# Patient Record
Sex: FEMALE | Race: WHITE | Employment: OTHER | URBAN - METROPOLITAN AREA
[De-identification: names, ages, dates, MRNs, and addresses within clinical notes are randomized per-mention and may not be internally consistent; named-entity substitution may affect disease eponyms.]

---

## 2017-03-30 ENCOUNTER — OFFICE VISIT (OUTPATIENT)
Dept: FAMILY MEDICINE CLINIC | Age: 57
End: 2017-03-30

## 2017-03-30 VITALS
HEART RATE: 65 BPM | WEIGHT: 244.8 LBS | SYSTOLIC BLOOD PRESSURE: 128 MMHG | TEMPERATURE: 98.7 F | HEIGHT: 65 IN | OXYGEN SATURATION: 98 % | DIASTOLIC BLOOD PRESSURE: 80 MMHG | BODY MASS INDEX: 40.79 KG/M2

## 2017-03-30 DIAGNOSIS — L03.311 CELLULITIS OF ABDOMINAL WALL: Primary | ICD-10-CM

## 2017-03-30 DIAGNOSIS — J30.2 SEASONAL ALLERGIC RHINITIS, UNSPECIFIED ALLERGIC RHINITIS TRIGGER: ICD-10-CM

## 2017-03-30 PROCEDURE — 99213 OFFICE O/P EST LOW 20 MIN: CPT | Performed by: FAMILY MEDICINE

## 2017-03-30 PROCEDURE — G8417 CALC BMI ABV UP PARAM F/U: HCPCS | Performed by: FAMILY MEDICINE

## 2017-03-30 PROCEDURE — G8484 FLU IMMUNIZE NO ADMIN: HCPCS | Performed by: FAMILY MEDICINE

## 2017-03-30 PROCEDURE — G8427 DOCREV CUR MEDS BY ELIG CLIN: HCPCS | Performed by: FAMILY MEDICINE

## 2017-03-30 PROCEDURE — 3014F SCREEN MAMMO DOC REV: CPT | Performed by: FAMILY MEDICINE

## 2017-03-30 PROCEDURE — 3017F COLORECTAL CA SCREEN DOC REV: CPT | Performed by: FAMILY MEDICINE

## 2017-03-30 PROCEDURE — 1036F TOBACCO NON-USER: CPT | Performed by: FAMILY MEDICINE

## 2017-03-30 RX ORDER — ATORVASTATIN CALCIUM 40 MG/1
40 TABLET, FILM COATED ORAL DAILY
COMMUNITY
Start: 2017-03-14 | End: 2020-11-05

## 2017-03-30 RX ORDER — SPIRONOLACTONE 25 MG/1
25 TABLET ORAL DAILY
Status: ON HOLD | COMMUNITY
Start: 2017-03-14 | End: 2018-03-09 | Stop reason: ALTCHOICE

## 2017-03-30 RX ORDER — LOSARTAN POTASSIUM 100 MG/1
50 TABLET ORAL DAILY
COMMUNITY
Start: 2017-03-14 | End: 2017-07-31

## 2017-03-30 RX ORDER — CLINDAMYCIN HYDROCHLORIDE 300 MG/1
300 CAPSULE ORAL 3 TIMES DAILY
Qty: 21 CAPSULE | Refills: 0 | Status: SHIPPED | OUTPATIENT
Start: 2017-03-30 | End: 2017-04-06

## 2017-03-30 ASSESSMENT — ENCOUNTER SYMPTOMS
COUGH: 0
WHEEZING: 0
DIARRHEA: 0
RHINORRHEA: 0
CONSTIPATION: 0
SHORTNESS OF BREATH: 0
SORE THROAT: 0
ABDOMINAL PAIN: 0

## 2017-04-03 ENCOUNTER — HOSPITAL ENCOUNTER (OUTPATIENT)
Age: 57
Setting detail: SPECIMEN
Discharge: HOME | End: 2017-04-03
Payer: COMMERCIAL

## 2017-04-03 ENCOUNTER — NURSE ONLY (OUTPATIENT)
Dept: FAMILY MEDICINE CLINIC | Age: 57
End: 2017-04-03

## 2017-04-03 DIAGNOSIS — E55.9 HYPOVITAMINOSIS D: ICD-10-CM

## 2017-04-03 DIAGNOSIS — I48.0 PAROXYSMAL ATRIAL FIBRILLATION (HCC): ICD-10-CM

## 2017-04-03 DIAGNOSIS — E78.5 DYSLIPIDEMIA: ICD-10-CM

## 2017-04-03 DIAGNOSIS — I10 ESSENTIAL HYPERTENSION: Primary | Chronic | ICD-10-CM

## 2017-04-03 DIAGNOSIS — I10 ESSENTIAL HYPERTENSION: Chronic | ICD-10-CM

## 2017-04-03 LAB
ANION GAP SERPL CALCULATED.3IONS-SCNC: 12 MEQ/L (ref 7–13)
BASOPHILS ABSOLUTE: 0 K/UL (ref 0–0.2)
BASOPHILS RELATIVE PERCENT: 0.5 %
BUN BLDV-MCNC: 18 MG/DL (ref 6–20)
CALCIUM SERPL-MCNC: 10.1 MG/DL (ref 8.6–10.2)
CHLORIDE BLD-SCNC: 99 MEQ/L (ref 98–107)
CHOLESTEROL, TOTAL: 143 MG/DL (ref 0–199)
CO2: 26 MEQ/L (ref 22–29)
CREAT SERPL-MCNC: 0.81 MG/DL (ref 0.5–0.9)
EOSINOPHILS ABSOLUTE: 0.2 K/UL (ref 0–0.7)
EOSINOPHILS RELATIVE PERCENT: 1.8 %
GFR AFRICAN AMERICAN: >60
GFR NON-AFRICAN AMERICAN: >60
GLUCOSE BLD-MCNC: 102 MG/DL (ref 74–109)
HCT VFR BLD CALC: 44 % (ref 37–47)
HDLC SERPL-MCNC: 49 MG/DL (ref 40–59)
HEMOGLOBIN: 14.5 G/DL (ref 12–16)
LDL CHOLESTEROL CALCULATED: 75 MG/DL (ref 0–129)
LYMPHOCYTES ABSOLUTE: 2.3 K/UL (ref 1–4.8)
LYMPHOCYTES RELATIVE PERCENT: 25.1 %
MCH RBC QN AUTO: 29.3 PG (ref 27–31.3)
MCHC RBC AUTO-ENTMCNC: 32.9 % (ref 33–37)
MCV RBC AUTO: 88.9 FL (ref 82–100)
MONOCYTES ABSOLUTE: 0.7 K/UL (ref 0.2–0.8)
MONOCYTES RELATIVE PERCENT: 7.5 %
NEUTROPHILS ABSOLUTE: 6 K/UL (ref 1.4–6.5)
NEUTROPHILS RELATIVE PERCENT: 65.1 %
PDW BLD-RTO: 14.2 % (ref 11.5–14.5)
PLATELET # BLD: 227 K/UL (ref 130–400)
POTASSIUM SERPL-SCNC: 4 MEQ/L (ref 3.5–5.1)
RBC # BLD: 4.96 M/UL (ref 4.2–5.4)
SODIUM BLD-SCNC: 137 MEQ/L (ref 132–144)
TRIGL SERPL-MCNC: 97 MG/DL (ref 0–200)
VITAMIN D 25-HYDROXY: 23.1 NG/ML (ref 30–100)
WBC # BLD: 9.2 K/UL (ref 4.8–10.8)

## 2017-04-03 PROCEDURE — 82306 VITAMIN D 25 HYDROXY: CPT

## 2017-04-03 PROCEDURE — 80061 LIPID PANEL: CPT

## 2017-04-03 PROCEDURE — 80048 BASIC METABOLIC PNL TOTAL CA: CPT

## 2017-04-03 PROCEDURE — 36415 COLL VENOUS BLD VENIPUNCTURE: CPT | Performed by: FAMILY MEDICINE

## 2017-04-03 PROCEDURE — 85025 COMPLETE CBC W/AUTO DIFF WBC: CPT

## 2017-04-05 DIAGNOSIS — R79.89 LOW VITAMIN D LEVEL: Primary | ICD-10-CM

## 2017-04-10 ENCOUNTER — OFFICE VISIT (OUTPATIENT)
Dept: FAMILY MEDICINE CLINIC | Age: 57
End: 2017-04-10

## 2017-04-10 VITALS
BODY MASS INDEX: 40.8 KG/M2 | OXYGEN SATURATION: 93 % | DIASTOLIC BLOOD PRESSURE: 84 MMHG | SYSTOLIC BLOOD PRESSURE: 132 MMHG | TEMPERATURE: 98.2 F | HEART RATE: 66 BPM | HEIGHT: 64 IN | WEIGHT: 239 LBS

## 2017-04-10 DIAGNOSIS — F41.9 ANXIETY: ICD-10-CM

## 2017-04-10 DIAGNOSIS — Z00.00 ANNUAL PHYSICAL EXAM: ICD-10-CM

## 2017-04-10 DIAGNOSIS — J06.9 URI WITH COUGH AND CONGESTION: Primary | ICD-10-CM

## 2017-04-10 DIAGNOSIS — L73.9 FOLLICULITIS: ICD-10-CM

## 2017-04-10 DIAGNOSIS — R09.81 NASAL CONGESTION: ICD-10-CM

## 2017-04-10 PROCEDURE — 3014F SCREEN MAMMO DOC REV: CPT | Performed by: FAMILY MEDICINE

## 2017-04-10 PROCEDURE — G8417 CALC BMI ABV UP PARAM F/U: HCPCS | Performed by: FAMILY MEDICINE

## 2017-04-10 PROCEDURE — 99214 OFFICE O/P EST MOD 30 MIN: CPT | Performed by: FAMILY MEDICINE

## 2017-04-10 PROCEDURE — 1036F TOBACCO NON-USER: CPT | Performed by: FAMILY MEDICINE

## 2017-04-10 PROCEDURE — 3017F COLORECTAL CA SCREEN DOC REV: CPT | Performed by: FAMILY MEDICINE

## 2017-04-10 PROCEDURE — G8427 DOCREV CUR MEDS BY ELIG CLIN: HCPCS | Performed by: FAMILY MEDICINE

## 2017-04-10 RX ORDER — POTASSIUM CHLORIDE 20 MEQ/1
20 TABLET, EXTENDED RELEASE ORAL DAILY
Status: ON HOLD | COMMUNITY
Start: 2017-03-16 | End: 2017-11-12

## 2017-04-10 RX ORDER — OXYMETAZOLINE HYDROCHLORIDE 0.05 G/100ML
2 SPRAY NASAL 2 TIMES DAILY
Qty: 14.7 ML | Refills: 0 | Status: SHIPPED | OUTPATIENT
Start: 2017-04-10 | End: 2017-04-13

## 2017-04-10 RX ORDER — PSEUDOEPHEDRINE HCL 120 MG/1
120 TABLET, FILM COATED, EXTENDED RELEASE ORAL EVERY 12 HOURS
Qty: 14 TABLET | Refills: 0 | Status: SHIPPED | OUTPATIENT
Start: 2017-04-10 | End: 2017-04-17

## 2017-04-10 RX ORDER — PAROXETINE HYDROCHLORIDE 20 MG/1
20 TABLET, FILM COATED ORAL EVERY MORNING
Qty: 90 TABLET | Refills: 3 | Status: SHIPPED | OUTPATIENT
Start: 2017-04-10 | End: 2018-04-16

## 2017-04-10 ASSESSMENT — ENCOUNTER SYMPTOMS
RHINORRHEA: 0
CONSTIPATION: 0
SHORTNESS OF BREATH: 0
SORE THROAT: 0
DIARRHEA: 0
ABDOMINAL PAIN: 0
WHEEZING: 0
COUGH: 0

## 2017-04-10 ASSESSMENT — PATIENT HEALTH QUESTIONNAIRE - PHQ9
2. FEELING DOWN, DEPRESSED OR HOPELESS: 0
SUM OF ALL RESPONSES TO PHQ9 QUESTIONS 1 & 2: 0
1. LITTLE INTEREST OR PLEASURE IN DOING THINGS: 0
SUM OF ALL RESPONSES TO PHQ QUESTIONS 1-9: 0

## 2017-04-19 ENCOUNTER — TELEPHONE (OUTPATIENT)
Dept: FAMILY MEDICINE CLINIC | Age: 57
End: 2017-04-19

## 2017-04-19 DIAGNOSIS — B37.31 VAGINAL CANDIDA: Primary | ICD-10-CM

## 2017-04-19 RX ORDER — FLUCONAZOLE 150 MG/1
150 TABLET ORAL ONCE
Qty: 1 TABLET | Refills: 0 | Status: SHIPPED | OUTPATIENT
Start: 2017-04-19 | End: 2017-04-19

## 2017-05-24 ENCOUNTER — HOSPITAL ENCOUNTER (EMERGENCY)
Age: 57
Discharge: HOME | End: 2017-05-24
Attending: EMERGENCY MEDICINE
Payer: COMMERCIAL

## 2017-05-24 VITALS
HEART RATE: 56 BPM | TEMPERATURE: 98 F | SYSTOLIC BLOOD PRESSURE: 111 MMHG | OXYGEN SATURATION: 96 % | DIASTOLIC BLOOD PRESSURE: 59 MMHG | RESPIRATION RATE: 10 BRPM | WEIGHT: 220 LBS | BODY MASS INDEX: 37.56 KG/M2 | HEIGHT: 64 IN

## 2017-05-24 DIAGNOSIS — M79.89 LEG SWELLING: Primary | ICD-10-CM

## 2017-05-24 LAB
ALBUMIN SERPL-MCNC: 4.3 G/DL (ref 3.9–4.9)
ALP BLD-CCNC: 105 U/L (ref 40–130)
ALT SERPL-CCNC: 16 U/L (ref 0–33)
ANION GAP SERPL CALCULATED.3IONS-SCNC: 14 MEQ/L (ref 7–13)
AST SERPL-CCNC: 17 U/L (ref 0–35)
BASOPHILS ABSOLUTE: 0 K/UL (ref 0–0.2)
BASOPHILS RELATIVE PERCENT: 0.2 %
BILIRUB SERPL-MCNC: 0.5 MG/DL (ref 0–1.2)
BUN BLDV-MCNC: 27 MG/DL (ref 6–20)
CALCIUM SERPL-MCNC: 10 MG/DL (ref 8.6–10.2)
CHLORIDE BLD-SCNC: 98 MEQ/L (ref 98–107)
CO2: 23 MEQ/L (ref 22–29)
CREAT SERPL-MCNC: 1.17 MG/DL (ref 0.5–0.9)
EOSINOPHILS ABSOLUTE: 0.2 K/UL (ref 0–0.7)
EOSINOPHILS RELATIVE PERCENT: 2.2 %
GFR AFRICAN AMERICAN: 57.2
GFR NON-AFRICAN AMERICAN: 47.3
GLOBULIN: 3.6 G/DL (ref 2.3–3.5)
GLUCOSE BLD-MCNC: 99 MG/DL (ref 74–109)
HCT VFR BLD CALC: 40.6 % (ref 37–47)
HEMOGLOBIN: 13.8 G/DL (ref 12–16)
INR BLD: 0.9
LYMPHOCYTES ABSOLUTE: 1.6 K/UL (ref 1–4.8)
LYMPHOCYTES RELATIVE PERCENT: 14.9 %
MCH RBC QN AUTO: 30.1 PG (ref 27–31.3)
MCHC RBC AUTO-ENTMCNC: 33.9 % (ref 33–37)
MCV RBC AUTO: 88.9 FL (ref 82–100)
MONOCYTES ABSOLUTE: 0.9 K/UL (ref 0.2–0.8)
MONOCYTES RELATIVE PERCENT: 8.9 %
NEUTROPHILS ABSOLUTE: 7.9 K/UL (ref 1.4–6.5)
NEUTROPHILS RELATIVE PERCENT: 73.8 %
PDW BLD-RTO: 13.6 % (ref 11.5–14.5)
PLATELET # BLD: 300 K/UL (ref 130–400)
POTASSIUM SERPL-SCNC: 4.7 MEQ/L (ref 3.5–5.1)
PRO-BNP: 164 PG/ML
PROTHROMBIN TIME: 9.8 SEC (ref 9.6–12.3)
RBC # BLD: 4.57 M/UL (ref 4.2–5.4)
SODIUM BLD-SCNC: 135 MEQ/L (ref 132–144)
TOTAL PROTEIN: 7.9 G/DL (ref 6.4–8.1)
TSH SERPL DL<=0.05 MIU/L-ACNC: 2.95 UIU/ML (ref 0.27–4.2)
WBC # BLD: 10.7 K/UL (ref 4.8–10.8)

## 2017-05-24 PROCEDURE — 96374 THER/PROPH/DIAG INJ IV PUSH: CPT

## 2017-05-24 PROCEDURE — 85025 COMPLETE CBC W/AUTO DIFF WBC: CPT

## 2017-05-24 PROCEDURE — 83880 ASSAY OF NATRIURETIC PEPTIDE: CPT

## 2017-05-24 PROCEDURE — 84443 ASSAY THYROID STIM HORMONE: CPT

## 2017-05-24 PROCEDURE — 99284 EMERGENCY DEPT VISIT MOD MDM: CPT

## 2017-05-24 PROCEDURE — 93005 ELECTROCARDIOGRAM TRACING: CPT

## 2017-05-24 PROCEDURE — 80053 COMPREHEN METABOLIC PANEL: CPT

## 2017-05-24 PROCEDURE — 85610 PROTHROMBIN TIME: CPT

## 2017-05-24 PROCEDURE — 6360000002 HC RX W HCPCS: Performed by: EMERGENCY MEDICINE

## 2017-05-24 RX ORDER — AMLODIPINE BESYLATE 10 MG/1
10 TABLET ORAL DAILY
COMMUNITY
End: 2021-05-24

## 2017-05-24 RX ORDER — FUROSEMIDE 10 MG/ML
20 INJECTION INTRAMUSCULAR; INTRAVENOUS ONCE
Status: COMPLETED | OUTPATIENT
Start: 2017-05-24 | End: 2017-05-24

## 2017-05-24 RX ORDER — FUROSEMIDE 20 MG/1
20 TABLET ORAL DAILY
Qty: 10 TABLET | Refills: 0 | Status: SHIPPED | OUTPATIENT
Start: 2017-05-24 | End: 2017-07-31

## 2017-05-24 RX ADMIN — FUROSEMIDE 20 MG: 10 INJECTION, SOLUTION INTRAMUSCULAR; INTRAVENOUS at 19:50

## 2017-05-24 ASSESSMENT — ENCOUNTER SYMPTOMS
VOMITING: 0
SORE THROAT: 0
EYE REDNESS: 0
CHOKING: 0
WHEEZING: 0
TROUBLE SWALLOWING: 0
CHEST TIGHTNESS: 0
COUGH: 0
EYE PAIN: 0
SINUS PRESSURE: 0
BACK PAIN: 0
STRIDOR: 0
SHORTNESS OF BREATH: 0
ABDOMINAL PAIN: 0
BLOOD IN STOOL: 0
EYE DISCHARGE: 0
CONSTIPATION: 0
FACIAL SWELLING: 0
VOICE CHANGE: 0
DIARRHEA: 0

## 2017-05-26 ENCOUNTER — TELEPHONE (OUTPATIENT)
Dept: FAMILY MEDICINE CLINIC | Age: 57
End: 2017-05-26

## 2017-05-26 LAB
EKG ATRIAL RATE: 56 BPM
EKG P AXIS: 51 DEGREES
EKG P-R INTERVAL: 128 MS
EKG Q-T INTERVAL: 420 MS
EKG QRS DURATION: 90 MS
EKG QTC CALCULATION (BAZETT): 405 MS
EKG R AXIS: -9 DEGREES
EKG T AXIS: 36 DEGREES
EKG VENTRICULAR RATE: 56 BPM

## 2017-06-13 ENCOUNTER — HOSPITAL ENCOUNTER (OUTPATIENT)
Dept: LAB | Age: 57
Discharge: HOME | End: 2017-06-13
Payer: COMMERCIAL

## 2017-06-13 LAB
ANION GAP SERPL CALCULATED.3IONS-SCNC: 15 MEQ/L (ref 7–13)
BUN BLDV-MCNC: 26 MG/DL (ref 6–20)
CALCIUM SERPL-MCNC: 9.6 MG/DL (ref 8.6–10.2)
CHLORIDE BLD-SCNC: 98 MEQ/L (ref 98–107)
CO2: 21 MEQ/L (ref 22–29)
CREAT SERPL-MCNC: 0.99 MG/DL (ref 0.5–0.9)
GFR AFRICAN AMERICAN: >60
GFR NON-AFRICAN AMERICAN: 57.4
GLUCOSE BLD-MCNC: 100 MG/DL (ref 74–109)
POTASSIUM SERPL-SCNC: 4.4 MEQ/L (ref 3.5–5.1)
SODIUM BLD-SCNC: 134 MEQ/L (ref 132–144)

## 2017-06-13 PROCEDURE — 80048 BASIC METABOLIC PNL TOTAL CA: CPT

## 2017-06-13 PROCEDURE — 36415 COLL VENOUS BLD VENIPUNCTURE: CPT

## 2017-07-10 ENCOUNTER — HOSPITAL ENCOUNTER (OUTPATIENT)
Dept: NUCLEAR MEDICINE | Age: 57
Discharge: HOME | End: 2017-07-10
Payer: COMMERCIAL

## 2017-07-10 ENCOUNTER — HOSPITAL ENCOUNTER (OUTPATIENT)
Dept: ULTRASOUND IMAGING | Age: 57
Discharge: HOME | End: 2017-07-10
Payer: COMMERCIAL

## 2017-07-10 DIAGNOSIS — M54.89 INTERSCAPULAR PAIN: ICD-10-CM

## 2017-07-10 PROCEDURE — 76705 ECHO EXAM OF ABDOMEN: CPT

## 2017-07-10 PROCEDURE — 3430000000 HC RX DIAGNOSTIC RADIOPHARMACEUTICAL: Performed by: INTERNAL MEDICINE

## 2017-07-10 PROCEDURE — 78227 HEPATOBIL SYST IMAGE W/DRUG: CPT

## 2017-07-10 PROCEDURE — A9537 TC99M MEBROFENIN: HCPCS | Performed by: INTERNAL MEDICINE

## 2017-07-10 RX ADMIN — Medication 6.9 MILLICURIE: at 10:00

## 2017-07-24 ENCOUNTER — OFFICE VISIT (OUTPATIENT)
Dept: SURGERY | Age: 57
End: 2017-07-24

## 2017-07-24 VITALS
DIASTOLIC BLOOD PRESSURE: 74 MMHG | SYSTOLIC BLOOD PRESSURE: 152 MMHG | HEIGHT: 65 IN | RESPIRATION RATE: 14 BRPM | HEART RATE: 56 BPM | BODY MASS INDEX: 40.65 KG/M2 | WEIGHT: 244 LBS | TEMPERATURE: 98.2 F

## 2017-07-24 DIAGNOSIS — K80.50 BILIARY COLIC: ICD-10-CM

## 2017-07-24 DIAGNOSIS — K80.10 CHRONIC CHOLECYSTITIS WITH CALCULUS: Primary | ICD-10-CM

## 2017-07-24 PROCEDURE — 3017F COLORECTAL CA SCREEN DOC REV: CPT | Performed by: SURGERY

## 2017-07-24 PROCEDURE — 3014F SCREEN MAMMO DOC REV: CPT | Performed by: SURGERY

## 2017-07-24 PROCEDURE — G8427 DOCREV CUR MEDS BY ELIG CLIN: HCPCS | Performed by: SURGERY

## 2017-07-24 PROCEDURE — 1036F TOBACCO NON-USER: CPT | Performed by: SURGERY

## 2017-07-24 PROCEDURE — 99202 OFFICE O/P NEW SF 15 MIN: CPT | Performed by: SURGERY

## 2017-07-24 PROCEDURE — G8417 CALC BMI ABV UP PARAM F/U: HCPCS | Performed by: SURGERY

## 2017-07-31 ENCOUNTER — HOSPITAL ENCOUNTER (OUTPATIENT)
Dept: PREADMISSION TESTING | Age: 57
Discharge: HOME | End: 2017-07-31
Payer: COMMERCIAL

## 2017-07-31 VITALS
DIASTOLIC BLOOD PRESSURE: 51 MMHG | HEART RATE: 53 BPM | RESPIRATION RATE: 16 BRPM | OXYGEN SATURATION: 96 % | TEMPERATURE: 96.9 F | SYSTOLIC BLOOD PRESSURE: 112 MMHG | HEIGHT: 65 IN | BODY MASS INDEX: 40.55 KG/M2 | WEIGHT: 243.39 LBS

## 2017-07-31 PROCEDURE — 93005 ELECTROCARDIOGRAM TRACING: CPT

## 2017-07-31 PROCEDURE — 85027 COMPLETE CBC AUTOMATED: CPT

## 2017-07-31 PROCEDURE — 80048 BASIC METABOLIC PNL TOTAL CA: CPT

## 2017-07-31 RX ORDER — SODIUM CHLORIDE 0.9 % (FLUSH) 0.9 %
10 SYRINGE (ML) INJECTION EVERY 12 HOURS SCHEDULED
Status: CANCELLED | OUTPATIENT
Start: 2017-07-31

## 2017-07-31 RX ORDER — SODIUM CHLORIDE, SODIUM LACTATE, POTASSIUM CHLORIDE, CALCIUM CHLORIDE 600; 310; 30; 20 MG/100ML; MG/100ML; MG/100ML; MG/100ML
INJECTION, SOLUTION INTRAVENOUS CONTINUOUS
Status: CANCELLED | OUTPATIENT
Start: 2017-07-31

## 2017-07-31 RX ORDER — SODIUM CHLORIDE 0.9 % (FLUSH) 0.9 %
10 SYRINGE (ML) INJECTION PRN
Status: CANCELLED | OUTPATIENT
Start: 2017-07-31

## 2017-07-31 RX ORDER — SODIUM CHLORIDE 9 MG/ML
INJECTION, SOLUTION INTRAVENOUS CONTINUOUS
Status: CANCELLED | OUTPATIENT
Start: 2017-08-01

## 2017-08-01 ENCOUNTER — HOSPITAL ENCOUNTER (OUTPATIENT)
Dept: GENERAL RADIOLOGY | Age: 57
Setting detail: OUTPATIENT SURGERY
Discharge: HOME | End: 2017-08-01
Attending: SURGERY
Payer: COMMERCIAL

## 2017-08-01 ENCOUNTER — ANESTHESIA EVENT (OUTPATIENT)
Dept: OPERATING ROOM | Age: 57
End: 2017-08-01
Payer: COMMERCIAL

## 2017-08-01 ENCOUNTER — ANESTHESIA (OUTPATIENT)
Dept: OPERATING ROOM | Age: 57
End: 2017-08-01
Payer: COMMERCIAL

## 2017-08-01 ENCOUNTER — HOSPITAL ENCOUNTER (OUTPATIENT)
Age: 57
Setting detail: OUTPATIENT SURGERY
Discharge: HOME | End: 2017-08-01
Attending: SURGERY | Admitting: SURGERY
Payer: COMMERCIAL

## 2017-08-01 VITALS — DIASTOLIC BLOOD PRESSURE: 55 MMHG | TEMPERATURE: 95.9 F | SYSTOLIC BLOOD PRESSURE: 108 MMHG | OXYGEN SATURATION: 88 %

## 2017-08-01 VITALS
TEMPERATURE: 97.3 F | BODY MASS INDEX: 40.48 KG/M2 | OXYGEN SATURATION: 93 % | WEIGHT: 243 LBS | HEART RATE: 60 BPM | DIASTOLIC BLOOD PRESSURE: 83 MMHG | RESPIRATION RATE: 18 BRPM | HEIGHT: 65 IN | SYSTOLIC BLOOD PRESSURE: 150 MMHG

## 2017-08-01 DIAGNOSIS — K80.50 STONES COMMON DUCT: ICD-10-CM

## 2017-08-01 PROBLEM — K80.10 CHRONIC CHOLECYSTITIS WITH CALCULUS: Status: ACTIVE | Noted: 2017-08-01

## 2017-08-01 LAB
ANION GAP SERPL CALCULATED.3IONS-SCNC: 19 MEQ/L (ref 7–13)
BUN BLDV-MCNC: 19 MG/DL (ref 6–20)
CALCIUM SERPL-MCNC: 9.6 MG/DL (ref 8.6–10.2)
CHLORIDE BLD-SCNC: 99 MEQ/L (ref 98–107)
CO2: 23 MEQ/L (ref 22–29)
CREAT SERPL-MCNC: 0.81 MG/DL (ref 0.5–0.9)
EKG ATRIAL RATE: 53 BPM
EKG P AXIS: 45 DEGREES
EKG P-R INTERVAL: 122 MS
EKG Q-T INTERVAL: 454 MS
EKG QRS DURATION: 94 MS
EKG QTC CALCULATION (BAZETT): 426 MS
EKG R AXIS: -9 DEGREES
EKG T AXIS: 21 DEGREES
EKG VENTRICULAR RATE: 53 BPM
GFR AFRICAN AMERICAN: >60
GFR NON-AFRICAN AMERICAN: >60
GLUCOSE BLD-MCNC: 92 MG/DL (ref 74–109)
HCT VFR BLD CALC: 42.4 % (ref 37–47)
HEMOGLOBIN: 14.1 G/DL (ref 12–16)
MCH RBC QN AUTO: 29.6 PG (ref 27–31.3)
MCHC RBC AUTO-ENTMCNC: 33.2 % (ref 33–37)
MCV RBC AUTO: 89.2 FL (ref 82–100)
PDW BLD-RTO: 13.3 % (ref 11.5–14.5)
PLATELET # BLD: 264 K/UL (ref 130–400)
POTASSIUM SERPL-SCNC: 4.4 MEQ/L (ref 3.5–5.1)
RBC # BLD: 4.75 M/UL (ref 4.2–5.4)
SODIUM BLD-SCNC: 141 MEQ/L (ref 132–144)
WBC # BLD: 9 K/UL (ref 4.8–10.8)

## 2017-08-01 PROCEDURE — 7100000000 HC PACU RECOVERY - FIRST 15 MIN: Performed by: SURGERY

## 2017-08-01 PROCEDURE — 6360000002 HC RX W HCPCS: Performed by: NURSE ANESTHETIST, CERTIFIED REGISTERED

## 2017-08-01 PROCEDURE — 6360000002 HC RX W HCPCS: Performed by: SURGERY

## 2017-08-01 PROCEDURE — 3700000001 HC ADD 15 MINUTES (ANESTHESIA): Performed by: SURGERY

## 2017-08-01 PROCEDURE — 7100000010 HC PHASE II RECOVERY - FIRST 15 MIN: Performed by: SURGERY

## 2017-08-01 PROCEDURE — 3600000014 HC SURGERY LEVEL 4 ADDTL 15MIN: Performed by: SURGERY

## 2017-08-01 PROCEDURE — 2580000003 HC RX 258: Performed by: SURGERY

## 2017-08-01 PROCEDURE — 6360000002 HC RX W HCPCS: Performed by: ANESTHESIOLOGY

## 2017-08-01 PROCEDURE — 7100000011 HC PHASE II RECOVERY - ADDTL 15 MIN: Performed by: SURGERY

## 2017-08-01 PROCEDURE — 2500000003 HC RX 250 WO HCPCS: Performed by: NURSE ANESTHETIST, CERTIFIED REGISTERED

## 2017-08-01 PROCEDURE — 2720000010 HC SURG SUPPLY STERILE: Performed by: SURGERY

## 2017-08-01 PROCEDURE — 2500000003 HC RX 250 WO HCPCS: Performed by: STUDENT IN AN ORGANIZED HEALTH CARE EDUCATION/TRAINING PROGRAM

## 2017-08-01 PROCEDURE — 6370000000 HC RX 637 (ALT 250 FOR IP): Performed by: SURGERY

## 2017-08-01 PROCEDURE — 3700000000 HC ANESTHESIA ATTENDED CARE: Performed by: SURGERY

## 2017-08-01 PROCEDURE — 6360000004 HC RX CONTRAST MEDICATION: Performed by: SURGERY

## 2017-08-01 PROCEDURE — 7100000001 HC PACU RECOVERY - ADDTL 15 MIN: Performed by: SURGERY

## 2017-08-01 PROCEDURE — 88304 TISSUE EXAM BY PATHOLOGIST: CPT

## 2017-08-01 PROCEDURE — 47562 LAPAROSCOPIC CHOLECYSTECTOMY: CPT | Performed by: SURGERY

## 2017-08-01 PROCEDURE — 3600000004 HC SURGERY LEVEL 4 BASE: Performed by: SURGERY

## 2017-08-01 RX ORDER — GLYCOPYRROLATE 0.2 MG/ML
INJECTION INTRAMUSCULAR; INTRAVENOUS PRN
Status: DISCONTINUED | OUTPATIENT
Start: 2017-08-01 | End: 2017-08-01 | Stop reason: SDUPTHER

## 2017-08-01 RX ORDER — HYDROCODONE BITARTRATE AND ACETAMINOPHEN 5; 325 MG/1; MG/1
2 TABLET ORAL EVERY 6 HOURS PRN
Qty: 40 TABLET | Refills: 0 | Status: SHIPPED | OUTPATIENT
Start: 2017-08-01 | End: 2017-08-08

## 2017-08-01 RX ORDER — FENTANYL CITRATE 50 UG/ML
50 INJECTION, SOLUTION INTRAMUSCULAR; INTRAVENOUS EVERY 10 MIN PRN
Status: DISCONTINUED | OUTPATIENT
Start: 2017-08-01 | End: 2017-08-01 | Stop reason: HOSPADM

## 2017-08-01 RX ORDER — SODIUM CHLORIDE 9 MG/ML
INJECTION, SOLUTION INTRAVENOUS CONTINUOUS
Status: DISCONTINUED | OUTPATIENT
Start: 2017-08-01 | End: 2017-08-01 | Stop reason: HOSPADM

## 2017-08-01 RX ORDER — ROCURONIUM BROMIDE 10 MG/ML
INJECTION, SOLUTION INTRAVENOUS PRN
Status: DISCONTINUED | OUTPATIENT
Start: 2017-08-01 | End: 2017-08-01 | Stop reason: SDUPTHER

## 2017-08-01 RX ORDER — MAGNESIUM HYDROXIDE 1200 MG/15ML
LIQUID ORAL CONTINUOUS PRN
Status: DISCONTINUED | OUTPATIENT
Start: 2017-08-01 | End: 2017-08-01 | Stop reason: HOSPADM

## 2017-08-01 RX ORDER — MEPERIDINE HYDROCHLORIDE 25 MG/ML
12.5 INJECTION INTRAMUSCULAR; INTRAVENOUS; SUBCUTANEOUS EVERY 5 MIN PRN
Status: DISCONTINUED | OUTPATIENT
Start: 2017-08-01 | End: 2017-08-01 | Stop reason: HOSPADM

## 2017-08-01 RX ORDER — MORPHINE SULFATE 2 MG/ML
2 INJECTION, SOLUTION INTRAMUSCULAR; INTRAVENOUS
Status: DISCONTINUED | OUTPATIENT
Start: 2017-08-01 | End: 2017-08-01 | Stop reason: HOSPADM

## 2017-08-01 RX ORDER — DIPHENHYDRAMINE HYDROCHLORIDE 50 MG/ML
INJECTION INTRAMUSCULAR; INTRAVENOUS PRN
Status: DISCONTINUED | OUTPATIENT
Start: 2017-08-01 | End: 2017-08-01 | Stop reason: SDUPTHER

## 2017-08-01 RX ORDER — SUCCINYLCHOLINE CHLORIDE 20 MG/ML
INJECTION INTRAMUSCULAR; INTRAVENOUS PRN
Status: DISCONTINUED | OUTPATIENT
Start: 2017-08-01 | End: 2017-08-01 | Stop reason: SDUPTHER

## 2017-08-01 RX ORDER — PROPOFOL 10 MG/ML
INJECTION, EMULSION INTRAVENOUS PRN
Status: DISCONTINUED | OUTPATIENT
Start: 2017-08-01 | End: 2017-08-01 | Stop reason: SDUPTHER

## 2017-08-01 RX ORDER — KETOROLAC TROMETHAMINE 30 MG/ML
30 INJECTION, SOLUTION INTRAMUSCULAR; INTRAVENOUS EVERY 6 HOURS
Status: DISCONTINUED | OUTPATIENT
Start: 2017-08-01 | End: 2017-08-01 | Stop reason: HOSPADM

## 2017-08-01 RX ORDER — DEXAMETHASONE SODIUM PHOSPHATE 10 MG/ML
INJECTION INTRAMUSCULAR; INTRAVENOUS PRN
Status: DISCONTINUED | OUTPATIENT
Start: 2017-08-01 | End: 2017-08-01 | Stop reason: SDUPTHER

## 2017-08-01 RX ORDER — LIDOCAINE HYDROCHLORIDE 20 MG/ML
INJECTION, SOLUTION EPIDURAL; INFILTRATION; INTRACAUDAL; PERINEURAL PRN
Status: DISCONTINUED | OUTPATIENT
Start: 2017-08-01 | End: 2017-08-01 | Stop reason: SDUPTHER

## 2017-08-01 RX ORDER — ONDANSETRON 2 MG/ML
4 INJECTION INTRAMUSCULAR; INTRAVENOUS EVERY 6 HOURS PRN
Status: DISCONTINUED | OUTPATIENT
Start: 2017-08-01 | End: 2017-08-01 | Stop reason: HOSPADM

## 2017-08-01 RX ORDER — HYDROMORPHONE HCL 110MG/55ML
PATIENT CONTROLLED ANALGESIA SYRINGE INTRAVENOUS PRN
Status: DISCONTINUED | OUTPATIENT
Start: 2017-08-01 | End: 2017-08-01 | Stop reason: SDUPTHER

## 2017-08-01 RX ORDER — HYDROCODONE BITARTRATE AND ACETAMINOPHEN 5; 325 MG/1; MG/1
1 TABLET ORAL EVERY 4 HOURS PRN
Status: DISCONTINUED | OUTPATIENT
Start: 2017-08-01 | End: 2017-08-01 | Stop reason: HOSPADM

## 2017-08-01 RX ORDER — WOUND DRESSING ADHESIVE - LIQUID
LIQUID MISCELLANEOUS PRN
Status: DISCONTINUED | OUTPATIENT
Start: 2017-08-01 | End: 2017-08-01 | Stop reason: HOSPADM

## 2017-08-01 RX ORDER — DOCUSATE SODIUM 100 MG/1
100 CAPSULE, LIQUID FILLED ORAL 2 TIMES DAILY
Status: DISCONTINUED | OUTPATIENT
Start: 2017-08-01 | End: 2017-08-01 | Stop reason: HOSPADM

## 2017-08-01 RX ORDER — MORPHINE SULFATE 4 MG/ML
4 INJECTION, SOLUTION INTRAMUSCULAR; INTRAVENOUS
Status: DISCONTINUED | OUTPATIENT
Start: 2017-08-01 | End: 2017-08-01 | Stop reason: HOSPADM

## 2017-08-01 RX ORDER — ACETAMINOPHEN 650 MG/1
650 SUPPOSITORY RECTAL EVERY 4 HOURS PRN
Status: DISCONTINUED | OUTPATIENT
Start: 2017-08-01 | End: 2017-08-01 | Stop reason: HOSPADM

## 2017-08-01 RX ORDER — PROMETHAZINE HYDROCHLORIDE 25 MG/ML
6.25 INJECTION, SOLUTION INTRAMUSCULAR; INTRAVENOUS
Status: COMPLETED | OUTPATIENT
Start: 2017-08-01 | End: 2017-08-01

## 2017-08-01 RX ORDER — HYDROCODONE BITARTRATE AND ACETAMINOPHEN 5; 325 MG/1; MG/1
2 TABLET ORAL PRN
Status: DISCONTINUED | OUTPATIENT
Start: 2017-08-01 | End: 2017-08-01 | Stop reason: HOSPADM

## 2017-08-01 RX ORDER — HYDROCODONE BITARTRATE AND ACETAMINOPHEN 5; 325 MG/1; MG/1
1 TABLET ORAL PRN
Status: DISCONTINUED | OUTPATIENT
Start: 2017-08-01 | End: 2017-08-01 | Stop reason: HOSPADM

## 2017-08-01 RX ORDER — LIDOCAINE HYDROCHLORIDE 10 MG/ML
1 INJECTION, SOLUTION EPIDURAL; INFILTRATION; INTRACAUDAL; PERINEURAL
Status: COMPLETED | OUTPATIENT
Start: 2017-08-01 | End: 2017-08-01

## 2017-08-01 RX ORDER — ACETAMINOPHEN 325 MG/1
650 TABLET ORAL EVERY 4 HOURS PRN
Status: DISCONTINUED | OUTPATIENT
Start: 2017-08-01 | End: 2017-08-01 | Stop reason: HOSPADM

## 2017-08-01 RX ADMIN — MORPHINE SULFATE 2 MG: 2 INJECTION, SOLUTION INTRAMUSCULAR; INTRAVENOUS at 14:25

## 2017-08-01 RX ADMIN — SODIUM CHLORIDE: 900 INJECTION, SOLUTION INTRAVENOUS at 08:58

## 2017-08-01 RX ADMIN — GLYCOPYRROLATE 0.2 MG: 0.2 INJECTION INTRAMUSCULAR; INTRAVENOUS at 11:35

## 2017-08-01 RX ADMIN — DIPHENHYDRAMINE HYDROCHLORIDE 12.5 MG: 50 INJECTION, SOLUTION INTRAMUSCULAR; INTRAVENOUS at 11:27

## 2017-08-01 RX ADMIN — LIDOCAINE HYDROCHLORIDE 0.1 ML: 10 INJECTION, SOLUTION EPIDURAL; INFILTRATION; INTRACAUDAL; PERINEURAL at 08:57

## 2017-08-01 RX ADMIN — CEFTRIAXONE SODIUM 1 G: 1 INJECTION, POWDER, FOR SOLUTION INTRAMUSCULAR; INTRAVENOUS at 11:20

## 2017-08-01 RX ADMIN — HYDROMORPHONE HYDROCHLORIDE 0.4 MG: 2 INJECTION, SOLUTION INTRAMUSCULAR; INTRAVENOUS; SUBCUTANEOUS at 12:00

## 2017-08-01 RX ADMIN — ONDANSETRON 4 MG: 2 INJECTION INTRAMUSCULAR; INTRAVENOUS at 15:04

## 2017-08-01 RX ADMIN — LIDOCAINE HYDROCHLORIDE 60 MG: 20 INJECTION, SOLUTION EPIDURAL; INFILTRATION; INTRACAUDAL; PERINEURAL at 11:16

## 2017-08-01 RX ADMIN — FENTANYL CITRATE 50 MCG: 50 INJECTION, SOLUTION INTRAMUSCULAR; INTRAVENOUS at 13:13

## 2017-08-01 RX ADMIN — HYDROMORPHONE HYDROCHLORIDE 0.4 MG: 2 INJECTION, SOLUTION INTRAMUSCULAR; INTRAVENOUS; SUBCUTANEOUS at 12:05

## 2017-08-01 RX ADMIN — FENTANYL CITRATE 50 MCG: 50 INJECTION, SOLUTION INTRAMUSCULAR; INTRAVENOUS at 13:28

## 2017-08-01 RX ADMIN — PROMETHAZINE HYDROCHLORIDE 6.25 MG: 25 INJECTION INTRAMUSCULAR; INTRAVENOUS at 15:47

## 2017-08-01 RX ADMIN — FENTANYL CITRATE 100 MCG: 50 INJECTION, SOLUTION INTRAMUSCULAR; INTRAVENOUS at 11:16

## 2017-08-01 RX ADMIN — PROPOFOL 200 MG: 10 INJECTION, EMULSION INTRAVENOUS at 11:16

## 2017-08-01 RX ADMIN — SODIUM CHLORIDE: 900 INJECTION, SOLUTION INTRAVENOUS at 12:15

## 2017-08-01 RX ADMIN — SUGAMMADEX 400 MG: 100 INJECTION, SOLUTION INTRAVENOUS at 12:15

## 2017-08-01 RX ADMIN — SUCCINYLCHOLINE CHLORIDE 160 MG: 20 INJECTION, SOLUTION INTRAMUSCULAR; INTRAVENOUS at 11:16

## 2017-08-01 RX ADMIN — DEXAMETHASONE SODIUM PHOSPHATE 8 MG: 10 INJECTION INTRAMUSCULAR; INTRAVENOUS at 11:27

## 2017-08-01 RX ADMIN — ROCURONIUM BROMIDE 50 MG: 50 INJECTION, SOLUTION INTRAVENOUS at 11:23

## 2017-08-01 ASSESSMENT — PAIN DESCRIPTION - LOCATION: LOCATION: ABDOMEN

## 2017-08-01 ASSESSMENT — PAIN SCALES - GENERAL
PAINLEVEL_OUTOF10: 0
PAINLEVEL_OUTOF10: 8
PAINLEVEL_OUTOF10: 8
PAINLEVEL_OUTOF10: 7
PAINLEVEL_OUTOF10: 8

## 2017-08-01 ASSESSMENT — PAIN DESCRIPTION - ONSET: ONSET: GRADUAL

## 2017-08-01 ASSESSMENT — PAIN DESCRIPTION - FREQUENCY: FREQUENCY: CONTINUOUS

## 2017-08-01 ASSESSMENT — PAIN DESCRIPTION - DESCRIPTORS: DESCRIPTORS: SHARP

## 2017-08-01 ASSESSMENT — PAIN DESCRIPTION - PAIN TYPE: TYPE: ACUTE PAIN;SURGICAL PAIN

## 2017-08-01 ASSESSMENT — PAIN - FUNCTIONAL ASSESSMENT: PAIN_FUNCTIONAL_ASSESSMENT: 0-10

## 2017-08-14 ENCOUNTER — OFFICE VISIT (OUTPATIENT)
Dept: SURGERY | Age: 57
End: 2017-08-14

## 2017-08-14 VITALS
HEART RATE: 62 BPM | WEIGHT: 240 LBS | SYSTOLIC BLOOD PRESSURE: 153 MMHG | RESPIRATION RATE: 12 BRPM | TEMPERATURE: 98.1 F | BODY MASS INDEX: 39.99 KG/M2 | HEIGHT: 65 IN | DIASTOLIC BLOOD PRESSURE: 91 MMHG

## 2017-08-14 DIAGNOSIS — Z09 SURGERY FOLLOW-UP: Primary | ICD-10-CM

## 2017-08-14 PROCEDURE — 99024 POSTOP FOLLOW-UP VISIT: CPT | Performed by: SURGERY

## 2017-09-27 ENCOUNTER — HOSPITAL ENCOUNTER (OUTPATIENT)
Age: 57
Setting detail: OBSERVATION
Discharge: HOME | End: 2017-09-28
Attending: INTERNAL MEDICINE | Admitting: INTERNAL MEDICINE
Payer: COMMERCIAL

## 2017-09-27 ENCOUNTER — APPOINTMENT (OUTPATIENT)
Dept: GENERAL RADIOLOGY | Age: 57
End: 2017-09-27
Payer: COMMERCIAL

## 2017-09-27 DIAGNOSIS — I48.0 PAROXYSMAL ATRIAL FIBRILLATION (HCC): Primary | ICD-10-CM

## 2017-09-27 DIAGNOSIS — R07.9 CHEST PAIN, UNSPECIFIED TYPE: ICD-10-CM

## 2017-09-27 DIAGNOSIS — Z79.899 HIGH RISK MEDICATION USE: ICD-10-CM

## 2017-09-27 LAB
ALBUMIN SERPL-MCNC: 4.5 G/DL (ref 3.9–4.9)
ALP BLD-CCNC: 142 U/L (ref 40–130)
ALT SERPL-CCNC: 23 U/L (ref 0–33)
ANION GAP SERPL CALCULATED.3IONS-SCNC: 17 MEQ/L (ref 7–13)
APTT: 22.9 SEC (ref 21.6–35.4)
AST SERPL-CCNC: 28 U/L (ref 0–35)
BILIRUB SERPL-MCNC: 0.2 MG/DL (ref 0–1.2)
BILIRUBIN URINE: NEGATIVE
BLOOD, URINE: NEGATIVE
BUN BLDV-MCNC: 18 MG/DL (ref 6–20)
CALCIUM SERPL-MCNC: 9.7 MG/DL (ref 8.6–10.2)
CHLORIDE BLD-SCNC: 97 MEQ/L (ref 98–107)
CK MB: 3.5 NG/ML (ref 0–3.8)
CLARITY: CLEAR
CO2: 26 MEQ/L (ref 22–29)
COLOR: YELLOW
CREAT SERPL-MCNC: 0.74 MG/DL (ref 0.5–0.9)
CREATINE KINASE-MB INDEX: 2 % (ref 0–3.5)
GFR AFRICAN AMERICAN: >60
GFR NON-AFRICAN AMERICAN: >60
GLOBULIN: 3.7 G/DL (ref 2.3–3.5)
GLUCOSE BLD-MCNC: 86 MG/DL (ref 74–109)
GLUCOSE URINE: NEGATIVE MG/DL
HCT VFR BLD CALC: 45.7 % (ref 37–47)
HEMOGLOBIN: 15.1 G/DL (ref 12–16)
INR BLD: 1
KETONES, URINE: NEGATIVE MG/DL
LEUKOCYTE ESTERASE, URINE: NEGATIVE
MAGNESIUM: 2.2 MG/DL (ref 1.7–2.3)
MCH RBC QN AUTO: 28.7 PG (ref 27–31.3)
MCHC RBC AUTO-ENTMCNC: 33 % (ref 33–37)
MCV RBC AUTO: 86.8 FL (ref 82–100)
NITRITE, URINE: NEGATIVE
PDW BLD-RTO: 12.9 % (ref 11.5–14.5)
PH UA: 7 (ref 5–9)
PLATELET # BLD: 290 K/UL (ref 130–400)
POTASSIUM SERPL-SCNC: 3.7 MEQ/L (ref 3.5–5.1)
PRO-BNP: 250 PG/ML
PROTEIN UA: NEGATIVE MG/DL
PROTHROMBIN TIME: 10.2 SEC (ref 8.1–13.7)
RBC # BLD: 5.27 M/UL (ref 4.2–5.4)
SODIUM BLD-SCNC: 140 MEQ/L (ref 132–144)
SPECIFIC GRAVITY UA: 1.01 (ref 1–1.03)
TOTAL CK: 175 U/L (ref 0–170)
TOTAL PROTEIN: 8.2 G/DL (ref 6.4–8.1)
TROPONIN: <0.01 NG/ML (ref 0–0.01)
URINE REFLEX TO CULTURE: NORMAL
UROBILINOGEN, URINE: 0.2 E.U./DL
WBC # BLD: 10.5 K/UL (ref 4.8–10.8)

## 2017-09-27 PROCEDURE — 85610 PROTHROMBIN TIME: CPT

## 2017-09-27 PROCEDURE — 6360000002 HC RX W HCPCS: Performed by: PHYSICIAN ASSISTANT

## 2017-09-27 PROCEDURE — 93005 ELECTROCARDIOGRAM TRACING: CPT

## 2017-09-27 PROCEDURE — G0378 HOSPITAL OBSERVATION PER HR: HCPCS

## 2017-09-27 PROCEDURE — 85730 THROMBOPLASTIN TIME PARTIAL: CPT

## 2017-09-27 PROCEDURE — 80053 COMPREHEN METABOLIC PANEL: CPT

## 2017-09-27 PROCEDURE — 84484 ASSAY OF TROPONIN QUANT: CPT

## 2017-09-27 PROCEDURE — 83735 ASSAY OF MAGNESIUM: CPT

## 2017-09-27 PROCEDURE — 36415 COLL VENOUS BLD VENIPUNCTURE: CPT

## 2017-09-27 PROCEDURE — 71010 XR CHEST PORTABLE: CPT

## 2017-09-27 PROCEDURE — 99285 EMERGENCY DEPT VISIT HI MDM: CPT

## 2017-09-27 PROCEDURE — 6370000000 HC RX 637 (ALT 250 FOR IP): Performed by: NURSE PRACTITIONER

## 2017-09-27 PROCEDURE — 81003 URINALYSIS AUTO W/O SCOPE: CPT

## 2017-09-27 PROCEDURE — 83880 ASSAY OF NATRIURETIC PEPTIDE: CPT

## 2017-09-27 PROCEDURE — 6370000000 HC RX 637 (ALT 250 FOR IP): Performed by: INTERNAL MEDICINE

## 2017-09-27 PROCEDURE — 82553 CREATINE MB FRACTION: CPT

## 2017-09-27 PROCEDURE — 85027 COMPLETE CBC AUTOMATED: CPT

## 2017-09-27 PROCEDURE — 82550 ASSAY OF CK (CPK): CPT

## 2017-09-27 PROCEDURE — 6370000000 HC RX 637 (ALT 250 FOR IP): Performed by: PHYSICIAN ASSISTANT

## 2017-09-27 PROCEDURE — 96372 THER/PROPH/DIAG INJ SC/IM: CPT

## 2017-09-27 RX ORDER — LORATADINE 10 MG/1
10 TABLET ORAL DAILY
Status: ON HOLD | COMMUNITY
End: 2017-09-28 | Stop reason: HOSPADM

## 2017-09-27 RX ORDER — SOTALOL HYDROCHLORIDE 80 MG/1
80 TABLET ORAL 2 TIMES DAILY
Status: DISCONTINUED | OUTPATIENT
Start: 2017-09-27 | End: 2017-09-29 | Stop reason: HOSPADM

## 2017-09-27 RX ORDER — ASPIRIN 81 MG/1
81 TABLET, CHEWABLE ORAL DAILY
Status: DISCONTINUED | OUTPATIENT
Start: 2017-09-28 | End: 2017-09-29 | Stop reason: HOSPADM

## 2017-09-27 RX ORDER — ONDANSETRON 2 MG/ML
4 INJECTION INTRAMUSCULAR; INTRAVENOUS EVERY 6 HOURS PRN
Status: DISCONTINUED | OUTPATIENT
Start: 2017-09-27 | End: 2017-09-29 | Stop reason: HOSPADM

## 2017-09-27 RX ORDER — PAROXETINE HYDROCHLORIDE 20 MG/1
20 TABLET, FILM COATED ORAL EVERY MORNING
Status: DISCONTINUED | OUTPATIENT
Start: 2017-09-28 | End: 2017-09-29 | Stop reason: HOSPADM

## 2017-09-27 RX ORDER — SODIUM CHLORIDE 0.9 % (FLUSH) 0.9 %
10 SYRINGE (ML) INJECTION EVERY 12 HOURS SCHEDULED
Status: DISCONTINUED | OUTPATIENT
Start: 2017-09-27 | End: 2017-09-29 | Stop reason: HOSPADM

## 2017-09-27 RX ORDER — SODIUM CHLORIDE 0.9 % (FLUSH) 0.9 %
10 SYRINGE (ML) INJECTION PRN
Status: DISCONTINUED | OUTPATIENT
Start: 2017-09-27 | End: 2017-09-29 | Stop reason: HOSPADM

## 2017-09-27 RX ORDER — MORPHINE SULFATE 2 MG/ML
2 INJECTION, SOLUTION INTRAMUSCULAR; INTRAVENOUS
Status: DISCONTINUED | OUTPATIENT
Start: 2017-09-27 | End: 2017-09-29 | Stop reason: HOSPADM

## 2017-09-27 RX ORDER — AMLODIPINE BESYLATE 10 MG/1
10 TABLET ORAL DAILY
Status: DISCONTINUED | OUTPATIENT
Start: 2017-09-27 | End: 2017-09-29 | Stop reason: HOSPADM

## 2017-09-27 RX ORDER — ATORVASTATIN CALCIUM 20 MG/1
20 TABLET, FILM COATED ORAL NIGHTLY
Status: DISCONTINUED | OUTPATIENT
Start: 2017-09-27 | End: 2017-09-27

## 2017-09-27 RX ORDER — ACETAMINOPHEN 80 MG
TABLET,CHEWABLE ORAL ONCE
Status: DISCONTINUED | OUTPATIENT
Start: 2017-09-27 | End: 2017-09-29 | Stop reason: HOSPADM

## 2017-09-27 RX ORDER — ACETAMINOPHEN 325 MG/1
650 TABLET ORAL EVERY 4 HOURS PRN
Status: DISCONTINUED | OUTPATIENT
Start: 2017-09-27 | End: 2017-09-29 | Stop reason: HOSPADM

## 2017-09-27 RX ORDER — TRIAMTERENE AND HYDROCHLOROTHIAZIDE 37.5; 25 MG/1; MG/1
0.5 TABLET ORAL DAILY
Status: DISCONTINUED | OUTPATIENT
Start: 2017-09-27 | End: 2017-09-29 | Stop reason: HOSPADM

## 2017-09-27 RX ORDER — ATORVASTATIN CALCIUM 40 MG/1
40 TABLET, FILM COATED ORAL DAILY
Status: DISCONTINUED | OUTPATIENT
Start: 2017-09-27 | End: 2017-09-29 | Stop reason: HOSPADM

## 2017-09-27 RX ORDER — CETIRIZINE HYDROCHLORIDE 10 MG/1
5 TABLET ORAL DAILY
Status: DISCONTINUED | OUTPATIENT
Start: 2017-09-27 | End: 2017-09-29 | Stop reason: HOSPADM

## 2017-09-27 RX ORDER — NITROGLYCERIN 0.4 MG/1
0.4 TABLET SUBLINGUAL EVERY 5 MIN PRN
Status: DISCONTINUED | OUTPATIENT
Start: 2017-09-27 | End: 2017-09-29 | Stop reason: HOSPADM

## 2017-09-27 RX ORDER — MORPHINE SULFATE 4 MG/ML
4 INJECTION, SOLUTION INTRAMUSCULAR; INTRAVENOUS
Status: DISCONTINUED | OUTPATIENT
Start: 2017-09-27 | End: 2017-09-29 | Stop reason: HOSPADM

## 2017-09-27 RX ADMIN — TRIAMTERENE AND HYDROCHLOROTHIAZIDE 0.5 TABLET: 37.5; 25 TABLET ORAL at 20:24

## 2017-09-27 RX ADMIN — AMLODIPINE BESYLATE 10 MG: 10 TABLET ORAL at 20:22

## 2017-09-27 RX ADMIN — ACETAMINOPHEN 650 MG: 325 TABLET ORAL at 14:25

## 2017-09-27 RX ADMIN — ATORVASTATIN CALCIUM 40 MG: 40 TABLET, FILM COATED ORAL at 20:25

## 2017-09-27 RX ADMIN — ENOXAPARIN SODIUM 100 MG: 100 INJECTION SUBCUTANEOUS at 09:53

## 2017-09-27 RX ADMIN — SOTALOL HYDROCHLORIDE 80 MG: 80 TABLET ORAL at 20:25

## 2017-09-27 RX ADMIN — NITROGLYCERIN 1 INCH: 20 OINTMENT TOPICAL at 09:53

## 2017-09-27 RX ADMIN — CETIRIZINE HYDROCHLORIDE 5 MG: 10 TABLET, FILM COATED ORAL at 20:23

## 2017-09-27 RX ADMIN — Medication 400 MG: at 20:22

## 2017-09-27 RX ADMIN — APIXABAN 5 MG: 5 TABLET, FILM COATED ORAL at 20:22

## 2017-09-27 ASSESSMENT — ENCOUNTER SYMPTOMS
ABDOMINAL PAIN: 0
ALLERGIC/IMMUNOLOGIC NEGATIVE: 1
GASTROINTESTINAL NEGATIVE: 1
DIARRHEA: 0
ABDOMINAL DISTENTION: 0
RESPIRATORY NEGATIVE: 1
WHEEZING: 0
SHORTNESS OF BREATH: 0
EYES NEGATIVE: 1
BACK PAIN: 0
COUGH: 0
NAUSEA: 0
CONSTIPATION: 0
RHINORRHEA: 0
CHOKING: 0
COLOR CHANGE: 0
SORE THROAT: 0
EYE DISCHARGE: 0
STRIDOR: 0
VOMITING: 0

## 2017-09-27 ASSESSMENT — PAIN SCALES - GENERAL
PAINLEVEL_OUTOF10: 0
PAINLEVEL_OUTOF10: 7
PAINLEVEL_OUTOF10: 0
PAINLEVEL_OUTOF10: 2
PAINLEVEL_OUTOF10: 6
PAINLEVEL_OUTOF10: 0
PAINLEVEL_OUTOF10: 0

## 2017-09-27 ASSESSMENT — PAIN DESCRIPTION - PAIN TYPE
TYPE: ACUTE PAIN

## 2017-09-27 ASSESSMENT — PAIN DESCRIPTION - ORIENTATION: ORIENTATION: LEFT

## 2017-09-27 ASSESSMENT — PAIN DESCRIPTION - LOCATION
LOCATION: JAW
LOCATION: HEAD
LOCATION: HEAD

## 2017-09-27 ASSESSMENT — PAIN DESCRIPTION - DESCRIPTORS: DESCRIPTORS: BURNING

## 2017-09-28 VITALS
WEIGHT: 293 LBS | HEART RATE: 64 BPM | RESPIRATION RATE: 18 BRPM | BODY MASS INDEX: 48.82 KG/M2 | HEIGHT: 65 IN | OXYGEN SATURATION: 96 % | TEMPERATURE: 98 F | DIASTOLIC BLOOD PRESSURE: 46 MMHG | SYSTOLIC BLOOD PRESSURE: 103 MMHG

## 2017-09-28 LAB
ANION GAP SERPL CALCULATED.3IONS-SCNC: 15 MEQ/L (ref 7–13)
BUN BLDV-MCNC: 16 MG/DL (ref 6–20)
CALCIUM SERPL-MCNC: 9.3 MG/DL (ref 8.6–10.2)
CHLORIDE BLD-SCNC: 99 MEQ/L (ref 98–107)
CHOLESTEROL, TOTAL: 206 MG/DL (ref 0–199)
CO2: 27 MEQ/L (ref 22–29)
CREAT SERPL-MCNC: 0.6 MG/DL (ref 0.5–0.9)
GFR AFRICAN AMERICAN: >60
GFR NON-AFRICAN AMERICAN: >60
GLUCOSE BLD-MCNC: 106 MG/DL (ref 74–109)
HCT VFR BLD CALC: 44 % (ref 37–47)
HDLC SERPL-MCNC: 45 MG/DL (ref 40–59)
HEMOGLOBIN: 14.4 G/DL (ref 12–16)
INR BLD: 1
LDL CHOLESTEROL CALCULATED: 141 MG/DL (ref 0–129)
MAGNESIUM: 2.4 MG/DL (ref 1.7–2.3)
MCH RBC QN AUTO: 28.5 PG (ref 27–31.3)
MCHC RBC AUTO-ENTMCNC: 32.7 % (ref 33–37)
MCV RBC AUTO: 87.2 FL (ref 82–100)
PDW BLD-RTO: 13 % (ref 11.5–14.5)
PLATELET # BLD: 292 K/UL (ref 130–400)
PLATELET SLIDE REVIEW: ADEQUATE
POTASSIUM SERPL-SCNC: 4.2 MEQ/L (ref 3.5–5.1)
PROTHROMBIN TIME: 10.4 SEC (ref 8.1–13.7)
RBC # BLD: 5.05 M/UL (ref 4.2–5.4)
SODIUM BLD-SCNC: 141 MEQ/L (ref 132–144)
TRIGL SERPL-MCNC: 102 MG/DL (ref 0–200)
TSH REFLEX: 3.59 UIU/ML (ref 0.27–4.2)
WBC # BLD: 12.2 K/UL (ref 4.8–10.8)

## 2017-09-28 PROCEDURE — 6370000000 HC RX 637 (ALT 250 FOR IP): Performed by: INTERNAL MEDICINE

## 2017-09-28 PROCEDURE — 94762 N-INVAS EAR/PLS OXIMTRY CONT: CPT

## 2017-09-28 PROCEDURE — G0378 HOSPITAL OBSERVATION PER HR: HCPCS

## 2017-09-28 PROCEDURE — 84443 ASSAY THYROID STIM HORMONE: CPT

## 2017-09-28 PROCEDURE — 85610 PROTHROMBIN TIME: CPT

## 2017-09-28 PROCEDURE — 6370000000 HC RX 637 (ALT 250 FOR IP): Performed by: NURSE PRACTITIONER

## 2017-09-28 PROCEDURE — 85027 COMPLETE CBC AUTOMATED: CPT

## 2017-09-28 PROCEDURE — 80048 BASIC METABOLIC PNL TOTAL CA: CPT

## 2017-09-28 PROCEDURE — 2580000003 HC RX 258: Performed by: INTERNAL MEDICINE

## 2017-09-28 PROCEDURE — 83735 ASSAY OF MAGNESIUM: CPT

## 2017-09-28 PROCEDURE — 36415 COLL VENOUS BLD VENIPUNCTURE: CPT

## 2017-09-28 PROCEDURE — 80061 LIPID PANEL: CPT

## 2017-09-28 PROCEDURE — 93005 ELECTROCARDIOGRAM TRACING: CPT

## 2017-09-28 RX ADMIN — SODIUM CHLORIDE, PRESERVATIVE FREE 10 ML: 5 INJECTION INTRAVENOUS at 08:21

## 2017-09-28 RX ADMIN — ACETAMINOPHEN 650 MG: 325 TABLET ORAL at 16:32

## 2017-09-28 RX ADMIN — AMLODIPINE BESYLATE 10 MG: 10 TABLET ORAL at 08:20

## 2017-09-28 RX ADMIN — Medication 400 MG: at 08:20

## 2017-09-28 RX ADMIN — SOTALOL HYDROCHLORIDE 80 MG: 80 TABLET ORAL at 08:20

## 2017-09-28 RX ADMIN — CETIRIZINE HYDROCHLORIDE 5 MG: 10 TABLET, FILM COATED ORAL at 08:20

## 2017-09-28 RX ADMIN — ATORVASTATIN CALCIUM 40 MG: 40 TABLET, FILM COATED ORAL at 08:21

## 2017-09-28 RX ADMIN — ASPIRIN 81 MG 81 MG: 81 TABLET ORAL at 08:20

## 2017-09-28 RX ADMIN — PAROXETINE HYDROCHLORIDE HEMIHYDRATE 20 MG: 20 TABLET, FILM COATED ORAL at 08:21

## 2017-09-28 RX ADMIN — APIXABAN 5 MG: 5 TABLET, FILM COATED ORAL at 08:21

## 2017-09-28 RX ADMIN — TRIAMTERENE AND HYDROCHLOROTHIAZIDE 0.5 TABLET: 37.5; 25 TABLET ORAL at 08:20

## 2017-09-28 ASSESSMENT — PAIN SCALES - GENERAL
PAINLEVEL_OUTOF10: 0
PAINLEVEL_OUTOF10: 7
PAINLEVEL_OUTOF10: 0

## 2017-09-28 ASSESSMENT — PAIN DESCRIPTION - LOCATION: LOCATION: HEAD

## 2017-09-28 ASSESSMENT — PAIN DESCRIPTION - PAIN TYPE: TYPE: ACUTE PAIN

## 2017-09-29 ENCOUNTER — TELEPHONE (OUTPATIENT)
Dept: FAMILY MEDICINE CLINIC | Age: 57
End: 2017-09-29

## 2017-09-29 LAB
EKG ATRIAL RATE: 394 BPM
EKG ATRIAL RATE: 54 BPM
EKG ATRIAL RATE: 60 BPM
EKG P AXIS: 43 DEGREES
EKG P AXIS: 48 DEGREES
EKG P-R INTERVAL: 124 MS
EKG P-R INTERVAL: 126 MS
EKG Q-T INTERVAL: 402 MS
EKG Q-T INTERVAL: 424 MS
EKG Q-T INTERVAL: 466 MS
EKG QRS DURATION: 88 MS
EKG QRS DURATION: 92 MS
EKG QRS DURATION: 94 MS
EKG QTC CALCULATION (BAZETT): 424 MS
EKG QTC CALCULATION (BAZETT): 441 MS
EKG QTC CALCULATION (BAZETT): 446 MS
EKG R AXIS: -19 DEGREES
EKG R AXIS: -5 DEGREES
EKG R AXIS: -6 DEGREES
EKG T AXIS: 10 DEGREES
EKG T AXIS: 18 DEGREES
EKG T AXIS: 34 DEGREES
EKG VENTRICULAR RATE: 54 BPM
EKG VENTRICULAR RATE: 60 BPM
EKG VENTRICULAR RATE: 74 BPM

## 2017-10-09 ENCOUNTER — HOSPITAL ENCOUNTER (OUTPATIENT)
Dept: LAB | Age: 57
Discharge: HOME | End: 2017-10-09
Payer: COMMERCIAL

## 2017-10-09 DIAGNOSIS — Z79.899 HIGH RISK MEDICATION USE: ICD-10-CM

## 2017-10-09 LAB
ANION GAP SERPL CALCULATED.3IONS-SCNC: 15 MEQ/L (ref 7–13)
BUN BLDV-MCNC: 17 MG/DL (ref 6–20)
CALCIUM SERPL-MCNC: 9.4 MG/DL (ref 8.6–10.2)
CHLORIDE BLD-SCNC: 97 MEQ/L (ref 98–107)
CO2: 26 MEQ/L (ref 22–29)
CREAT SERPL-MCNC: 0.76 MG/DL (ref 0.5–0.9)
GFR AFRICAN AMERICAN: >60
GFR NON-AFRICAN AMERICAN: >60
GLUCOSE BLD-MCNC: 109 MG/DL (ref 74–109)
POTASSIUM SERPL-SCNC: 3.9 MEQ/L (ref 3.5–5.1)
SODIUM BLD-SCNC: 138 MEQ/L (ref 132–144)

## 2017-10-09 PROCEDURE — 80048 BASIC METABOLIC PNL TOTAL CA: CPT

## 2017-10-09 PROCEDURE — 36415 COLL VENOUS BLD VENIPUNCTURE: CPT

## 2017-10-26 ENCOUNTER — TELEPHONE (OUTPATIENT)
Dept: FAMILY MEDICINE CLINIC | Age: 57
End: 2017-10-26

## 2017-10-26 DIAGNOSIS — Z12.31 VISIT FOR SCREENING MAMMOGRAM: Primary | ICD-10-CM

## 2017-10-26 NOTE — TELEPHONE ENCOUNTER
Pt last seen by Dr Marcos Colón on 4/10/2017, Pt called today requesting an order for a mammogram. Order pended in epic for approval.

## 2017-11-10 ENCOUNTER — HOSPITAL ENCOUNTER (INPATIENT)
Age: 57
LOS: 2 days | Discharge: HOME | DRG: 309 | End: 2017-11-12
Attending: INTERNAL MEDICINE | Admitting: INTERNAL MEDICINE
Payer: COMMERCIAL

## 2017-11-10 DIAGNOSIS — I10 ESSENTIAL HYPERTENSION: Primary | Chronic | ICD-10-CM

## 2017-11-10 LAB
ALBUMIN SERPL-MCNC: 4.2 G/DL (ref 3.9–4.9)
ALP BLD-CCNC: 115 U/L (ref 40–130)
ALT SERPL-CCNC: 17 U/L (ref 0–33)
ANION GAP SERPL CALCULATED.3IONS-SCNC: 15 MEQ/L (ref 7–13)
AST SERPL-CCNC: 18 U/L (ref 0–35)
BILIRUB SERPL-MCNC: 0.3 MG/DL (ref 0–1.2)
BUN BLDV-MCNC: 20 MG/DL (ref 6–20)
CALCIUM SERPL-MCNC: 9.9 MG/DL (ref 8.6–10.2)
CHLORIDE BLD-SCNC: 99 MEQ/L (ref 98–107)
CO2: 26 MEQ/L (ref 22–29)
CREAT SERPL-MCNC: 0.71 MG/DL (ref 0.5–0.9)
EKG ATRIAL RATE: 56 BPM
EKG ATRIAL RATE: 61 BPM
EKG ATRIAL RATE: 64 BPM
EKG P AXIS: 34 DEGREES
EKG P AXIS: 42 DEGREES
EKG P AXIS: 47 DEGREES
EKG P-R INTERVAL: 120 MS
EKG P-R INTERVAL: 126 MS
EKG P-R INTERVAL: 128 MS
EKG Q-T INTERVAL: 392 MS
EKG Q-T INTERVAL: 452 MS
EKG Q-T INTERVAL: 452 MS
EKG QRS DURATION: 88 MS
EKG QRS DURATION: 92 MS
EKG QRS DURATION: 92 MS
EKG QTC CALCULATION (BAZETT): 404 MS
EKG QTC CALCULATION (BAZETT): 436 MS
EKG QTC CALCULATION (BAZETT): 455 MS
EKG R AXIS: -14 DEGREES
EKG R AXIS: -15 DEGREES
EKG R AXIS: -16 DEGREES
EKG T AXIS: -8 DEGREES
EKG T AXIS: 103 DEGREES
EKG T AXIS: 4 DEGREES
EKG VENTRICULAR RATE: 56 BPM
EKG VENTRICULAR RATE: 61 BPM
EKG VENTRICULAR RATE: 64 BPM
GFR AFRICAN AMERICAN: >60
GFR NON-AFRICAN AMERICAN: >60
GLOBULIN: 3 G/DL (ref 2.3–3.5)
GLUCOSE BLD-MCNC: 110 MG/DL (ref 74–109)
HCT VFR BLD CALC: 43.2 % (ref 37–47)
HEMOGLOBIN: 14.3 G/DL (ref 12–16)
MCH RBC QN AUTO: 28.8 PG (ref 27–31.3)
MCHC RBC AUTO-ENTMCNC: 33 % (ref 33–37)
MCV RBC AUTO: 87.4 FL (ref 82–100)
PDW BLD-RTO: 14.4 % (ref 11.5–14.5)
PLATELET # BLD: 267 K/UL (ref 130–400)
POTASSIUM SERPL-SCNC: 4.3 MEQ/L (ref 3.5–5.1)
RBC # BLD: 4.95 M/UL (ref 4.2–5.4)
SODIUM BLD-SCNC: 140 MEQ/L (ref 132–144)
TOTAL PROTEIN: 7.2 G/DL (ref 6.4–8.1)
WBC # BLD: 8.3 K/UL (ref 4.8–10.8)

## 2017-11-10 PROCEDURE — 85027 COMPLETE CBC AUTOMATED: CPT

## 2017-11-10 PROCEDURE — 80053 COMPREHEN METABOLIC PANEL: CPT

## 2017-11-10 PROCEDURE — 36415 COLL VENOUS BLD VENIPUNCTURE: CPT

## 2017-11-10 PROCEDURE — 6370000000 HC RX 637 (ALT 250 FOR IP): Performed by: INTERNAL MEDICINE

## 2017-11-10 PROCEDURE — 6370000000 HC RX 637 (ALT 250 FOR IP): Performed by: NURSE PRACTITIONER

## 2017-11-10 PROCEDURE — 2060000000 HC ICU INTERMEDIATE R&B

## 2017-11-10 PROCEDURE — 93005 ELECTROCARDIOGRAM TRACING: CPT

## 2017-11-10 RX ORDER — ATORVASTATIN CALCIUM 40 MG/1
40 TABLET, FILM COATED ORAL DAILY
Status: DISCONTINUED | OUTPATIENT
Start: 2017-11-10 | End: 2017-11-12 | Stop reason: HOSPADM

## 2017-11-10 RX ORDER — PAROXETINE HYDROCHLORIDE 20 MG/1
20 TABLET, FILM COATED ORAL EVERY MORNING
Status: DISCONTINUED | OUTPATIENT
Start: 2017-11-10 | End: 2017-11-12 | Stop reason: HOSPADM

## 2017-11-10 RX ORDER — LORATADINE 10 MG/1
10 TABLET ORAL DAILY
COMMUNITY

## 2017-11-10 RX ORDER — SPIRONOLACTONE 25 MG/1
25 TABLET ORAL DAILY
Status: DISCONTINUED | OUTPATIENT
Start: 2017-11-10 | End: 2017-11-12 | Stop reason: HOSPADM

## 2017-11-10 RX ORDER — TRIAMCINOLONE ACETONIDE 55 UG/1
1 SPRAY, METERED NASAL 3 TIMES DAILY PRN
Status: DISCONTINUED | OUTPATIENT
Start: 2017-11-10 | End: 2017-11-12 | Stop reason: HOSPADM

## 2017-11-10 RX ORDER — MAGNESIUM OXIDE 400 MG/1
250 TABLET ORAL DAILY
Status: DISCONTINUED | OUTPATIENT
Start: 2017-11-10 | End: 2017-11-10

## 2017-11-10 RX ORDER — DOFETILIDE 0.25 MG/1
250 CAPSULE ORAL EVERY 12 HOURS SCHEDULED
Status: DISCONTINUED | OUTPATIENT
Start: 2017-11-10 | End: 2017-11-12 | Stop reason: HOSPADM

## 2017-11-10 RX ORDER — TRIAMTERENE AND HYDROCHLOROTHIAZIDE 37.5; 25 MG/1; MG/1
1 TABLET ORAL DAILY
Status: DISCONTINUED | OUTPATIENT
Start: 2017-11-10 | End: 2017-11-12 | Stop reason: HOSPADM

## 2017-11-10 RX ORDER — CETIRIZINE HYDROCHLORIDE 10 MG/1
10 TABLET ORAL DAILY
Status: DISCONTINUED | OUTPATIENT
Start: 2017-11-10 | End: 2017-11-12 | Stop reason: HOSPADM

## 2017-11-10 RX ORDER — AMLODIPINE BESYLATE 5 MG/1
5 TABLET ORAL DAILY
Status: DISCONTINUED | OUTPATIENT
Start: 2017-11-10 | End: 2017-11-12 | Stop reason: HOSPADM

## 2017-11-10 RX ORDER — ACETAMINOPHEN 325 MG/1
650 TABLET ORAL EVERY 6 HOURS PRN
Status: DISCONTINUED | OUTPATIENT
Start: 2017-11-10 | End: 2017-11-12 | Stop reason: HOSPADM

## 2017-11-10 RX ORDER — POTASSIUM CHLORIDE 20 MEQ/1
20 TABLET, EXTENDED RELEASE ORAL DAILY
Status: DISCONTINUED | OUTPATIENT
Start: 2017-11-10 | End: 2017-11-12 | Stop reason: HOSPADM

## 2017-11-10 RX ORDER — MAGNESIUM OXIDE 400 MG/1
400 TABLET ORAL DAILY
COMMUNITY
End: 2019-05-06

## 2017-11-10 RX ADMIN — CETIRIZINE HYDROCHLORIDE 10 MG: 10 TABLET, FILM COATED ORAL at 17:23

## 2017-11-10 RX ADMIN — AMLODIPINE BESYLATE 5 MG: 5 TABLET ORAL at 17:23

## 2017-11-10 RX ADMIN — TRIAMTERENE AND HYDROCHLOROTHIAZIDE 1 TABLET: 37.5; 25 TABLET ORAL at 17:23

## 2017-11-10 RX ADMIN — SPIRONOLACTONE 25 MG: 25 TABLET, FILM COATED ORAL at 17:23

## 2017-11-10 RX ADMIN — DOFETILIDE 250 MCG: 0.25 CAPSULE ORAL at 21:56

## 2017-11-10 RX ADMIN — DOFETILIDE 250 MCG: 0.25 CAPSULE ORAL at 11:23

## 2017-11-10 RX ADMIN — Medication 400 MG: at 21:56

## 2017-11-10 RX ADMIN — POTASSIUM CHLORIDE 20 MEQ: 20 TABLET, EXTENDED RELEASE ORAL at 17:23

## 2017-11-10 RX ADMIN — ATORVASTATIN CALCIUM 40 MG: 40 TABLET, FILM COATED ORAL at 21:56

## 2017-11-10 RX ADMIN — APIXABAN 5 MG: 5 TABLET, FILM COATED ORAL at 21:56

## 2017-11-10 RX ADMIN — APIXABAN 5 MG: 5 TABLET, FILM COATED ORAL at 11:23

## 2017-11-10 ASSESSMENT — PAIN SCALES - GENERAL
PAINLEVEL_OUTOF10: 0

## 2017-11-10 ASSESSMENT — ENCOUNTER SYMPTOMS
RESPIRATORY NEGATIVE: 1
GASTROINTESTINAL NEGATIVE: 1
ALLERGIC/IMMUNOLOGIC NEGATIVE: 1
EYES NEGATIVE: 1

## 2017-11-10 NOTE — FLOWSHEET NOTE
1st dose Tikosyn given. Reviewed home medications with pt. Others timed for dinner 1700 per pt request / usual medication regimen.  Electronically signed by Evita Greenwood RN on 11/10/2017 at 1:53 PM

## 2017-11-10 NOTE — PLAN OF CARE
Problem: Pain Control  Goal: Maintain pain level at or below patient's acceptable level (or 5 if patient is unable to determine acceptable level)  Outcome: Ongoing    Goal: Improvement in pain related behaviors BP/HR WNL  Outcome: Ongoing      Problem: Cardiovascular  Goal: No DVT, peripheral vascular complications  Outcome: Ongoing    Goal: Hemodynamic stability  Outcome: Ongoing    Goal: Anticoagulate/Hct stable  Outcome: Ongoing    Goal: Agreement to quit smoking  Outcome: Ongoing    Goal: Weight maintained or lost  Outcome: Ongoing      Problem: Respiratory  Goal: No pulmonary complications  Outcome: Ongoing    Goal: O2 Sat > 90%  Outcome: Ongoing      Problem: Skin Integrity/Risk  Goal: No skin breakdown during hospitalization  Outcome: Ongoing      Problem: Musculor/Skeletal Functional Status  Goal: Highest potential functional level  Outcome: Ongoing    Goal: Absence of falls  Outcome: Ongoing      Problem: Intellectual/Education/Knowledge Deficit  Goal: Teaching initiated upon admission  Outcome: Ongoing    Goal: Written Disposition Instruction form completed  Outcome: Ongoing

## 2017-11-10 NOTE — LETTER
[address]  [facility] [address]  Phone: [phone]             November 12, 2017    Patient: Soto Campos   YOB: 1960   Date of Visit: 11/10/2017       To Whom It May Concern:    Soto Campos was seen and treated in our facility  beginning 11/10/2017 until {DISCHARGE BWVB:[id number]}. She {Return to school/sport/work:10879}.       Sincerely,       Marshall Montelongo RN         Signature:__________________________________

## 2017-11-10 NOTE — H&P
[facility] HEART CARDIOLOGY HISTORY AND PHYSICAL NOTE    PATIENT NAME: Mami Robles  PATIENT MRN: [id number]  SERVICE DATE:  11/10/2017  SERVICE TIME: 10:08 AM    SHARED VISIT CNP: Tati Choudhary NP  PRIMARY CARE PHYSICIAN: Deni Hickman MD  CARDIOLOGIST : Dr. Maria Fernanda Butterfield  PRIMARY CARDIOLOGIST: Dr. Maria Fernanda Butterfield    ================================================================    CHIEF COMPLAINT:    Antiarrhythmic drug loading    IMPRESSIONS:  1. Persistent atrial fibrillation, presently in NSR, anticoagulated with eliquis. Failure of sotalol to maintain NSR  2. Normal LV function     RECOMMENDATIONS:  1. Will initiate antiarrhythmic drug loading with tikosyn 250 µg twice daily. 2. Routine EKG for QTc monitoring  3. Check baseline CMP, CBC  4. Continue anticoagulation with Eliquis      CARDIAC HISTORY:  1. Persistent atrial fibrillation s/p [facility] 11/2016  2. Normal LV systolic function with LVEF 55% per [facility] 11/2016  3. Moderate LAE 4.4cm  4. Perfusion scan 2/2016 showing mild apical ischemia with normal TID ratio  5. HTN  6. HLD    NON CARDIAC HISTORY:  1. Morbid Obesity  2. Mild ALE, recent nocturnal studies showing no sustained desaturations  3. Stage 3 CKD  4. Recent cholecystectomy    HPI:   Mami Robles is a 59 y.o. female history of persistent atrial fibrillation, status post prior cardioversion in November 2016, with failure of sotalol to maintain sinus rhythm. She is being admitted for anti-arrhythmic drug loading. She is presently in sinus rhythm, no reports of chest pain, no palpitations, no lightheadedness. She discontinued her sotalol 48 hours ago. She has been continued on anticoagulation with eliquis.     PROBLEM LIST:  Patient Active Problem List   Diagnosis    Essential hypertension    Adiposity    Atopic rhinitis    PVC's (premature ventricular contractions)    Dyslipidemia    Hypovitaminosis D    Benign essential hypertension    New onset a-fib (HCC)    Fatigue    High risk medication use    Patient overweight    Anxiety    Chronic cholecystitis with calculus    Biliary colic       PAST MEDICAL HISTORY:    Past Medical History:   Diagnosis Date    Allergic rhinitis     Anxiety     Cholecystitis     Hyperlipidemia     Hypertension     Osteoarthritis     back worst    Osteoarthritis     knees, spine    PONV (postoperative nausea and vomiting)     from using inhaled anethesia     Seasonal allergies     Vitamin D deficiency        PAST SURGICAL HISTORY:  Past Surgical History:   Procedure Laterality Date     SECTION      x2     SECTION   &     CHOLECYSTECTOMY, LAPAROSCOPIC N/A 2017    For biliary colic and stones    COLONOSCOPY  14    jarmoszuk    HYSTERECTOMY      fibroids total    HYSTERECTOMY      TONSILLECTOMY      TONSILLECTOMY  age 20       MEDICATIONS:  No current facility-administered medications for this encounter. ALLERGIES AND DRUG INTOLERANCES:   Allergies   Allergen Reactions    Sulfa Antibiotics Swelling    Lisinopril     Lisinopril Other (See Comments)     cough    Sulfa Antibiotics Swelling    Codeine Nausea And Vomiting    Percocet [Oxycodone-Acetaminophen] Nausea And Vomiting       FAMILY HISTORY:    Family History   Problem Relation Age of Onset    Heart Disease Father      heart attack    Heart Attack Father     Heart Disease Sister      heart attack age 40    Cancer Brother 62     lung/age 62    Coronary Art Dis Sister     Cancer Maternal Grandfather      lung cancer    Cancer Paternal Grandfather      colon cancer    No Known Problems Daughter        SOCIAL HISTORY:    Social History     Social History    Marital status:      Spouse name: N/A    Number of children: 2    Years of education: N/A     Occupational History    Not on file.      Social History Main Topics    Smoking status: Never Smoker    Smokeless tobacco: Never Used    Alcohol use No    Drug use: No    Sexual activity: Yes     Partners: Male     Other Topics Concern    Not on file     Social History Narrative    ** Merged History Encounter **         ** Data from: 5/4/16 Enc Dept: [facility]         ** Data from: 7/19/14 Enc Dept: [address]    Lives with . 2 daughters. ADs. Living Will. [facility], , secondary is daughter Chen Kim. ROS:   Review of Systems   Constitutional: Negative. HENT: Negative. Eyes: Negative. Respiratory: Negative. Cardiovascular: Positive for palpitations. Negative for chest pain and leg swelling. Gastrointestinal: Negative. Endocrine: Negative. Genitourinary: Negative. Musculoskeletal: Negative. Skin: Negative. Allergic/Immunologic: Negative. Neurological: Negative. Hematological: Negative. Psychiatric/Behavioral: Negative. PHYSICAL EXAM:  BP (!) 161/76   Pulse 72   Temp 98 °F (36.7 °C) (Oral)   Resp 16   Ht 5' 5\" (1.651 m)   Wt 253 lb 8.5 oz (115 kg)   LMP 11/14/1998   SpO2 98%   BMI 42.19 kg/m²   Physical Exam   Constitutional: She appears healthy. No distress. HENT:   Mouth/Throat: Oropharynx is clear. Eyes: Conjunctivae are normal. Pupils are equal, round, and reactive to light. Neck: Normal range of motion. No JVD present. Cardiovascular: Normal rate, regular rhythm, normal heart sounds and intact distal pulses. PMI is not displaced. Pulmonary/Chest: Effort normal and breath sounds normal.   Abdominal: Soft. Bowel sounds are normal. She exhibits no distension. Musculoskeletal: Normal range of motion. She exhibits no edema. Neurological: She is alert and oriented to person, place, and time. Skin: Skin is warm and dry. EKG:  EKG: NSR, no ischemic abnormalities, QTc 404ms    LABS:  No results for input(s): WBC, HB, HCT, PLT, INR, NA, K, CO2, BUN, MG in the last 72 hours.     Invalid input(s): CHLOR, CREAT, GLUC    No intake or output data in the 24 hours

## 2017-11-10 NOTE — FLOWSHEET NOTE
1st dose Tikosyn given. Reviewed home medications with pt. Others timed for dinner 1700 per pt request / usual medication regimen.  Electronically signed by Holli Bennett RN on 11/10/2017 at 1:50 PM

## 2017-11-11 PROCEDURE — 6370000000 HC RX 637 (ALT 250 FOR IP): Performed by: NURSE PRACTITIONER

## 2017-11-11 PROCEDURE — 6370000000 HC RX 637 (ALT 250 FOR IP): Performed by: INTERNAL MEDICINE

## 2017-11-11 PROCEDURE — 2060000000 HC ICU INTERMEDIATE R&B

## 2017-11-11 PROCEDURE — 93005 ELECTROCARDIOGRAM TRACING: CPT

## 2017-11-11 RX ADMIN — POTASSIUM CHLORIDE 20 MEQ: 20 TABLET, EXTENDED RELEASE ORAL at 08:04

## 2017-11-11 RX ADMIN — Medication 400 MG: at 08:04

## 2017-11-11 RX ADMIN — ATORVASTATIN CALCIUM 40 MG: 40 TABLET, FILM COATED ORAL at 20:27

## 2017-11-11 RX ADMIN — SPIRONOLACTONE 25 MG: 25 TABLET, FILM COATED ORAL at 08:04

## 2017-11-11 RX ADMIN — CETIRIZINE HYDROCHLORIDE 10 MG: 10 TABLET, FILM COATED ORAL at 08:04

## 2017-11-11 RX ADMIN — APIXABAN 5 MG: 5 TABLET, FILM COATED ORAL at 08:04

## 2017-11-11 RX ADMIN — APIXABAN 5 MG: 5 TABLET, FILM COATED ORAL at 20:27

## 2017-11-11 RX ADMIN — DOFETILIDE 250 MCG: 0.25 CAPSULE ORAL at 20:27

## 2017-11-11 RX ADMIN — AMLODIPINE BESYLATE 5 MG: 5 TABLET ORAL at 08:04

## 2017-11-11 RX ADMIN — DOFETILIDE 250 MCG: 0.25 CAPSULE ORAL at 08:04

## 2017-11-11 RX ADMIN — PAROXETINE HYDROCHLORIDE 20 MG: 20 TABLET, FILM COATED ORAL at 08:04

## 2017-11-11 RX ADMIN — TRIAMTERENE AND HYDROCHLOROTHIAZIDE 1 TABLET: 37.5; 25 TABLET ORAL at 08:04

## 2017-11-11 ASSESSMENT — PAIN SCALES - GENERAL: PAINLEVEL_OUTOF10: 0

## 2017-11-11 NOTE — CARE COORDINATION
PT IS FROM HOME WITH SPOUSE, INDEPENDENT. PT DENIES NEEDS. USES [address] I CALLED THE PHARMACY AND THEY DO HAVE SOME GENERIC IN STOCK. PLAN FOR DISCHARGE TOMORROW AFTER 5TH DOSE. ? CARE TRANSITION PROGRAM.

## 2017-11-11 NOTE — PROGRESS NOTES
[facility] OF THE [facility] Heart Progress Note      Patient: Von Setting  Unit/Bed: Q797/I010-07  YOB: 1960  MRN: [id number]  Admit Date:  11/10/2017      Subjective Complaint:   Denies any chest pain with exertion or at rest, palpitations, syncope, or edema. .     Physical Examination:     BP (!) 150/58   Pulse 59   Temp 98.1 °F (36.7 °C) (Oral)   Resp 17   Ht 5' 5\" (1.651 m)   Wt 261 lb 11 oz (118.7 kg)   LMP 11/14/1998   SpO2 95%   BMI 43.55 kg/m²     No intake or output data in the 24 hours ending 11/11/17 0856               Von Setting examined at bedside in in no apparent distress. Focused exam reveals:     Cardiac: Heart regular rate and rhythm     Lungs:  clear to auscultation bilaterally- no wheezes, rales or rhonchi, normal air movement, no respiratory distress    Extremities:   Negative  BP was elevated this am with recheck showing much lower numbers. Telemetry:      normal sinus          LABS:   CBC:   Recent Labs      11/10/17   1048   WBC  8.3   HGB  14.3   PLT  267      BMP:    Recent Labs      11/10/17   1048   NA  140   K  4.3   CL  99   CO2  26   BUN  20   CREATININE  0.71   GLUCOSE  110*              Troponin: No results for input(s): TROPONINT in the last 72 hours. BNP: No results for input(s): PROBNP in the last 72 hours. INR: No results for input(s): INR in the last 72 hours. Mg: No results for input(s): MG in the last 72 hours. Cardiac Testing:    none EKG reviewed. QTc 459 msec. Assessment:   Persistent atrial fibrillation s/p [facility] 11/2016  2. Normal LV systolic function with LVEF 55% per [facility] 11/2016  3. Moderate LAE 4.4cm  4. Perfusion scan 2/2016 showing mild apical ischemia with normal TID ratio  5. HTN  6. HLD   7. High risk medications  8. Obstructive sleep apnoea. Plan:  1.  Continue Dofetilide  2. DC planning in am.  Electronically signed by Dejah Zimmerman MD on 11/11/2017 at 8:56 AM

## 2017-11-12 VITALS
HEART RATE: 65 BPM | DIASTOLIC BLOOD PRESSURE: 84 MMHG | TEMPERATURE: 98 F | OXYGEN SATURATION: 98 % | SYSTOLIC BLOOD PRESSURE: 140 MMHG | HEIGHT: 65 IN | RESPIRATION RATE: 16 BRPM | BODY MASS INDEX: 43.6 KG/M2 | WEIGHT: 261.69 LBS

## 2017-11-12 PROCEDURE — 6370000000 HC RX 637 (ALT 250 FOR IP): Performed by: INTERNAL MEDICINE

## 2017-11-12 PROCEDURE — 6370000000 HC RX 637 (ALT 250 FOR IP): Performed by: NURSE PRACTITIONER

## 2017-11-12 PROCEDURE — 93005 ELECTROCARDIOGRAM TRACING: CPT

## 2017-11-12 RX ORDER — DOFETILIDE 0.25 MG/1
250 CAPSULE ORAL EVERY 12 HOURS SCHEDULED
Qty: 60 CAPSULE | Refills: 3 | Status: SHIPPED | OUTPATIENT
Start: 2017-11-12 | End: 2017-12-07 | Stop reason: SDUPTHER

## 2017-11-12 RX ORDER — POTASSIUM CHLORIDE 20 MEQ/1
20 TABLET, EXTENDED RELEASE ORAL 2 TIMES DAILY
Qty: 60 TABLET | Refills: 3 | Status: SHIPPED | OUTPATIENT
Start: 2017-11-12

## 2017-11-12 RX ADMIN — SPIRONOLACTONE 25 MG: 25 TABLET, FILM COATED ORAL at 08:07

## 2017-11-12 RX ADMIN — POTASSIUM CHLORIDE 20 MEQ: 20 TABLET, EXTENDED RELEASE ORAL at 08:07

## 2017-11-12 RX ADMIN — APIXABAN 5 MG: 5 TABLET, FILM COATED ORAL at 08:07

## 2017-11-12 RX ADMIN — TRIAMTERENE AND HYDROCHLOROTHIAZIDE 1 TABLET: 37.5; 25 TABLET ORAL at 08:07

## 2017-11-12 RX ADMIN — PAROXETINE HYDROCHLORIDE 20 MG: 20 TABLET, FILM COATED ORAL at 08:07

## 2017-11-12 RX ADMIN — AMLODIPINE BESYLATE 5 MG: 5 TABLET ORAL at 08:07

## 2017-11-12 RX ADMIN — Medication 400 MG: at 08:07

## 2017-11-12 RX ADMIN — DOFETILIDE 250 MCG: 0.25 CAPSULE ORAL at 08:07

## 2017-11-12 RX ADMIN — CETIRIZINE HYDROCHLORIDE 10 MG: 10 TABLET, FILM COATED ORAL at 08:07

## 2017-11-12 ASSESSMENT — PAIN SCALES - GENERAL: PAINLEVEL_OUTOF10: 0

## 2017-11-12 NOTE — FLOWSHEET NOTE
Sl dc'd, tele dc'd discharge instructions reviewed with pt and . Transport called for [employer] home.

## 2017-11-13 PROCEDURE — 93010 ELECTROCARDIOGRAM REPORT: CPT | Performed by: INTERNAL MEDICINE

## 2017-11-13 NOTE — DISCHARGE SUMMARY
[address]                       [address], [address] [facility]                                 DISCHARGE SUMMARY    PATIENT NAME: Meche Chan                       :        1960  MED REC NO:   [id number]                            ROOM:       I941  ACCOUNT NO:   [de-identified]                           ADMIT DATE: 11/10/2017  PROVIDER:     Jimi Steiner MD                    [address] [facility] DATE: 2017    REASON FOR ADMISSION:  Symptomatic atrial fibrillation, patient is admitted  for dofetilide loading. HOSPITAL COURSE:  The patient tolerated dofetilide 250 mcg twice a day and  QT intervals were around 450 msec, baseline heart rate was in the 60s. She  has history of hypertension, tendency for hypokalemia despite potassium  supplementation, she is overweight, has obstructive sleep apnea and remains  on high-risk medication. Her GFR is greater than 60. EKG was reviewed. Her exam shows clear lung  fields, regular heart sounds and no peripheral edema. DISCHARGE MEDICATIONS:  Dofetilide 250 mcg twice a day, potassium chloride  is being increased to 20 mEq twice a day, amlodipine 10 mg daily, Eliquis 5  mg twice a day, loratadine 10 mg at bedtime, atorvastatin 40 mg daily,  magnesium oxide 400 mg daily, Paxil 20 mg daily, Aldactone 25 mg daily,  Nasacort spray, Maxzide 37.5/25 daily. She is to get basic metabolic profile drawn on 2017. She is advised  to follow up with Dr. Len Torres in about 6-8 weeks. Primary medical care is  Dr. Matthew Price. Discharge time 30 minutes. Return to work will be 2017 without  restrictions.         Gia Barton MD    D: 2017 11:15:45       T: 2017 0:51:18     [id number]  Job#: [id number]     Doc#: [id number]

## 2017-11-20 ENCOUNTER — HOSPITAL ENCOUNTER (OUTPATIENT)
Dept: LAB | Age: 57
Discharge: HOME | End: 2017-11-20
Payer: COMMERCIAL

## 2017-11-20 DIAGNOSIS — I10 ESSENTIAL HYPERTENSION: Chronic | ICD-10-CM

## 2017-11-20 LAB
ANION GAP SERPL CALCULATED.3IONS-SCNC: 12 MEQ/L (ref 7–13)
BUN BLDV-MCNC: 18 MG/DL (ref 6–20)
CALCIUM SERPL-MCNC: 9.5 MG/DL (ref 8.6–10.2)
CHLORIDE BLD-SCNC: 100 MEQ/L (ref 98–107)
CO2: 26 MEQ/L (ref 22–29)
CREAT SERPL-MCNC: 0.78 MG/DL (ref 0.5–0.9)
GFR AFRICAN AMERICAN: >60
GFR NON-AFRICAN AMERICAN: >60
GLUCOSE BLD-MCNC: 105 MG/DL (ref 74–109)
POTASSIUM SERPL-SCNC: 4.5 MEQ/L (ref 3.5–5.1)
SODIUM BLD-SCNC: 138 MEQ/L (ref 132–144)

## 2017-11-20 PROCEDURE — 80048 BASIC METABOLIC PNL TOTAL CA: CPT

## 2017-11-20 PROCEDURE — 36415 COLL VENOUS BLD VENIPUNCTURE: CPT

## 2017-11-27 ENCOUNTER — HOSPITAL ENCOUNTER (OUTPATIENT)
Dept: WOMENS IMAGING | Age: 57
Discharge: HOME | End: 2017-11-27
Payer: COMMERCIAL

## 2017-11-27 DIAGNOSIS — Z12.31 VISIT FOR SCREENING MAMMOGRAM: ICD-10-CM

## 2017-11-27 PROCEDURE — G0202 SCR MAMMO BI INCL CAD: HCPCS

## 2017-12-07 ENCOUNTER — OFFICE VISIT (OUTPATIENT)
Dept: FAMILY MEDICINE CLINIC | Age: 57
End: 2017-12-07

## 2017-12-07 VITALS
WEIGHT: 243.6 LBS | HEART RATE: 72 BPM | BODY MASS INDEX: 40.54 KG/M2 | TEMPERATURE: 97.5 F | OXYGEN SATURATION: 96 % | SYSTOLIC BLOOD PRESSURE: 130 MMHG | DIASTOLIC BLOOD PRESSURE: 84 MMHG

## 2017-12-07 DIAGNOSIS — I87.2 VENOUS STASIS DERMATITIS OF BOTH LOWER EXTREMITIES: Primary | ICD-10-CM

## 2017-12-07 DIAGNOSIS — L25.9 CONTACT DERMATITIS, UNSPECIFIED CONTACT DERMATITIS TYPE, UNSPECIFIED TRIGGER: ICD-10-CM

## 2017-12-07 DIAGNOSIS — I48.91 NEW ONSET A-FIB (HCC): ICD-10-CM

## 2017-12-07 PROCEDURE — G8484 FLU IMMUNIZE NO ADMIN: HCPCS | Performed by: FAMILY MEDICINE

## 2017-12-07 PROCEDURE — 99214 OFFICE O/P EST MOD 30 MIN: CPT | Performed by: FAMILY MEDICINE

## 2017-12-07 PROCEDURE — G8417 CALC BMI ABV UP PARAM F/U: HCPCS | Performed by: FAMILY MEDICINE

## 2017-12-07 PROCEDURE — 1111F DSCHRG MED/CURRENT MED MERGE: CPT | Performed by: FAMILY MEDICINE

## 2017-12-07 PROCEDURE — 1036F TOBACCO NON-USER: CPT | Performed by: FAMILY MEDICINE

## 2017-12-07 PROCEDURE — 3017F COLORECTAL CA SCREEN DOC REV: CPT | Performed by: FAMILY MEDICINE

## 2017-12-07 PROCEDURE — 3014F SCREEN MAMMO DOC REV: CPT | Performed by: FAMILY MEDICINE

## 2017-12-07 PROCEDURE — G8427 DOCREV CUR MEDS BY ELIG CLIN: HCPCS | Performed by: FAMILY MEDICINE

## 2017-12-07 RX ORDER — DOFETILIDE 0.12 MG/1
125 CAPSULE ORAL EVERY 12 HOURS SCHEDULED
Qty: 60 CAPSULE | Refills: 0 | Status: ON HOLD | OUTPATIENT
Start: 2017-12-07 | End: 2018-04-25 | Stop reason: HOSPADM

## 2017-12-07 ASSESSMENT — ENCOUNTER SYMPTOMS
RHINORRHEA: 0
WHEEZING: 0
COUGH: 0
DIARRHEA: 0
ABDOMINAL PAIN: 0
SHORTNESS OF BREATH: 0
SORE THROAT: 0
CONSTIPATION: 0

## 2017-12-07 NOTE — PROGRESS NOTES
[address] [facility] PRIMARY CARE [place]  [address] [address]  Dept: [phone]  Dept Fax: [phone]  Loc: [phone]   Chief Complaint  Chief Complaint   Patient presents with    Rash     x 4 days, B/L, warm to the touch, wolfe       HPI:   59 y.o. female who presents for rash b/l LEs:    Rash: x4 days; warm to touch with burning sensation and some swelling, seems to be spreading, denies itchiness  Denies ever having anything like this before     No changes to anything at home, uses dye free detergent, using fragrant and dye free lotions and soaps    recently had a rash also, and was given Rx cream, which she tried and it did not help her     Also has dermatitis like rash on thumb, and attributes this to using  at work, as it usually resolves over the weekend; she uses a spray bottle while wearing latex gloves and notices the skin gets irritated while working.     Also was notified by insurance/pharmacist that there is a chance for a possible bad reaction between Tikosyn and Maxzide and is expressing concern with this   Also reporting sinus issues, congestion and drainage  Taking Nasacort and Claritin, which have been helping     She is on her feet for 8 hours daily.      Denies f/c, CP, SOB, n/v/d    Past Medical History:   Diagnosis Date    Allergic rhinitis     Anxiety     Cholecystitis     Hyperlipidemia     Hypertension     Osteoarthritis     back worst    Osteoarthritis     knees, spine    PONV (postoperative nausea and vomiting)     from using inhaled anethesia     Seasonal allergies     Vitamin D deficiency      Past Surgical History:   Procedure Laterality Date     SECTION      x2    [facility], LAPAROSCOPIC N/A 2017    For biliary colic and stones    COLONOSCOPY  14    [facility]    fibroids total   [address] [facility] thumb rash: contact dermatitis. Needs to avoid the triggers which sound like latex. - Afib: refilled her Tikosyn which she is almost out of. Possible reaction is QT prolongation with maxzide. She has been unable to get a response from her cardiology office but will be seeing them 12/13. Will have her confirm with them whether she should stay on this. 1. Venous stasis dermatitis of both lower extremities  Misc. Devices KIT   2. New onset a-fib (HCC)  dofetilide (TIKOSYN) 125 MCG capsule   3. Contact dermatitis, unspecified contact dermatitis type, unspecified trigger          Return if symptoms worsen or fail to improve.     Betsy Hardy MD

## 2018-01-25 ENCOUNTER — HOSPITAL ENCOUNTER (OUTPATIENT)
Dept: SLEEP CENTER | Age: 58
Discharge: HOME | End: 2018-01-25
Payer: COMMERCIAL

## 2018-01-25 PROCEDURE — 95810 POLYSOM 6/> YRS 4/> PARAM: CPT

## 2018-01-31 ENCOUNTER — HOSPITAL ENCOUNTER (OUTPATIENT)
Dept: LAB | Age: 58
Discharge: HOME | End: 2018-01-31
Payer: COMMERCIAL

## 2018-01-31 LAB
ALBUMIN SERPL-MCNC: 4.3 G/DL (ref 3.9–4.9)
ALP BLD-CCNC: 124 U/L (ref 40–130)
ALT SERPL-CCNC: 21 U/L (ref 0–33)
ANION GAP SERPL CALCULATED.3IONS-SCNC: 16 MEQ/L (ref 7–13)
AST SERPL-CCNC: 22 U/L (ref 0–35)
BILIRUB SERPL-MCNC: 0.3 MG/DL (ref 0–1.2)
BUN BLDV-MCNC: 18 MG/DL (ref 6–20)
CALCIUM SERPL-MCNC: 9.8 MG/DL (ref 8.6–10.2)
CHLORIDE BLD-SCNC: 96 MEQ/L (ref 98–107)
CHOLESTEROL, TOTAL: 187 MG/DL (ref 0–199)
CO2: 25 MEQ/L (ref 22–29)
CREAT SERPL-MCNC: 0.8 MG/DL (ref 0.5–0.9)
GFR AFRICAN AMERICAN: >60
GFR NON-AFRICAN AMERICAN: >60
GLOBULIN: 3.1 G/DL (ref 2.3–3.5)
GLUCOSE BLD-MCNC: 108 MG/DL (ref 74–109)
HCT VFR BLD CALC: 41 % (ref 37–47)
HDLC SERPL-MCNC: 54 MG/DL (ref 40–59)
HEMOGLOBIN: 13.5 G/DL (ref 12–16)
LDL CHOLESTEROL CALCULATED: 119 MG/DL (ref 0–129)
MCH RBC QN AUTO: 28.7 PG (ref 27–31.3)
MCHC RBC AUTO-ENTMCNC: 32.8 % (ref 33–37)
MCV RBC AUTO: 87.3 FL (ref 82–100)
PDW BLD-RTO: 14 % (ref 11.5–14.5)
PLATELET # BLD: 281 K/UL (ref 130–400)
POTASSIUM SERPL-SCNC: 4 MEQ/L (ref 3.5–5.1)
RBC # BLD: 4.69 M/UL (ref 4.2–5.4)
SODIUM BLD-SCNC: 137 MEQ/L (ref 132–144)
TOTAL PROTEIN: 7.4 G/DL (ref 6.4–8.1)
TRIGL SERPL-MCNC: 71 MG/DL (ref 0–200)
TSH SERPL DL<=0.05 MIU/L-ACNC: 4.26 UIU/ML (ref 0.27–4.2)
WBC # BLD: 9.5 K/UL (ref 4.8–10.8)

## 2018-01-31 PROCEDURE — 84443 ASSAY THYROID STIM HORMONE: CPT

## 2018-01-31 PROCEDURE — 36415 COLL VENOUS BLD VENIPUNCTURE: CPT

## 2018-01-31 PROCEDURE — 85027 COMPLETE CBC AUTOMATED: CPT

## 2018-01-31 PROCEDURE — 80053 COMPREHEN METABOLIC PANEL: CPT

## 2018-01-31 PROCEDURE — 80061 LIPID PANEL: CPT

## 2018-02-07 ENCOUNTER — TELEPHONE (OUTPATIENT)
Dept: FAMILY MEDICINE CLINIC | Age: 58
End: 2018-02-07

## 2018-02-07 DIAGNOSIS — L30.9 DERMATITIS: Primary | ICD-10-CM

## 2018-02-22 ENCOUNTER — HOSPITAL ENCOUNTER (OUTPATIENT)
Age: 58
Setting detail: SPECIMEN
Discharge: HOME | End: 2018-02-22
Payer: COMMERCIAL

## 2018-02-22 ENCOUNTER — OFFICE VISIT (OUTPATIENT)
Dept: INTERNAL MEDICINE | Age: 58
End: 2018-02-22
Payer: COMMERCIAL

## 2018-02-22 VITALS
SYSTOLIC BLOOD PRESSURE: 152 MMHG | BODY MASS INDEX: 41.66 KG/M2 | DIASTOLIC BLOOD PRESSURE: 78 MMHG | HEART RATE: 58 BPM | WEIGHT: 244 LBS | HEIGHT: 64 IN

## 2018-02-22 DIAGNOSIS — L30.1 DYSHIDROTIC ECZEMA: ICD-10-CM

## 2018-02-22 DIAGNOSIS — R79.89 ELEVATED TSH: Primary | ICD-10-CM

## 2018-02-22 DIAGNOSIS — R09.81 SINUS CONGESTION: ICD-10-CM

## 2018-02-22 DIAGNOSIS — R79.89 ELEVATED TSH: ICD-10-CM

## 2018-02-22 LAB
T3 FREE: 3.4 PG/ML (ref 2–4.4)
T4 FREE: 1.28 NG/DL (ref 0.93–1.7)
TSH REFLEX: 3.42 UIU/ML (ref 0.27–4.2)

## 2018-02-22 PROCEDURE — 84443 ASSAY THYROID STIM HORMONE: CPT

## 2018-02-22 PROCEDURE — G8482 FLU IMMUNIZE ORDER/ADMIN: HCPCS | Performed by: FAMILY MEDICINE

## 2018-02-22 PROCEDURE — G8417 CALC BMI ABV UP PARAM F/U: HCPCS | Performed by: FAMILY MEDICINE

## 2018-02-22 PROCEDURE — 84481 FREE ASSAY (FT-3): CPT

## 2018-02-22 PROCEDURE — 3014F SCREEN MAMMO DOC REV: CPT | Performed by: FAMILY MEDICINE

## 2018-02-22 PROCEDURE — 86376 MICROSOMAL ANTIBODY EACH: CPT

## 2018-02-22 PROCEDURE — 1036F TOBACCO NON-USER: CPT | Performed by: FAMILY MEDICINE

## 2018-02-22 PROCEDURE — 84439 ASSAY OF FREE THYROXINE: CPT

## 2018-02-22 PROCEDURE — 3017F COLORECTAL CA SCREEN DOC REV: CPT | Performed by: FAMILY MEDICINE

## 2018-02-22 PROCEDURE — G8427 DOCREV CUR MEDS BY ELIG CLIN: HCPCS | Performed by: FAMILY MEDICINE

## 2018-02-22 PROCEDURE — 36415 COLL VENOUS BLD VENIPUNCTURE: CPT | Performed by: FAMILY MEDICINE

## 2018-02-22 PROCEDURE — 99213 OFFICE O/P EST LOW 20 MIN: CPT | Performed by: FAMILY MEDICINE

## 2018-02-22 RX ORDER — CLOBETASOL PROPIONATE 0.5 MG/G
CREAM TOPICAL
Qty: 60 G | Refills: 2 | Status: SHIPPED | OUTPATIENT
Start: 2018-02-22 | End: 2020-01-14 | Stop reason: SDUPTHER

## 2018-02-22 NOTE — PROGRESS NOTES
note and vitals reviewed. pruritic, red, scaly, and crusted lesions on hands. no vesicles,  serous exudates and crusting      I personally reviewed all recent labs and imaging pertaining to conditions mentioned above in assessment/plan in [facility] and care everywhere, discussed results with patient in office      Assessment:  Agustín Best was seen today for other. Diagnoses and all orders for this visit:    Elevated TSH  -     TSH with Reflex; Future  -     T4, Free; Future  -     T3, Free; Future  -     Thyroid Peroxidase Antibody; Future  Check labs    Sinus congestion  Explained viral etiology to patient and advised supportive care, over the counter analgesics for symptomatic therapy, tylenol/motrin for fevers, Fluids, rest  Signs of worsening infection explained, signs of dehydration explained, to seek medical attention if they occur  rtc if not better  If not better at 10-14 day nanci advised he/she can call in for antibiotics script  Hand out provided    Dyshidrotic eczema  -     clobetasol (TEMOVATE) 0.05 % cream; Apply topically 2 times daily. hand out provided, had a lengthy discussion with patient about treatment, it's side effects, the diagnosis & etiology of symptoms, along with management and expectations of outcome with the recommended treatment. Patient verbalized understanding.           Orders Placed This Encounter   Procedures    TSH with Reflex     Standing Status:   Future     Number of Occurrences:   1     Standing Expiration Date:   2/22/2019    T4, Free     Standing Status:   Future     Number of Occurrences:   1     Standing Expiration Date:   2/22/2019    T3, Free     Standing Status:   Future     Number of Occurrences:   1     Standing Expiration Date:   2/22/2019    Thyroid Peroxidase Antibody     Standing Status:   Future     Number of Occurrences:   1     Standing Expiration Date:   4/22/2018       Piyush Kent MD

## 2018-02-24 LAB — THYROID PEROXIDASE (TPO) ABS: 0.4 IU/ML (ref 0–9)

## 2018-02-26 ENCOUNTER — TELEPHONE (OUTPATIENT)
Dept: INTERNAL MEDICINE | Age: 58
End: 2018-02-26

## 2018-02-26 ENCOUNTER — HOSPITAL ENCOUNTER (OUTPATIENT)
Dept: LAB | Age: 58
Discharge: HOME | End: 2018-02-26
Payer: COMMERCIAL

## 2018-02-26 LAB
ALBUMIN SERPL-MCNC: 4.3 G/DL (ref 3.9–4.9)
ALP BLD-CCNC: 119 U/L (ref 40–130)
ALT SERPL-CCNC: 19 U/L (ref 0–33)
ANION GAP SERPL CALCULATED.3IONS-SCNC: 11 MEQ/L (ref 7–13)
AST SERPL-CCNC: 21 U/L (ref 0–35)
BILIRUB SERPL-MCNC: 0.4 MG/DL (ref 0–1.2)
BUN BLDV-MCNC: 17 MG/DL (ref 8–23)
CALCIUM SERPL-MCNC: 9.4 MG/DL (ref 8.6–10.2)
CHLORIDE BLD-SCNC: 101 MEQ/L (ref 98–107)
CO2: 28 MEQ/L (ref 22–29)
CREAT SERPL-MCNC: 0.79 MG/DL (ref 0.5–0.9)
GFR AFRICAN AMERICAN: >60
GFR NON-AFRICAN AMERICAN: >60
GLOBULIN: 2.8 G/DL (ref 2.3–3.5)
GLUCOSE BLD-MCNC: 106 MG/DL (ref 74–109)
HCT VFR BLD CALC: 42.8 % (ref 37–47)
HEMOGLOBIN: 14 G/DL (ref 12–16)
MCH RBC QN AUTO: 28.4 PG (ref 27–31.3)
MCHC RBC AUTO-ENTMCNC: 32.6 % (ref 33–37)
MCV RBC AUTO: 87.1 FL (ref 82–100)
PDW BLD-RTO: 14.1 % (ref 11.5–14.5)
PLATELET # BLD: 266 K/UL (ref 130–400)
POTASSIUM SERPL-SCNC: 4.2 MEQ/L (ref 3.5–5.1)
RBC # BLD: 4.91 M/UL (ref 4.2–5.4)
SODIUM BLD-SCNC: 140 MEQ/L (ref 132–144)
TOTAL PROTEIN: 7.1 G/DL (ref 6.4–8.1)
WBC # BLD: 7.6 K/UL (ref 4.8–10.8)

## 2018-02-26 PROCEDURE — 80053 COMPREHEN METABOLIC PANEL: CPT

## 2018-02-26 PROCEDURE — 85027 COMPLETE CBC AUTOMATED: CPT

## 2018-02-26 PROCEDURE — 36415 COLL VENOUS BLD VENIPUNCTURE: CPT

## 2018-03-09 ENCOUNTER — HOSPITAL ENCOUNTER (OUTPATIENT)
Dept: CARDIAC CATH/INVASIVE PROCEDURES | Age: 58
Discharge: HOME | End: 2018-03-10
Attending: INTERNAL MEDICINE | Admitting: INTERNAL MEDICINE
Payer: COMMERCIAL

## 2018-03-09 ENCOUNTER — APPOINTMENT (OUTPATIENT)
Dept: GENERAL RADIOLOGY | Age: 58
End: 2018-03-09
Attending: INTERNAL MEDICINE
Payer: COMMERCIAL

## 2018-03-09 PROBLEM — R00.1 SYMPTOMATIC BRADYCARDIA: Status: ACTIVE | Noted: 2018-03-09

## 2018-03-09 PROBLEM — Z95.0 PACEMAKER: Status: ACTIVE | Noted: 2018-03-09

## 2018-03-09 LAB
EKG ATRIAL RATE: 63 BPM
EKG P AXIS: 57 DEGREES
EKG P-R INTERVAL: 120 MS
EKG Q-T INTERVAL: 428 MS
EKG QRS DURATION: 92 MS
EKG QTC CALCULATION (BAZETT): 437 MS
EKG R AXIS: -8 DEGREES
EKG T AXIS: 17 DEGREES
EKG VENTRICULAR RATE: 63 BPM

## 2018-03-09 PROCEDURE — C1785 PMKR, DUAL, RATE-RESP: HCPCS

## 2018-03-09 PROCEDURE — 6370000000 HC RX 637 (ALT 250 FOR IP): Performed by: INTERNAL MEDICINE

## 2018-03-09 PROCEDURE — 71045 X-RAY EXAM CHEST 1 VIEW: CPT

## 2018-03-09 PROCEDURE — 2580000003 HC RX 258: Performed by: INTERNAL MEDICINE

## 2018-03-09 PROCEDURE — C1898 LEAD, PMKR, OTHER THAN TRANS: HCPCS

## 2018-03-09 PROCEDURE — 93005 ELECTROCARDIOGRAM TRACING: CPT

## 2018-03-09 PROCEDURE — 2500000003 HC RX 250 WO HCPCS

## 2018-03-09 PROCEDURE — C1894 INTRO/SHEATH, NON-LASER: HCPCS

## 2018-03-09 PROCEDURE — G0378 HOSPITAL OBSERVATION PER HR: HCPCS

## 2018-03-09 PROCEDURE — C1892 INTRO/SHEATH,FIXED,PEEL-AWAY: HCPCS

## 2018-03-09 PROCEDURE — 33208 INSRT HEART PM ATRIAL & VENT: CPT | Performed by: INTERNAL MEDICINE

## 2018-03-09 PROCEDURE — 6360000002 HC RX W HCPCS: Performed by: INTERNAL MEDICINE

## 2018-03-09 PROCEDURE — 6360000002 HC RX W HCPCS

## 2018-03-09 PROCEDURE — 6370000000 HC RX 637 (ALT 250 FOR IP)

## 2018-03-09 PROCEDURE — 2580000003 HC RX 258

## 2018-03-09 RX ORDER — ACETAMINOPHEN 80 MG
TABLET,CHEWABLE ORAL ONCE
Status: DISCONTINUED | OUTPATIENT
Start: 2018-03-09 | End: 2018-03-10 | Stop reason: HOSPADM

## 2018-03-09 RX ORDER — AMLODIPINE BESYLATE 10 MG/1
10 TABLET ORAL DAILY
Status: DISCONTINUED | OUTPATIENT
Start: 2018-03-09 | End: 2018-03-10 | Stop reason: HOSPADM

## 2018-03-09 RX ORDER — ACETAMINOPHEN 325 MG/1
650 TABLET ORAL EVERY 4 HOURS PRN
Status: DISCONTINUED | OUTPATIENT
Start: 2018-03-09 | End: 2018-03-10 | Stop reason: HOSPADM

## 2018-03-09 RX ORDER — PAROXETINE HYDROCHLORIDE 20 MG/1
20 TABLET, FILM COATED ORAL EVERY MORNING
Status: DISCONTINUED | OUTPATIENT
Start: 2018-03-09 | End: 2018-03-10 | Stop reason: HOSPADM

## 2018-03-09 RX ORDER — FUROSEMIDE 20 MG/1
20 TABLET ORAL DAILY
Status: DISCONTINUED | OUTPATIENT
Start: 2018-03-09 | End: 2018-03-10 | Stop reason: HOSPADM

## 2018-03-09 RX ORDER — VANCOMYCIN HYDROCHLORIDE 1 G/200ML
1000 INJECTION, SOLUTION INTRAVENOUS ONCE
Status: COMPLETED | OUTPATIENT
Start: 2018-03-10 | End: 2018-03-10

## 2018-03-09 RX ORDER — KETOROLAC TROMETHAMINE 30 MG/ML
30 INJECTION, SOLUTION INTRAMUSCULAR; INTRAVENOUS EVERY 6 HOURS
Status: DISCONTINUED | OUTPATIENT
Start: 2018-03-09 | End: 2018-03-10 | Stop reason: HOSPADM

## 2018-03-09 RX ORDER — SODIUM CHLORIDE 0.9 % (FLUSH) 0.9 %
10 SYRINGE (ML) INJECTION PRN
Status: DISCONTINUED | OUTPATIENT
Start: 2018-03-09 | End: 2018-03-10 | Stop reason: HOSPADM

## 2018-03-09 RX ORDER — ONDANSETRON 2 MG/ML
4 INJECTION INTRAMUSCULAR; INTRAVENOUS EVERY 6 HOURS PRN
Status: DISCONTINUED | OUTPATIENT
Start: 2018-03-09 | End: 2018-03-10 | Stop reason: HOSPADM

## 2018-03-09 RX ORDER — SODIUM CHLORIDE 0.9 % (FLUSH) 0.9 %
10 SYRINGE (ML) INJECTION EVERY 12 HOURS SCHEDULED
Status: DISCONTINUED | OUTPATIENT
Start: 2018-03-09 | End: 2018-03-10 | Stop reason: HOSPADM

## 2018-03-09 RX ORDER — ALPRAZOLAM 0.5 MG/1
0.5 TABLET ORAL ONCE
Status: COMPLETED | OUTPATIENT
Start: 2018-03-09 | End: 2018-03-09

## 2018-03-09 RX ORDER — ATORVASTATIN CALCIUM 40 MG/1
40 TABLET, FILM COATED ORAL DAILY
Status: DISCONTINUED | OUTPATIENT
Start: 2018-03-09 | End: 2018-03-10 | Stop reason: HOSPADM

## 2018-03-09 RX ORDER — TRIAMCINOLONE ACETONIDE 55 UG/1
1 SPRAY, METERED NASAL 2 TIMES DAILY
Status: DISCONTINUED | OUTPATIENT
Start: 2018-03-09 | End: 2018-03-09 | Stop reason: CLARIF

## 2018-03-09 RX ORDER — FLUTICASONE PROPIONATE 50 MCG
1 SPRAY, SUSPENSION (ML) NASAL DAILY
Status: DISCONTINUED | OUTPATIENT
Start: 2018-03-09 | End: 2018-03-10 | Stop reason: HOSPADM

## 2018-03-09 RX ORDER — VANCOMYCIN HYDROCHLORIDE 1 G/200ML
1000 INJECTION, SOLUTION INTRAVENOUS ONCE
Status: DISCONTINUED | OUTPATIENT
Start: 2018-03-09 | End: 2018-03-09 | Stop reason: SDUPTHER

## 2018-03-09 RX ORDER — SODIUM CHLORIDE 450 MG/100ML
INJECTION, SOLUTION INTRAVENOUS CONTINUOUS
Status: DISCONTINUED | OUTPATIENT
Start: 2018-03-09 | End: 2018-03-10 | Stop reason: HOSPADM

## 2018-03-09 RX ORDER — DOFETILIDE 0.12 MG/1
125 CAPSULE ORAL EVERY 12 HOURS SCHEDULED
Status: DISCONTINUED | OUTPATIENT
Start: 2018-03-09 | End: 2018-03-10 | Stop reason: HOSPADM

## 2018-03-09 RX ORDER — SPIRONOLACTONE 25 MG/1
25 TABLET ORAL DAILY
COMMUNITY

## 2018-03-09 RX ORDER — CETIRIZINE HYDROCHLORIDE 10 MG/1
5 TABLET ORAL DAILY
Status: DISCONTINUED | OUTPATIENT
Start: 2018-03-09 | End: 2018-03-10 | Stop reason: HOSPADM

## 2018-03-09 RX ORDER — DOXAZOSIN MESYLATE 1 MG/1
2 TABLET ORAL DAILY
Status: DISCONTINUED | OUTPATIENT
Start: 2018-03-09 | End: 2018-03-10 | Stop reason: HOSPADM

## 2018-03-09 RX ORDER — DOXAZOSIN 2 MG/1
2 TABLET ORAL NIGHTLY
COMMUNITY

## 2018-03-09 RX ORDER — POTASSIUM CHLORIDE 20 MEQ/1
20 TABLET, EXTENDED RELEASE ORAL 2 TIMES DAILY
Status: DISCONTINUED | OUTPATIENT
Start: 2018-03-09 | End: 2018-03-10 | Stop reason: HOSPADM

## 2018-03-09 RX ORDER — SPIRONOLACTONE 25 MG/1
25 TABLET ORAL DAILY
Status: DISCONTINUED | OUTPATIENT
Start: 2018-03-09 | End: 2018-03-10 | Stop reason: HOSPADM

## 2018-03-09 RX ORDER — FUROSEMIDE 20 MG/1
20 TABLET ORAL DAILY
COMMUNITY

## 2018-03-09 RX ADMIN — SODIUM CHLORIDE 1000 ML: 4.5 INJECTION, SOLUTION INTRAVENOUS at 07:44

## 2018-03-09 RX ADMIN — VANCOMYCIN HYDROCHLORIDE 1000 MG: 1 INJECTION, SOLUTION INTRAVENOUS at 12:24

## 2018-03-09 RX ADMIN — KETOROLAC TROMETHAMINE 30 MG: 30 INJECTION, SOLUTION INTRAMUSCULAR; INTRAVENOUS at 16:45

## 2018-03-09 RX ADMIN — Medication 10 ML: at 21:45

## 2018-03-09 RX ADMIN — ALPRAZOLAM 0.5 MG: 0.5 TABLET ORAL at 07:43

## 2018-03-09 RX ADMIN — POTASSIUM CHLORIDE 20 MEQ: 20 TABLET, EXTENDED RELEASE ORAL at 21:43

## 2018-03-09 RX ADMIN — KETOROLAC TROMETHAMINE 30 MG: 30 INJECTION, SOLUTION INTRAMUSCULAR; INTRAVENOUS at 21:44

## 2018-03-09 RX ADMIN — VANCOMYCIN HYDROCHLORIDE: 1 INJECTION, POWDER, LYOPHILIZED, FOR SOLUTION INTRAVENOUS at 12:23

## 2018-03-09 RX ADMIN — DOFETILIDE 125 MCG: 0.12 CAPSULE ORAL at 21:43

## 2018-03-09 RX ADMIN — Medication 10 ML: at 12:22

## 2018-03-09 RX ADMIN — ATORVASTATIN CALCIUM 40 MG: 40 TABLET, FILM COATED ORAL at 21:44

## 2018-03-09 RX ADMIN — KETOROLAC TROMETHAMINE 30 MG: 30 INJECTION, SOLUTION INTRAMUSCULAR; INTRAVENOUS at 10:58

## 2018-03-09 RX ADMIN — FUROSEMIDE 20 MG: 20 TABLET ORAL at 21:43

## 2018-03-09 RX ADMIN — CETIRIZINE HYDROCHLORIDE 5 MG: 10 TABLET, FILM COATED ORAL at 22:04

## 2018-03-09 RX ADMIN — MAGNESIUM OXIDE TAB 400 MG (241.3 MG ELEMENTAL MG) 400 MG: 400 (241.3 MG) TAB at 21:44

## 2018-03-09 ASSESSMENT — PAIN SCALES - GENERAL
PAINLEVEL_OUTOF10: 7

## 2018-03-09 NOTE — PROGRESS NOTES
PHARMACY NOTE  Felicia Vigil was ordered Nasacort. Per the [address], this medication is non-formulary and not stocked by pharmacy. Flonase was substituted. The medication can be reordered at discharge.

## 2018-03-09 NOTE — PROGRESS NOTES
Patient is returning from the cath lab and family is at the bedside.   Juju Queen will review results with patient and family

## 2018-03-09 NOTE — OP NOTE
with subcuticular suture and Steri-Strips. A pressure  dressing was applied. The patient left the EP laboratory in stable  condition. COMPLICATIONS:  The patient tolerated the procedure without any  complications or incidents. No complications occurred. LEAD PARAMETERS:  LEAD #1:  Chamber:  Right atrium. Model Information:  Medtronic [id number], serial [id number]. Implanted on  03/09/2018. Location:  Right atrial appendage. Capture:  1 V at 0.5 ms. Impedance:  735 ohms. EGM amplitude:  2.9 mV. Diaphragmatic Stimulation:  None. Status:  Active. LEAD #2:  Chamber:  Right ventricle. Model Information:  Medtronic [id number], serial [id number]. Implanted on  03/09/2018. Location:  Right ventricular low septum. Capture:  1.1 V at 0.5 ms. Impedance:  1211 ohms. EGM amplitude:  5.4 mV. Diaphragmatic Stimulation:  None. Status:  Active. SUMMARY:  1. Successful implantation of a dual-chamber pacemaker. 2.  Patency of the left subclavian system. FOLLOWUP:  1. The patient will remain in the hospital overnight for telemetry  monitoring and observation with anticipated discharge the next day. 2.  Follow up with electrophysiology service in 7 days for wound  assessment. 3.  The patient was instructed for bleeding, swelling, or signs of  infection. 4.  Resume Eliquis as indicated in the discharge orders. 5.  Follow up with the device clinic next day for device next day for  device interrogation and also as scheduled. 6.  The patient should receive an extra dose of vancomycin 12 hours post  procedure and also get a chest x-ray, PA and lateral for lead position.         Samreen Antunez MD    D: 03/09/2018 10:06:12       T: 03/09/2018 12:10:31     [email or web address]  Job#: [id number]     Doc#: [id number]    CC:

## 2018-03-10 ENCOUNTER — APPOINTMENT (OUTPATIENT)
Dept: GENERAL RADIOLOGY | Age: 58
End: 2018-03-10
Attending: INTERNAL MEDICINE
Payer: COMMERCIAL

## 2018-03-10 VITALS
SYSTOLIC BLOOD PRESSURE: 157 MMHG | HEIGHT: 65 IN | TEMPERATURE: 97.9 F | BODY MASS INDEX: 40.4 KG/M2 | WEIGHT: 242.51 LBS | DIASTOLIC BLOOD PRESSURE: 57 MMHG | RESPIRATION RATE: 17 BRPM | OXYGEN SATURATION: 95 % | HEART RATE: 67 BPM

## 2018-03-10 PROCEDURE — 2580000003 HC RX 258: Performed by: INTERNAL MEDICINE

## 2018-03-10 PROCEDURE — 6370000000 HC RX 637 (ALT 250 FOR IP): Performed by: INTERNAL MEDICINE

## 2018-03-10 PROCEDURE — 71046 X-RAY EXAM CHEST 2 VIEWS: CPT

## 2018-03-10 PROCEDURE — 6360000002 HC RX W HCPCS: Performed by: INTERNAL MEDICINE

## 2018-03-10 RX ADMIN — FLUTICASONE PROPIONATE 1 SPRAY: 50 SPRAY, METERED NASAL at 09:50

## 2018-03-10 RX ADMIN — POTASSIUM CHLORIDE 20 MEQ: 20 TABLET, EXTENDED RELEASE ORAL at 09:36

## 2018-03-10 RX ADMIN — FUROSEMIDE 20 MG: 20 TABLET ORAL at 09:37

## 2018-03-10 RX ADMIN — VANCOMYCIN HYDROCHLORIDE 1000 MG: 1 INJECTION, SOLUTION INTRAVENOUS at 09:38

## 2018-03-10 RX ADMIN — AMLODIPINE BESYLATE 10 MG: 10 TABLET ORAL at 09:36

## 2018-03-10 RX ADMIN — Medication 10 ML: at 09:38

## 2018-03-10 RX ADMIN — CETIRIZINE HYDROCHLORIDE 5 MG: 10 TABLET, FILM COATED ORAL at 09:36

## 2018-03-10 RX ADMIN — PAROXETINE HYDROCHLORIDE 20 MG: 20 TABLET, FILM COATED ORAL at 09:36

## 2018-03-10 RX ADMIN — SPIRONOLACTONE 25 MG: 25 TABLET, FILM COATED ORAL at 09:37

## 2018-03-10 RX ADMIN — KETOROLAC TROMETHAMINE 30 MG: 30 INJECTION, SOLUTION INTRAMUSCULAR; INTRAVENOUS at 05:04

## 2018-03-10 RX ADMIN — KETOROLAC TROMETHAMINE 30 MG: 30 INJECTION, SOLUTION INTRAMUSCULAR; INTRAVENOUS at 09:37

## 2018-03-10 RX ADMIN — MAGNESIUM OXIDE TAB 400 MG (241.3 MG ELEMENTAL MG) 400 MG: 400 (241.3 MG) TAB at 09:37

## 2018-03-10 RX ADMIN — DOXAZOSIN 2 MG: 1 TABLET ORAL at 09:37

## 2018-03-10 RX ADMIN — DOFETILIDE 125 MCG: 0.12 CAPSULE ORAL at 09:36

## 2018-03-10 RX ADMIN — ATORVASTATIN CALCIUM 40 MG: 40 TABLET, FILM COATED ORAL at 09:37

## 2018-03-10 ASSESSMENT — PAIN SCALES - GENERAL
PAINLEVEL_OUTOF10: 6
PAINLEVEL_OUTOF10: 8

## 2018-03-12 PROCEDURE — 93010 ELECTROCARDIOGRAM REPORT: CPT | Performed by: INTERNAL MEDICINE

## 2018-03-21 ENCOUNTER — OFFICE VISIT (OUTPATIENT)
Dept: PULMONOLOGY | Age: 58
End: 2018-03-21
Payer: COMMERCIAL

## 2018-03-21 VITALS
TEMPERATURE: 97.4 F | WEIGHT: 249 LBS | SYSTOLIC BLOOD PRESSURE: 120 MMHG | DIASTOLIC BLOOD PRESSURE: 72 MMHG | BODY MASS INDEX: 41.48 KG/M2 | HEART RATE: 65 BPM | HEIGHT: 65 IN | OXYGEN SATURATION: 95 %

## 2018-03-21 DIAGNOSIS — I10 ESSENTIAL HYPERTENSION: ICD-10-CM

## 2018-03-21 DIAGNOSIS — R09.81 NASAL CONGESTION: ICD-10-CM

## 2018-03-21 DIAGNOSIS — G47.33 OSA (OBSTRUCTIVE SLEEP APNEA): Primary | ICD-10-CM

## 2018-03-21 PROCEDURE — 99215 OFFICE O/P EST HI 40 MIN: CPT | Performed by: INTERNAL MEDICINE

## 2018-03-21 PROCEDURE — 3014F SCREEN MAMMO DOC REV: CPT | Performed by: INTERNAL MEDICINE

## 2018-03-21 PROCEDURE — G8427 DOCREV CUR MEDS BY ELIG CLIN: HCPCS | Performed by: INTERNAL MEDICINE

## 2018-03-21 PROCEDURE — 1036F TOBACCO NON-USER: CPT | Performed by: INTERNAL MEDICINE

## 2018-03-21 PROCEDURE — G8417 CALC BMI ABV UP PARAM F/U: HCPCS | Performed by: INTERNAL MEDICINE

## 2018-03-21 PROCEDURE — G8482 FLU IMMUNIZE ORDER/ADMIN: HCPCS | Performed by: INTERNAL MEDICINE

## 2018-03-21 PROCEDURE — 3017F COLORECTAL CA SCREEN DOC REV: CPT | Performed by: INTERNAL MEDICINE

## 2018-03-21 RX ORDER — FLUTICASONE PROPIONATE 50 MCG
1 SPRAY, SUSPENSION (ML) NASAL DAILY
Qty: 1 BOTTLE | COMMUNITY
Start: 2018-03-21 | End: 2020-02-21 | Stop reason: SDUPTHER

## 2018-03-21 NOTE — PROGRESS NOTES
Subjective:     Jeison Larson is a 59 y.o. female who complains today of:     Chief Complaint   Patient presents with    New Patient     pt referred by dr. Megan De La Garza for teto       HPI  Patient presents for management of TETO, had PSG done and showed AHI 15.2 , reports low day time energy, sleepy during the day , + snoring, no SOB, no chest pain, no fever or chills, + nasal congestion and post nasal drip. Works at night 10pm to 630 am , sleeps 5-6 hours during the day with no refreshing sleep     Never smoked    Declined repeat in lab titration study     Allergies:  Sulfa antibiotics; Lisinopril; Lisinopril; Sulfa antibiotics; Codeine; and Percocet [oxycodone-acetaminophen]  Past Medical History:   Diagnosis Date    Allergic rhinitis     Anxiety     Cholecystitis     Hyperlipidemia     Hypertension     Osteoarthritis     back worst    Osteoarthritis     knees, spine    PONV (postoperative nausea and vomiting)     from using inhaled anethesia     Seasonal allergies     Vitamin D deficiency      Past Surgical History:   Procedure Laterality Date     SECTION      x2     SECTION   &     CHOLECYSTECTOMY, LAPAROSCOPIC N/A 2017    For biliary colic and stones    COLONOSCOPY  14    jarmoszuk    HYSTERECTOMY      fibroids total    HYSTERECTOMY      TONSILLECTOMY      TONSILLECTOMY  age 20     Family History   Problem Relation Age of Onset    Heart Disease Father 36     heart attack    Heart Attack Father     Heart Disease Sister 40     heart attack age 40    Cancer Brother 62     lung/age 62    Coronary Art Dis Sister     Cancer Maternal Grandfather      lung cancer    Cancer Paternal Grandfather      colon cancer    No Known Problems Daughter      Social History     Social History    Marital status:      Spouse name: N/A    Number of children: 2    Years of education: N/A     Occupational History    Not on file.      Social History Main Topics    studies reviewed by me Pacemaker, no infiltrate. Lab results reviewed in chart  ECHO: 2017  1- Overall preserved left ventricular systolic function, visually estimated  left ventricular ejection fraction is about 55% in the setting of atrial  fibrillation with controlled ventricular response. 2.  No regional wall motion abnormality is apparent within the limits of the  transesophageal echocardiogram.  3.  Left ventricular cavity size is normal.  4.  Left atrium is dilated at 4.4 cm. Assessment and Plan      1. ALE (obstructive sleep apnea)  DME Order for CPAP as OP   2. Nasal congestion  fluticasone (FLONASE) 50 MCG/ACT nasal spray   3. Class 3 obesity due to excess calories without serious comorbidity with body mass index (BMI) of 40.0 to 44.9 in adult ([facility] [address]     4. Essential hypertension       · In light of patient clinic history recommended in lab titration however patient declined she would like to do AutoPap. We will start Flonase for nasal congestion, and weight loss strongly recommended. · Blood pressure control or sensory improve with CPAP compliance  · Compliance with CPAP strongly suggested, avoid sedative. Orders Placed This Encounter   Procedures    DME Order for CPAP as OP     CPAP Auto Adjust 5 - 20 cm H2O    Heated Humidifier    Full Face Mask 1 per 3 months and Cushions/Seals 2 per month    Headgear 1 per 6 months, Chin Strap 1 per 6 months, Disposable Filters 2 per month, Non-disposable Filters 1 per 6 months, Tubing 1 per 3 months, Integrated Heating Element 1 per 3 months, Chambers 1 per 6 months and Other Related Supplies. Replace supplies and accessories as needed. Patient may choose another interface for compliance and comfort. Comments: mask fitting   Diagnosis: ALE AHI 15.2   Length of need: Lifetime     No orders of the defined types were placed in this encounter. Return in about 2 months (around 5/21/2018).       Zheng Munguia MD

## 2018-04-09 ENCOUNTER — HOSPITAL ENCOUNTER (OUTPATIENT)
Age: 58
Setting detail: SPECIMEN
Discharge: HOME | End: 2018-04-09
Payer: COMMERCIAL

## 2018-04-09 ENCOUNTER — NURSE ONLY (OUTPATIENT)
Dept: FAMILY MEDICINE CLINIC | Age: 58
End: 2018-04-09
Payer: COMMERCIAL

## 2018-04-09 DIAGNOSIS — Z11.59 NEED FOR HEPATITIS C SCREENING TEST: ICD-10-CM

## 2018-04-09 DIAGNOSIS — E78.5 DYSLIPIDEMIA: Primary | ICD-10-CM

## 2018-04-09 DIAGNOSIS — E78.5 DYSLIPIDEMIA: ICD-10-CM

## 2018-04-09 DIAGNOSIS — Z11.4 ENCOUNTER FOR SCREENING FOR HIV: ICD-10-CM

## 2018-04-09 DIAGNOSIS — Z13.1 DIABETES MELLITUS SCREENING: ICD-10-CM

## 2018-04-09 DIAGNOSIS — I10 BENIGN ESSENTIAL HYPERTENSION: ICD-10-CM

## 2018-04-09 LAB
ALBUMIN SERPL-MCNC: 3.9 G/DL (ref 3.9–4.9)
ALP BLD-CCNC: 115 U/L (ref 40–130)
ALT SERPL-CCNC: 22 U/L (ref 0–33)
ANION GAP SERPL CALCULATED.3IONS-SCNC: 15 MEQ/L (ref 7–13)
AST SERPL-CCNC: 18 U/L (ref 0–35)
BILIRUB SERPL-MCNC: 0.4 MG/DL (ref 0–1.2)
BUN BLDV-MCNC: 18 MG/DL (ref 8–23)
CALCIUM SERPL-MCNC: 9 MG/DL (ref 8.6–10.2)
CHLORIDE BLD-SCNC: 103 MEQ/L (ref 98–107)
CHOLESTEROL, TOTAL: 223 MG/DL (ref 0–199)
CO2: 24 MEQ/L (ref 22–29)
CREAT SERPL-MCNC: 0.68 MG/DL (ref 0.5–0.9)
GFR AFRICAN AMERICAN: >60
GFR NON-AFRICAN AMERICAN: >60
GLOBULIN: 2.7 G/DL (ref 2.3–3.5)
GLUCOSE BLD-MCNC: 91 MG/DL (ref 74–109)
HBA1C MFR BLD: 6.2 % (ref 4.8–5.9)
HCT VFR BLD CALC: 38.3 % (ref 37–47)
HDLC SERPL-MCNC: 58 MG/DL (ref 40–59)
HEMOGLOBIN: 12.8 G/DL (ref 12–16)
HEPATITIS C ANTIBODY INTERPRETATION: NORMAL
LDL CHOLESTEROL CALCULATED: 150 MG/DL (ref 0–129)
MCH RBC QN AUTO: 28.9 PG (ref 27–31.3)
MCHC RBC AUTO-ENTMCNC: 33.3 % (ref 33–37)
MCV RBC AUTO: 86.7 FL (ref 82–100)
PDW BLD-RTO: 14.9 % (ref 11.5–14.5)
PLATELET # BLD: 201 K/UL (ref 130–400)
POTASSIUM SERPL-SCNC: 4.3 MEQ/L (ref 3.5–5.1)
RBC # BLD: 4.42 M/UL (ref 4.2–5.4)
SODIUM BLD-SCNC: 142 MEQ/L (ref 132–144)
TOTAL PROTEIN: 6.6 G/DL (ref 6.4–8.1)
TRIGL SERPL-MCNC: 76 MG/DL (ref 0–200)
WBC # BLD: 6.9 K/UL (ref 4.8–10.8)

## 2018-04-09 PROCEDURE — 86703 HIV-1/HIV-2 1 RESULT ANTBDY: CPT

## 2018-04-09 PROCEDURE — 85027 COMPLETE CBC AUTOMATED: CPT

## 2018-04-09 PROCEDURE — 80061 LIPID PANEL: CPT

## 2018-04-09 PROCEDURE — 80053 COMPREHEN METABOLIC PANEL: CPT

## 2018-04-09 PROCEDURE — 36415 COLL VENOUS BLD VENIPUNCTURE: CPT | Performed by: FAMILY MEDICINE

## 2018-04-09 PROCEDURE — 86803 HEPATITIS C AB TEST: CPT

## 2018-04-09 PROCEDURE — 83036 HEMOGLOBIN GLYCOSYLATED A1C: CPT

## 2018-04-11 LAB — HIV-1 AND HIV-2 ANTIBODIES: NEGATIVE

## 2018-04-16 ENCOUNTER — OFFICE VISIT (OUTPATIENT)
Dept: FAMILY MEDICINE CLINIC | Age: 58
End: 2018-04-16
Payer: COMMERCIAL

## 2018-04-16 VITALS
SYSTOLIC BLOOD PRESSURE: 126 MMHG | BODY MASS INDEX: 41.69 KG/M2 | DIASTOLIC BLOOD PRESSURE: 72 MMHG | HEIGHT: 65 IN | TEMPERATURE: 97.4 F | OXYGEN SATURATION: 99 % | WEIGHT: 250.2 LBS | HEART RATE: 63 BPM

## 2018-04-16 DIAGNOSIS — Z00.00 ANNUAL PHYSICAL EXAM: Primary | ICD-10-CM

## 2018-04-16 DIAGNOSIS — R73.03 PREDIABETES: ICD-10-CM

## 2018-04-16 PROCEDURE — 99396 PREV VISIT EST AGE 40-64: CPT | Performed by: FAMILY MEDICINE

## 2018-04-16 ASSESSMENT — ENCOUNTER SYMPTOMS
RHINORRHEA: 0
COUGH: 0
SHORTNESS OF BREATH: 0
SORE THROAT: 0
DIARRHEA: 0
CONSTIPATION: 0
WHEEZING: 0
ABDOMINAL PAIN: 0

## 2018-04-16 ASSESSMENT — PATIENT HEALTH QUESTIONNAIRE - PHQ9
2. FEELING DOWN, DEPRESSED OR HOPELESS: 0
1. LITTLE INTEREST OR PLEASURE IN DOING THINGS: 0
SUM OF ALL RESPONSES TO PHQ9 QUESTIONS 1 & 2: 0
SUM OF ALL RESPONSES TO PHQ QUESTIONS 1-9: 0

## 2018-04-19 RX ORDER — PAROXETINE HYDROCHLORIDE 20 MG/1
20 TABLET, FILM COATED ORAL EVERY MORNING
Qty: 90 TABLET | Refills: 3 | Status: SHIPPED | OUTPATIENT
Start: 2018-04-19 | End: 2018-07-16 | Stop reason: SDUPTHER

## 2018-04-23 ENCOUNTER — HOSPITAL ENCOUNTER (INPATIENT)
Age: 58
LOS: 2 days | Discharge: HOME | DRG: 309 | End: 2018-04-25
Attending: INTERNAL MEDICINE | Admitting: INTERNAL MEDICINE
Payer: COMMERCIAL

## 2018-04-23 PROBLEM — I48.91 ATRIAL FIBRILLATION (HCC): Status: ACTIVE | Noted: 2018-04-23

## 2018-04-23 LAB
ANION GAP SERPL CALCULATED.3IONS-SCNC: 14 MEQ/L (ref 7–13)
BUN BLDV-MCNC: 21 MG/DL (ref 8–23)
CALCIUM SERPL-MCNC: 9.8 MG/DL (ref 8.6–10.2)
CHLORIDE BLD-SCNC: 102 MEQ/L (ref 98–107)
CO2: 25 MEQ/L (ref 22–29)
CREAT SERPL-MCNC: 0.9 MG/DL (ref 0.5–0.9)
GFR AFRICAN AMERICAN: >60
GFR NON-AFRICAN AMERICAN: >60
GLUCOSE BLD-MCNC: 111 MG/DL (ref 74–109)
HCT VFR BLD CALC: 42.2 % (ref 37–47)
HEMOGLOBIN: 14.2 G/DL (ref 12–16)
MAGNESIUM: 1.9 MG/DL (ref 1.7–2.3)
MCH RBC QN AUTO: 29.5 PG (ref 27–31.3)
MCHC RBC AUTO-ENTMCNC: 33.6 % (ref 33–37)
MCV RBC AUTO: 87.7 FL (ref 82–100)
PDW BLD-RTO: 15.2 % (ref 11.5–14.5)
PLATELET # BLD: 223 K/UL (ref 130–400)
PLATELET SLIDE REVIEW: ADEQUATE
POTASSIUM REFLEX MAGNESIUM: 4.4 MEQ/L (ref 3.5–5.1)
RBC # BLD: 4.82 M/UL (ref 4.2–5.4)
SLIDE REVIEW: ABNORMAL
SODIUM BLD-SCNC: 141 MEQ/L (ref 132–144)
T4 FREE: 1.31 NG/DL (ref 0.93–1.7)
TSH SERPL DL<=0.05 MIU/L-ACNC: 3.05 UIU/ML (ref 0.27–4.2)
WBC # BLD: 9.9 K/UL (ref 4.8–10.8)

## 2018-04-23 PROCEDURE — 93005 ELECTROCARDIOGRAM TRACING: CPT

## 2018-04-23 PROCEDURE — 85027 COMPLETE CBC AUTOMATED: CPT

## 2018-04-23 PROCEDURE — 84443 ASSAY THYROID STIM HORMONE: CPT

## 2018-04-23 PROCEDURE — 84439 ASSAY OF FREE THYROXINE: CPT

## 2018-04-23 PROCEDURE — 83735 ASSAY OF MAGNESIUM: CPT

## 2018-04-23 PROCEDURE — 2060000000 HC ICU INTERMEDIATE R&B

## 2018-04-23 PROCEDURE — 6360000002 HC RX W HCPCS: Performed by: NURSE PRACTITIONER

## 2018-04-23 PROCEDURE — 6370000000 HC RX 637 (ALT 250 FOR IP): Performed by: NURSE PRACTITIONER

## 2018-04-23 PROCEDURE — 80048 BASIC METABOLIC PNL TOTAL CA: CPT

## 2018-04-23 PROCEDURE — 2580000003 HC RX 258: Performed by: NURSE PRACTITIONER

## 2018-04-23 PROCEDURE — 36415 COLL VENOUS BLD VENIPUNCTURE: CPT

## 2018-04-23 RX ORDER — FLUTICASONE PROPIONATE 50 MCG
1 SPRAY, SUSPENSION (ML) NASAL DAILY
Status: DISCONTINUED | OUTPATIENT
Start: 2018-04-23 | End: 2018-04-25 | Stop reason: HOSPADM

## 2018-04-23 RX ORDER — PAROXETINE HYDROCHLORIDE 20 MG/1
20 TABLET, FILM COATED ORAL EVERY MORNING
Status: DISCONTINUED | OUTPATIENT
Start: 2018-04-23 | End: 2018-04-25 | Stop reason: HOSPADM

## 2018-04-23 RX ORDER — SPIRONOLACTONE 25 MG/1
25 TABLET ORAL DAILY
Status: DISCONTINUED | OUTPATIENT
Start: 2018-04-23 | End: 2018-04-25 | Stop reason: HOSPADM

## 2018-04-23 RX ORDER — CLOBETASOL PROPIONATE 0.5 MG/G
CREAM TOPICAL 2 TIMES DAILY
Status: DISCONTINUED | OUTPATIENT
Start: 2018-04-23 | End: 2018-04-25 | Stop reason: HOSPADM

## 2018-04-23 RX ORDER — ATORVASTATIN CALCIUM 40 MG/1
40 TABLET, FILM COATED ORAL DAILY
Status: DISCONTINUED | OUTPATIENT
Start: 2018-04-23 | End: 2018-04-25 | Stop reason: HOSPADM

## 2018-04-23 RX ORDER — POTASSIUM CHLORIDE 20 MEQ/1
20 TABLET, EXTENDED RELEASE ORAL 2 TIMES DAILY
Status: DISCONTINUED | OUTPATIENT
Start: 2018-04-23 | End: 2018-04-25 | Stop reason: HOSPADM

## 2018-04-23 RX ORDER — ACETAMINOPHEN 325 MG/1
650 TABLET ORAL EVERY 4 HOURS PRN
Status: DISCONTINUED | OUTPATIENT
Start: 2018-04-23 | End: 2018-04-25 | Stop reason: HOSPADM

## 2018-04-23 RX ORDER — FUROSEMIDE 20 MG/1
20 TABLET ORAL DAILY
Status: DISCONTINUED | OUTPATIENT
Start: 2018-04-23 | End: 2018-04-25 | Stop reason: HOSPADM

## 2018-04-23 RX ORDER — SODIUM CHLORIDE 0.9 % (FLUSH) 0.9 %
10 SYRINGE (ML) INJECTION PRN
Status: DISCONTINUED | OUTPATIENT
Start: 2018-04-23 | End: 2018-04-25 | Stop reason: HOSPADM

## 2018-04-23 RX ORDER — FUROSEMIDE 10 MG/ML
20 INJECTION INTRAMUSCULAR; INTRAVENOUS ONCE
Status: COMPLETED | OUTPATIENT
Start: 2018-04-23 | End: 2018-04-23

## 2018-04-23 RX ORDER — SODIUM CHLORIDE 0.9 % (FLUSH) 0.9 %
10 SYRINGE (ML) INJECTION EVERY 12 HOURS SCHEDULED
Status: DISCONTINUED | OUTPATIENT
Start: 2018-04-23 | End: 2018-04-25 | Stop reason: HOSPADM

## 2018-04-23 RX ORDER — SOTALOL HYDROCHLORIDE 120 MG/1
120 TABLET ORAL 2 TIMES DAILY
Status: DISCONTINUED | OUTPATIENT
Start: 2018-04-23 | End: 2018-04-25 | Stop reason: HOSPADM

## 2018-04-23 RX ORDER — AMLODIPINE BESYLATE 10 MG/1
10 TABLET ORAL DAILY
Status: DISCONTINUED | OUTPATIENT
Start: 2018-04-23 | End: 2018-04-25 | Stop reason: HOSPADM

## 2018-04-23 RX ORDER — CETIRIZINE HYDROCHLORIDE 10 MG/1
10 TABLET ORAL DAILY
Status: DISCONTINUED | OUTPATIENT
Start: 2018-04-23 | End: 2018-04-25 | Stop reason: HOSPADM

## 2018-04-23 RX ORDER — DOXAZOSIN MESYLATE 1 MG/1
2 TABLET ORAL DAILY
Status: DISCONTINUED | OUTPATIENT
Start: 2018-04-23 | End: 2018-04-25 | Stop reason: HOSPADM

## 2018-04-23 RX ADMIN — Medication 10 ML: at 14:36

## 2018-04-23 RX ADMIN — SPIRONOLACTONE 25 MG: 25 TABLET ORAL at 11:15

## 2018-04-23 RX ADMIN — SOTALOL HYDROCHLORIDE 120 MG: 120 TABLET ORAL at 21:40

## 2018-04-23 RX ADMIN — AMLODIPINE BESYLATE 10 MG: 10 TABLET ORAL at 11:16

## 2018-04-23 RX ADMIN — POTASSIUM CHLORIDE 20 MEQ: 20 TABLET, EXTENDED RELEASE ORAL at 21:40

## 2018-04-23 RX ADMIN — APIXABAN 5 MG: 5 TABLET, FILM COATED ORAL at 11:16

## 2018-04-23 RX ADMIN — PAROXETINE HYDROCHLORIDE 20 MG: 20 TABLET, FILM COATED ORAL at 11:15

## 2018-04-23 RX ADMIN — SOTALOL HYDROCHLORIDE 120 MG: 120 TABLET ORAL at 11:15

## 2018-04-23 RX ADMIN — Medication 400 MG: at 11:15

## 2018-04-23 RX ADMIN — ATORVASTATIN CALCIUM 40 MG: 40 TABLET, FILM COATED ORAL at 11:16

## 2018-04-23 RX ADMIN — APIXABAN 5 MG: 5 TABLET, FILM COATED ORAL at 21:40

## 2018-04-23 RX ADMIN — DOXAZOSIN 2 MG: 1 TABLET ORAL at 11:15

## 2018-04-23 RX ADMIN — POTASSIUM CHLORIDE 20 MEQ: 20 TABLET, EXTENDED RELEASE ORAL at 11:15

## 2018-04-23 RX ADMIN — CETIRIZINE HYDROCHLORIDE 10 MG: 10 TABLET, FILM COATED ORAL at 11:15

## 2018-04-23 RX ADMIN — FUROSEMIDE 20 MG: 10 INJECTION, SOLUTION INTRAVENOUS at 11:15

## 2018-04-23 ASSESSMENT — ENCOUNTER SYMPTOMS
ALLERGIC/IMMUNOLOGIC NEGATIVE: 1
GASTROINTESTINAL NEGATIVE: 1
EYES NEGATIVE: 1
RESPIRATORY NEGATIVE: 1

## 2018-04-23 ASSESSMENT — PAIN SCALES - GENERAL
PAINLEVEL_OUTOF10: 0
PAINLEVEL_OUTOF10: 0

## 2018-04-24 PROCEDURE — 93005 ELECTROCARDIOGRAM TRACING: CPT

## 2018-04-24 PROCEDURE — 6370000000 HC RX 637 (ALT 250 FOR IP): Performed by: NURSE PRACTITIONER

## 2018-04-24 PROCEDURE — 2060000000 HC ICU INTERMEDIATE R&B

## 2018-04-24 PROCEDURE — 93010 ELECTROCARDIOGRAM REPORT: CPT | Performed by: INTERNAL MEDICINE

## 2018-04-24 RX ADMIN — PAROXETINE HYDROCHLORIDE 20 MG: 20 TABLET, FILM COATED ORAL at 09:25

## 2018-04-24 RX ADMIN — ATORVASTATIN CALCIUM 40 MG: 40 TABLET, FILM COATED ORAL at 09:25

## 2018-04-24 RX ADMIN — DOXAZOSIN 2 MG: 1 TABLET ORAL at 09:25

## 2018-04-24 RX ADMIN — POTASSIUM CHLORIDE 20 MEQ: 20 TABLET, EXTENDED RELEASE ORAL at 20:46

## 2018-04-24 RX ADMIN — SOTALOL HYDROCHLORIDE 120 MG: 120 TABLET ORAL at 09:25

## 2018-04-24 RX ADMIN — CETIRIZINE HYDROCHLORIDE 10 MG: 10 TABLET, FILM COATED ORAL at 09:25

## 2018-04-24 RX ADMIN — APIXABAN 5 MG: 5 TABLET, FILM COATED ORAL at 20:46

## 2018-04-24 RX ADMIN — CLOBETASOL PROPIONATE: 0.5 CREAM TOPICAL at 09:29

## 2018-04-24 RX ADMIN — SPIRONOLACTONE 25 MG: 25 TABLET ORAL at 09:25

## 2018-04-24 RX ADMIN — Medication 400 MG: at 09:25

## 2018-04-24 RX ADMIN — AMLODIPINE BESYLATE 10 MG: 10 TABLET ORAL at 09:25

## 2018-04-24 RX ADMIN — FLUTICASONE PROPIONATE 1 SPRAY: 50 SPRAY, METERED NASAL at 09:46

## 2018-04-24 RX ADMIN — APIXABAN 5 MG: 5 TABLET, FILM COATED ORAL at 09:25

## 2018-04-24 RX ADMIN — POTASSIUM CHLORIDE 20 MEQ: 20 TABLET, EXTENDED RELEASE ORAL at 09:24

## 2018-04-24 RX ADMIN — FUROSEMIDE 20 MG: 20 TABLET ORAL at 09:25

## 2018-04-24 RX ADMIN — SOTALOL HYDROCHLORIDE 120 MG: 120 TABLET ORAL at 20:46

## 2018-04-24 ASSESSMENT — PAIN SCALES - GENERAL
PAINLEVEL_OUTOF10: 0
PAINLEVEL_OUTOF10: 0

## 2018-04-25 VITALS
BODY MASS INDEX: 41.51 KG/M2 | DIASTOLIC BLOOD PRESSURE: 59 MMHG | WEIGHT: 249.12 LBS | SYSTOLIC BLOOD PRESSURE: 116 MMHG | HEIGHT: 65 IN | HEART RATE: 64 BPM | RESPIRATION RATE: 18 BRPM | TEMPERATURE: 98.1 F | OXYGEN SATURATION: 97 %

## 2018-04-25 LAB
EKG ATRIAL RATE: 60 BPM
EKG ATRIAL RATE: 60 BPM
EKG ATRIAL RATE: 61 BPM
EKG ATRIAL RATE: 62 BPM
EKG ATRIAL RATE: 62 BPM
EKG ATRIAL RATE: 63 BPM
EKG ATRIAL RATE: 65 BPM
EKG ATRIAL RATE: 65 BPM
EKG P AXIS: 38 DEGREES
EKG P AXIS: 48 DEGREES
EKG P AXIS: 49 DEGREES
EKG P AXIS: 52 DEGREES
EKG P AXIS: 54 DEGREES
EKG P AXIS: 56 DEGREES
EKG P-R INTERVAL: 118 MS
EKG P-R INTERVAL: 122 MS
EKG P-R INTERVAL: 126 MS
EKG P-R INTERVAL: 126 MS
EKG P-R INTERVAL: 146 MS
EKG P-R INTERVAL: 186 MS
EKG P-R INTERVAL: 188 MS
EKG P-R INTERVAL: 188 MS
EKG Q-T INTERVAL: 390 MS
EKG Q-T INTERVAL: 412 MS
EKG Q-T INTERVAL: 422 MS
EKG Q-T INTERVAL: 422 MS
EKG Q-T INTERVAL: 426 MS
EKG Q-T INTERVAL: 432 MS
EKG Q-T INTERVAL: 434 MS
EKG Q-T INTERVAL: 448 MS
EKG QRS DURATION: 84 MS
EKG QRS DURATION: 88 MS
EKG QRS DURATION: 90 MS
EKG QRS DURATION: 90 MS
EKG QRS DURATION: 92 MS
EKG QRS DURATION: 94 MS
EKG QTC CALCULATION (BAZETT): 405 MS
EKG QTC CALCULATION (BAZETT): 418 MS
EKG QTC CALCULATION (BAZETT): 422 MS
EKG QTC CALCULATION (BAZETT): 434 MS
EKG QTC CALCULATION (BAZETT): 435 MS
EKG QTC CALCULATION (BAZETT): 438 MS
EKG QTC CALCULATION (BAZETT): 440 MS
EKG QTC CALCULATION (BAZETT): 448 MS
EKG R AXIS: -11 DEGREES
EKG R AXIS: -21 DEGREES
EKG R AXIS: -21 DEGREES
EKG R AXIS: -22 DEGREES
EKG R AXIS: -3 DEGREES
EKG R AXIS: -4 DEGREES
EKG R AXIS: -4 DEGREES
EKG R AXIS: -9 DEGREES
EKG T AXIS: 16 DEGREES
EKG T AXIS: 24 DEGREES
EKG T AXIS: 26 DEGREES
EKG T AXIS: 28 DEGREES
EKG T AXIS: 31 DEGREES
EKG T AXIS: 34 DEGREES
EKG T AXIS: 38 DEGREES
EKG T AXIS: 44 DEGREES
EKG VENTRICULAR RATE: 60 BPM
EKG VENTRICULAR RATE: 60 BPM
EKG VENTRICULAR RATE: 61 BPM
EKG VENTRICULAR RATE: 62 BPM
EKG VENTRICULAR RATE: 62 BPM
EKG VENTRICULAR RATE: 63 BPM
EKG VENTRICULAR RATE: 65 BPM
EKG VENTRICULAR RATE: 65 BPM

## 2018-04-25 PROCEDURE — 2580000003 HC RX 258: Performed by: NURSE PRACTITIONER

## 2018-04-25 PROCEDURE — 93005 ELECTROCARDIOGRAM TRACING: CPT

## 2018-04-25 PROCEDURE — 6370000000 HC RX 637 (ALT 250 FOR IP): Performed by: NURSE PRACTITIONER

## 2018-04-25 RX ORDER — SOTALOL HYDROCHLORIDE 120 MG/1
120 TABLET ORAL 2 TIMES DAILY
Qty: 60 TABLET | Refills: 3 | Status: ON HOLD | OUTPATIENT
Start: 2018-04-25 | End: 2018-05-12 | Stop reason: HOSPADM

## 2018-04-25 RX ADMIN — FLUTICASONE PROPIONATE 1 SPRAY: 50 SPRAY, METERED NASAL at 08:47

## 2018-04-25 RX ADMIN — AMLODIPINE BESYLATE 10 MG: 10 TABLET ORAL at 08:46

## 2018-04-25 RX ADMIN — DOXAZOSIN 2 MG: 1 TABLET ORAL at 08:45

## 2018-04-25 RX ADMIN — CLOBETASOL PROPIONATE: 0.5 CREAM TOPICAL at 08:47

## 2018-04-25 RX ADMIN — CETIRIZINE HYDROCHLORIDE 10 MG: 10 TABLET, FILM COATED ORAL at 08:46

## 2018-04-25 RX ADMIN — POTASSIUM CHLORIDE 20 MEQ: 20 TABLET, EXTENDED RELEASE ORAL at 08:46

## 2018-04-25 RX ADMIN — Medication 400 MG: at 08:46

## 2018-04-25 RX ADMIN — PAROXETINE HYDROCHLORIDE 20 MG: 20 TABLET, FILM COATED ORAL at 08:46

## 2018-04-25 RX ADMIN — FUROSEMIDE 20 MG: 20 TABLET ORAL at 08:45

## 2018-04-25 RX ADMIN — Medication 10 ML: at 08:46

## 2018-04-25 RX ADMIN — APIXABAN 5 MG: 5 TABLET, FILM COATED ORAL at 08:46

## 2018-04-25 RX ADMIN — ATORVASTATIN CALCIUM 40 MG: 40 TABLET, FILM COATED ORAL at 08:46

## 2018-04-25 RX ADMIN — SOTALOL HYDROCHLORIDE 120 MG: 120 TABLET ORAL at 08:46

## 2018-04-25 RX ADMIN — SPIRONOLACTONE 25 MG: 25 TABLET ORAL at 08:45

## 2018-04-25 ASSESSMENT — PAIN SCALES - GENERAL
PAINLEVEL_OUTOF10: 0

## 2018-04-26 PROCEDURE — 93010 ELECTROCARDIOGRAM REPORT: CPT | Performed by: INTERNAL MEDICINE

## 2018-05-10 ENCOUNTER — HOSPITAL ENCOUNTER (INPATIENT)
Age: 58
LOS: 2 days | Discharge: HOME | DRG: 308 | End: 2018-05-12
Attending: INTERNAL MEDICINE | Admitting: INTERNAL MEDICINE
Payer: COMMERCIAL

## 2018-05-10 PROBLEM — I48.19 ATRIAL FIBRILLATION, PERSISTENT (HCC): Status: ACTIVE | Noted: 2018-05-10

## 2018-05-10 LAB
ANION GAP SERPL CALCULATED.3IONS-SCNC: 13 MEQ/L (ref 7–13)
BASOPHILS ABSOLUTE: 0.1 K/UL (ref 0–0.2)
BASOPHILS RELATIVE PERCENT: 0.7 %
BUN BLDV-MCNC: 18 MG/DL (ref 8–23)
CALCIUM SERPL-MCNC: 8.7 MG/DL (ref 8.6–10.2)
CHLORIDE BLD-SCNC: 103 MEQ/L (ref 98–107)
CO2: 26 MEQ/L (ref 22–29)
CREAT SERPL-MCNC: 0.86 MG/DL (ref 0.5–0.9)
EOSINOPHILS ABSOLUTE: 0.2 K/UL (ref 0–0.7)
EOSINOPHILS RELATIVE PERCENT: 2.2 %
GFR AFRICAN AMERICAN: >60
GFR NON-AFRICAN AMERICAN: >60
GLUCOSE BLD-MCNC: 96 MG/DL (ref 74–109)
HCT VFR BLD CALC: 39 % (ref 37–47)
HEMOGLOBIN: 13.2 G/DL (ref 12–16)
INR BLD: 1
LV EF: 60 %
LVEF MODALITY: NORMAL
LYMPHOCYTES ABSOLUTE: 1.4 K/UL (ref 1–4.8)
LYMPHOCYTES RELATIVE PERCENT: 17.2 %
MCH RBC QN AUTO: 28.9 PG (ref 27–31.3)
MCHC RBC AUTO-ENTMCNC: 33.9 % (ref 33–37)
MCV RBC AUTO: 85.1 FL (ref 82–100)
MONOCYTES ABSOLUTE: 0.8 K/UL (ref 0.2–0.8)
MONOCYTES RELATIVE PERCENT: 9.5 %
NEUTROPHILS ABSOLUTE: 5.9 K/UL (ref 1.4–6.5)
NEUTROPHILS RELATIVE PERCENT: 70.4 %
PDW BLD-RTO: 14.8 % (ref 11.5–14.5)
PLATELET # BLD: 246 K/UL (ref 130–400)
POTASSIUM REFLEX MAGNESIUM: 4.1 MEQ/L (ref 3.5–5.1)
PROTHROMBIN TIME: 10.8 SEC (ref 9.6–12.3)
RBC # BLD: 4.58 M/UL (ref 4.2–5.4)
SODIUM BLD-SCNC: 142 MEQ/L (ref 132–144)
TSH SERPL DL<=0.05 MIU/L-ACNC: 3.25 UIU/ML (ref 0.27–4.2)
WBC # BLD: 8.4 K/UL (ref 4.8–10.8)

## 2018-05-10 PROCEDURE — 84443 ASSAY THYROID STIM HORMONE: CPT

## 2018-05-10 PROCEDURE — 1210000000 HC MED SURG R&B

## 2018-05-10 PROCEDURE — 85025 COMPLETE CBC W/AUTO DIFF WBC: CPT

## 2018-05-10 PROCEDURE — 93306 TTE W/DOPPLER COMPLETE: CPT

## 2018-05-10 PROCEDURE — 6370000000 HC RX 637 (ALT 250 FOR IP): Performed by: INTERNAL MEDICINE

## 2018-05-10 PROCEDURE — 36415 COLL VENOUS BLD VENIPUNCTURE: CPT

## 2018-05-10 PROCEDURE — 85610 PROTHROMBIN TIME: CPT

## 2018-05-10 PROCEDURE — 80048 BASIC METABOLIC PNL TOTAL CA: CPT

## 2018-05-10 RX ORDER — ACETAMINOPHEN 325 MG/1
650 TABLET ORAL EVERY 4 HOURS PRN
Status: DISCONTINUED | OUTPATIENT
Start: 2018-05-10 | End: 2018-05-12 | Stop reason: HOSPADM

## 2018-05-10 RX ORDER — PANTOPRAZOLE SODIUM 40 MG/1
40 TABLET, DELAYED RELEASE ORAL
Status: DISCONTINUED | OUTPATIENT
Start: 2018-05-10 | End: 2018-05-12 | Stop reason: HOSPADM

## 2018-05-10 RX ORDER — DOXAZOSIN MESYLATE 1 MG/1
2 TABLET ORAL DAILY
Status: DISCONTINUED | OUTPATIENT
Start: 2018-05-10 | End: 2018-05-12 | Stop reason: HOSPADM

## 2018-05-10 RX ORDER — ONDANSETRON 4 MG/1
2 TABLET, FILM COATED ORAL EVERY 8 HOURS PRN
Status: DISCONTINUED | OUTPATIENT
Start: 2018-05-10 | End: 2018-05-12 | Stop reason: HOSPADM

## 2018-05-10 RX ORDER — FUROSEMIDE 10 MG/ML
40 INJECTION INTRAMUSCULAR; INTRAVENOUS DAILY
Status: DISCONTINUED | OUTPATIENT
Start: 2018-05-10 | End: 2018-05-12 | Stop reason: HOSPADM

## 2018-05-10 RX ORDER — POTASSIUM CHLORIDE 20 MEQ/1
20 TABLET, EXTENDED RELEASE ORAL 2 TIMES DAILY
Status: DISCONTINUED | OUTPATIENT
Start: 2018-05-10 | End: 2018-05-12 | Stop reason: HOSPADM

## 2018-05-10 RX ORDER — SPIRONOLACTONE 25 MG/1
25 TABLET ORAL 2 TIMES DAILY
Status: DISCONTINUED | OUTPATIENT
Start: 2018-05-10 | End: 2018-05-12 | Stop reason: HOSPADM

## 2018-05-10 RX ORDER — PAROXETINE HYDROCHLORIDE 20 MG/1
20 TABLET, FILM COATED ORAL EVERY MORNING
Status: DISCONTINUED | OUTPATIENT
Start: 2018-05-11 | End: 2018-05-12 | Stop reason: HOSPADM

## 2018-05-10 RX ORDER — AMLODIPINE BESYLATE 10 MG/1
10 TABLET ORAL DAILY
Status: DISCONTINUED | OUTPATIENT
Start: 2018-05-10 | End: 2018-05-12 | Stop reason: HOSPADM

## 2018-05-10 RX ORDER — ATORVASTATIN CALCIUM 40 MG/1
40 TABLET, FILM COATED ORAL DAILY
Status: DISCONTINUED | OUTPATIENT
Start: 2018-05-10 | End: 2018-05-12 | Stop reason: HOSPADM

## 2018-05-10 RX ORDER — SPIRONOLACTONE 25 MG/1
25 TABLET ORAL DAILY
Status: DISCONTINUED | OUTPATIENT
Start: 2018-05-10 | End: 2018-05-10

## 2018-05-10 RX ORDER — SOTALOL HYDROCHLORIDE 80 MG/1
160 TABLET ORAL 2 TIMES DAILY
Status: DISCONTINUED | OUTPATIENT
Start: 2018-05-10 | End: 2018-05-11

## 2018-05-10 RX ADMIN — SOTALOL HYDROCHLORIDE 160 MG: 80 TABLET ORAL at 20:38

## 2018-05-10 RX ADMIN — ACETAMINOPHEN 650 MG: 325 TABLET ORAL at 20:38

## 2018-05-10 RX ADMIN — POTASSIUM CHLORIDE 20 MEQ: 20 TABLET, EXTENDED RELEASE ORAL at 20:39

## 2018-05-10 RX ADMIN — SPIRONOLACTONE 25 MG: 25 TABLET ORAL at 18:48

## 2018-05-10 RX ADMIN — APIXABAN 5 MG: 5 TABLET, FILM COATED ORAL at 20:39

## 2018-05-10 ASSESSMENT — PAIN DESCRIPTION - LOCATION: LOCATION: HEAD

## 2018-05-10 ASSESSMENT — PAIN DESCRIPTION - DESCRIPTORS: DESCRIPTORS: HEADACHE

## 2018-05-10 ASSESSMENT — PAIN SCALES - GENERAL: PAINLEVEL_OUTOF10: 5

## 2018-05-10 ASSESSMENT — PAIN DESCRIPTION - PAIN TYPE: TYPE: ACUTE PAIN

## 2018-05-11 ENCOUNTER — APPOINTMENT (OUTPATIENT)
Dept: GENERAL RADIOLOGY | Age: 58
DRG: 308 | End: 2018-05-11
Attending: INTERNAL MEDICINE
Payer: COMMERCIAL

## 2018-05-11 LAB
ALBUMIN SERPL-MCNC: 3.4 G/DL (ref 3.9–4.9)
ALP BLD-CCNC: 114 U/L (ref 40–130)
ALT SERPL-CCNC: 15 U/L (ref 0–33)
ANION GAP SERPL CALCULATED.3IONS-SCNC: 13 MEQ/L (ref 7–13)
AST SERPL-CCNC: 14 U/L (ref 0–35)
BILIRUB SERPL-MCNC: 0.4 MG/DL (ref 0–1.2)
BUN BLDV-MCNC: 16 MG/DL (ref 8–23)
C-REACTIVE PROTEIN, HIGH SENSITIVITY: 8.7 MG/L (ref 0–5)
CALCIUM SERPL-MCNC: 8.7 MG/DL (ref 8.6–10.2)
CHLORIDE BLD-SCNC: 105 MEQ/L (ref 98–107)
CO2: 24 MEQ/L (ref 22–29)
CREAT SERPL-MCNC: 0.74 MG/DL (ref 0.5–0.9)
D DIMER: 0.66 MG/L FEU (ref 0–0.5)
GFR AFRICAN AMERICAN: >60
GFR NON-AFRICAN AMERICAN: >60
GLOBULIN: 2.3 G/DL (ref 2.3–3.5)
GLUCOSE BLD-MCNC: 102 MG/DL (ref 74–109)
HCT VFR BLD CALC: 37.7 % (ref 37–47)
HEMOGLOBIN: 12.4 G/DL (ref 12–16)
MAGNESIUM: 2 MG/DL (ref 1.7–2.3)
MCH RBC QN AUTO: 28.4 PG (ref 27–31.3)
MCHC RBC AUTO-ENTMCNC: 33 % (ref 33–37)
MCV RBC AUTO: 86.1 FL (ref 82–100)
PDW BLD-RTO: 14.7 % (ref 11.5–14.5)
PLATELET # BLD: 198 K/UL (ref 130–400)
POTASSIUM SERPL-SCNC: 4.1 MEQ/L (ref 3.5–5.1)
RBC # BLD: 4.38 M/UL (ref 4.2–5.4)
SEDIMENTATION RATE, ERYTHROCYTE: 25 MM (ref 0–30)
SODIUM BLD-SCNC: 142 MEQ/L (ref 132–144)
TOTAL PROTEIN: 5.7 G/DL (ref 6.4–8.1)
TROPONIN: <0.01 NG/ML (ref 0–0.01)
WBC # BLD: 7 K/UL (ref 4.8–10.8)

## 2018-05-11 PROCEDURE — 71046 X-RAY EXAM CHEST 2 VIEWS: CPT

## 2018-05-11 PROCEDURE — 85379 FIBRIN DEGRADATION QUANT: CPT

## 2018-05-11 PROCEDURE — 93005 ELECTROCARDIOGRAM TRACING: CPT

## 2018-05-11 PROCEDURE — 83735 ASSAY OF MAGNESIUM: CPT

## 2018-05-11 PROCEDURE — 80053 COMPREHEN METABOLIC PANEL: CPT

## 2018-05-11 PROCEDURE — 86141 C-REACTIVE PROTEIN HS: CPT

## 2018-05-11 PROCEDURE — 85652 RBC SED RATE AUTOMATED: CPT

## 2018-05-11 PROCEDURE — 6370000000 HC RX 637 (ALT 250 FOR IP): Performed by: INTERNAL MEDICINE

## 2018-05-11 PROCEDURE — 6370000000 HC RX 637 (ALT 250 FOR IP): Performed by: NURSE PRACTITIONER

## 2018-05-11 PROCEDURE — 84484 ASSAY OF TROPONIN QUANT: CPT

## 2018-05-11 PROCEDURE — 85027 COMPLETE CBC AUTOMATED: CPT

## 2018-05-11 PROCEDURE — 2060000000 HC ICU INTERMEDIATE R&B

## 2018-05-11 PROCEDURE — 36415 COLL VENOUS BLD VENIPUNCTURE: CPT

## 2018-05-11 PROCEDURE — 6360000002 HC RX W HCPCS: Performed by: INTERNAL MEDICINE

## 2018-05-11 RX ORDER — SODIUM CHLORIDE 9 MG/ML
INJECTION, SOLUTION INTRAVENOUS CONTINUOUS
Status: DISCONTINUED | OUTPATIENT
Start: 2018-05-14 | End: 2018-05-12 | Stop reason: HOSPADM

## 2018-05-11 RX ORDER — SODIUM CHLORIDE 0.9 % (FLUSH) 0.9 %
10 SYRINGE (ML) INJECTION EVERY 12 HOURS SCHEDULED
Status: DISCONTINUED | OUTPATIENT
Start: 2018-05-11 | End: 2018-05-12 | Stop reason: HOSPADM

## 2018-05-11 RX ORDER — SODIUM CHLORIDE 0.9 % (FLUSH) 0.9 %
10 SYRINGE (ML) INJECTION PRN
Status: DISCONTINUED | OUTPATIENT
Start: 2018-05-11 | End: 2018-05-12 | Stop reason: HOSPADM

## 2018-05-11 RX ORDER — ACETAMINOPHEN 80 MG
TABLET,CHEWABLE ORAL ONCE
Status: DISCONTINUED | OUTPATIENT
Start: 2018-05-11 | End: 2018-05-12 | Stop reason: HOSPADM

## 2018-05-11 RX ORDER — SOTALOL HYDROCHLORIDE 120 MG/1
180 TABLET ORAL 2 TIMES DAILY
Status: DISCONTINUED | OUTPATIENT
Start: 2018-05-11 | End: 2018-05-12 | Stop reason: HOSPADM

## 2018-05-11 RX ORDER — SODIUM CHLORIDE 9 MG/ML
INJECTION, SOLUTION INTRAVENOUS CONTINUOUS
Status: DISCONTINUED | OUTPATIENT
Start: 2018-05-11 | End: 2018-05-11

## 2018-05-11 RX ADMIN — Medication 400 MG: at 10:33

## 2018-05-11 RX ADMIN — PANTOPRAZOLE SODIUM 40 MG: 40 TABLET, DELAYED RELEASE ORAL at 10:39

## 2018-05-11 RX ADMIN — ATORVASTATIN CALCIUM 40 MG: 40 TABLET, FILM COATED ORAL at 10:33

## 2018-05-11 RX ADMIN — SPIRONOLACTONE 25 MG: 25 TABLET ORAL at 10:34

## 2018-05-11 RX ADMIN — PAROXETINE HYDROCHLORIDE 20 MG: 20 TABLET, FILM COATED ORAL at 10:33

## 2018-05-11 RX ADMIN — SPIRONOLACTONE 25 MG: 25 TABLET ORAL at 16:19

## 2018-05-11 RX ADMIN — FUROSEMIDE 40 MG: 10 INJECTION, SOLUTION INTRAMUSCULAR; INTRAVENOUS at 10:34

## 2018-05-11 RX ADMIN — SOTALOL HYDROCHLORIDE 180 MG: 120 TABLET ORAL at 21:59

## 2018-05-11 RX ADMIN — APIXABAN 5 MG: 5 TABLET, FILM COATED ORAL at 21:42

## 2018-05-11 RX ADMIN — POTASSIUM CHLORIDE 20 MEQ: 20 TABLET, EXTENDED RELEASE ORAL at 10:33

## 2018-05-11 RX ADMIN — SOTALOL HYDROCHLORIDE 160 MG: 80 TABLET ORAL at 10:33

## 2018-05-11 RX ADMIN — AMLODIPINE BESYLATE 10 MG: 10 TABLET ORAL at 21:42

## 2018-05-11 RX ADMIN — APIXABAN 5 MG: 5 TABLET, FILM COATED ORAL at 10:33

## 2018-05-11 RX ADMIN — POTASSIUM CHLORIDE 20 MEQ: 20 TABLET, EXTENDED RELEASE ORAL at 21:42

## 2018-05-11 RX ADMIN — DOXAZOSIN 2 MG: 1 TABLET ORAL at 16:20

## 2018-05-11 ASSESSMENT — PAIN SCALES - GENERAL
PAINLEVEL_OUTOF10: 0
PAINLEVEL_OUTOF10: 0

## 2018-05-12 VITALS
BODY MASS INDEX: 42 KG/M2 | HEART RATE: 61 BPM | OXYGEN SATURATION: 95 % | DIASTOLIC BLOOD PRESSURE: 68 MMHG | TEMPERATURE: 98.1 F | HEIGHT: 65 IN | WEIGHT: 252.1 LBS | SYSTOLIC BLOOD PRESSURE: 113 MMHG | RESPIRATION RATE: 20 BRPM

## 2018-05-12 LAB
EKG ATRIAL RATE: 300 BPM
EKG ATRIAL RATE: 340 BPM
EKG ATRIAL RATE: 394 BPM
EKG ATRIAL RATE: 60 BPM
EKG ATRIAL RATE: 61 BPM
EKG ATRIAL RATE: 62 BPM
EKG ATRIAL RATE: 62 BPM
EKG P AXIS: 43 DEGREES
EKG P AXIS: 52 DEGREES
EKG P AXIS: 60 DEGREES
EKG P AXIS: 62 DEGREES
EKG P-R INTERVAL: 148 MS
EKG P-R INTERVAL: 180 MS
EKG P-R INTERVAL: 194 MS
EKG P-R INTERVAL: 198 MS
EKG Q-T INTERVAL: 308 MS
EKG Q-T INTERVAL: 386 MS
EKG Q-T INTERVAL: 396 MS
EKG Q-T INTERVAL: 404 MS
EKG Q-T INTERVAL: 446 MS
EKG Q-T INTERVAL: 450 MS
EKG Q-T INTERVAL: 492 MS
EKG QRS DURATION: 84 MS
EKG QRS DURATION: 84 MS
EKG QRS DURATION: 88 MS
EKG QRS DURATION: 90 MS
EKG QRS DURATION: 92 MS
EKG QRS DURATION: 96 MS
EKG QRS DURATION: 98 MS
EKG QTC CALCULATION (BAZETT): 327 MS
EKG QTC CALCULATION (BAZETT): 391 MS
EKG QTC CALCULATION (BAZETT): 423 MS
EKG QTC CALCULATION (BAZETT): 450 MS
EKG QTC CALCULATION (BAZETT): 452 MS
EKG QTC CALCULATION (BAZETT): 456 MS
EKG QTC CALCULATION (BAZETT): 495 MS
EKG R AXIS: -4 DEGREES
EKG R AXIS: -5 DEGREES
EKG R AXIS: 2 DEGREES
EKG T AXIS: -13 DEGREES
EKG T AXIS: -6 DEGREES
EKG T AXIS: 15 DEGREES
EKG T AXIS: 16 DEGREES
EKG T AXIS: 19 DEGREES
EKG T AXIS: 209 DEGREES
EKG T AXIS: 4 DEGREES
EKG VENTRICULAR RATE: 60 BPM
EKG VENTRICULAR RATE: 61 BPM
EKG VENTRICULAR RATE: 62 BPM
EKG VENTRICULAR RATE: 62 BPM
EKG VENTRICULAR RATE: 66 BPM
EKG VENTRICULAR RATE: 68 BPM
EKG VENTRICULAR RATE: 80 BPM

## 2018-05-12 PROCEDURE — 93005 ELECTROCARDIOGRAM TRACING: CPT

## 2018-05-12 PROCEDURE — 6370000000 HC RX 637 (ALT 250 FOR IP): Performed by: NURSE PRACTITIONER

## 2018-05-12 PROCEDURE — 6370000000 HC RX 637 (ALT 250 FOR IP): Performed by: INTERNAL MEDICINE

## 2018-05-12 RX ORDER — SOTALOL HYDROCHLORIDE 120 MG/1
180 TABLET ORAL 2 TIMES DAILY
Qty: 60 TABLET | Refills: 3 | Status: SHIPPED | OUTPATIENT
Start: 2018-05-12 | End: 2019-05-06 | Stop reason: ALTCHOICE

## 2018-05-12 RX ADMIN — AMLODIPINE BESYLATE 10 MG: 10 TABLET ORAL at 09:29

## 2018-05-12 RX ADMIN — ATORVASTATIN CALCIUM 40 MG: 40 TABLET, FILM COATED ORAL at 09:29

## 2018-05-12 RX ADMIN — DOXAZOSIN 2 MG: 1 TABLET ORAL at 09:29

## 2018-05-12 RX ADMIN — SPIRONOLACTONE 25 MG: 25 TABLET ORAL at 09:29

## 2018-05-12 RX ADMIN — PANTOPRAZOLE SODIUM 40 MG: 40 TABLET, DELAYED RELEASE ORAL at 09:29

## 2018-05-12 RX ADMIN — SOTALOL HYDROCHLORIDE 180 MG: 120 TABLET ORAL at 17:03

## 2018-05-12 RX ADMIN — POTASSIUM CHLORIDE 20 MEQ: 20 TABLET, EXTENDED RELEASE ORAL at 09:29

## 2018-05-12 RX ADMIN — SOTALOL HYDROCHLORIDE 180 MG: 120 TABLET ORAL at 09:29

## 2018-05-12 RX ADMIN — PAROXETINE HYDROCHLORIDE 20 MG: 20 TABLET, FILM COATED ORAL at 09:29

## 2018-05-12 RX ADMIN — APIXABAN 5 MG: 5 TABLET, FILM COATED ORAL at 09:29

## 2018-05-12 RX ADMIN — Medication 400 MG: at 09:29

## 2018-05-12 ASSESSMENT — PAIN SCALES - GENERAL
PAINLEVEL_OUTOF10: 0

## 2018-05-13 PROCEDURE — 93010 ELECTROCARDIOGRAM REPORT: CPT | Performed by: INTERNAL MEDICINE

## 2018-05-14 ENCOUNTER — HOSPITAL ENCOUNTER (OUTPATIENT)
Dept: CARDIAC CATH/INVASIVE PROCEDURES | Age: 58
Discharge: HOME | End: 2018-05-14
Attending: INTERNAL MEDICINE | Admitting: INTERNAL MEDICINE
Payer: COMMERCIAL

## 2018-05-14 VITALS
TEMPERATURE: 98.3 F | SYSTOLIC BLOOD PRESSURE: 137 MMHG | BODY MASS INDEX: 40.32 KG/M2 | DIASTOLIC BLOOD PRESSURE: 74 MMHG | WEIGHT: 242 LBS | RESPIRATION RATE: 19 BRPM | HEIGHT: 65 IN | HEART RATE: 59 BPM

## 2018-05-14 PROBLEM — I20.8 ANGINA AT REST (HCC): Status: ACTIVE | Noted: 2018-05-14

## 2018-05-14 PROBLEM — I20.89 ANGINA AT REST: Status: ACTIVE | Noted: 2018-05-14

## 2018-05-14 PROCEDURE — 2500000003 HC RX 250 WO HCPCS

## 2018-05-14 PROCEDURE — 93458 L HRT ARTERY/VENTRICLE ANGIO: CPT | Performed by: INTERNAL MEDICINE

## 2018-05-14 PROCEDURE — 6360000002 HC RX W HCPCS

## 2018-05-14 PROCEDURE — C1887 CATHETER, GUIDING: HCPCS

## 2018-05-14 PROCEDURE — 2709999900 HC NON-CHARGEABLE SUPPLY

## 2018-05-14 PROCEDURE — 85347 COAGULATION TIME ACTIVATED: CPT

## 2018-05-14 PROCEDURE — 6370000000 HC RX 637 (ALT 250 FOR IP)

## 2018-05-14 PROCEDURE — C1725 CATH, TRANSLUMIN NON-LASER: HCPCS

## 2018-05-14 PROCEDURE — 6370000000 HC RX 637 (ALT 250 FOR IP): Performed by: INTERNAL MEDICINE

## 2018-05-14 PROCEDURE — C1769 GUIDE WIRE: HCPCS

## 2018-05-14 PROCEDURE — C1894 INTRO/SHEATH, NON-LASER: HCPCS

## 2018-05-14 PROCEDURE — 2580000003 HC RX 258

## 2018-05-14 PROCEDURE — 93571 IV DOP VEL&/PRESS C FLO 1ST: CPT | Performed by: INTERNAL MEDICINE

## 2018-05-14 RX ORDER — HYDRALAZINE HYDROCHLORIDE 20 MG/ML
10 INJECTION INTRAMUSCULAR; INTRAVENOUS EVERY 10 MIN PRN
Status: CANCELLED | OUTPATIENT
Start: 2018-05-14

## 2018-05-14 RX ORDER — ACETAMINOPHEN 325 MG/1
650 TABLET ORAL EVERY 4 HOURS PRN
Status: DISCONTINUED | OUTPATIENT
Start: 2018-05-14 | End: 2018-05-14 | Stop reason: HOSPADM

## 2018-05-14 RX ORDER — SODIUM CHLORIDE 0.9 % (FLUSH) 0.9 %
10 SYRINGE (ML) INJECTION PRN
Status: CANCELLED | OUTPATIENT
Start: 2018-05-14

## 2018-05-14 RX ORDER — 0.9 % SODIUM CHLORIDE 0.9 %
500 INTRAVENOUS SOLUTION INTRAVENOUS ONCE
Status: CANCELLED | OUTPATIENT
Start: 2018-05-14 | End: 2018-05-14

## 2018-05-14 RX ORDER — SODIUM CHLORIDE 0.9 % (FLUSH) 0.9 %
10 SYRINGE (ML) INJECTION EVERY 12 HOURS SCHEDULED
Status: DISCONTINUED | OUTPATIENT
Start: 2018-05-14 | End: 2018-05-14 | Stop reason: HOSPADM

## 2018-05-14 RX ORDER — SODIUM CHLORIDE 9 MG/ML
INJECTION, SOLUTION INTRAVENOUS CONTINUOUS
Status: CANCELLED | OUTPATIENT
Start: 2018-05-14 | End: 2018-05-15

## 2018-05-14 RX ORDER — ACETAMINOPHEN 325 MG/1
650 TABLET ORAL EVERY 4 HOURS PRN
Status: CANCELLED | OUTPATIENT
Start: 2018-05-14

## 2018-05-14 RX ORDER — ATROPINE SULFATE 0.4 MG/ML
0.6 AMPUL (ML) INJECTION
Status: CANCELLED | OUTPATIENT
Start: 2018-05-14 | End: 2018-05-14

## 2018-05-14 RX ORDER — SODIUM CHLORIDE 9 MG/ML
INJECTION, SOLUTION INTRAVENOUS CONTINUOUS
Status: DISCONTINUED | OUTPATIENT
Start: 2018-05-14 | End: 2018-05-14 | Stop reason: HOSPADM

## 2018-05-14 RX ORDER — DOCUSATE SODIUM 100 MG/1
100 CAPSULE, LIQUID FILLED ORAL 2 TIMES DAILY
Status: CANCELLED | OUTPATIENT
Start: 2018-05-14

## 2018-05-14 RX ORDER — SODIUM CHLORIDE 0.9 % (FLUSH) 0.9 %
10 SYRINGE (ML) INJECTION EVERY 12 HOURS SCHEDULED
Status: CANCELLED | OUTPATIENT
Start: 2018-05-14

## 2018-05-14 RX ORDER — ONDANSETRON 2 MG/ML
4 INJECTION INTRAMUSCULAR; INTRAVENOUS EVERY 6 HOURS PRN
Status: CANCELLED | OUTPATIENT
Start: 2018-05-14

## 2018-05-14 RX ORDER — ASPIRIN 81 MG/1
81 TABLET ORAL DAILY
Status: CANCELLED | OUTPATIENT
Start: 2018-05-15

## 2018-05-14 RX ADMIN — ACETAMINOPHEN 650 MG: 325 TABLET ORAL at 14:05

## 2018-05-14 ASSESSMENT — PAIN SCALES - GENERAL: PAINLEVEL_OUTOF10: 6

## 2018-05-15 PROCEDURE — 93010 ELECTROCARDIOGRAM REPORT: CPT | Performed by: INTERNAL MEDICINE

## 2018-05-16 LAB
PERFORMED ON: ABNORMAL
POC ACTIVATED CLOTTING TIME KAOLIN: 175 SEC (ref 82–152)
POC SAMPLE TYPE: ABNORMAL

## 2018-05-17 ENCOUNTER — OFFICE VISIT (OUTPATIENT)
Dept: INTERNAL MEDICINE | Age: 58
End: 2018-05-17
Payer: COMMERCIAL

## 2018-05-17 VITALS
HEART RATE: 62 BPM | WEIGHT: 251.8 LBS | SYSTOLIC BLOOD PRESSURE: 102 MMHG | OXYGEN SATURATION: 96 % | DIASTOLIC BLOOD PRESSURE: 70 MMHG | BODY MASS INDEX: 41.9 KG/M2

## 2018-05-17 DIAGNOSIS — R60.0 MILD PERIPHERAL EDEMA: ICD-10-CM

## 2018-05-17 DIAGNOSIS — R39.9 URINARY TRACT INFECTION SYMPTOMS: Primary | ICD-10-CM

## 2018-05-17 PROBLEM — I48.91 ATRIAL FIBRILLATION (HCC): Status: RESOLVED | Noted: 2018-04-23 | Resolved: 2018-05-17

## 2018-05-17 LAB
BILIRUBIN, POC: NORMAL
BLOOD URINE, POC: NORMAL
CLARITY, POC: NORMAL
COLOR, POC: NORMAL
GLUCOSE URINE, POC: NORMAL
KETONES, POC: NORMAL
LEUKOCYTE EST, POC: NORMAL
NITRITE, POC: NORMAL
PH, POC: 6
PROTEIN, POC: NORMAL
SPECIFIC GRAVITY, POC: 1.03
UROBILINOGEN, POC: NORMAL

## 2018-05-17 PROCEDURE — 99214 OFFICE O/P EST MOD 30 MIN: CPT | Performed by: PHYSICIAN ASSISTANT

## 2018-05-17 PROCEDURE — 81003 URINALYSIS AUTO W/O SCOPE: CPT | Performed by: PHYSICIAN ASSISTANT

## 2018-05-17 ASSESSMENT — ENCOUNTER SYMPTOMS
COUGH: 0
SHORTNESS OF BREATH: 0
ABDOMINAL PAIN: 1
CHEST TIGHTNESS: 0
NAUSEA: 0

## 2018-05-21 ENCOUNTER — OFFICE VISIT (OUTPATIENT)
Dept: PULMONOLOGY | Age: 58
End: 2018-05-21
Payer: COMMERCIAL

## 2018-05-21 VITALS
SYSTOLIC BLOOD PRESSURE: 130 MMHG | TEMPERATURE: 96.8 F | OXYGEN SATURATION: 96 % | RESPIRATION RATE: 18 BRPM | WEIGHT: 249 LBS | HEART RATE: 72 BPM | BODY MASS INDEX: 41.48 KG/M2 | HEIGHT: 65 IN | DIASTOLIC BLOOD PRESSURE: 84 MMHG

## 2018-05-21 DIAGNOSIS — G47.33 OSA (OBSTRUCTIVE SLEEP APNEA): Primary | ICD-10-CM

## 2018-05-21 DIAGNOSIS — I27.20 PULMONARY HTN (HCC): ICD-10-CM

## 2018-05-21 DIAGNOSIS — R09.81 NASAL CONGESTION: ICD-10-CM

## 2018-05-21 PROCEDURE — 99213 OFFICE O/P EST LOW 20 MIN: CPT | Performed by: INTERNAL MEDICINE

## 2018-06-18 ENCOUNTER — APPOINTMENT (OUTPATIENT)
Dept: LAB | Age: 58
End: 2018-06-18
Payer: COMMERCIAL

## 2018-06-18 ENCOUNTER — HOSPITAL ENCOUNTER (OUTPATIENT)
Dept: LAB | Age: 58
Discharge: HOME | End: 2018-06-18
Payer: COMMERCIAL

## 2018-06-18 LAB
ANION GAP SERPL CALCULATED.3IONS-SCNC: 16 MEQ/L (ref 7–13)
BUN BLDV-MCNC: 17 MG/DL (ref 8–23)
CALCIUM SERPL-MCNC: 9 MG/DL (ref 8.6–10.2)
CHLORIDE BLD-SCNC: 104 MEQ/L (ref 98–107)
CO2: 22 MEQ/L (ref 22–29)
CREAT SERPL-MCNC: 0.74 MG/DL (ref 0.5–0.9)
GFR AFRICAN AMERICAN: >60
GFR NON-AFRICAN AMERICAN: >60
GLUCOSE BLD-MCNC: 98 MG/DL (ref 74–109)
POTASSIUM SERPL-SCNC: 3.8 MEQ/L (ref 3.5–5.1)
SODIUM BLD-SCNC: 142 MEQ/L (ref 132–144)

## 2018-06-18 PROCEDURE — 36415 COLL VENOUS BLD VENIPUNCTURE: CPT

## 2018-06-18 PROCEDURE — 80048 BASIC METABOLIC PNL TOTAL CA: CPT

## 2018-07-16 ENCOUNTER — OFFICE VISIT (OUTPATIENT)
Dept: FAMILY MEDICINE CLINIC | Age: 58
End: 2018-07-16
Payer: COMMERCIAL

## 2018-07-16 VITALS
OXYGEN SATURATION: 97 % | BODY MASS INDEX: 41.14 KG/M2 | DIASTOLIC BLOOD PRESSURE: 82 MMHG | SYSTOLIC BLOOD PRESSURE: 128 MMHG | HEART RATE: 62 BPM | WEIGHT: 247.2 LBS

## 2018-07-16 DIAGNOSIS — F41.9 ANXIETY: ICD-10-CM

## 2018-07-16 DIAGNOSIS — R73.03 PREDIABETES: Primary | ICD-10-CM
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
## 2018-07-16 DIAGNOSIS — L72.0 EPIDERMOID CYST: ICD-10-CM

## 2018-07-16 LAB — HBA1C MFR BLD: 6.1 %

## 2018-07-16 PROCEDURE — 83036 HEMOGLOBIN GLYCOSYLATED A1C: CPT | Performed by: FAMILY MEDICINE

## 2018-07-16 PROCEDURE — 99214 OFFICE O/P EST MOD 30 MIN: CPT | Performed by: FAMILY MEDICINE

## 2018-07-16 RX ORDER — DOXYCYCLINE HYCLATE 100 MG
200 TABLET ORAL ONCE
Qty: 2 TABLET | Refills: 0 | Status: SHIPPED | OUTPATIENT
Start: 2018-07-16 | End: 2018-07-16

## 2018-07-16 RX ORDER — PAROXETINE HYDROCHLORIDE 20 MG/1
20 TABLET, FILM COATED ORAL EVERY MORNING
Qty: 90 TABLET | Refills: 3 | Status: SHIPPED | OUTPATIENT
Start: 2018-07-16 | End: 2019-01-14 | Stop reason: SDUPTHER

## 2018-07-16 ASSESSMENT — ENCOUNTER SYMPTOMS
SORE THROAT: 0
COUGH: 0
SHORTNESS OF BREATH: 0
ABDOMINAL PAIN: 0
RHINORRHEA: 0
DIARRHEA: 0
WHEEZING: 0
CONSTIPATION: 0

## 2018-07-16 NOTE — PROGRESS NOTES
[address] PRIMARY CARE  [address] [facility] [address]  Dept: [phone]  Dept Fax: [phone]  Loc: [phone]   Chief Complaint  Chief Complaint   Patient presents with    3 Month Follow-Up     preDM    Knee Pain     x few days,right    Other     pt has a spot on her back that she would like for you to check, states it will fill up and then drain        HPI:  59 y.o. female who presents for preDM2:    DM2: a1c 6.1 from 6.2; diet control; No excessive thirst, urination, or blurry vision. R knee pain: x1 week; has been on her feet more often than usual lately; has hx OA in multiple other joints; Has been using biofreeze cream and tylenol arthritis; No recent trauma. Skin lesion: thinks she has a cyst on the R back x years and it got bigger recently; popped it with a needle and blood and water came out;    Mole: R neck: just noticed it over the past week; no changing size. Hx of LUÍS-BSO.     + tick identified as deer tick  + engorged or attached for at least 36 hours  + able to provide prophylaxis within 72 hours of tick removal  + local rates of borgdoferi infection >20%  + Doxy not contraindicated (ie, the patient is not <6years of age, pregnant, or lactating).      Past Medical History:   Diagnosis Date    Allergic rhinitis     Anxiety     Cholecystitis     Hyperlipidemia     Hypertension     Osteoarthritis     back worst    Osteoarthritis     knees, spine    PONV (postoperative nausea and vomiting)     from using inhaled anethesia     Seasonal allergies     Vitamin D deficiency      Past Surgical History:   Procedure Laterality Date     SECTION      x2     SECTION   &     CHOLECYSTECTOMY, LAPAROSCOPIC N/A 2017    For biliary colic and stones    COLONOSCOPY  14    [email or web address]    HYSTERECTOMY      fibroids total    HYSTERECTOMY      TONSILLECTOMY      M) 20 MEQ extended release tablet Take 1 tablet by mouth 2 times daily 60 tablet 3    magnesium oxide (MAG-OX) 400 MG tablet Take 400 mg by mouth daily       loratadine (CLARITIN) 10 MG tablet Take 10 mg by mouth daily      amLODIPine (NORVASC) 10 MG tablet Take 10 mg by mouth daily       atorvastatin (LIPITOR) 40 MG tablet Take 40 mg by mouth daily       Triamcinolone Acetonide (NASACORT ALLERGY 24HR NA) by Nasal route 3 times daily as needed. No current facility-administered medications for this visit. ROS:  Review of Systems   Constitutional: Negative for chills and fever. HENT: Negative for rhinorrhea and sore throat. Respiratory: Negative for cough, shortness of breath and wheezing. Gastrointestinal: Negative for abdominal pain, constipation and diarrhea. Endocrine: Negative for polydipsia and polyuria. Genitourinary: Negative for dysuria, frequency and urgency. Neurological: Negative for syncope, light-headedness, numbness and headaches. Psychiatric/Behavioral: Negative for sleep disturbance. The patient is not nervous/anxious. Vitals:    07/16/18 0835   BP: 128/82   Site: Right Arm   Position: Sitting   Cuff Size: Large Adult   Pulse: 62   SpO2: 97%   Weight: 247 lb 3.2 oz (112.1 kg)       Physical exam:  Physical Exam   Constitutional: She is oriented to person, place, and time. She appears well-developed and well-nourished. No distress. HENT:   Head: Normocephalic and atraumatic. Eyes: EOM are normal.   Neck: Normal range of motion. Cardiovascular: Normal rate. Pulmonary/Chest: Effort normal. No respiratory distress. Musculoskeletal: She exhibits no edema. Back:    Neurological: She is alert and oriented to person, place, and time. Skin: Skin is warm and dry. Psychiatric: She has a normal mood and affect. Her behavior is normal.   Vitals reviewed.       Assessment/Plan:  59 y.o. female here mainly for preDM2:  - preDM2: at goal; continue

## 2018-07-23 ENCOUNTER — TELEPHONE (OUTPATIENT)
Dept: FAMILY MEDICINE CLINIC | Age: 58
End: 2018-07-23

## 2018-07-23 NOTE — TELEPHONE ENCOUNTER
Pt called in today and says that her right knee is still bothering her. She said you told her to call to let you know.

## 2018-07-24 ENCOUNTER — PROCEDURE VISIT (OUTPATIENT)
Dept: FAMILY MEDICINE CLINIC | Age: 58
End: 2018-07-24
Payer: COMMERCIAL

## 2018-07-24 VITALS
RESPIRATION RATE: 16 BRPM | OXYGEN SATURATION: 98 % | SYSTOLIC BLOOD PRESSURE: 122 MMHG | TEMPERATURE: 97.4 F | DIASTOLIC BLOOD PRESSURE: 80 MMHG | HEIGHT: 65 IN | HEART RATE: 62 BPM | BODY MASS INDEX: 40.98 KG/M2 | WEIGHT: 246 LBS

## 2018-07-24 DIAGNOSIS — M17.11 PRIMARY OSTEOARTHRITIS OF RIGHT KNEE: Primary | ICD-10-CM

## 2018-07-24 PROCEDURE — 20610 DRAIN/INJ JOINT/BURSA W/O US: CPT | Performed by: FAMILY MEDICINE

## 2018-07-24 PROCEDURE — 99213 OFFICE O/P EST LOW 20 MIN: CPT | Performed by: FAMILY MEDICINE

## 2018-07-24 RX ORDER — DOXYCYCLINE HYCLATE 100 MG
TABLET ORAL
COMMUNITY
Start: 2018-07-18 | End: 2019-05-15 | Stop reason: ALTCHOICE

## 2018-07-24 RX ORDER — TRIAMCINOLONE ACETONIDE 40 MG/ML
40 INJECTION, SUSPENSION INTRA-ARTICULAR; INTRAMUSCULAR ONCE
Status: COMPLETED | OUTPATIENT
Start: 2018-07-24 | End: 2018-07-24

## 2018-07-24 RX ADMIN — TRIAMCINOLONE ACETONIDE 40 MG: 40 INJECTION, SUSPENSION INTRA-ARTICULAR; INTRAMUSCULAR at 10:23

## 2018-07-24 ASSESSMENT — ENCOUNTER SYMPTOMS
CONSTIPATION: 0
ABDOMINAL PAIN: 0
RHINORRHEA: 0
DIARRHEA: 0
WHEEZING: 0
COUGH: 0
SHORTNESS OF BREATH: 0
SORE THROAT: 0

## 2018-07-24 NOTE — PROGRESS NOTES
Allergies   Allergen Reactions    Sulfa Antibiotics Swelling    Lisinopril     Lisinopril Other (See Comments)     cough    Sulfa Antibiotics Swelling    Codeine Nausea And Vomiting    Percocet [Oxycodone-Acetaminophen] Nausea And Vomiting     Current Outpatient Prescriptions   Medication Sig Dispense Refill    doxycycline hyclate (VIBRA-TABS) 100 MG tablet       apixaban (ELIQUIS) 5 MG TABS tablet Take 1 tablet by mouth 2 times daily Sample medication labeled with directions for administration and patient instructed on use. Lot: [id number] Exp:  28 tablet 0    PARoxetine (PAXIL) 20 MG tablet Take 1 tablet by mouth every morning 90 tablet 3    apixaban (ELIQUIS) 5 MG TABS tablet Take 5 mg by mouth 2 times daily      sotalol (BETAPACE) 120 MG tablet Take 1.5 tablets by mouth 2 times daily (Patient taking differently: Take 180 mg by mouth 2 times daily ) 60 tablet 3    fluticasone (FLONASE) 50 MCG/ACT nasal spray 1 spray by Nasal route daily 1 Bottle     doxazosin (CARDURA) 2 MG tablet Take 2 mg by mouth nightly       furosemide (LASIX) 20 MG tablet Take 20 mg by mouth daily      spironolactone (ALDACTONE) 25 MG tablet Take 25 mg by mouth daily      clobetasol (TEMOVATE) 0.05 % cream Apply topically 2 times daily. 60 g 2    Misc. Devices KIT B/l; knee high; compression stockings; 20-30mmHg 2 kit 1    potassium chloride (KLOR-CON M) 20 MEQ extended release tablet Take 1 tablet by mouth 2 times daily 60 tablet 3    magnesium oxide (MAG-OX) 400 MG tablet Take 400 mg by mouth daily       loratadine (CLARITIN) 10 MG tablet Take 10 mg by mouth daily      amLODIPine (NORVASC) 10 MG tablet Take 10 mg by mouth daily       atorvastatin (LIPITOR) 40 MG tablet Take 40 mg by mouth daily       Triamcinolone Acetonide (NASACORT ALLERGY 24HR NA) by Nasal route 3 times daily as needed. No current facility-administered medications for this visit.         ROS:  Review of Systems   Constitutional: DRAIN/INJECT LARGE JOINT/BURSA        Return if symptoms worsen or fail to improve.     Shellie Renner MD

## 2018-08-09 ENCOUNTER — HOSPITAL ENCOUNTER (OUTPATIENT)
Dept: CARDIOLOGY | Age: 58
Discharge: HOME | End: 2018-08-09
Payer: COMMERCIAL

## 2018-08-09 PROCEDURE — 93296 REM INTERROG EVL PM/IDS: CPT

## 2018-10-10 DIAGNOSIS — Z12.39 BREAST CANCER SCREENING: Primary | ICD-10-CM

## 2018-11-21 ENCOUNTER — HOSPITAL ENCOUNTER (OUTPATIENT)
Dept: CARDIOLOGY | Age: 58
Discharge: HOME | End: 2018-11-21
Payer: COMMERCIAL

## 2018-11-21 PROCEDURE — 93280 PM DEVICE PROGR EVAL DUAL: CPT

## 2018-11-28 ENCOUNTER — HOSPITAL ENCOUNTER (OUTPATIENT)
Dept: WOMENS IMAGING | Age: 58
Discharge: HOME | End: 2018-11-30
Payer: COMMERCIAL

## 2018-11-28 DIAGNOSIS — Z12.39 BREAST CANCER SCREENING: ICD-10-CM

## 2018-11-28 PROCEDURE — 77067 SCR MAMMO BI INCL CAD: CPT

## 2019-01-14 ENCOUNTER — TELEPHONE (OUTPATIENT)
Dept: FAMILY MEDICINE CLINIC | Age: 59
End: 2019-01-14

## 2019-01-14 ENCOUNTER — OFFICE VISIT (OUTPATIENT)
Dept: FAMILY MEDICINE CLINIC | Age: 59
End: 2019-01-14
Payer: COMMERCIAL

## 2019-01-14 VITALS
TEMPERATURE: 96.8 F | OXYGEN SATURATION: 94 % | SYSTOLIC BLOOD PRESSURE: 130 MMHG | RESPIRATION RATE: 14 BRPM | HEIGHT: 65 IN | DIASTOLIC BLOOD PRESSURE: 58 MMHG | BODY MASS INDEX: 44.15 KG/M2 | WEIGHT: 265 LBS | HEART RATE: 66 BPM

## 2019-01-14 DIAGNOSIS — R73.03 PREDIABETES: Primary | ICD-10-CM

## 2019-01-14 DIAGNOSIS — F41.9 ANXIETY: ICD-10-CM

## 2019-01-14 LAB — HBA1C MFR BLD: 5.7 %

## 2019-01-14 PROCEDURE — 83036 HEMOGLOBIN GLYCOSYLATED A1C: CPT | Performed by: FAMILY MEDICINE

## 2019-01-14 PROCEDURE — 99214 OFFICE O/P EST MOD 30 MIN: CPT | Performed by: FAMILY MEDICINE

## 2019-01-14 RX ORDER — PAROXETINE HYDROCHLORIDE 20 MG/1
20 TABLET, FILM COATED ORAL EVERY MORNING
Qty: 90 TABLET | Refills: 3 | Status: SHIPPED | OUTPATIENT
Start: 2019-01-14 | End: 2020-01-20 | Stop reason: SDUPTHER

## 2019-01-14 ASSESSMENT — ENCOUNTER SYMPTOMS
ABDOMINAL PAIN: 0
SORE THROAT: 0
RHINORRHEA: 0
SHORTNESS OF BREATH: 0
COUGH: 0
WHEEZING: 0
DIARRHEA: 0
CONSTIPATION: 0

## 2019-02-01 ENCOUNTER — HOSPITAL ENCOUNTER (EMERGENCY)
Age: 59
Discharge: HOME | End: 2019-02-01
Attending: STUDENT IN AN ORGANIZED HEALTH CARE EDUCATION/TRAINING PROGRAM
Payer: COMMERCIAL

## 2019-02-01 ENCOUNTER — APPOINTMENT (OUTPATIENT)
Dept: GENERAL RADIOLOGY | Age: 59
End: 2019-02-01
Payer: COMMERCIAL

## 2019-02-01 VITALS
HEART RATE: 63 BPM | OXYGEN SATURATION: 98 % | WEIGHT: 258 LBS | SYSTOLIC BLOOD PRESSURE: 160 MMHG | HEIGHT: 65 IN | BODY MASS INDEX: 42.99 KG/M2 | DIASTOLIC BLOOD PRESSURE: 88 MMHG | TEMPERATURE: 97.7 F | RESPIRATION RATE: 20 BRPM

## 2019-02-01 DIAGNOSIS — T82.110A PACEMAKER LEAD MALFUNCTION, INITIAL ENCOUNTER: Primary | ICD-10-CM

## 2019-02-01 DIAGNOSIS — E87.6 HYPOKALEMIA: ICD-10-CM

## 2019-02-01 LAB
ALBUMIN SERPL-MCNC: 3.6 G/DL (ref 3.9–4.9)
ALP BLD-CCNC: 147 U/L (ref 40–130)
ALT SERPL-CCNC: 22 U/L (ref 0–33)
ANION GAP SERPL CALCULATED.3IONS-SCNC: 12 MEQ/L (ref 7–13)
AST SERPL-CCNC: 20 U/L (ref 0–35)
BASOPHILS ABSOLUTE: 0.1 K/UL (ref 0–0.2)
BASOPHILS RELATIVE PERCENT: 1 %
BILIRUB SERPL-MCNC: 0.4 MG/DL (ref 0–1.2)
BILIRUBIN URINE: NEGATIVE
BLOOD, URINE: NEGATIVE
BUN BLDV-MCNC: 13 MG/DL (ref 8–23)
C-REACTIVE PROTEIN, HIGH SENSITIVITY: 13.2 MG/L (ref 0–5)
CALCIUM SERPL-MCNC: 9.1 MG/DL (ref 8.6–10.2)
CHLORIDE BLD-SCNC: 104 MEQ/L (ref 98–107)
CLARITY: CLEAR
CO2: 25 MEQ/L (ref 22–29)
COLOR: YELLOW
CREAT SERPL-MCNC: 0.92 MG/DL (ref 0.5–0.9)
EKG ATRIAL RATE: 60 BPM
EKG P AXIS: 52 DEGREES
EKG P-R INTERVAL: 136 MS
EKG Q-T INTERVAL: 458 MS
EKG QRS DURATION: 86 MS
EKG QTC CALCULATION (BAZETT): 458 MS
EKG R AXIS: -10 DEGREES
EKG T AXIS: 14 DEGREES
EKG VENTRICULAR RATE: 60 BPM
EOSINOPHILS ABSOLUTE: 0.2 K/UL (ref 0–0.7)
EOSINOPHILS RELATIVE PERCENT: 2.6 %
GFR AFRICAN AMERICAN: >60
GFR NON-AFRICAN AMERICAN: >60
GLOBULIN: 3.5 G/DL (ref 2.3–3.5)
GLUCOSE BLD-MCNC: 116 MG/DL (ref 74–109)
GLUCOSE URINE: NEGATIVE MG/DL
HCT VFR BLD CALC: 41.1 % (ref 37–47)
HEMOGLOBIN: 14.2 G/DL (ref 12–16)
KETONES, URINE: ABNORMAL MG/DL
LEUKOCYTE ESTERASE, URINE: NEGATIVE
LYMPHOCYTES ABSOLUTE: 1.3 K/UL (ref 1–4.8)
LYMPHOCYTES RELATIVE PERCENT: 16.6 %
MAGNESIUM: 2 MG/DL (ref 1.7–2.3)
MCH RBC QN AUTO: 30.3 PG (ref 27–31.3)
MCHC RBC AUTO-ENTMCNC: 34.5 % (ref 33–37)
MCV RBC AUTO: 87.9 FL (ref 82–100)
MONOCYTES ABSOLUTE: 0.6 K/UL (ref 0.2–0.8)
MONOCYTES RELATIVE PERCENT: 7.6 %
NEUTROPHILS ABSOLUTE: 5.5 K/UL (ref 1.4–6.5)
NEUTROPHILS RELATIVE PERCENT: 72.2 %
NITRITE, URINE: NEGATIVE
PDW BLD-RTO: 13.4 % (ref 11.5–14.5)
PH UA: 7 (ref 5–9)
PLATELET # BLD: 195 K/UL (ref 130–400)
POTASSIUM SERPL-SCNC: 3.4 MEQ/L (ref 3.5–5.1)
PROTEIN UA: NEGATIVE MG/DL
RBC # BLD: 4.68 M/UL (ref 4.2–5.4)
SODIUM BLD-SCNC: 141 MEQ/L (ref 132–144)
SPECIFIC GRAVITY UA: 1.03 (ref 1–1.03)
TOTAL CK: 111 U/L (ref 0–170)
TOTAL PROTEIN: 7.1 G/DL (ref 6.4–8.1)
TROPONIN: <0.01 NG/ML (ref 0–0.01)
URINE REFLEX TO CULTURE: ABNORMAL
UROBILINOGEN, URINE: 1 E.U./DL
WBC # BLD: 7.7 K/UL (ref 4.8–10.8)

## 2019-02-01 PROCEDURE — 6370000000 HC RX 637 (ALT 250 FOR IP): Performed by: PHYSICIAN ASSISTANT

## 2019-02-01 PROCEDURE — 81003 URINALYSIS AUTO W/O SCOPE: CPT

## 2019-02-01 PROCEDURE — 83735 ASSAY OF MAGNESIUM: CPT

## 2019-02-01 PROCEDURE — 85025 COMPLETE CBC W/AUTO DIFF WBC: CPT

## 2019-02-01 PROCEDURE — 99285 EMERGENCY DEPT VISIT HI MDM: CPT

## 2019-02-01 PROCEDURE — 84484 ASSAY OF TROPONIN QUANT: CPT

## 2019-02-01 PROCEDURE — 80053 COMPREHEN METABOLIC PANEL: CPT

## 2019-02-01 PROCEDURE — 36415 COLL VENOUS BLD VENIPUNCTURE: CPT

## 2019-02-01 PROCEDURE — 93005 ELECTROCARDIOGRAM TRACING: CPT

## 2019-02-01 PROCEDURE — 86141 C-REACTIVE PROTEIN HS: CPT

## 2019-02-01 PROCEDURE — 71046 X-RAY EXAM CHEST 2 VIEWS: CPT

## 2019-02-01 PROCEDURE — 82550 ASSAY OF CK (CPK): CPT

## 2019-02-01 RX ORDER — POTASSIUM BICARBONATE 25 MEQ/1
50 TABLET, EFFERVESCENT ORAL ONCE
Status: COMPLETED | OUTPATIENT
Start: 2019-02-01 | End: 2019-02-01

## 2019-02-01 RX ORDER — SODIUM CHLORIDE 0.9 % (FLUSH) 0.9 %
3 SYRINGE (ML) INJECTION EVERY 8 HOURS
Status: DISCONTINUED | OUTPATIENT
Start: 2019-02-01 | End: 2019-02-01 | Stop reason: HOSPADM

## 2019-02-01 RX ADMIN — POTASSIUM BICARBONATE 50 MEQ: 25 TABLET, EFFERVESCENT ORAL at 16:57

## 2019-02-01 ASSESSMENT — ENCOUNTER SYMPTOMS
VOMITING: 0
ABDOMINAL DISTENTION: 0
NAUSEA: 0
SHORTNESS OF BREATH: 1
ANAL BLEEDING: 0
BACK PAIN: 0
BLOOD IN STOOL: 0
EYE DISCHARGE: 0
VOICE CHANGE: 0
APNEA: 0
PHOTOPHOBIA: 0

## 2019-02-04 PROCEDURE — 93010 ELECTROCARDIOGRAM REPORT: CPT | Performed by: INTERNAL MEDICINE

## 2019-03-08 ENCOUNTER — HOSPITAL ENCOUNTER (OUTPATIENT)
Dept: LAB | Age: 59
Discharge: HOME | End: 2019-03-08
Payer: COMMERCIAL

## 2019-03-08 LAB
ANION GAP SERPL CALCULATED.3IONS-SCNC: 13 MEQ/L (ref 9–15)
BUN BLDV-MCNC: 15 MG/DL (ref 8–23)
CALCIUM SERPL-MCNC: 9.4 MG/DL (ref 8.5–9.9)
CHLORIDE BLD-SCNC: 99 MEQ/L (ref 95–107)
CO2: 27 MEQ/L (ref 20–31)
CREAT SERPL-MCNC: 0.84 MG/DL (ref 0.5–0.9)
GFR AFRICAN AMERICAN: >60
GFR NON-AFRICAN AMERICAN: >60
GLUCOSE BLD-MCNC: 113 MG/DL (ref 70–99)
POTASSIUM SERPL-SCNC: 4.2 MEQ/L (ref 3.4–4.9)
SODIUM BLD-SCNC: 139 MEQ/L (ref 135–144)

## 2019-03-08 PROCEDURE — 36415 COLL VENOUS BLD VENIPUNCTURE: CPT

## 2019-03-08 PROCEDURE — 80048 BASIC METABOLIC PNL TOTAL CA: CPT

## 2019-04-22 ENCOUNTER — TELEPHONE (OUTPATIENT)
Dept: FAMILY MEDICINE CLINIC | Age: 59
End: 2019-04-22

## 2019-04-22 DIAGNOSIS — Z00.00 ANNUAL PHYSICAL EXAM: Primary | ICD-10-CM

## 2019-04-25 ENCOUNTER — HOSPITAL ENCOUNTER (OUTPATIENT)
Dept: LAB | Age: 59
Discharge: HOME | End: 2019-04-25
Payer: COMMERCIAL

## 2019-04-25 DIAGNOSIS — Z00.00 ANNUAL PHYSICAL EXAM: ICD-10-CM

## 2019-04-25 LAB
CHOLESTEROL, FASTING: 179 MG/DL (ref 0–199)
HBA1C MFR BLD: 6.2 % (ref 4.8–5.9)
HDLC SERPL-MCNC: 44 MG/DL (ref 40–59)
LDL CHOLESTEROL CALCULATED: 118 MG/DL (ref 0–129)
TRIGLYCERIDE, FASTING: 87 MG/DL (ref 0–150)

## 2019-04-25 PROCEDURE — 36415 COLL VENOUS BLD VENIPUNCTURE: CPT

## 2019-04-25 PROCEDURE — 83036 HEMOGLOBIN GLYCOSYLATED A1C: CPT

## 2019-04-25 PROCEDURE — 80061 LIPID PANEL: CPT

## 2019-05-06 ENCOUNTER — OFFICE VISIT (OUTPATIENT)
Dept: FAMILY MEDICINE CLINIC | Age: 59
End: 2019-05-06
Payer: COMMERCIAL

## 2019-05-06 VITALS
WEIGHT: 267.8 LBS | HEART RATE: 65 BPM | BODY MASS INDEX: 44.62 KG/M2 | SYSTOLIC BLOOD PRESSURE: 130 MMHG | RESPIRATION RATE: 16 BRPM | HEIGHT: 65 IN | DIASTOLIC BLOOD PRESSURE: 72 MMHG | TEMPERATURE: 96.9 F | OXYGEN SATURATION: 95 %

## 2019-05-06 DIAGNOSIS — I48.19 ATRIAL FIBRILLATION, PERSISTENT (HCC): ICD-10-CM

## 2019-05-06 DIAGNOSIS — R73.03 PREDIABETES: ICD-10-CM

## 2019-05-06 DIAGNOSIS — Z00.00 ANNUAL PHYSICAL EXAM: Primary | ICD-10-CM

## 2019-05-06 DIAGNOSIS — E66.01 CLASS 3 SEVERE OBESITY DUE TO EXCESS CALORIES WITH SERIOUS COMORBIDITY AND BODY MASS INDEX (BMI) OF 40.0 TO 44.9 IN ADULT (HCC): ICD-10-CM

## 2019-05-06 DIAGNOSIS — I10 ESSENTIAL HYPERTENSION: Chronic | ICD-10-CM

## 2019-05-06 PROCEDURE — 99396 PREV VISIT EST AGE 40-64: CPT | Performed by: FAMILY MEDICINE

## 2019-05-06 RX ORDER — OLOPATADINE HYDROCHLORIDE 1 MG/ML
1-2 SOLUTION/ DROPS OPHTHALMIC
COMMUNITY
Start: 2011-03-17 | End: 2022-09-19

## 2019-05-06 RX ORDER — BUPROPION HYDROCHLORIDE 300 MG/1
300 TABLET ORAL EVERY MORNING
Qty: 30 TABLET | Refills: 3 | Status: SHIPPED | OUTPATIENT
Start: 2019-05-06 | End: 2021-05-24

## 2019-05-06 RX ORDER — TRIAMTERENE AND HYDROCHLOROTHIAZIDE 37.5; 25 MG/1; MG/1
1 TABLET ORAL
COMMUNITY
Start: 2012-04-30 | End: 2021-05-24

## 2019-05-06 RX ORDER — ACETAMINOPHEN,DIPHENHYDRAMINE HCL 500; 25 MG/1; MG/1
TABLET, FILM COATED ORAL
COMMUNITY
Start: 2009-06-15 | End: 2021-05-24

## 2019-05-06 ASSESSMENT — ENCOUNTER SYMPTOMS
ABDOMINAL PAIN: 0
COUGH: 0
CONSTIPATION: 0
RHINORRHEA: 0
SORE THROAT: 0
SHORTNESS OF BREATH: 0
DIARRHEA: 0
WHEEZING: 0

## 2019-05-06 ASSESSMENT — PATIENT HEALTH QUESTIONNAIRE - PHQ9
2. FEELING DOWN, DEPRESSED OR HOPELESS: 1
SUM OF ALL RESPONSES TO PHQ QUESTIONS 1-9: 2
SUM OF ALL RESPONSES TO PHQ QUESTIONS 1-9: 2
SUM OF ALL RESPONSES TO PHQ9 QUESTIONS 1 & 2: 2
1. LITTLE INTEREST OR PLEASURE IN DOING THINGS: 1

## 2019-05-06 NOTE — PROGRESS NOTES
[address] [facility] [address] [facility] PRIMARY CARE  [address] [facility]  [place] [address]  Dept: [phone]  Dept Fax: : [phone]   Chief Complaint  Chief Complaint   Patient presents with    Annual Exam       HPI:  62 y. o.female who presents for annual exam:    Sees Dr. Jeff Stoner and Madan Pichardo for for the Afib. No CP, palpitations, or SOB. Already had colonoscopy and mamm. Worried about her weight gain since retiring. Decreasing the carbs and portion sizes. Adding walking. Interested in starting wellbutrin to assist in wt loss.     Past Medical History:   Diagnosis Date    Allergic rhinitis     Anxiety     Cholecystitis     Hyperlipidemia     Hypertension     Osteoarthritis     back worst    Osteoarthritis     knees, spine    PONV (postoperative nausea and vomiting)     from using inhaled anethesia     Seasonal allergies     Vitamin D deficiency      Past Surgical History:   Procedure Laterality Date     SECTION      x2     SECTION   &     CHOLECYSTECTOMY, LAPAROSCOPIC N/A 2017    For biliary colic and stones    COLONOSCOPY  14    [email or web address]    HYSTERECTOMY      fibroids total    HYSTERECTOMY      TONSILLECTOMY      TONSILLECTOMY  age 20     Social History     Socioeconomic History    Marital status:      Spouse name: Not on file    Number of children: 2    Years of education: Not on file    Highest education level: Not on file   Occupational History    Not on file   Social Needs    Financial resource strain: Not on file    Food insecurity:     Worry: Not on file     Inability: Not on file    Transportation needs:     Medical: Not on file     Non-medical: Not on file   Tobacco Use    Smoking status: Never Smoker    Smokeless tobacco: Never Used   Substance and Sexual Activity    Alcohol use: No    Drug use: No    Sexual activity: Yes     Partners: Male   Lifestyle    tablet Take 20 mg by mouth daily      spironolactone (ALDACTONE) 25 MG tablet Take 25 mg by mouth daily      clobetasol (TEMOVATE) 0.05 % cream Apply topically 2 times daily. 60 g 2    potassium chloride (KLOR-CON M) 20 MEQ extended release tablet Take 1 tablet by mouth 2 times daily 60 tablet 3    loratadine (CLARITIN) 10 MG tablet Take 10 mg by mouth daily       amLODIPine (NORVASC) 10 MG tablet Take 10 mg by mouth daily       atorvastatin (LIPITOR) 40 MG tablet Take 40 mg by mouth daily       Triamcinolone Acetonide (NASACORT ALLERGY 24HR NA) by Nasal route 3 times daily as needed. No current facility-administered medications for this visit. ROS:  Review of Systems   Constitutional: Negative for chills and fever. HENT: Negative for rhinorrhea and sore throat. Respiratory: Negative for cough, shortness of breath and wheezing. Gastrointestinal: Negative for abdominal pain, constipation and diarrhea. Endocrine: Negative for polydipsia and polyuria. Genitourinary: Negative for dysuria, frequency and urgency. Neurological: Negative for syncope, light-headedness, numbness and headaches. Psychiatric/Behavioral: Negative for sleep disturbance. The patient is not nervous/anxious. Vitals:    05/06/19 0823   BP: 130/72   Site: Right Lower Arm   Position: Sitting   Cuff Size: Large Adult   Pulse: 65   Resp: 16   Temp: 96.9 °F (36.1 °C)   TempSrc: Temporal   SpO2: 95%   Weight: 267 lb 12.8 oz (121.5 kg)   Height: 5' 5\" (1.651 m)       Physical exam:  Physical Exam   Constitutional: She is oriented to person, place, and time. She appears well-developed and well-nourished. No distress. HENT:   Head: Normocephalic and atraumatic. Mouth/Throat: No oropharyngeal exudate. Eyes: EOM are normal.   Neck: Normal range of motion. Cardiovascular: Normal rate, regular rhythm and normal heart sounds. No murmur heard.   Pulmonary/Chest: Effort normal and breath sounds normal. No respiratory distress. She has no wheezes. Abdominal: Soft. She exhibits no distension. There is no tenderness. There is no rebound and no guarding. Musculoskeletal: She exhibits no edema. Neurological: She is alert and oriented to person, place, and time. Skin: Skin is warm and dry. Psychiatric: She has a normal mood and affect. Her behavior is normal.   Vitals reviewed. Assessment/Plan:  62 y.o. female here mainly for annual exam:  - doing well; discussed changes in the diet including adding more veggies and less chips/snacking; starting wellbutrin     Diagnosis Orders   1. Annual physical exam     2. Class 3 severe obesity due to excess calories with serious comorbidity and body mass index (BMI) of 40.0 to 44.9 in adult (HCC)  buPROPion (WELLBUTRIN XL) 300 MG extended release tablet   3. Atrial fibrillation, persistent ([address]     4. Prediabetes  buPROPion (WELLBUTRIN XL) 300 MG extended release tablet   5.  Essential hypertension  triamterene-hydrochlorothiazide (MAXZIDE-25) 37.5-25 MG per tablet        Return in about 1 year (around 5/6/2020) for annual exam.    Landy Escobedo MD

## 2019-05-15 ENCOUNTER — OFFICE VISIT (OUTPATIENT)
Dept: PULMONOLOGY | Age: 59
End: 2019-05-15
Payer: COMMERCIAL

## 2019-05-15 VITALS
OXYGEN SATURATION: 98 % | TEMPERATURE: 97 F | HEART RATE: 62 BPM | BODY MASS INDEX: 45.05 KG/M2 | HEIGHT: 65 IN | DIASTOLIC BLOOD PRESSURE: 66 MMHG | SYSTOLIC BLOOD PRESSURE: 116 MMHG | WEIGHT: 270.4 LBS

## 2019-05-15 DIAGNOSIS — R51.9 INTRACTABLE HEADACHE, UNSPECIFIED CHRONICITY PATTERN, UNSPECIFIED HEADACHE TYPE: ICD-10-CM

## 2019-05-15 DIAGNOSIS — R09.81 NASAL CONGESTION: ICD-10-CM

## 2019-05-15 DIAGNOSIS — I27.20 PULMONARY HTN (HCC): ICD-10-CM

## 2019-05-15 DIAGNOSIS — G47.33 OSA (OBSTRUCTIVE SLEEP APNEA): Primary | ICD-10-CM

## 2019-05-15 PROCEDURE — 99214 OFFICE O/P EST MOD 30 MIN: CPT | Performed by: INTERNAL MEDICINE

## 2019-05-15 RX ORDER — AZELASTINE 1 MG/ML
1 SPRAY, METERED NASAL 2 TIMES DAILY
Qty: 2 BOTTLE | Refills: 1 | Status: SHIPPED | OUTPATIENT
Start: 2019-05-15 | End: 2021-05-24

## 2019-05-15 NOTE — PROGRESS NOTES
Subjective:     Nickie Locke is a 62 y.o. female whocomplains today of:     Chief Complaint   Patient presents with    Follow-up     ALE       HPI  Patient presents for ALE     Uses CPAP daily has no problems, it improves her energy and day time activity sleeps well. Has headache, BP is dropping occasionally, headache back of the neck and temples, no sinus pressure   No SOB  No Chest pain   And no cough   No lower ext swelling   No tingling or numbness         Allergies:  Sulfa antibiotics; Lisinopril; Lisinopril; Sulfa antibiotics;  Codeine; and Percocet [oxycodone-acetaminophen]     Past Medical History:   Diagnosis Date    Allergic rhinitis     Anxiety     Cholecystitis     Hyperlipidemia     Hypertension     Osteoarthritis     back worst    Osteoarthritis     knees, spine    PONV (postoperative nausea and vomiting)     from using inhaled anethesia     Seasonal allergies     Vitamin D deficiency      Past Surgical History:   Procedure Laterality Date     SECTION      x2     SECTION   &     CHOLECYSTECTOMY, LAPAROSCOPIC N/A 2017    For biliary colic and stones    COLONOSCOPY  14    [email or web address]    HYSTERECTOMY      fibroids total    HYSTERECTOMY      TONSILLECTOMY      TONSILLECTOMY  age 20     Family History   Problem Relation Age of Onset    Heart Disease Father 36        heart attack    Heart Attack Father     Heart Disease Sister 40        heart attack age 40    Cancer Brother 62        lung/age 62    Coronary Art Dis Sister     Cancer Maternal Grandfather         lung cancer    Cancer Paternal Grandfather         colon cancer    No Known Problems Daughter      Social History     Socioeconomic History    Marital status:      Spouse name: Not on file    Number of children: 2    Years of education: Not on file    Highest education level: Not on file   Occupational History    Not on file   Social Needs    Financial resource strain: Not on file    Food insecurity:     Worry: Not on file     Inability: Not on file    Transportation needs:     Medical: Not on file     Non-medical: Not on file   Tobacco Use    Smoking status: Never Smoker    Smokeless tobacco: Never Used   Substance and Sexual Activity    Alcohol use: No    Drug use: No    Sexual activity: Yes     Partners: Male   Lifestyle    Physical activity:     Days per week: Not on file     Minutes per session: Not on file    Stress: Not on file   Relationships    Social connections:     Talks on phone: Not on file     Gets together: Not on file     Attends Orthodoxy service: Not on file     Active member of club or organization: Not on file     Attends meetings of clubs or organizations: Not on file     Relationship status: Not on file    Intimate partner violence:     Fear of current or ex partner: Not on file     Emotionally abused: Not on file     Physically abused: Not on file     Forced sexual activity: Not on file   Other Topics Concern    Not on file   Social History Narrative    ** Merged History Encounter **         ** Data from: 5/4/16 Enc Dept: [facility]         ** Data from: 7/19/14 Enc Dept: [address]    Lives with . 2 daughters. ADs. Living Will. [address] [facility], , secondary is daughter Romy Sanchez. Review of Systems      ROS: 10 organs review of system is done including general, psychological, ENT, hematological, endocrine, respiratory, cardiovascular, gastrointestinal,musculoskeletal, neurological,  allergy and Immunology is done and is otherwise negative.     Current Outpatient Medications   Medication Sig Dispense Refill    diphenhydrAMINE-APAP, sleep, (TYLENOL PM EXTRA STRENGTH)  MG tablet Take by mouth      olopatadine (PATANOL) 0.1 % ophthalmic solution 1-2 drops      triamterene-hydrochlorothiazide (MAXZIDE-25) 37.5-25 MG per tablet Take 1 tablet by mouth      buPROPion (WELLBUTRIN XL) 300 MG extended release tablet Take 1 tablet by mouth every morning 30 tablet 3    PARoxetine (PAXIL) 20 MG tablet Take 1 tablet by mouth every morning 90 tablet 3    doxycycline hyclate (VIBRA-TABS) 100 MG tablet       fluticasone (FLONASE) 50 MCG/ACT nasal spray 1 spray by Nasal route daily 1 Bottle     doxazosin (CARDURA) 2 MG tablet Take 2 mg by mouth nightly       furosemide (LASIX) 20 MG tablet Take 20 mg by mouth daily      spironolactone (ALDACTONE) 25 MG tablet Take 25 mg by mouth daily      clobetasol (TEMOVATE) 0.05 % cream Apply topically 2 times daily. 60 g 2    potassium chloride (KLOR-CON M) 20 MEQ extended release tablet Take 1 tablet by mouth 2 times daily 60 tablet 3    loratadine (CLARITIN) 10 MG tablet Take 10 mg by mouth daily       amLODIPine (NORVASC) 10 MG tablet Take 10 mg by mouth daily       atorvastatin (LIPITOR) 40 MG tablet Take 40 mg by mouth daily       Triamcinolone Acetonide (NASACORT ALLERGY 24HR NA) by Nasal route 3 times daily as needed. No current facility-administered medications for this visit. Objective:     Vitals:    05/15/19 1023   BP: 116/66   Site: Left Lower Arm   Pulse: 62   Temp: 97 °F (36.1 °C)   TempSrc: Tympanic   SpO2: 98%   Weight: 270 lb 6.4 oz (122.7 kg)   Height: 5' 5\" (1.651 m)         Physical Exam   Constitutional: She is oriented to person, place, and time. She appears well-developed and well-nourished. No distress. HENT:   Head: Normocephalic and atraumatic. Nose: Right sinus exhibits frontal sinus tenderness. Left sinus exhibits frontal sinus tenderness. Eyes: Pupils are equal, round, and reactive to light. Conjunctivae are normal.   Neck: Normal range of motion. Neck supple. Cardiovascular: Normal rate and regular rhythm. Exam reveals no gallop and no friction rub. No murmur heard. Pulmonary/Chest: Effort normal and breath sounds normal. No respiratory distress. She has no wheezes. She has no rales.  She exhibits no

## 2019-05-28 ENCOUNTER — HOSPITAL ENCOUNTER (OUTPATIENT)
Dept: GENERAL RADIOLOGY | Age: 59
Discharge: HOME | End: 2019-05-30
Payer: COMMERCIAL

## 2019-05-28 DIAGNOSIS — Z95.0 PRESENCE OF PERMANENT CARDIAC PACEMAKER: ICD-10-CM

## 2019-05-28 PROCEDURE — 71046 X-RAY EXAM CHEST 2 VIEWS: CPT

## 2019-06-04 ENCOUNTER — HOSPITAL ENCOUNTER (OUTPATIENT)
Dept: CARDIOLOGY | Age: 59
Discharge: HOME | End: 2019-06-04
Payer: COMMERCIAL

## 2019-06-04 PROCEDURE — 93280 PM DEVICE PROGR EVAL DUAL: CPT

## 2019-09-06 ENCOUNTER — HOSPITAL ENCOUNTER (OUTPATIENT)
Dept: CARDIOLOGY | Age: 59
Discharge: HOME | End: 2019-09-06
Payer: COMMERCIAL

## 2019-09-06 PROCEDURE — 93296 REM INTERROG EVL PM/IDS: CPT

## 2019-10-10 ENCOUNTER — HOSPITAL ENCOUNTER (OUTPATIENT)
Dept: LAB | Age: 59
Discharge: HOME | End: 2019-10-10
Payer: COMMERCIAL

## 2019-10-10 LAB
ALBUMIN SERPL-MCNC: 4 G/DL (ref 3.5–4.6)
ALP BLD-CCNC: 110 U/L (ref 40–130)
ALT SERPL-CCNC: 19 U/L (ref 0–33)
ANION GAP SERPL CALCULATED.3IONS-SCNC: 12 MEQ/L (ref 9–15)
AST SERPL-CCNC: 19 U/L (ref 0–35)
BILIRUB SERPL-MCNC: 0.4 MG/DL (ref 0.2–0.7)
BILIRUBIN DIRECT: <0.2 MG/DL (ref 0–0.4)
BILIRUBIN, INDIRECT: NORMAL MG/DL (ref 0–0.6)
BUN BLDV-MCNC: 21 MG/DL (ref 8–23)
CALCIUM SERPL-MCNC: 9.3 MG/DL (ref 8.5–9.9)
CHLORIDE BLD-SCNC: 101 MEQ/L (ref 95–107)
CHOLESTEROL, TOTAL: 182 MG/DL (ref 0–199)
CO2: 25 MEQ/L (ref 20–31)
CREAT SERPL-MCNC: 0.82 MG/DL (ref 0.5–0.9)
GFR AFRICAN AMERICAN: >60
GFR NON-AFRICAN AMERICAN: >60
GLUCOSE BLD-MCNC: 120 MG/DL (ref 70–99)
HCT VFR BLD CALC: 41.2 % (ref 37–47)
HDLC SERPL-MCNC: 40 MG/DL (ref 40–59)
HEMOGLOBIN: 13.5 G/DL (ref 12–16)
LDL CHOLESTEROL CALCULATED: 123 MG/DL (ref 0–129)
MCH RBC QN AUTO: 29.6 PG (ref 27–31.3)
MCHC RBC AUTO-ENTMCNC: 32.8 % (ref 33–37)
MCV RBC AUTO: 90.2 FL (ref 82–100)
PDW BLD-RTO: 14.2 % (ref 11.5–14.5)
PLATELET # BLD: 225 K/UL (ref 130–400)
POTASSIUM SERPL-SCNC: 4.2 MEQ/L (ref 3.4–4.9)
RBC # BLD: 4.57 M/UL (ref 4.2–5.4)
SODIUM BLD-SCNC: 138 MEQ/L (ref 135–144)
TOTAL CK: 111 U/L (ref 0–170)
TOTAL PROTEIN: 7.4 G/DL (ref 6.3–8)
TRIGL SERPL-MCNC: 96 MG/DL (ref 0–150)
WBC # BLD: 7.7 K/UL (ref 4.8–10.8)

## 2019-10-10 PROCEDURE — 82550 ASSAY OF CK (CPK): CPT

## 2019-10-10 PROCEDURE — 80048 BASIC METABOLIC PNL TOTAL CA: CPT

## 2019-10-10 PROCEDURE — 80076 HEPATIC FUNCTION PANEL: CPT

## 2019-10-10 PROCEDURE — 85027 COMPLETE CBC AUTOMATED: CPT

## 2019-10-10 PROCEDURE — 36415 COLL VENOUS BLD VENIPUNCTURE: CPT

## 2019-10-10 PROCEDURE — 80061 LIPID PANEL: CPT

## 2019-10-24 DIAGNOSIS — Z12.39 BREAST CANCER SCREENING: Primary | ICD-10-CM

## 2019-12-03 ENCOUNTER — HOSPITAL ENCOUNTER (OUTPATIENT)
Dept: WOMENS IMAGING | Age: 59
Discharge: HOME | End: 2019-12-05
Payer: COMMERCIAL

## 2019-12-03 DIAGNOSIS — Z12.39 BREAST CANCER SCREENING: ICD-10-CM

## 2019-12-03 PROCEDURE — 77067 SCR MAMMO BI INCL CAD: CPT

## 2020-01-14 RX ORDER — CLOBETASOL PROPIONATE 0.5 MG/G
CREAM TOPICAL
Qty: 60 G | Refills: 2 | Status: SHIPPED | OUTPATIENT
Start: 2020-01-14 | End: 2021-05-24

## 2020-01-14 NOTE — TELEPHONE ENCOUNTER
Rx requested:  Requested Prescriptions     Pending Prescriptions Disp Refills    clobetasol (TEMOVATE) 0.05 % cream 60 g 2     Sig: Apply topically 2 times daily. Last Office Visit:   5/6/2019      Last filled:  02/22/2018    Next Visit Date:  No future appointments.     WM Shelby Cha

## 2020-01-15 ENCOUNTER — TELEPHONE (OUTPATIENT)
Dept: FAMILY MEDICINE CLINIC | Age: 60
End: 2020-01-15

## 2020-01-15 PROBLEM — L30.1 DYSHIDROTIC ECZEMA: Status: ACTIVE | Noted: 2020-01-15

## 2020-01-20 RX ORDER — PAROXETINE HYDROCHLORIDE 20 MG/1
20 TABLET, FILM COATED ORAL EVERY MORNING
Qty: 90 TABLET | Refills: 0 | Status: SHIPPED | OUTPATIENT
Start: 2020-01-20 | End: 2020-04-27

## 2020-01-20 NOTE — TELEPHONE ENCOUNTER
Rx requested:  Requested Prescriptions     Pending Prescriptions Disp Refills    PARoxetine (PAXIL) 20 MG tablet 90 tablet 3     Sig: Take 1 tablet by mouth every morning       Last Office Visit:   5/6/2019      Last filled:  1/14/2019    Next Visit Date:  No future appointments.

## 2020-02-21 ENCOUNTER — OFFICE VISIT (OUTPATIENT)
Dept: INTERNAL MEDICINE | Age: 60
End: 2020-02-21
Payer: COMMERCIAL

## 2020-02-21 VITALS
WEIGHT: 272 LBS | SYSTOLIC BLOOD PRESSURE: 116 MMHG | OXYGEN SATURATION: 98 % | BODY MASS INDEX: 45.32 KG/M2 | HEIGHT: 65 IN | DIASTOLIC BLOOD PRESSURE: 78 MMHG | TEMPERATURE: 98.5 F | HEART RATE: 70 BPM

## 2020-02-21 LAB — S PYO AG THROAT QL: NORMAL

## 2020-02-21 PROCEDURE — 87880 STREP A ASSAY W/OPTIC: CPT | Performed by: PHYSICIAN ASSISTANT

## 2020-02-21 PROCEDURE — 99213 OFFICE O/P EST LOW 20 MIN: CPT | Performed by: PHYSICIAN ASSISTANT

## 2020-02-21 RX ORDER — FLUTICASONE PROPIONATE 50 MCG
2 SPRAY, SUSPENSION (ML) NASAL DAILY
Qty: 1 BOTTLE | Refills: 1 | Status: SHIPPED | OUTPATIENT
Start: 2020-02-21

## 2020-02-21 RX ORDER — SOTALOL HYDROCHLORIDE 120 MG/1
120 TABLET ORAL
COMMUNITY
Start: 2020-02-11

## 2020-02-21 RX ORDER — AMOXICILLIN AND CLAVULANATE POTASSIUM 875; 125 MG/1; MG/1
1 TABLET, FILM COATED ORAL 2 TIMES DAILY
Qty: 20 TABLET | Refills: 0 | Status: SHIPPED | OUTPATIENT
Start: 2020-02-21 | End: 2020-05-12

## 2020-02-21 RX ORDER — FLUCONAZOLE 150 MG/1
TABLET ORAL
Qty: 2 TABLET | Refills: 0 | Status: SHIPPED | OUTPATIENT
Start: 2020-02-21 | End: 2020-11-05

## 2020-02-21 ASSESSMENT — ENCOUNTER SYMPTOMS
SORE THROAT: 1
WHEEZING: 0
RHINORRHEA: 1
VOMITING: 0
COUGH: 1

## 2020-02-21 ASSESSMENT — PATIENT HEALTH QUESTIONNAIRE - PHQ9
2. FEELING DOWN, DEPRESSED OR HOPELESS: 0
SUM OF ALL RESPONSES TO PHQ QUESTIONS 1-9: 0
SUM OF ALL RESPONSES TO PHQ QUESTIONS 1-9: 0
1. LITTLE INTEREST OR PLEASURE IN DOING THINGS: 0
SUM OF ALL RESPONSES TO PHQ9 QUESTIONS 1 & 2: 0

## 2020-02-21 NOTE — PROGRESS NOTES
olopatadine (PATANOL) 0.1 % ophthalmic solution 1-2 drops Yes Historical Provider, MD   triamterene-hydrochlorothiazide (MAXZIDE-25) 37.5-25 MG per tablet Take 1 tablet by mouth Yes Historical Provider, MD   buPROPion (WELLBUTRIN XL) 300 MG extended release tablet Take 1 tablet by mouth every morning Yes Eileen Morales MD   doxazosin (CARDURA) 2 MG tablet Take 2 mg by mouth nightly  Yes Historical Provider, MD   furosemide (LASIX) 20 MG tablet Take 20 mg by mouth daily Yes Historical Provider, MD   spironolactone (ALDACTONE) 25 MG tablet Take 25 mg by mouth daily Yes Historical Provider, MD   potassium chloride (KLOR-CON M) 20 MEQ extended release tablet Take 1 tablet by mouth 2 times daily Yes Ai Yates MD   loratadine (CLARITIN) 10 MG tablet Take 10 mg by mouth daily  Yes Historical Provider, MD   amLODIPine (NORVASC) 10 MG tablet Take 10 mg by mouth daily  Yes Historical Provider, MD   atorvastatin (LIPITOR) 40 MG tablet Take 40 mg by mouth daily  Yes Historical Provider, MD   Triamcinolone Acetonide (NASACORT ALLERGY 24HR NA) by Nasal route 3 times daily as needed. Yes Historical Provider, MD   fluocinonide (LIDEX) 0.05 % cream Apply topically 2 times daily. Patient not taking: Reported on 2/21/2020  Eileen Morales MD        Social History     Tobacco Use    Smoking status: Never Smoker    Smokeless tobacco: Never Used   Substance Use Topics    Alcohol use: No        Vitals:    02/21/20 1022   BP: 116/78   Site: Right Upper Arm   Position: Sitting   Cuff Size: Large Adult   Pulse: 70   Temp: 98.5 °F (36.9 °C)   TempSrc: Temporal   SpO2: 98%   Weight: 272 lb (123.4 kg)   Height: 5' 5\" (1.651 m)     Estimated body mass index is 45.26 kg/m² as calculated from the following:    Height as of this encounter: 5' 5\" (1.651 m). Weight as of this encounter: 272 lb (123.4 kg). Physical Exam  Vitals signs reviewed. Constitutional:       Appearance: Normal appearance.    HENT:      Right Ear: A middle ear effusion is present. Left Ear: A middle ear effusion is present. Nose: Congestion present. Right Sinus: Maxillary sinus tenderness present. Left Sinus: Maxillary sinus tenderness present. Mouth/Throat:      Mouth: Mucous membranes are moist.      Pharynx: Oropharynx is clear. Eyes:      Conjunctiva/sclera: Conjunctivae normal.      Pupils: Pupils are equal, round, and reactive to light. Neck:      Musculoskeletal: Normal range of motion and neck supple. Cardiovascular:      Rate and Rhythm: Normal rate and regular rhythm. Pulses: Normal pulses. Heart sounds: Normal heart sounds. Pulmonary:      Effort: Pulmonary effort is normal.      Breath sounds: Normal breath sounds. Neurological:      Mental Status: She is alert. ASSESSMENT/PLAN:  1. Acute recurrent maxillary sinusitis  2. Sore throat  - POCT rapid strep A- neg   - amoxicillin-clavulanate (AUGMENTIN) 875-125 MG per tablet; Take 1 tablet by mouth 2 times daily  Dispense: 20 tablet; Refill: 0  - fluticasone (FLONASE) 50 MCG/ACT nasal spray; 2 sprays by Nasal route daily  Dispense: 1 Bottle; Refill: 1  -  Advised nasal spray and antihistamine until symptoms improve, stream inhalation, rest, and increase fluids  -  Return if symptoms worsen             No follow-ups on file. An electronic signature was used to authenticate this note.     --SHAKA Cain on 2/21/2020 at 12:12 PM

## 2020-02-26 ENCOUNTER — TELEPHONE (OUTPATIENT)
Dept: FAMILY MEDICINE CLINIC | Age: 60
End: 2020-02-26

## 2020-02-26 RX ORDER — SCOLOPAMINE TRANSDERMAL SYSTEM 1 MG/1
1 PATCH, EXTENDED RELEASE TRANSDERMAL
Qty: 2 PATCH | Refills: 0 | Status: SHIPPED | OUTPATIENT
Start: 2020-02-26 | End: 2020-03-03

## 2020-03-13 ENCOUNTER — HOSPITAL ENCOUNTER (OUTPATIENT)
Dept: CARDIOLOGY | Age: 60
Discharge: HOME | End: 2020-03-13
Payer: COMMERCIAL

## 2020-03-13 ENCOUNTER — TELEPHONE (OUTPATIENT)
Dept: FAMILY MEDICINE CLINIC | Age: 60
End: 2020-03-13

## 2020-03-13 PROCEDURE — 93296 REM INTERROG EVL PM/IDS: CPT

## 2020-03-13 NOTE — TELEPHONE ENCOUNTER
Pt called in today stating she has her annual exam on 5/12/2020 and would like to have her lab test done prior to her appointment. She would like to know if you can place the orders now. Please call and let the pt know the orders were mailed out.

## 2020-04-27 ENCOUNTER — TELEPHONE (OUTPATIENT)
Dept: INTERNAL MEDICINE | Age: 60
End: 2020-04-27

## 2020-04-27 NOTE — TELEPHONE ENCOUNTER
Patient states she has a stye in her right eye x 3 days, using warm compress, asking that you call something in. I did advise she might have to come in to be seen or do a virtual visit.      Please advise

## 2020-05-04 ENCOUNTER — HOSPITAL ENCOUNTER (OUTPATIENT)
Dept: LAB | Age: 60
Discharge: HOME | End: 2020-05-04
Payer: COMMERCIAL

## 2020-05-04 LAB
ALBUMIN SERPL-MCNC: 3.7 G/DL (ref 3.5–4.6)
ALP BLD-CCNC: 121 U/L (ref 40–130)
ALT SERPL-CCNC: 19 U/L (ref 0–33)
ANION GAP SERPL CALCULATED.3IONS-SCNC: 13 MEQ/L (ref 9–15)
AST SERPL-CCNC: 19 U/L (ref 0–35)
BILIRUB SERPL-MCNC: 0.3 MG/DL (ref 0.2–0.7)
BUN BLDV-MCNC: 17 MG/DL (ref 8–23)
CALCIUM SERPL-MCNC: 9.4 MG/DL (ref 8.5–9.9)
CHLORIDE BLD-SCNC: 102 MEQ/L (ref 95–107)
CHOLESTEROL, TOTAL: 125 MG/DL (ref 0–199)
CO2: 23 MEQ/L (ref 20–31)
CREAT SERPL-MCNC: 0.74 MG/DL (ref 0.5–0.9)
GFR AFRICAN AMERICAN: >60
GFR NON-AFRICAN AMERICAN: >60
GLOBULIN: 3.5 G/DL (ref 2.3–3.5)
GLUCOSE BLD-MCNC: 109 MG/DL (ref 70–99)
HBA1C MFR BLD: 6.5 % (ref 4.8–5.9)
HDLC SERPL-MCNC: 43 MG/DL (ref 40–59)
LDL CHOLESTEROL CALCULATED: 68 MG/DL (ref 0–129)
POTASSIUM SERPL-SCNC: 3.9 MEQ/L (ref 3.4–4.9)
SODIUM BLD-SCNC: 138 MEQ/L (ref 135–144)
TOTAL PROTEIN: 7.2 G/DL (ref 6.3–8)
TRIGL SERPL-MCNC: 70 MG/DL (ref 0–150)

## 2020-05-04 PROCEDURE — 83036 HEMOGLOBIN GLYCOSYLATED A1C: CPT

## 2020-05-04 PROCEDURE — 80053 COMPREHEN METABOLIC PANEL: CPT

## 2020-05-04 PROCEDURE — 80061 LIPID PANEL: CPT

## 2020-05-04 PROCEDURE — 36415 COLL VENOUS BLD VENIPUNCTURE: CPT

## 2020-05-05 PROBLEM — E11.9 TYPE 2 DIABETES MELLITUS WITHOUT COMPLICATION, WITHOUT LONG-TERM CURRENT USE OF INSULIN (HCC): Status: ACTIVE | Noted: 2018-04-16

## 2020-05-12 ENCOUNTER — OFFICE VISIT (OUTPATIENT)
Dept: FAMILY MEDICINE CLINIC | Age: 60
End: 2020-05-12
Payer: COMMERCIAL

## 2020-05-12 VITALS
TEMPERATURE: 97.2 F | DIASTOLIC BLOOD PRESSURE: 76 MMHG | HEIGHT: 65 IN | BODY MASS INDEX: 45.35 KG/M2 | SYSTOLIC BLOOD PRESSURE: 128 MMHG | WEIGHT: 272.2 LBS | OXYGEN SATURATION: 97 % | HEART RATE: 70 BPM

## 2020-05-12 DIAGNOSIS — E11.9 TYPE 2 DIABETES MELLITUS WITHOUT COMPLICATION, WITHOUT LONG-TERM CURRENT USE OF INSULIN (HCC): ICD-10-CM

## 2020-05-12 LAB
CREATININE URINE: 45.1 MG/DL
MICROALBUMIN UR-MCNC: <1.2 MG/DL
MICROALBUMIN/CREAT UR-RTO: NORMAL MG/G (ref 0–30)

## 2020-05-12 PROCEDURE — 99396 PREV VISIT EST AGE 40-64: CPT | Performed by: FAMILY MEDICINE

## 2020-05-12 ASSESSMENT — ENCOUNTER SYMPTOMS
RHINORRHEA: 0
CONSTIPATION: 0
SORE THROAT: 0
COUGH: 0
SHORTNESS OF BREATH: 0
DIARRHEA: 0
ABDOMINAL PAIN: 0
WHEEZING: 0

## 2020-05-12 NOTE — PROGRESS NOTES
Take by mouth      olopatadine (PATANOL) 0.1 % ophthalmic solution 1-2 drops      triamterene-hydrochlorothiazide (MAXZIDE-25) 37.5-25 MG per tablet Take 1 tablet by mouth      buPROPion (WELLBUTRIN XL) 300 MG extended release tablet Take 1 tablet by mouth every morning 30 tablet 3    doxazosin (CARDURA) 2 MG tablet Take 2 mg by mouth nightly       furosemide (LASIX) 20 MG tablet Take 20 mg by mouth daily      spironolactone (ALDACTONE) 25 MG tablet Take 25 mg by mouth daily      potassium chloride (KLOR-CON M) 20 MEQ extended release tablet Take 1 tablet by mouth 2 times daily 60 tablet 3    loratadine (CLARITIN) 10 MG tablet Take 10 mg by mouth daily       amLODIPine (NORVASC) 10 MG tablet Take 10 mg by mouth daily       atorvastatin (LIPITOR) 40 MG tablet Take 40 mg by mouth daily       Triamcinolone Acetonide (NASACORT ALLERGY 24HR NA) by Nasal route 3 times daily as needed. No current facility-administered medications for this visit. ROS:  Review of Systems   Constitutional: Negative for chills and fever. HENT: Negative for rhinorrhea and sore throat. Respiratory: Negative for cough, shortness of breath and wheezing. Gastrointestinal: Negative for abdominal pain, constipation and diarrhea. Endocrine: Negative for polydipsia and polyuria. Genitourinary: Negative for dysuria, frequency and urgency. Neurological: Negative for syncope, light-headedness, numbness and headaches. Psychiatric/Behavioral: Negative for sleep disturbance. The patient is not nervous/anxious. Vitals:    05/12/20 0932   BP: 128/76   Site: Right Upper Arm   Position: Sitting   Cuff Size: Large Adult   Pulse: 70   Temp: 97.2 °F (36.2 °C)   TempSrc: Oral   SpO2: 97%   Weight: 272 lb 3.2 oz (123.5 kg)   Height: 5' 5\" (1.651 m)       Physical exam:  Physical Exam  Vitals signs reviewed. Constitutional:       General: She is not in acute distress. Appearance: She is well-developed.    HENT:

## 2020-05-13 ENCOUNTER — VIRTUAL VISIT (OUTPATIENT)
Dept: PULMONOLOGY | Age: 60
End: 2020-05-13
Payer: COMMERCIAL

## 2020-05-13 PROCEDURE — 99213 OFFICE O/P EST LOW 20 MIN: CPT | Performed by: INTERNAL MEDICINE

## 2020-05-13 ASSESSMENT — ENCOUNTER SYMPTOMS
EYES NEGATIVE: 1
GASTROINTESTINAL NEGATIVE: 1
ALLERGIC/IMMUNOLOGIC NEGATIVE: 1
RESPIRATORY NEGATIVE: 1

## 2020-05-13 NOTE — PROGRESS NOTES
2020    TELEHEALTH EVALUATION -- Audio/Visual (During WJBPY-60 public health emergency)    Due to Shree 19 outbreak, patient's office visit was converted to a virtual visit. Patient was contacted and agreed to proceed with a virtual visit via [email or web address] me  The risks and benefits of converting to a virtual visit were discussed in light of the current infectious disease epidemic. Patient also understood that insurance coverage and co-pays are up to their individual insurance plans. HPI:    Elie Mendoza (:  1960) has requested an audio/video evaluation for the following concern(s):    Patient presents for follow-up, she is doing okay, no shortness of breath, no chest pain, she has CPAP at home she is 100% compliant, uses daily, significant improvement in symptoms, no daytime tiredness or sleepiness, no chest pain, no coughing, has mild lower extremity edema when she stay up for long time, otherwise none, no heartburn, no nausea no vomiting essentially doing good, she did gain weight unfortunately due to the quarantine. Review of Systems   Constitutional: Negative. HENT: Negative. Eyes: Negative. Respiratory: Negative. Gastrointestinal: Negative. Musculoskeletal: Negative. Allergic/Immunologic: Negative. Neurological: Negative. Hematological: Negative. Psychiatric/Behavioral: Negative. Prior to Visit Medications    Medication Sig Taking? Authorizing Provider   PARoxetine (PAXIL) 20 MG tablet TAKE 1 TABLET EVERY MORNING  Shoshana Hodge MD   sotalol (BETAPACE) 120 MG tablet   Historical Provider, MD   fluconazole (DIFLUCAN) 150 MG tablet Take one tablet PO once. Repeat in 72 hours  if symptoms still persist  SHAKA Cisneros   fluticasone (FLONASE) 50 MCG/ACT nasal spray 2 sprays by Nasal route daily  SHAKA Appiah   fluocinonide (LIDEX) 0.05 % cream Apply topically 2 times daily.   Shoshana Hodge MD   clobetasol (TEMOVATE) 0.05 % cream Apply topically 2 times daily. Slava Huffman MD   azelastine (ASTELIN) 0.1 % nasal spray 1 spray by Nasal route 2 times daily 1 Spray in each nostril  Marsha Leung MD   diphenhydrAMINE-APAP, sleep, (TYLENOL PM EXTRA STRENGTH)  MG tablet Take by mouth  Historical Provider, MD   olopatadine (PATANOL) 0.1 % ophthalmic solution 1-2 drops  Historical Provider, MD   triamterene-hydrochlorothiazide (MAXZIDE-25) 37.5-25 MG per tablet Take 1 tablet by mouth  Historical Provider, MD   buPROPion (WELLBUTRIN XL) 300 MG extended release tablet Take 1 tablet by mouth every morning  Slava Huffman MD   doxazosin (CARDURA) 2 MG tablet Take 2 mg by mouth nightly   Historical Provider, MD   furosemide (LASIX) 20 MG tablet Take 20 mg by mouth daily  Historical Provider, MD   spironolactone (ALDACTONE) 25 MG tablet Take 25 mg by mouth daily  Historical Provider, MD   potassium chloride (KLOR-CON M) 20 MEQ extended release tablet Take 1 tablet by mouth 2 times daily  Caroline Rios MD   loratadine (CLARITIN) 10 MG tablet Take 10 mg by mouth daily   Historical Provider, MD   amLODIPine (NORVASC) 10 MG tablet Take 10 mg by mouth daily   Historical Provider, MD   atorvastatin (LIPITOR) 40 MG tablet Take 40 mg by mouth daily   Historical Provider, MD   Triamcinolone Acetonide (NASACORT ALLERGY 24HR NA) by Nasal route 3 times daily as needed.   Historical Provider, MD       Social History     Tobacco Use    Smoking status: Never Smoker    Smokeless tobacco: Never Used   Substance Use Topics    Alcohol use: No    Drug use: No        Allergies   Allergen Reactions    Sulfa Antibiotics Swelling    Lisinopril     Lisinopril Other (See Comments)     cough    Sulfa Antibiotics Swelling    Codeine Nausea And Vomiting    Percocet [Oxycodone-Acetaminophen] Nausea And Vomiting   ,   Past Medical History:   Diagnosis Date    Allergic rhinitis     Anxiety     Cholecystitis     Hyperlipidemia     Hypertension     Osteoarthritis     back worst    Osteoarthritis     knees, spine    PONV (postoperative nausea and vomiting)     from using inhaled anethesia     Seasonal allergies     Vitamin D deficiency    ,   Past Surgical History:   Procedure Laterality Date     SECTION      x2     SECTION   &     CHOLECYSTECTOMY, LAPAROSCOPIC N/A 2017    For biliary colic and stones    COLONOSCOPY  14    [email or web address]    HYSTERECTOMY  1995    fibroids total    HYSTERECTOMY      TONSILLECTOMY      TONSILLECTOMY  age 20   ,   Social History     Tobacco Use    Smoking status: Never Smoker    Smokeless tobacco: Never Used   Substance Use Topics    Alcohol use: No    Drug use: No   ,   Family History   Problem Relation Age of Onset    Heart Disease Father 36        heart attack    Heart Attack Father     Heart Disease Sister 40        heart attack age 40    Cancer Brother 62        lung/age 62    Coronary Art Dis Sister     Cancer Maternal Grandfather         lung cancer    Cancer Paternal Grandfather         colon cancer    No Known Problems Daughter    ,   Immunization History   Administered Date(s) Administered    Influenza Vaccine, unspecified formulation 2016    Influenza Virus Vaccine 10/01/2017    Influenza, Quadv, IM, PF (6 mo and older Fluzone, Flulaval, Fluarix, and 3 yrs and older Afluria) 10/03/2018, 10/01/2019    Pneumococcal Polysaccharide (Spjipckfe42) 2014    Zoster Live (Zostavax) 2014, 2015   ,   Health Maintenance   Topic Date Due    Diabetic retinal exam  1968    DTaP/Tdap/Td vaccine (1 - Tdap) 1977    Shingles Vaccine (2 of 3) 2015    Cervical cancer screen  2017    A1C test (Diabetic or Prediabetic)  2021    Lipid screen  2021    Potassium monitoring  2021    Creatinine monitoring  2021    Diabetic foot exam  2021    Diabetic microalbuminuria test  2021    Breast cancer screen  2021    Colon cancer

## 2020-05-22 ENCOUNTER — TELEPHONE (OUTPATIENT)
Dept: INTERNAL MEDICINE | Age: 60
End: 2020-05-22

## 2020-06-04 ENCOUNTER — HOSPITAL ENCOUNTER (OUTPATIENT)
Dept: CARDIOLOGY | Age: 60
Discharge: HOME | End: 2020-06-04
Payer: COMMERCIAL

## 2020-06-04 PROCEDURE — 93296 REM INTERROG EVL PM/IDS: CPT

## 2020-07-09 ENCOUNTER — TELEPHONE (OUTPATIENT)
Dept: FAMILY MEDICINE CLINIC | Age: 60
End: 2020-07-09

## 2020-07-09 NOTE — TELEPHONE ENCOUNTER
Since she is a newly diagnosed diabetic, we might need extra time to go over the blood sugars and diabetes. Let's schedule a different day for the skin tags.

## 2020-08-04 RX ORDER — PAROXETINE HYDROCHLORIDE 20 MG/1
TABLET, FILM COATED ORAL
Qty: 90 TABLET | Refills: 3 | Status: SHIPPED | OUTPATIENT
Start: 2020-08-04 | End: 2021-05-24 | Stop reason: SDUPTHER

## 2020-08-04 NOTE — TELEPHONE ENCOUNTER
Rx requested:  Requested Prescriptions     Pending Prescriptions Disp Refills    PARoxetine (PAXIL) 20 MG tablet [Pharmacy Med Name: PAROXETINE HCL TABS 20MG] 90 tablet 3     Sig: TAKE 1 TABLET EVERY MORNING       Last Office Visit:   5/12/2020      Last filled:  4/27/2020    Next Visit Date:  Future Appointments   Date Time Provider Colleen Coleman   8/13/2020  9:30 AM Calin Schneider MD [address] [place] [address]   9/3/2020 10:00 AM [facility] [address] [place] [address]   5/13/2021 10:00 AM Costella Schirmer, MD [facility]

## 2020-09-03 ENCOUNTER — HOSPITAL ENCOUNTER (OUTPATIENT)
Dept: CARDIOLOGY | Age: 60
Discharge: HOME | End: 2020-09-03
Payer: COMMERCIAL

## 2020-09-03 PROCEDURE — 93280 PM DEVICE PROGR EVAL DUAL: CPT

## 2020-11-05 ENCOUNTER — OFFICE VISIT (OUTPATIENT)
Dept: FAMILY MEDICINE CLINIC | Age: 60
End: 2020-11-05
Payer: COMMERCIAL

## 2020-11-05 VITALS
DIASTOLIC BLOOD PRESSURE: 82 MMHG | TEMPERATURE: 96.8 F | SYSTOLIC BLOOD PRESSURE: 126 MMHG | HEART RATE: 64 BPM | BODY MASS INDEX: 44.65 KG/M2 | HEIGHT: 65 IN | WEIGHT: 268 LBS | OXYGEN SATURATION: 96 %

## 2020-11-05 PROCEDURE — 99213 OFFICE O/P EST LOW 20 MIN: CPT | Performed by: FAMILY MEDICINE

## 2020-11-05 RX ORDER — PREDNISONE 20 MG/1
60 TABLET ORAL DAILY
Qty: 15 TABLET | Refills: 0 | Status: SHIPPED | OUTPATIENT
Start: 2020-11-05 | End: 2020-11-10

## 2020-11-05 RX ORDER — ATORVASTATIN CALCIUM 80 MG/1
TABLET, FILM COATED ORAL
COMMUNITY
Start: 2020-09-10

## 2020-11-05 RX ORDER — APIXABAN 5 MG/1
5 TABLET, FILM COATED ORAL 2 TIMES DAILY
COMMUNITY
Start: 2020-10-20

## 2020-11-05 ASSESSMENT — ENCOUNTER SYMPTOMS
ABDOMINAL PAIN: 0
WHEEZING: 0
RHINORRHEA: 0
CONSTIPATION: 0
SORE THROAT: 0
COUGH: 0
DIARRHEA: 0
SHORTNESS OF BREATH: 0

## 2020-11-05 NOTE — PROGRESS NOTES
[facility] [address] [place] [address] PRIMARY CARE  [address] [address]  Dept: [phone]  Dept Fax: [phone]  Loc: [phone]   Chief Complaint  Chief Complaint   Patient presents with    Foot Pain     left foot, stabbing pain onset sudden        HPI:  56 y. o.female who presents for L foot pain:    L foot pain: woke today with new pain over the arch of the foot; hx of bones spurs; sharp pain with bearing wt    Past Medical History:   Diagnosis Date    Allergic rhinitis     Anxiety     Cholecystitis     Hyperlipidemia     Hypertension     Osteoarthritis     back worst    Osteoarthritis     knees, spine    PONV (postoperative nausea and vomiting)     from using inhaled anethesia     Seasonal allergies     Type 2 diabetes mellitus without complication, without long-term current use of insulin ([facility])     Vitamin D deficiency      Past Surgical History:   Procedure Laterality Date     SECTION      x2    [facility], LAPAROSCOPIC N/A 2017    For biliary colic and stones    COLONOSCOPY  12    [email or web address]    HYSTERECTOMY      fibroids total    HYSTERECTOMY      TONSILLECTOMY      TONSILLECTOMY  age 20     Social History     Socioeconomic History    Marital status:      Spouse name: Not on file    Number of children: 2    Years of education: Not on file    Highest education level: Not on file   Occupational History    Not on file   Social Needs    Financial resource strain: Not on file    Food insecurity     Worry: Not on file     Inability: Not on file   [employer] needs     Medical: Not on file     Non-medical: Not on file   Tobacco Use    Smoking status: Never Smoker    Smokeless tobacco: Never Used   Substance and Sexual Activity    Alcohol use: No    Drug use: No    Sexual activity: Yes     Partners: Male   Lifestyle    Physical activity Days per week: Not on file     Minutes per session: Not on file    Stress: Not on file   Relationships    Social connections     Talks on phone: Not on file     Gets together: Not on file     Attends Mosque service: Not on file     Active member of club or organization: Not on file     Attends meetings of clubs or organizations: Not on file     Relationship status: Not on file    Intimate partner violence     Fear of current or ex partner: Not on file     Emotionally abused: Not on file     Physically abused: Not on file     Forced sexual activity: Not on file   Other Topics Concern    Not on file   Social History Narrative    ** Merged History Encounter **         ** Data from: 5/4/16 Enc Dept: [facility]         ** Data from: 7/19/14 Enc Dept: [address]    Lives with . 2 daughters. ADs. Living Will. [address], , secondary is daughter Parvez Kurtz. Allergies   Allergen Reactions    Sulfa Antibiotics Swelling    Lisinopril     Lisinopril Other (See Comments)     cough    Sulfa Antibiotics Swelling    Codeine Nausea And Vomiting    Percocet [Oxycodone-Acetaminophen] Nausea And Vomiting     Current Outpatient Medications   Medication Sig Dispense Refill    ELIQUIS 5 MG TABS tablet       atorvastatin (LIPITOR) 80 MG tablet       metoprolol tartrate (LOPRESSOR) 25 MG tablet       predniSONE (DELTASONE) 20 MG tablet Take 3 tablets by mouth daily for 5 days 15 tablet 0    PARoxetine (PAXIL) 20 MG tablet TAKE 1 TABLET EVERY MORNING 90 tablet 3    Respiratory Therapy Supplies BLAS Dispense cpap supplies and mask 1 Device 0    sotalol (BETAPACE) 120 MG tablet       fluticasone (FLONASE) 50 MCG/ACT nasal spray 2 sprays by Nasal route daily 1 Bottle 1    fluocinonide (LIDEX) 0.05 % cream Apply topically 2 times daily. 60 g 2    clobetasol (TEMOVATE) 0.05 % cream Apply topically 2 times daily.  60 g 2    azelastine (ASTELIN) 0.1 % nasal spray 1 spray by distress. Appearance: She is well-developed. HENT:      Head: Normocephalic and atraumatic. Neck:      Musculoskeletal: Normal range of motion. Cardiovascular:      Rate and Rhythm: Normal rate. Pulmonary:      Effort: Pulmonary effort is normal. No respiratory distress. Musculoskeletal:        Feet:    Skin:     General: Skin is warm and dry. Neurological:      Mental Status: She is alert and oriented to person, place, and time. Psychiatric:         Behavior: Behavior normal.         Assessment/Plan:  56 y.o. female here mainly for L foot plantar fasciitis:  - trial of steroid, icing, massage. If no improvement then may send to podiatry     Diagnosis Orders   1. Plantar fasciitis  predniSONE (DELTASONE) 20 MG tablet   2. Left foot pain          Return if symptoms worsen or fail to improve.     Joan Becker MD

## 2020-12-03 ENCOUNTER — HOSPITAL ENCOUNTER (OUTPATIENT)
Dept: CARDIOLOGY | Age: 60
Discharge: HOME | End: 2020-12-03
Payer: COMMERCIAL

## 2020-12-03 PROCEDURE — 93296 REM INTERROG EVL PM/IDS: CPT

## 2021-01-11 ENCOUNTER — TELEPHONE (OUTPATIENT)
Dept: FAMILY MEDICINE CLINIC | Age: 61
End: 2021-01-11

## 2021-01-11 DIAGNOSIS — Z12.39 ENCOUNTER FOR OTHER SCREENING FOR MALIGNANT NEOPLASM OF BREAST: Primary | ICD-10-CM

## 2021-01-11 NOTE — TELEPHONE ENCOUNTER
Pt scheduled a mammogram for 2-5 at [facility] but needs an order. Please call patient to advise.

## 2021-02-05 ENCOUNTER — HOSPITAL ENCOUNTER (OUTPATIENT)
Dept: WOMENS IMAGING | Age: 61
Discharge: HOME | End: 2021-02-07
Payer: COMMERCIAL

## 2021-02-05 DIAGNOSIS — Z12.39 ENCOUNTER FOR OTHER SCREENING FOR MALIGNANT NEOPLASM OF BREAST: ICD-10-CM

## 2021-02-05 PROCEDURE — 77063 BREAST TOMOSYNTHESIS BI: CPT

## 2021-04-13 ENCOUNTER — TELEPHONE (OUTPATIENT)
Dept: FAMILY MEDICINE CLINIC | Age: 61
End: 2021-04-13

## 2021-04-13 DIAGNOSIS — E11.9 TYPE 2 DIABETES MELLITUS WITHOUT COMPLICATION, WITHOUT LONG-TERM CURRENT USE OF INSULIN (HCC): ICD-10-CM

## 2021-04-13 DIAGNOSIS — Z00.00 ANNUAL PHYSICAL EXAM: Primary | ICD-10-CM

## 2021-04-13 NOTE — TELEPHONE ENCOUNTER
Pt's is aware. Declined to make an appointment for DM due to having a lot of appointments with her . Bruce Goldsmith made aware.

## 2021-04-13 NOTE — TELEPHONE ENCOUNTER
Patient called in today stating she was upset about not being able to have her labs drawn prior to her appointment. States she has been doing it this way for years with you. States she would like her labs placed so she can have them drawn 5/17/2021 so you guys can go over them at the appointment if anything is wrong. Orders pended.

## 2021-04-13 NOTE — TELEPHONE ENCOUNTER
Can order labs ahead of time for her annual like usual. Still needs to schedule separate appt for the diabetes (was due 8/2020).

## 2021-05-17 ENCOUNTER — HOSPITAL ENCOUNTER (OUTPATIENT)
Dept: LAB | Age: 61
Discharge: HOME | End: 2021-05-17
Payer: COMMERCIAL

## 2021-05-17 DIAGNOSIS — Z00.00 ANNUAL PHYSICAL EXAM: ICD-10-CM

## 2021-05-17 LAB
ALBUMIN SERPL-MCNC: 4 G/DL (ref 3.5–4.6)
ALP BLD-CCNC: 124 U/L (ref 40–130)
ALT SERPL-CCNC: 24 U/L (ref 0–33)
ANION GAP SERPL CALCULATED.3IONS-SCNC: 12 MEQ/L (ref 9–15)
AST SERPL-CCNC: 20 U/L (ref 0–35)
BILIRUB SERPL-MCNC: 0.4 MG/DL (ref 0.2–0.7)
BUN BLDV-MCNC: 19 MG/DL (ref 8–23)
CALCIUM SERPL-MCNC: 9.2 MG/DL (ref 8.5–9.9)
CHLORIDE BLD-SCNC: 105 MEQ/L (ref 95–107)
CHOLESTEROL, FASTING: 114 MG/DL (ref 0–199)
CO2: 25 MEQ/L (ref 20–31)
CREAT SERPL-MCNC: 0.85 MG/DL (ref 0.5–0.9)
GFR AFRICAN AMERICAN: >60
GFR NON-AFRICAN AMERICAN: >60
GLOBULIN: 2.7 G/DL (ref 2.3–3.5)
GLUCOSE FASTING: 120 MG/DL (ref 70–99)
HDLC SERPL-MCNC: 42 MG/DL (ref 40–59)
LDL CHOLESTEROL CALCULATED: 60 MG/DL (ref 0–129)
POTASSIUM SERPL-SCNC: 3.9 MEQ/L (ref 3.4–4.9)
SODIUM BLD-SCNC: 142 MEQ/L (ref 135–144)
TOTAL PROTEIN: 6.7 G/DL (ref 6.3–8)
TRIGLYCERIDE, FASTING: 60 MG/DL (ref 0–150)

## 2021-05-17 PROCEDURE — 80061 LIPID PANEL: CPT

## 2021-05-17 PROCEDURE — 36415 COLL VENOUS BLD VENIPUNCTURE: CPT

## 2021-05-17 PROCEDURE — 80053 COMPREHEN METABOLIC PANEL: CPT

## 2021-05-24 ENCOUNTER — HOSPITAL ENCOUNTER (OUTPATIENT)
Age: 61
Setting detail: SPECIMEN
Discharge: HOME | End: 2021-05-24
Payer: COMMERCIAL

## 2021-05-24 ENCOUNTER — OFFICE VISIT (OUTPATIENT)
Dept: FAMILY MEDICINE CLINIC | Age: 61
End: 2021-05-24
Payer: COMMERCIAL

## 2021-05-24 VITALS
SYSTOLIC BLOOD PRESSURE: 132 MMHG | HEIGHT: 65 IN | WEIGHT: 275 LBS | DIASTOLIC BLOOD PRESSURE: 88 MMHG | BODY MASS INDEX: 45.82 KG/M2 | HEART RATE: 66 BPM | OXYGEN SATURATION: 96 % | TEMPERATURE: 96.9 F

## 2021-05-24 DIAGNOSIS — E66.01 CLASS 3 SEVERE OBESITY DUE TO EXCESS CALORIES WITH SERIOUS COMORBIDITY AND BODY MASS INDEX (BMI) OF 40.0 TO 44.9 IN ADULT (HCC): ICD-10-CM

## 2021-05-24 DIAGNOSIS — L30.1 DYSHIDROTIC ECZEMA: ICD-10-CM

## 2021-05-24 DIAGNOSIS — Z00.00 ANNUAL PHYSICAL EXAM: Primary | ICD-10-CM

## 2021-05-24 DIAGNOSIS — E11.9 TYPE 2 DIABETES MELLITUS WITHOUT COMPLICATION, WITHOUT LONG-TERM CURRENT USE OF INSULIN (HCC): ICD-10-CM

## 2021-05-24 DIAGNOSIS — F41.9 ANXIETY: ICD-10-CM

## 2021-05-24 LAB
CREATININE URINE: 110 MG/DL
MICROALBUMIN UR-MCNC: <1.2 MG/DL
MICROALBUMIN/CREAT UR-RTO: NORMAL MG/G (ref 0–30)

## 2021-05-24 PROCEDURE — 82043 UR ALBUMIN QUANTITATIVE: CPT

## 2021-05-24 PROCEDURE — 99396 PREV VISIT EST AGE 40-64: CPT | Performed by: FAMILY MEDICINE

## 2021-05-24 PROCEDURE — 82570 ASSAY OF URINE CREATININE: CPT

## 2021-05-24 RX ORDER — PAROXETINE HYDROCHLORIDE 20 MG/1
TABLET, FILM COATED ORAL
Qty: 90 TABLET | Refills: 3 | Status: SHIPPED | OUTPATIENT
Start: 2021-05-24 | End: 2022-06-14

## 2021-05-24 SDOH — ECONOMIC STABILITY: FOOD INSECURITY: WITHIN THE PAST 12 MONTHS, THE FOOD YOU BOUGHT JUST DIDN'T LAST AND YOU DIDN'T HAVE MONEY TO GET MORE.: NEVER TRUE

## 2021-05-24 SDOH — ECONOMIC STABILITY: FOOD INSECURITY: WITHIN THE PAST 12 MONTHS, YOU WORRIED THAT YOUR FOOD WOULD RUN OUT BEFORE YOU GOT MONEY TO BUY MORE.: NEVER TRUE

## 2021-05-24 ASSESSMENT — ENCOUNTER SYMPTOMS
SORE THROAT: 0
COUGH: 0
DIARRHEA: 0
CONSTIPATION: 0
ABDOMINAL PAIN: 0
WHEEZING: 0
SHORTNESS OF BREATH: 0
RHINORRHEA: 0

## 2021-05-24 ASSESSMENT — PATIENT HEALTH QUESTIONNAIRE - PHQ9
SUM OF ALL RESPONSES TO PHQ9 QUESTIONS 1 & 2: 0
1. LITTLE INTEREST OR PLEASURE IN DOING THINGS: 0

## 2021-05-24 NOTE — PROGRESS NOTES
History    Marital status:      Spouse name: Not on file    Number of children: 2    Years of education: Not on file    Highest education level: Not on file   Occupational History    Not on file   Tobacco Use    Smoking status: Never Smoker    Smokeless tobacco: Never Used   Substance and Sexual Activity    Alcohol use: No    Drug use: No    Sexual activity: Yes     Partners: Male   Other Topics Concern    Not on file   Social History Narrative    ** Merged History Encounter **         ** Data from: 5/4/16 Enc Dept: [facility]         ** Data from: 7/19/14 Enc Dept: [address]    Lives with . 2 daughters. ADs. Living Will. [address], , secondary is daughter Benton Palma. Social Determinants of Health     Financial Resource Strain: Low Risk     Difficulty of Paying Living Expenses: Not hard at all   Food Insecurity: No Food Insecurity    Worried About Running Out of Food in the Last Year: Never true    Marlon of Food in the Last Year: Never true   Transportation Needs:     Lack of Transportation (Medical):      Lack of Transportation (Non-Medical):    Physical Activity:     Days of Exercise per Week:     Minutes of Exercise per Session:    Stress:     Feeling of Stress :    Social Connections:     Frequency of Communication with Friends and Family:     Frequency of Social Gatherings with Friends and Family:     Attends Spiritism Services:     Active Member of Clubs or Organizations:     Attends Club or Organization Meetings:     Marital Status:    Intimate Partner Violence:     Fear of Current or Ex-Partner:     Emotionally Abused:     Physically Abused:     Sexually Abused:      Family History   Problem Relation Age of Onset    Heart Disease Father 36        heart attack    Heart Attack Father     Heart Disease Sister 40        heart attack age 40    Cancer Brother 62        lung/age 62    Coronary Art Dis Sister     Cancer Maternal Grandfather         lung cancer    Cancer Paternal Grandfather         colon cancer    No Known Problems Daughter       Allergies   Allergen Reactions    Sulfa Antibiotics Swelling    Lisinopril     Lisinopril Other (See Comments)     cough    Sulfa Antibiotics Swelling    Codeine Nausea And Vomiting    Percocet [Oxycodone-Acetaminophen] Nausea And Vomiting     Current Outpatient Medications   Medication Sig Dispense Refill    PARoxetine (PAXIL) 20 MG tablet TAKE 1 TABLET EVERY MORNING 90 tablet 3    fluocinonide (LIDEX) 0.05 % cream Apply topically 2 times daily. 60 g 2    ELIQUIS 5 MG TABS tablet 5 mg 2 times daily       atorvastatin (LIPITOR) 80 MG tablet       metoprolol tartrate (LOPRESSOR) 25 MG tablet Take 25 mg by mouth 2 times daily       Respiratory Therapy Supplies BLAS Dispense cpap supplies and mask 1 Device 0    sotalol (BETAPACE) 120 MG tablet Take 120 mg by mouth       fluticasone (FLONASE) 50 MCG/ACT nasal spray 2 sprays by Nasal route daily 1 Bottle 1    olopatadine (PATANOL) 0.1 % ophthalmic solution 1-2 drops      doxazosin (CARDURA) 2 MG tablet Take 2 mg by mouth nightly       furosemide (LASIX) 20 MG tablet Take 20 mg by mouth daily      spironolactone (ALDACTONE) 25 MG tablet Take 25 mg by mouth daily      potassium chloride (KLOR-CON M) 20 MEQ extended release tablet Take 1 tablet by mouth 2 times daily (Patient taking differently: Take 20 mEq by mouth daily ) 60 tablet 3    loratadine (CLARITIN) 10 MG tablet Take 10 mg by mouth daily       Triamcinolone Acetonide (NASACORT ALLERGY 24HR NA) by Nasal route 3 times daily as needed. No current facility-administered medications for this visit.          Vitals:    05/24/21 1047   BP: 132/88   Site: Right Upper Arm   Position: Sitting   Cuff Size: Large Adult   Pulse: 66   Temp: 96.9 °F (36.1 °C)   TempSrc: Infrared   SpO2: 96%   Weight: 275 lb (124.7 kg)   Height: 5' 5\" (1.651 m)       Physical exam:  Physical Exam  Vitals reviewed. Constitutional:       General: She is not in acute distress. Appearance: She is well-developed. HENT:      Head: Normocephalic and atraumatic. Right Ear: Tympanic membrane, ear canal and external ear normal. Tympanic membrane is not erythematous. Tympanic membrane has normal mobility. Left Ear: Tympanic membrane, ear canal and external ear normal. Tympanic membrane is not erythematous. Tympanic membrane has normal mobility. Nose: Nose normal.      Mouth/Throat:      Pharynx: No oropharyngeal exudate. Neck:      Thyroid: No thyromegaly. Cardiovascular:      Rate and Rhythm: Normal rate and regular rhythm. Heart sounds: Normal heart sounds. No murmur heard. Pulmonary:      Effort: Pulmonary effort is normal. No respiratory distress. Breath sounds: Normal breath sounds. No wheezing. Abdominal:      General: Bowel sounds are normal. There is no distension. Palpations: Abdomen is soft. Tenderness: There is no abdominal tenderness. There is no guarding or rebound. Musculoskeletal:      Cervical back: Normal range of motion. Lymphadenopathy:      Cervical: No cervical adenopathy. Skin:     General: Skin is warm and dry. Neurological:      Mental Status: She is alert and oriented to person, place, and time. Psychiatric:         Behavior: Behavior normal.         Assessment/Plan:  59 y.o. female here mainly for annual:  - discussed her labs; encourage wt loss to control the blood sugar. Should f/u for DM2 visit in 3mo     Diagnosis Orders   1. Annual physical exam     2. Anxiety  PARoxetine (PAXIL) 20 MG tablet   3. Dyshidrotic eczema  fluocinonide (LIDEX) 0.05 % cream   4. Class 3 severe obesity due to excess calories with serious comorbidity and body mass index (BMI) of 40.0 to 44.9 in [facility])          Return in about 3 months (around 8/24/2021) for DM2 for A1c.     Jaime Vela MD

## 2021-05-27 NOTE — PROGRESS NOTES
furosemide (LASIX) 20 MG tablet Take 20 mg by mouth daily Yes Historical Provider, MD   spironolactone (ALDACTONE) 25 MG tablet Take 25 mg by mouth daily Yes Historical Provider, MD   potassium chloride (KLOR-CON M) 20 MEQ extended release tablet Take 1 tablet by mouth 2 times daily  Patient taking differently: Take 20 mEq by mouth daily  Yes Luis Alberto Redding MD   loratadine (CLARITIN) 10 MG tablet Take 10 mg by mouth daily  Yes Historical Provider, MD   Triamcinolone Acetonide (NASACORT ALLERGY 24HR NA) by Nasal route 3 times daily as needed.  Yes Historical Provider, MD        Allergies   Allergen Reactions    Sulfa Antibiotics Swelling    Lisinopril     Lisinopril Other (See Comments)     cough    Sulfa Antibiotics Swelling    Codeine Nausea And Vomiting    Percocet [Oxycodone-Acetaminophen] Nausea And Vomiting       Past Medical History:   Diagnosis Date    Allergic rhinitis     Anxiety     Cholecystitis     Hyperlipidemia     Hypertension     Osteoarthritis     back worst    Osteoarthritis     knees, spine    PONV (postoperative nausea and vomiting)     from using inhaled anethesia     Seasonal allergies     Type 2 diabetes mellitus without complication, without long-term current use of insulin (HCC)     Vitamin D deficiency        Past Surgical History:   Procedure Laterality Date     SECTION      x2     SECTION   &     CHOLECYSTECTOMY, LAPAROSCOPIC N/A 2017    For biliary colic and stones    COLONOSCOPY  2014    [email or web address]    HYSTERECTOMY      fibroids total    HYSTERECTOMY      LUÍS AND BSO Bilateral     TONSILLECTOMY      TONSILLECTOMY  age 20       Social History     Socioeconomic History    Marital status:      Spouse name: Not on file    Number of children: 2    Years of education: Not on file    Highest education level: Not on file   Occupational History    Not on file   Tobacco Use    Smoking status: Never Smoker    Smokeless tobacco: Never Used   Substance and Sexual Activity    Alcohol use: No    Drug use: No    Sexual activity: Yes     Partners: Male   Other Topics Concern    Not on file   Social History Narrative    ** Merged History Encounter **         ** Data from: 5/4/16 Enc Dept: [facility]         ** Data from: 7/19/14 Enc Dept: [address]    Lives with . 2 daughters. ADs. Living Will. [address], , secondary is daughter Foster January. Social Determinants of Health     Financial Resource Strain: Low Risk     Difficulty of Paying Living Expenses: Not hard at all   Food Insecurity: No Food Insecurity    Worried About Running Out of Food in the Last Year: Never true    Marlon of Food in the Last Year: Never true   Transportation Needs:     Lack of Transportation (Medical):      Lack of Transportation (Non-Medical):    Physical Activity:     Days of Exercise per Week:     Minutes of Exercise per Session:    Stress:     Feeling of Stress :    Social Connections:     Frequency of Communication with Friends and Family:     Frequency of Social Gatherings with Friends and Family:     Attends Mu-ism Services:     Active Member of Clubs or Organizations:     Attends Club or Organization Meetings:     Marital Status:    Intimate Partner Violence:     Fear of Current or Ex-Partner:     Emotionally Abused:     Physically Abused:     Sexually Abused:         Family History   Problem Relation Age of Onset    Heart Disease Father 36        heart attack    Heart Attack Father     Heart Disease Sister 40        heart attack age 40    Cancer Brother 62        lung/age 62    Coronary Art Dis Sister     Cancer Maternal Grandfather         lung cancer    Cancer Paternal Grandfather         colon cancer    No Known Problems Daughter        Vitals:    05/28/21 1154   Pulse: 76   Temp: 96.6 °F (35.9 °C)   SpO2: 96%   Weight: 275 lb (124.7 kg)   Height: 5' 5\" (1.651 m) Estimated body mass index is 45.76 kg/m² as calculated from the following:    Height as of this encounter: 5' 5\" (1.651 m). Weight as of this encounter: 275 lb (124.7 kg). No results for input(s): WBC, RBC, HGB, HCT, MCV, MCH, MCHC, RDW, PLT, MPV in the last 72 hours. No results for input(s): NA, K, CL, CO2, BUN, CREATININE, GLUCOSE, CALCIUM, PROT, LABALBU, BILITOT, ALKPHOS, AST, ALT in the last 72 hours. Lab Results   Component Value Date    LABA1C 6.5 05/04/2020       No results found. Physical Exam  Constitutional:       Appearance: Normal appearance. She is normal weight. HENT:      Head: Normocephalic and atraumatic. Eyes:      Extraocular Movements: Extraocular movements intact. Conjunctiva/sclera: Conjunctivae normal.   Skin:     Findings: No lesion or rash. Comments: Irritated skin tags neck   Neurological:      Mental Status: She is alert. Psychiatric:         Mood and Affect: Mood normal.         Thought Content: Thought content normal.         Judgment: Judgment normal.         ASSESSMENT/PLAN:  1. Inflamed skin tag  Procedure: The procedure was discussed with the patient. All questions were answered and alternative options discussed. The patient is aware of the risks of bleeding, infection and scarring. Informed consent paperwork was signed by the patient. Liquid nitrogen was applied to the affected areas until freezing was noted then a thaw was allowed between dosing for a total of 2 cycles. Applied to 6 lesion(s). The patient tolerated the procedure well. Post op instructions verbally given. A printed copy provided. ---------------------------------------------------------------------  Side effects, adverse effects of the medication prescribed today, as well as treatment plan/ rationale and result expectations have been discussed with the patient who expresses understanding and desires to proceed.      Close follow up to evaluate treatment results and for coordination of care. I have reviewed the patient's medical history in detail and updated the computerized patient record. As always, patient is advised that if symptoms worsen in any way they will proceed to the nearest emergency room. --------------------------------------------------------------------    Return in about 2 weeks (around 6/11/2021) for Cry[facility] Dr. Marquis Nath. An  electronic signature was used to authenticate this note.     --Radha Lopez, DO on 5/28/2021 at 12:38 PM

## 2021-05-28 ENCOUNTER — OFFICE VISIT (OUTPATIENT)
Dept: FAMILY MEDICINE CLINIC | Age: 61
End: 2021-05-28
Payer: COMMERCIAL

## 2021-05-28 VITALS
HEIGHT: 65 IN | HEART RATE: 76 BPM | TEMPERATURE: 96.6 F | OXYGEN SATURATION: 96 % | WEIGHT: 275 LBS | BODY MASS INDEX: 45.82 KG/M2

## 2021-05-28 DIAGNOSIS — L91.8 INFLAMED SKIN TAG: Primary | ICD-10-CM

## 2021-05-28 PROCEDURE — 99213 OFFICE O/P EST LOW 20 MIN: CPT | Performed by: STUDENT IN AN ORGANIZED HEALTH CARE EDUCATION/TRAINING PROGRAM

## 2021-05-28 PROCEDURE — 11200 RMVL SKIN TAGS UP TO&INC 15: CPT | Performed by: STUDENT IN AN ORGANIZED HEALTH CARE EDUCATION/TRAINING PROGRAM

## 2021-05-28 ASSESSMENT — ENCOUNTER SYMPTOMS
COUGH: 0
SINUS PRESSURE: 0
VOMITING: 0
ABDOMINAL PAIN: 0
SORE THROAT: 0
SHORTNESS OF BREATH: 0

## 2021-05-28 NOTE — PATIENT INSTRUCTIONS
MIGRAINE INFO:  Keep a migraine log, monitoring the frequency, duration and severity of your headaches. Avoid known triggers. Possible triggers may include smoke exposure, snoring, allergens. The first-line treatment for migraines is 600 mg of ibuprofen (Advil liquid gel) with a dose of your preferred caffeine beverage per (coffee, cola, tea). Triptans should be taken at the first sign of a migraine headache. They may be taken with the above-mentioned ibuprofen and caffeine for the first dose of the triptan. If your symptoms improve after you take sumatriptan but return after 2 hours or longer, you may take a second tablet. However, if your symptoms do not improve after you take sumitriptan, do not take a second tablet. If repeat dosing of the triptan, do not repeat the caffeine and ibuprofen then. Caffeine and ibuprofen should be repeated in 4-6 hours. Nausea, vomiting and fatigue are the most common side effects. F/U appt as discussed to review success of this regimen and need for other options, such as daily preventative medication or a sleep study. Migraine Headache: Care Instructions  Your Care Instructions  Migraines are painful, throbbing headaches that often start on one side of the head. They may cause nausea and vomiting and make you sensitive to light, sound, or smell. Without treatment, migraines can last from 4 hours to a few days. Medicines can help prevent migraines or stop them after they have started. Your doctor can help you find which ones work best for you. Follow-up care is a key part of your treatment and safety. Be sure to make and go to all appointments, and call your doctor if you are having problems. It's also a good idea to know your test results and keep a list of the medicines you take. How can you care for yourself at home? · Do not drive if you have taken a prescription pain medicine. · Rest in a quiet, dark room until your headache is gone. Close your eyes, and try to relax or go to sleep. Don't watch TV or read. · Put a cold, moist cloth or cold pack on the painful area for 10 to 20 minutes at a time. Put a thin cloth between the cold pack and your skin. · Use a warm, moist towel or a heating pad set on low to relax tight shoulder and neck muscles. · Have someone gently massage your neck and shoulders. · Take your medicines exactly as prescribed. Call your doctor if you think you are having a problem with your medicine. You will get more details on the specific medicines your doctor prescribes. · Don't take medicine for headache pain too often. Talk to your doctor if you are taking medicine more than 2 days a week to stop a headache. Taking too much pain medicine can lead to more headaches. These are called medicine-overuse headaches. To prevent migraines  · Keep a headache diary so you can figure out what triggers your headaches. Avoiding triggers may help you prevent headaches. Record when each headache began, how long it lasted, and what the pain was like. Write down any other symptoms you had with the headache, such as nausea, flashing lights or dark spots, or sensitivity to bright light or loud noise. Note if the headache occurred near your period. List anything that might have triggered the headache. Triggers may include certain foods (chocolate, cheese, wine) or odors, smoke, bright light, stress, or lack of sleep. · If your doctor has prescribed medicine for your migraines, take it as directed. You may have medicine that you take only when you get a migraine and medicine that you take all the time to help prevent migraines. ? If your doctor has prescribed medicine for when you get a headache, take it at the first sign of a migraine, unless your doctor has given you other instructions. ? If your doctor has prescribed medicine to prevent migraines, take it exactly as prescribed.  Call your doctor if you think you are having a problem with your medicine. · Find healthy ways to deal with stress. Migraines are most common during or right after stressful times. Try finding ways to reduce stress like practicing mindfulness or deep breathing exercises. · Get plenty of sleep and exercise. But be careful to not push yourself too hard during exercise. It may trigger a headache. · Eat meals on a regular schedule. Avoid foods and drinks that often trigger migraines. These include chocolate, alcohol (especially red wine and port), aspartame, monosodium glutamate (MSG), and some additives found in foods (such as hot dogs, morfin, cold cuts, aged cheeses, and pickled foods). · Limit caffeine. Don't drink too much coffee, tea, or soda. But don't quit caffeine suddenly. That can also give you migraines. · Do not smoke or allow others to smoke around you. If you need help quitting, talk to your doctor about stop-smoking programs and medicines. These can increase your chances of quitting for good. · If you are taking birth control pills or hormone therapy, talk to your doctor about whether they are triggering your migraines. When should you call for help? [id number] anytime you think you may need emergency care. For example, call if:  · You have signs of a stroke. These may include:  ? Sudden numbness, paralysis, or weakness in your face, arm, or leg, especially on only one side of your body. ? Sudden vision changes. ? Sudden trouble speaking. ? Sudden confusion or trouble understanding simple statements. ? Sudden problems with walking or balance. ? A sudden, severe headache that is different from past headaches. Call your doctor now or seek immediate medical care if:  · You have new or worse nausea and vomiting. · You have a new or higher fever. · Your headache gets much worse. Watch closely for changes in your health, and be sure to contact your doctor if:  · You are not getting better after 2 days (48 hours). Where can you learn more?   Go to [email or web address] and sign in to your [employer] account. Enter K612 in the [facility] box to learn more about \"Migraine Headache: Care Instructions. \"     If you do not have an account, please click on the \"Sign Up Now\" link. Current as of: November 20, 2019               Content Version: 12.5  © 5847-5532 [employer]. Care instructions adapted under license by [facility] [email or web address] [facility] ([facility]). If you have questions about a medical condition or this instruction, always ask your healthcare professional. [address] any warranty or liability for your use of this information. sumatriptan (oral/nasal)  Pronunciation:  mary ma TRIP tan  Brand:  Imitrex, Imitrex Nasal, Raven Lee    What is the most important information I should know about sumatriptan? You should not use this medicine if you have uncontrolled high blood pressure, heart problems, certain heart rhythm disorders, a history of heart attack or stroke, or circulation problems that cause a lack of blood supply within the body. Do not use this medicine if you have used an MAO inhibitor in the past 14 days, such as isocarboxazid, linezolid, methylene blue injection, phenelzine, rasagiline, selegiline, or tranylcypromine. Do not use sumatriptan within 24 hours before or after using any other migraine headache medicine. What is sumatriptan? Sumatriptan is a headache medicine that narrows blood vessels around the brain. Sumatriptan also reduces substances in the body that can trigger headache pain, nausea, sensitivity to light and sound, and other migraine symptoms. Sumatriptan is used to treat migraine headaches in adults. Sumatriptan will only treat a headache. This medicine will not prevent headaches or reduce the number of attacks. Sumatriptan should not be used to treat a common tension headache or a headache that causes loss of movement on one side of your body.   Use this medicine only if your condition has been confirmed by a doctor as migraine headaches. Sumatriptan may also be used for purposes not listed in this medication guide. What should I discuss with my healthcare provider before using sumatriptan? You should not use sumatriptan if you are allergic to it, or if you have ever had:  · heart problems, or a stroke (including \"mini-stroke\");  · coronary artery disease, angina (chest pain), blood circulation problems, lack of blood supply to the heart;  · circulation problems affecting your legs, arms, stomach, intestines, or kidneys;  · a heart disorder called Chance-Parkinson-White syndrome;  · uncontrolled high blood pressure;  · severe liver disease; or  · a headache that seems different from your usual migraine headaches. ·   Do not use sumatriptan if you have used an MAO inhibitor in the past 14 days. A dangerous drug interaction could occur. MAO inhibitors include isocarboxazid, linezolid, methylene blue injection, phenelzine, rasagiline, selegiline, tranylcypromine, and others. Be sure your doctor knows if you also take stimulant medicine, opioid medicine, herbal products, or medicine for depression, mental illness, Parkinson's disease, serious infections, or prevention of nausea and vomiting. These medicines may interact with sumatriptan and cause a serious condition called serotonin syndrome. Tell your doctor if you have ever had:  · liver or kidney disease;  · epilepsy or other seizure disorder;  · high blood pressure, a heart rhythm disorder; or  · risk factors for coronary artery disease (such as diabetes, menopause, smoking, being overweight, having high blood pressure or high cholesterol, having a family history of coronary artery disease, or being older than 36 and a man). It is not known whether this medicine will harm an unborn baby. Tell your doctor if you are pregnant or plan to become pregnant.     You should not breastfeed within 12 hours after using problems, blue-colored lips or fingernails, vision problems, or a seizure (convulsions). What should I avoid while using sumatriptan? Do not use sumatriptan within 24 hours before or after using another migraine headache medicine, including:  · sumatriptan injection, almotriptan, eletriptan, frovatriptan, naratriptan, rizatriptan, zolmitriptan; or  · ergot medicine such as dihydroergotamine, ergotamine, ergonovine, or methylergonovine. Avoid driving or hazardous activity until you know how this medicine will affect you. Your reactions could be impaired. What are the possible side effects of sumatriptan? Get emergency medical help if you have signs of an allergic reaction: hives; difficulty breathing; swelling of your face, lips, tongue, or throat. Stop using sumatriptan and call your doctor at once if you have:  · sudden and severe stomach pain and bloody diarrhea;  · severe chest pain, shortness of breath, irregular heartbeats;  · a seizure (convulsions);  · severe headache, blurred vision, pounding in your neck or ears;  · blood circulation problems in your legs or feet --cramps, tight or heavy feeling, numbness or tingling, muscle weakness, burning pain, cold feeling, color changes (pale or blue), hip pain;  · heart attack symptoms --chest pain or pressure, pain spreading to your jaw or shoulder, nausea, sweating;  · high levels of serotonin in the body --agitation, hallucinations, fever, sweating, shivering, fast heart rate, muscle stiffness, twitching, loss of coordination, vomiting, diarrhea; or  · signs of a stroke --sudden numbness or weakness (especially on one side of the body), sudden severe headache, slurred speech, problems with vision or balance.   Common side effects may include:  · pain or tight feeling in your chest, throat, or jaw;  · pressure or heavy feeling in any part of your body;  · numbness or tingling, feeling hot or cold;  · dizziness, drowsiness, weakness;  · unusual or unpleasant taste in your mouth after using the nasal medicine;  · pain, burning, numbness, or tingling in your nose or throat after using the nasal medicine; or  · runny or stuffy nose after using the nasal medicine. This is not a complete list of side effects and others may occur. Call your doctor for medical advice about side effects. You may report side effects to FDA at 2-763-EYM-8657. What other drugs will affect sumatriptan? Tell your doctor about all your other medicines, especially any type of antidepressant. Other drugs may affect sumatriptan, including prescription and over-the-counter medicines, vitamins, and herbal products. Tell your doctor about all your current medicines and any medicine you start or stop using. Where can I get more information? Your pharmacist can provide more information about sumatriptan. Remember, keep this and all other medicines out of the reach of children, never share your medicines with others, and use this medication only for the indication prescribed. Every effort has been made to ensure that the information provided by [address] is accurate, up-to-date, and complete, but no guarantee is made to that effect. Drug information contained herein may be time sensitive. [employer] information has been compiled for use by healthcare practitioners and consumers in the [place] and therefore [employer] does not warrant that uses outside of the [place] are appropriate, unless specifically indicated otherwise. [facility] drug information does not endorse drugs, diagnose patients or recommend therapy. [facility] drug information is an informational resource designed to assist licensed healthcare practitioners in caring for their patients and/or to serve consumers viewing this service as a supplement to, and not a substitute for, the expertise, skill, knowledge and judgment of healthcare practitioners.  The absence of a warning for a given drug or drug combination in no way should be construed to indicate that the drug or drug combination is safe, effective or appropriate for any given patient. [facility] does not assume any responsibility for any aspect of healthcare administered with the aid of information [facility] provides. The information contained herein is not intended to cover all possible uses, directions, precautions, warnings, drug interactions, allergic reactions, or adverse effects. If you have questions about the drugs you are taking, check with your doctor, nurse or pharmacist.  Copyright 7172-2892 [address]: 15.02. Revision date: 10/4/2019. Care instructions adapted under license by [facility] ([facility]). If you have questions about a medical condition or this instruction, always ask your healthcare professional. [place] [address] any warranty or liability for your use of this information.

## 2021-06-13 NOTE — PROGRESS NOTES
2021    Gabriela Woods (:  1960) is a 59 y.o. female, here for evaluation of the following medical concerns:  Chief Complaint   Patient presents with    2 Week Follow-Up    Skin Problem     Spot that was frozen off last time has fallen off. No new concerns     HPI  Skin tags  Around the neckline  Due to location they do rub on her collar and get caught    Skin tags x6 treated  with LN    All skin tags fell off after treatment  No other concerns at this time    Review of Systems   Constitutional: Negative for chills and fever. HENT: Negative for congestion, sinus pressure and sore throat. Respiratory: Negative for cough and shortness of breath. Cardiovascular: Negative for chest pain and palpitations. Gastrointestinal: Negative for abdominal pain and vomiting. Musculoskeletal: Negative for arthralgias and myalgias. Skin: Negative for rash and wound. Neurological: Negative for speech difficulty and light-headedness. Psychiatric/Behavioral: Negative for suicidal ideas. The patient is not nervous/anxious. Prior to Visit Medications    Medication Sig Taking? Authorizing Provider   PARoxetine (PAXIL) 20 MG tablet TAKE 1 TABLET EVERY MORNING Yes Manuel Freire MD   fluocinonide (LIDEX) 0.05 % cream Apply topically 2 times daily.  Yes Manuel Freire MD   ELIQUIS 5 MG TABS tablet 5 mg 2 times daily  Yes Historical Provider, MD   atorvastatin (LIPITOR) 80 MG tablet  Yes Historical Provider, MD   metoprolol tartrate (LOPRESSOR) 25 MG tablet Take 25 mg by mouth 2 times daily  Yes Historical Provider, MD   Respiratory Therapy Supplies BLAS Dispense cpap supplies and mask Yes Ganesh Puente MD   sotalol (BETAPACE) 120 MG tablet Take 120 mg by mouth  Yes Historical Provider, MD   fluticasone (FLONASE) 50 MCG/ACT nasal spray 2 sprays by Nasal route daily Yes SHAKA Botello   olopatadine (PATANOL) 0.1 % ophthalmic solution 1-2 drops Yes Historical Provider, MD   doxazosin History    Not on file   Tobacco Use    Smoking status: Never Smoker    Smokeless tobacco: Never Used   Substance and Sexual Activity    Alcohol use: No    Drug use: No    Sexual activity: Yes     Partners: Male   Other Topics Concern    Not on file   Social History Narrative    ** Merged History Encounter **         ** Data from: 5/4/16 Enc Dept: [facility]         ** Data from: 7/19/14 Enc Dept: [facility] [place]    Lives with . 2 daughters. ADs. Living Will. [facility], , secondary is daughter Rad Bucio. Social Determinants of Health     Financial Resource Strain: Low Risk     Difficulty of Paying Living Expenses: Not hard at all   Food Insecurity: No Food Insecurity    Worried About Running Out of Food in the Last Year: Never true    Marlon of Food in the Last Year: Never true   Transportation Needs:     Lack of Transportation (Medical):      Lack of Transportation (Non-Medical):    Physical Activity:     Days of Exercise per Week:     Minutes of Exercise per Session:    Stress:     Feeling of Stress :    Social Connections:     Frequency of Communication with Friends and Family:     Frequency of Social Gatherings with Friends and Family:     Attends Mormonism Services:     Active Member of Clubs or Organizations:     Attends Club or Organization Meetings:     Marital Status:    Intimate Partner Violence:     Fear of Current or Ex-Partner:     Emotionally Abused:     Physically Abused:     Sexually Abused:         Family History   Problem Relation Age of Onset    Heart Disease Father 36        heart attack    Heart Attack Father     Heart Disease Sister 40        heart attack age 40    Cancer Brother 62        lung/age 62    Coronary Art Dis Sister     Cancer Maternal Grandfather         lung cancer    Cancer Paternal Grandfather         colon cancer    No Known Problems Daughter        Vitals:    06/14/21 1139   Pulse: 87   Temp: 96.5 °F (35.8 °C)   SpO2: 95%       Estimated body mass index is 45.76 kg/m² as calculated from the following:    Height as of 5/28/21: 5' 5\" (1.651 m). Weight as of 5/28/21: 275 lb (124.7 kg). No results for input(s): WBC, RBC, HGB, HCT, MCV, MCH, MCHC, RDW, PLT, MPV in the last 72 hours. No results for input(s): NA, K, CL, CO2, BUN, CREATININE, GLUCOSE, CALCIUM, PROT, LABALBU, BILITOT, ALKPHOS, AST, ALT in the last 72 hours. Lab Results   Component Value Date    LABA1C 6.5 05/04/2020       No results found. Physical Exam  Constitutional:       Appearance: Normal appearance. She is normal weight. HENT:      Head: Normocephalic and atraumatic. Eyes:      Extraocular Movements: Extraocular movements intact. Conjunctiva/sclera: Conjunctivae normal.   Skin:     Findings: No lesion or rash. Neurological:      Mental Status: She is alert. Psychiatric:         Mood and Affect: Mood normal.         Thought Content: Thought content normal.         Judgment: Judgment normal.         ASSESSMENT/PLAN:  1. Inflamed skin tag  - All have fallen off since treatment with liquid nitrogen at last appointment        ---------------------------------------------------------------------  Side effects, adverse effects of the medication prescribed today, as well as treatment plan/ rationale and result expectations have been discussed with the patient who expresses understanding and desires to proceed. Close follow up to evaluate treatment results and for coordination of care. I have reviewed the patient's medical history in detail and updated the computerized patient record. As always, patient is advised that if symptoms worsen in any way they will proceed to the nearest emergency room. --------------------------------------------------------------------    No follow-ups on file. An  electronic signature was used to authenticate this note.     --Lauren Garcia, DO on 6/14/2021 at 11:41 AM

## 2021-06-14 ENCOUNTER — OFFICE VISIT (OUTPATIENT)
Dept: FAMILY MEDICINE CLINIC | Age: 61
End: 2021-06-14
Payer: COMMERCIAL

## 2021-06-14 VITALS — TEMPERATURE: 96.5 F | OXYGEN SATURATION: 95 % | HEART RATE: 87 BPM

## 2021-06-14 DIAGNOSIS — L91.8 INFLAMED SKIN TAG: Primary | ICD-10-CM

## 2021-06-14 PROCEDURE — 99213 OFFICE O/P EST LOW 20 MIN: CPT | Performed by: STUDENT IN AN ORGANIZED HEALTH CARE EDUCATION/TRAINING PROGRAM

## 2021-06-14 ASSESSMENT — ENCOUNTER SYMPTOMS
SORE THROAT: 0
SINUS PRESSURE: 0
VOMITING: 0
ABDOMINAL PAIN: 0
SHORTNESS OF BREATH: 0
COUGH: 0

## 2021-07-19 ENCOUNTER — OFFICE VISIT (OUTPATIENT)
Dept: PULMONOLOGY | Age: 61
End: 2021-07-19
Payer: COMMERCIAL

## 2021-07-19 VITALS
BODY MASS INDEX: 46.65 KG/M2 | SYSTOLIC BLOOD PRESSURE: 118 MMHG | WEIGHT: 280 LBS | HEIGHT: 65 IN | TEMPERATURE: 97.6 F | HEART RATE: 65 BPM | DIASTOLIC BLOOD PRESSURE: 76 MMHG | RESPIRATION RATE: 16 BRPM | OXYGEN SATURATION: 96 %

## 2021-07-19 DIAGNOSIS — G47.33 OSA (OBSTRUCTIVE SLEEP APNEA): Primary | ICD-10-CM

## 2021-07-19 DIAGNOSIS — E66.01 CLASS 3 SEVERE OBESITY DUE TO EXCESS CALORIES WITH SERIOUS COMORBIDITY AND BODY MASS INDEX (BMI) OF 40.0 TO 44.9 IN ADULT (HCC): ICD-10-CM

## 2021-07-19 DIAGNOSIS — I27.20 PULMONARY HYPERTENSION (HCC): ICD-10-CM

## 2021-07-19 PROCEDURE — 99213 OFFICE O/P EST LOW 20 MIN: CPT | Performed by: INTERNAL MEDICINE

## 2021-07-19 NOTE — PROGRESS NOTES
Subjective:     Darshan Wray is a 59 y.o. female who complains today of:     Chief Complaint   Patient presents with    Follow-up     1 YR  F/U for ALE on CPAP and Pulmonary HTN       HPI  Patient presents for ALE    She is doing good, compliant with CPAP, good daytime energy and activity, no chest pain, no shortness of breath, no lower extremity edema, no nasal congestion or postnasal drip and no heartburn. No skin rash, joint swelling or pain. Weight is stable.             Allergies:  Sulfa antibiotics, Lisinopril, Lisinopril, Sulfa antibiotics, Codeine, and Percocet [oxycodone-acetaminophen]  Past Medical History:   Diagnosis Date    Allergic rhinitis     Anxiety     Cholecystitis     Hyperlipidemia     Hypertension     Osteoarthritis     back worst    Osteoarthritis     knees, spine    PONV (postoperative nausea and vomiting)     from using inhaled anethesia     Seasonal allergies     Sleep apnea     Type 2 diabetes mellitus without complication, without long-term current use of insulin (HCC)     Vitamin D deficiency      Past Surgical History:   Procedure Laterality Date     SECTION      x2     SECTION   &     CHOLECYSTECTOMY, LAPAROSCOPIC N/A 2017    For biliary colic and stones    COLONOSCOPY  2014    [email or web address]    HYSTERECTOMY      fibroids total    HYSTERECTOMY      LUÍS AND BSO Bilateral     TONSILLECTOMY      TONSILLECTOMY  age 20     Family History   Problem Relation Age of Onset    Heart Disease Father 36        heart attack    Heart Attack Father     Heart Disease Sister 40        heart attack age 40    Cancer Brother 62        lung/age 62    Coronary Art Dis Sister     Cancer Maternal Grandfather         lung cancer    Cancer Paternal Grandfather         colon cancer    No Known Problems Daughter      Social History     Socioeconomic History    Marital status:      Spouse name: Not on file    Number of children: 2    Years of education: Not on file    Highest education level: Not on file   Occupational History    Not on file   Tobacco Use    Smoking status: Never Smoker    Smokeless tobacco: Never Used   Vaping Use    Vaping Use: Never used   Substance and Sexual Activity    Alcohol use: No    Drug use: No    Sexual activity: Yes     Partners: Male   Other Topics Concern    Not on file   Social History Narrative    ** Merged History Encounter **         ** Data from: 5/4/16 Enc Dept: [facility]         ** Data from: 7/19/14 Enc Dept: [address] [place]    Lives with . 2 daughters. ADs. Living Will. 16 [facility], , secondary is daughter Sujata South. Social Determinants of Health     Financial Resource Strain: Low Risk     Difficulty of Paying Living Expenses: Not hard at all   Food Insecurity: No Food Insecurity    Worried About Running Out of Food in the Last Year: Never true    Marlon of Food in the Last Year: Never true   Transportation Needs:     Lack of Transportation (Medical):  Lack of Transportation (Non-Medical):    Physical Activity:     Days of Exercise per Week:     Minutes of Exercise per Session:    Stress:     Feeling of Stress :    Social Connections:     Frequency of Communication with Friends and Family:     Frequency of Social Gatherings with Friends and Family:     Attends Yarsani Services:     Active Member of Clubs or Organizations:     Attends Club or Organization Meetings:     Marital Status:    Intimate Partner Violence:     Fear of Current or Ex-Partner:     Emotionally Abused:     Physically Abused:     Sexually Abused:          Review of Systems      ROS: 10 organs review of system is done including general, psychological, ENT, hematological, endocrine, respiratory, cardiovascular, gastrointestinal,musculoskeletal, neurological,  allergy and Immunology is done and is otherwise negative.     Current Outpatient Medications No friction rub. No gallop. Pulmonary:      Effort: Pulmonary effort is normal. No respiratory distress. Breath sounds: Normal breath sounds. No wheezing or rales. Chest:      Chest wall: No tenderness. Abdominal:      General: There is no distension. Palpations: Abdomen is soft. Tenderness: There is no abdominal tenderness. There is no rebound. Musculoskeletal:         General: No tenderness. Cervical back: Normal range of motion and neck supple. Right lower leg: No edema. Left lower leg: No edema. Lymphadenopathy:      Cervical: No cervical adenopathy. Skin:     General: Skin is warm and dry. Findings: No erythema. Neurological:      Mental Status: She is alert and oriented to person, place, and time. Psychiatric:         Judgment: Judgment normal.         Imaging studies reviewed by me chest x-ray May 2019 no infiltrate  Lab results reviewed in chart     ECHO: EF 60%, RVSP 32  Sleep study two thousand eighteen shows AHI 15.2, on auto CPAP 5-20  Assessment and Plan       Diagnosis Orders   1. ALE (obstructive sleep apnea)     2. Class 3 severe obesity due to excess calories with serious comorbidity and body mass index (BMI) of 40.0 to 44.9 in adult ([address]     3. Pulmonary hypertension ([address]       · Patient is compliant with CPAP treatment, compliance report reviewed, she used the device 30 out of 30 days, 11 hours on average, no significant leak, and no significant residual AHI. Continue same, patient congratulated,  · Weight loss is recommended  · Pulmonary hypertension, likely group 3 and treatment as above. No orders of the defined types were placed in this encounter. No orders of the defined types were placed in this encounter. Discussed with patient the importance of exercise and weight control and  overall health and well-being. Reviewed with the patient: current clinical status, medications, activities and diet.       Side effects, adverse effects of the medication prescribed today, as well as treatment plan and result expectations have been discussed with the patient who expresses understanding and desires to proceed. Return in about 1 year (around 7/19/2022).       Adrian Jean MD

## 2021-08-09 ENCOUNTER — VIRTUAL VISIT (OUTPATIENT)
Dept: FAMILY MEDICINE CLINIC | Age: 61
End: 2021-08-09
Payer: COMMERCIAL

## 2021-08-09 DIAGNOSIS — J06.9 ACUTE URI: Primary | ICD-10-CM

## 2021-08-09 PROCEDURE — 99213 OFFICE O/P EST LOW 20 MIN: CPT | Performed by: FAMILY MEDICINE

## 2021-08-09 RX ORDER — GUAIFENESIN AND DEXTROMETHORPHAN HYDROBROMIDE 600; 30 MG/1; MG/1
1 TABLET, EXTENDED RELEASE ORAL 2 TIMES DAILY PRN
Qty: 28 TABLET | Refills: 0 | Status: SHIPPED | OUTPATIENT
Start: 2021-08-09 | End: 2022-01-18 | Stop reason: SDUPTHER

## 2021-08-09 ASSESSMENT — ENCOUNTER SYMPTOMS
SORE THROAT: 0
WHEEZING: 0
ABDOMINAL PAIN: 0
RHINORRHEA: 0
DIARRHEA: 0
CONSTIPATION: 0
COUGH: 1
SHORTNESS OF BREATH: 0

## 2021-08-09 NOTE — PROGRESS NOTES
[facility]  Virtual Visit  [address] [facility] [address] [facility]  [phone]  Dept: [phone]  Dept Fax: : [phone]     Due to COVID 23 outbreak, patient's office visit was converted to a virtual visit. Patient was contacted and agreed to proceed with a virtual visit via [email or web address] me  The risks and benefits of converting to a virtual visit were discussed in light of the current infectious disease epidemic. Patient also understood that insurance coverage and co-pays are up to their individual insurance plans. Chief Complaint  Chief Complaint   Patient presents with    Cough     congestion, sore throat, has \"lump\" in back of throat, x5 days       HPI:  59 y. o.female who presents for the following:      URI symptoms: x5 days with b/l ear pain, postnasal drip, nasal congestion; feels like a lump in the throat that is stuck; no sick contacts; no n/v or fevers; using nasal saline and zyrtec and tylenol; initially thought she had allergies      Review of Systems   Constitutional: Negative for chills and fever. HENT: Positive for congestion and postnasal drip. Negative for rhinorrhea and sore throat. Respiratory: Positive for cough. Negative for shortness of breath and wheezing. Gastrointestinal: Negative for abdominal pain, constipation and diarrhea. Endocrine: Negative for polydipsia and polyuria. Genitourinary: Negative for dysuria, frequency and urgency. Neurological: Negative for syncope, light-headedness, numbness and headaches. Psychiatric/Behavioral: Negative for sleep disturbance. The patient is not nervous/anxious.         Past Medical History:   Diagnosis Date    Allergic rhinitis     Anxiety     Cholecystitis     Hyperlipidemia     Hypertension     Osteoarthritis     back worst    Osteoarthritis     knees, spine    PONV (postoperative nausea and vomiting)     from using inhaled anethesia  Seasonal allergies     Sleep apnea     Type 2 diabetes mellitus without complication, without long-term current use of insulin (HCC)     Vitamin D deficiency      Past Surgical History:   Procedure Laterality Date     SECTION      x2     SECTION   &     CHOLECYSTECTOMY, LAPAROSCOPIC N/A 2017    For biliary colic and stones    COLONOSCOPY  2014    [email or web address]    HYSTERECTOMY  1995    fibroids total    HYSTERECTOMY      LUÍS AND BSO Bilateral     TONSILLECTOMY      TONSILLECTOMY  age 20     Social History     Socioeconomic History    Marital status:      Spouse name: Not on file    Number of children: 2    Years of education: Not on file    Highest education level: Not on file   Occupational History    Not on file   Tobacco Use    Smoking status: Never Smoker    Smokeless tobacco: Never Used   Vaping Use    Vaping Use: Never used   Substance and Sexual Activity    Alcohol use: No    Drug use: No    Sexual activity: Yes     Partners: Male   Other Topics Concern    Not on file   Social History Narrative    ** Merged History Encounter **         ** Data from: 16 Enc Dept: [facility] [place] [facility]         ** Data from: 14 Enc Dept: [address] [place]    Lives with . 2 daughters. ADs. Living Will. 16 [facility], , secondary is daughter Amada Rojas. Social Determinants of Health     Financial Resource Strain: Low Risk     Difficulty of Paying Living Expenses: Not hard at all   Food Insecurity: No Food Insecurity    Worried About Running Out of Food in the Last Year: Never true    Marlon of Food in the Last Year: Never true   Transportation Needs:     Lack of Transportation (Medical):      Lack of Transportation (Non-Medical):    Physical Activity:     Days of Exercise per Week:     Minutes of Exercise per Session:    Stress:     Feeling of Stress :    Social Connections:     Frequency of Communication with Friends and Family:     Frequency of Social Gatherings with Friends and Family:     Attends Spiritism Services:     Active Member of Clubs or Organizations:     Attends Club or Organization Meetings:     Marital Status:    Intimate Partner Violence:     Fear of Current or Ex-Partner:     Emotionally Abused:     Physically Abused:     Sexually Abused:      Family History   Problem Relation Age of Onset    Heart Disease Father 36        heart attack    Heart Attack Father     Heart Disease Sister 40        heart attack age 40    Cancer Brother 62        lung/age 62    Coronary Art Dis Sister     Cancer Maternal Grandfather         lung cancer    Cancer Paternal Grandfather         colon cancer    No Known Problems Daughter       Allergies   Allergen Reactions    Sulfa Antibiotics Swelling    Lisinopril     Lisinopril Other (See Comments)     cough    Sulfa Antibiotics Swelling    Codeine Nausea And Vomiting    Percocet [Oxycodone-Acetaminophen] Nausea And Vomiting     Current Outpatient Medications   Medication Sig Dispense Refill    Dextromethorphan-guaiFENesin (MUCINEX DM)  MG TB12 Take 1 tablet by mouth 2 times daily as needed (cough, congestion) 28 tablet 0    PARoxetine (PAXIL) 20 MG tablet TAKE 1 TABLET EVERY MORNING 90 tablet 3    fluocinonide (LIDEX) 0.05 % cream Apply topically 2 times daily.  60 g 2    ELIQUIS 5 MG TABS tablet 5 mg 2 times daily       atorvastatin (LIPITOR) 80 MG tablet       metoprolol tartrate (LOPRESSOR) 25 MG tablet Take 25 mg by mouth 2 times daily       Respiratory Therapy Supplies BLAS Dispense cpap supplies and mask 1 Device 0    sotalol (BETAPACE) 120 MG tablet Take 120 mg by mouth       fluticasone (FLONASE) 50 MCG/ACT nasal spray 2 sprays by Nasal route daily 1 Bottle 1    olopatadine (PATANOL) 0.1 % ophthalmic solution 1-2 drops      doxazosin (CARDURA) 2 MG tablet Take 2 mg by mouth nightly       furosemide (LASIX) 20 MG tablet Take 20 mg by mouth daily      spironolactone (ALDACTONE) 25 MG tablet Take 25 mg by mouth daily      potassium chloride (KLOR-CON M) 20 MEQ extended release tablet Take 1 tablet by mouth 2 times daily (Patient taking differently: Take 20 mEq by mouth daily ) 60 tablet 3    loratadine (CLARITIN) 10 MG tablet Take 10 mg by mouth daily       Triamcinolone Acetonide (NASACORT ALLERGY 24HR NA) by Nasal route 3 times daily as needed. No current facility-administered medications for this visit. Physical exam:  Physical Exam  Vitals reviewed. Constitutional:       General: She is not in acute distress. Appearance: She is well-developed. HENT:      Head: Normocephalic and atraumatic. Pulmonary:      Effort: Pulmonary effort is normal. No respiratory distress. Musculoskeletal:      Cervical back: Normal range of motion. Neurological:      Mental Status: She is alert and oriented to person, place, and time. Psychiatric:         Behavior: Behavior normal.         Assessment/Plan:  59 y.o. female here mainly for URI symptoms:  - likely viral URI with mild symptoms; discussed supportive measures; if worsens, then can call in and we can consider other measures     Diagnosis Orders   1. Acute URI  Dextromethorphan-guaiFENesin (MUCINEX DM)  MG TB12        Return if symptoms worsen or fail to improve. Judith Mehta MD       Pursuant to the emergency declaration under the [facility], [facility] waiver authority and the [employer] and [employer] Act, this Virtual  Visit was conducted, with patient's consent, to reduce the patient's risk of exposure to COVID-19 and provide continuity of care for an established patient. Services were provided through a video synchronous discussion virtually to substitute for in-person clinic visit.

## 2021-08-11 ENCOUNTER — HOSPITAL ENCOUNTER (OUTPATIENT)
Age: 61
Setting detail: SPECIMEN
Discharge: HOME | End: 2021-08-11
Payer: COMMERCIAL

## 2021-08-11 ENCOUNTER — TELEPHONE (OUTPATIENT)
Dept: FAMILY MEDICINE CLINIC | Age: 61
End: 2021-08-11
Payer: COMMERCIAL

## 2021-08-11 ENCOUNTER — NURSE ONLY (OUTPATIENT)
Dept: FAMILY MEDICINE CLINIC | Age: 61
End: 2021-08-11

## 2021-08-11 DIAGNOSIS — J02.9 SORE THROAT: Primary | ICD-10-CM

## 2021-08-11 DIAGNOSIS — J06.9 ACUTE URI: ICD-10-CM

## 2021-08-11 DIAGNOSIS — J02.9 SORE THROAT: ICD-10-CM

## 2021-08-11 LAB — S PYO AG THROAT QL: NORMAL

## 2021-08-11 PROCEDURE — 87070 CULTURE OTHR SPECIMN AEROBIC: CPT

## 2021-08-11 PROCEDURE — 87635 SARS-COV-2 COVID-19 AMP PRB: CPT

## 2021-08-11 PROCEDURE — 87880 STREP A ASSAY W/OPTIC: CPT | Performed by: FAMILY MEDICINE

## 2021-08-11 NOTE — TELEPHONE ENCOUNTER
----- Message from Annette Chew sent at 8/11/2021  9:29 AM EDT -----  Subject: Message to Provider    QUESTIONS  Information for Provider? needs to discuss info received on virtual 8/9  ---------------------------------------------------------------------------  --------------  CALL BACK INFO  What is the best way for the office to contact you? OK to leave message on   voicemail  Preferred Call Back Phone Number? [phone]  ---------------------------------------------------------------------------  --------------  SCRIPT ANSWERS  Relationship to Patient?  Self

## 2021-08-13 LAB — SARS-COV-2, PCR: NOT DETECTED

## 2021-08-14 LAB
ORGANISM: ABNORMAL
THROAT CULTURE: ABNORMAL
THROAT CULTURE: ABNORMAL

## 2021-09-01 ENCOUNTER — HOSPITAL ENCOUNTER (OUTPATIENT)
Dept: CARDIOLOGY | Age: 61
Discharge: HOME | End: 2021-09-01
Payer: COMMERCIAL

## 2021-09-01 PROCEDURE — 93280 PM DEVICE PROGR EVAL DUAL: CPT

## 2021-09-07 ENCOUNTER — OFFICE VISIT (OUTPATIENT)
Dept: FAMILY MEDICINE CLINIC | Age: 61
End: 2021-09-07
Payer: COMMERCIAL

## 2021-09-07 VITALS
DIASTOLIC BLOOD PRESSURE: 86 MMHG | OXYGEN SATURATION: 95 % | WEIGHT: 280 LBS | TEMPERATURE: 97.2 F | HEART RATE: 63 BPM | SYSTOLIC BLOOD PRESSURE: 122 MMHG | BODY MASS INDEX: 46.65 KG/M2 | HEIGHT: 65 IN

## 2021-09-07 DIAGNOSIS — Z71.3 NUTRITIONAL COUNSELING: ICD-10-CM

## 2021-09-07 DIAGNOSIS — E11.9 TYPE 2 DIABETES MELLITUS WITHOUT COMPLICATION, WITHOUT LONG-TERM CURRENT USE OF INSULIN (HCC): Primary | ICD-10-CM

## 2021-09-07 LAB — HBA1C MFR BLD: 6.4 %

## 2021-09-07 PROCEDURE — 83036 HEMOGLOBIN GLYCOSYLATED A1C: CPT | Performed by: FAMILY MEDICINE

## 2021-09-07 PROCEDURE — 99213 OFFICE O/P EST LOW 20 MIN: CPT | Performed by: FAMILY MEDICINE

## 2021-09-07 RX ORDER — TOPIRAMATE 50 MG/1
50 TABLET, FILM COATED ORAL DAILY
Qty: 30 TABLET | Refills: 0 | Status: SHIPPED | OUTPATIENT
Start: 2021-09-07 | End: 2022-06-07

## 2021-09-07 ASSESSMENT — ENCOUNTER SYMPTOMS
SHORTNESS OF BREATH: 0
SORE THROAT: 0
ABDOMINAL PAIN: 0
CONSTIPATION: 0
COUGH: 0
WHEEZING: 0
RHINORRHEA: 0
DIARRHEA: 0

## 2021-09-07 NOTE — PROGRESS NOTES
[address] [facility] PRIMARY CARE  [address] [address]  Dept: [phone]  Dept Fax: [phone]: [phone]     Chief Complaint  Chief Complaint   Patient presents with    Diabetes     3 month follow up       HPI:  59 y. o.female who presents for the following:      DM2: a1c 6.4 from 6.5; not on meds; sometimes compliant with diet; No excessive thirst, urination, or blurry vision. Wt gain: 268 to 280 over the past year; drinks water and diet pepsi. Snacks with nutrigrain bars and popcorn; more veggies (broccoli, green beans, cauliflower). Snacks at night time         Review of Systems   Constitutional: Negative for chills and fever. HENT: Negative for congestion, rhinorrhea and sore throat. Respiratory: Negative for cough, shortness of breath and wheezing. Gastrointestinal: Negative for abdominal pain, constipation and diarrhea. Endocrine: Negative for polydipsia and polyuria. Genitourinary: Negative for dysuria, frequency and urgency. Neurological: Negative for syncope, light-headedness, numbness and headaches. Psychiatric/Behavioral: Negative for sleep disturbance. The patient is not nervous/anxious.         Past Medical History:   Diagnosis Date    Allergic rhinitis     Anxiety     Cholecystitis     Hyperlipidemia     Hypertension     Osteoarthritis     back worst    Osteoarthritis     knees, spine    PONV (postoperative nausea and vomiting)     from using inhaled anethesia     Seasonal allergies     Sleep apnea     Type 2 diabetes mellitus without complication, without long-term current use of insulin (HCC)     Vitamin D deficiency      Past Surgical History:   Procedure Laterality Date     SECTION      x2    [facility], LAPAROSCOPIC N/A 2017    For biliary colic and stones    COLONOSCOPY  2014    [email or web address]    HYSTERECTOMY 1995    fibroids total    HYSTERECTOMY  1990    LUÍS AND BSO Bilateral     TONSILLECTOMY      TONSILLECTOMY  age 20     Social History     Socioeconomic History    Marital status:      Spouse name: Not on file    Number of children: 2    Years of education: Not on file    Highest education level: Not on file   Occupational History    Not on file   Tobacco Use    Smoking status: Never Smoker    Smokeless tobacco: Never Used   Vaping Use    Vaping Use: Never used   Substance and Sexual Activity    Alcohol use: No    Drug use: No    Sexual activity: Yes     Partners: Male   Other Topics Concern    Not on file   Social History Narrative    ** Merged History Encounter **         ** Data from: 5/4/16 Enc Dept: [facility]         ** Data from: 7/19/14 Enc Dept: [address]    Lives with . 2 daughters. ADs. Living Will. [address], , secondary is daughter Marcel. Social Determinants of Health     Financial Resource Strain: Low Risk     Difficulty of Paying Living Expenses: Not hard at all   Food Insecurity: No Food Insecurity    Worried About Running Out of Food in the Last Year: Never true    Marlon of Food in the Last Year: Never true   Transportation Needs:     Lack of Transportation (Medical):      Lack of Transportation (Non-Medical):    Physical Activity:     Days of Exercise per Week:     Minutes of Exercise per Session:    Stress:     Feeling of Stress :    Social Connections:     Frequency of Communication with Friends and Family:     Frequency of Social Gatherings with Friends and Family:     Attends Islam Services:     Active Member of Clubs or Organizations:     Attends Club or Organization Meetings:     Marital Status:    Intimate Partner Violence:     Fear of Current or Ex-Partner:     Emotionally Abused:     Physically Abused:     Sexually Abused:      Family History   Problem Relation Age of Onset    Heart Disease Father 36        heart attack    Heart Attack Father     Heart Disease Sister 40        heart attack age 40    Cancer Brother 62        lung/age 62    Coronary Art Dis Sister     Cancer Maternal Grandfather         lung cancer    Cancer Paternal Grandfather         colon cancer    No Known Problems Daughter       Allergies   Allergen Reactions    Sulfa Antibiotics Swelling    Lisinopril     Lisinopril Other (See Comments)     cough    Sulfa Antibiotics Swelling    Codeine Nausea And Vomiting    Percocet [Oxycodone-Acetaminophen] Nausea And Vomiting     Current Outpatient Medications   Medication Sig Dispense Refill    topiramate (TOPAMAX) 50 MG tablet Take 1 tablet by mouth daily 30 tablet 0    Dextromethorphan-guaiFENesin (MUCINEX DM)  MG TB12 Take 1 tablet by mouth 2 times daily as needed (cough, congestion) 28 tablet 0    PARoxetine (PAXIL) 20 MG tablet TAKE 1 TABLET EVERY MORNING 90 tablet 3    fluocinonide (LIDEX) 0.05 % cream Apply topically 2 times daily.  60 g 2    ELIQUIS 5 MG TABS tablet 5 mg 2 times daily       atorvastatin (LIPITOR) 80 MG tablet       metoprolol tartrate (LOPRESSOR) 25 MG tablet Take 25 mg by mouth 2 times daily       Respiratory Therapy Supplies BLAS Dispense cpap supplies and mask 1 Device 0    sotalol (BETAPACE) 120 MG tablet Take 120 mg by mouth       fluticasone (FLONASE) 50 MCG/ACT nasal spray 2 sprays by Nasal route daily 1 Bottle 1    olopatadine (PATANOL) 0.1 % ophthalmic solution 1-2 drops      doxazosin (CARDURA) 2 MG tablet Take 2 mg by mouth nightly       furosemide (LASIX) 20 MG tablet Take 20 mg by mouth daily      spironolactone (ALDACTONE) 25 MG tablet Take 25 mg by mouth daily      potassium chloride (KLOR-CON M) 20 MEQ extended release tablet Take 1 tablet by mouth 2 times daily (Patient taking differently: Take 20 mEq by mouth daily ) 60 tablet 3    loratadine (CLARITIN) 10 MG tablet Take 10 mg by mouth daily       Triamcinolone Acetonide (NASACORT ALLERGY 24HR NA) by Nasal route 3 times daily as needed. No current facility-administered medications for this visit. Vitals:    09/07/21 1010   BP: 122/86   Pulse: 63   Temp: 97.2 °F (36.2 °C)   TempSrc: Infrared   SpO2: 95%   Weight: 280 lb (127 kg)   Height: 5' 5\" (1.651 m)       Physical exam:  Physical Exam  Vitals reviewed. Constitutional:       General: She is not in acute distress. Appearance: She is well-developed. HENT:      Head: Normocephalic and atraumatic. Mouth/Throat:      Pharynx: No oropharyngeal exudate. Neck:      Thyroid: No thyromegaly. Cardiovascular:      Rate and Rhythm: Normal rate and regular rhythm. Heart sounds: Normal heart sounds. No murmur heard. Pulmonary:      Effort: Pulmonary effort is normal. No respiratory distress. Breath sounds: Normal breath sounds. No wheezing. Abdominal:      General: There is no distension. Palpations: Abdomen is soft. Tenderness: There is no abdominal tenderness. There is no guarding or rebound. Musculoskeletal:      Cervical back: Normal range of motion. Lymphadenopathy:      Cervical: No cervical adenopathy. Skin:     General: Skin is warm and dry. Neurological:      Mental Status: She is alert and oriented to person, place, and time. Psychiatric:         Behavior: Behavior normal.         Assessment/Plan:  59 y.o. female here mainly for DM2:  - DM2: controlled; will continue on current regimen   - discussed diet changes; trial of topamax for appetite suppression     Diagnosis Orders   1. Type 2 diabetes mellitus without complication, without long-term current use of insulin ([facility])  POCT glycosylated hemoglobin (Hb A1C)   2. Nutritional counseling  topiramate (TOPAMAX) 50 MG tablet        Return if symptoms worsen or fail to improve.     Ranjan Lopez MD

## 2021-09-10 ENCOUNTER — HOSPITAL ENCOUNTER (OUTPATIENT)
Dept: LAB | Age: 61
Discharge: HOME | End: 2021-09-10
Payer: COMMERCIAL

## 2021-09-10 LAB
ALBUMIN SERPL-MCNC: 4.1 G/DL (ref 3.5–4.6)
ALP BLD-CCNC: 140 U/L (ref 40–130)
ALT SERPL-CCNC: 24 U/L (ref 0–33)
ANION GAP SERPL CALCULATED.3IONS-SCNC: 14 MEQ/L (ref 9–15)
AST SERPL-CCNC: 22 U/L (ref 0–35)
BILIRUB SERPL-MCNC: 0.4 MG/DL (ref 0.2–0.7)
BILIRUBIN DIRECT: <0.2 MG/DL (ref 0–0.4)
BILIRUBIN, INDIRECT: ABNORMAL MG/DL (ref 0–0.6)
BUN BLDV-MCNC: 16 MG/DL (ref 8–23)
CALCIUM SERPL-MCNC: 9.3 MG/DL (ref 8.5–9.9)
CHLORIDE BLD-SCNC: 104 MEQ/L (ref 95–107)
CHOLESTEROL, TOTAL: 117 MG/DL (ref 0–199)
CO2: 23 MEQ/L (ref 20–31)
CREAT SERPL-MCNC: 0.87 MG/DL (ref 0.5–0.9)
GFR AFRICAN AMERICAN: >60
GFR NON-AFRICAN AMERICAN: >60
GLUCOSE BLD-MCNC: 118 MG/DL (ref 70–99)
HDLC SERPL-MCNC: 41 MG/DL (ref 40–59)
LDL CHOLESTEROL CALCULATED: 59 MG/DL (ref 0–129)
POTASSIUM SERPL-SCNC: 4.5 MEQ/L (ref 3.4–4.9)
SODIUM BLD-SCNC: 141 MEQ/L (ref 135–144)
TOTAL PROTEIN: 7.2 G/DL (ref 6.3–8)
TRIGL SERPL-MCNC: 85 MG/DL (ref 0–150)

## 2021-09-10 PROCEDURE — 36415 COLL VENOUS BLD VENIPUNCTURE: CPT

## 2021-09-10 PROCEDURE — 80048 BASIC METABOLIC PNL TOTAL CA: CPT

## 2021-09-10 PROCEDURE — 80076 HEPATIC FUNCTION PANEL: CPT

## 2021-09-10 PROCEDURE — 80061 LIPID PANEL: CPT

## 2021-11-19 ENCOUNTER — TELEPHONE (OUTPATIENT)
Dept: FAMILY MEDICINE CLINIC | Age: 61
End: 2021-11-19

## 2021-12-01 ENCOUNTER — HOSPITAL ENCOUNTER (OUTPATIENT)
Dept: CARDIOLOGY | Age: 61
Discharge: HOME | End: 2021-12-01
Payer: COMMERCIAL

## 2021-12-01 PROCEDURE — 93296 REM INTERROG EVL PM/IDS: CPT

## 2022-01-08 ENCOUNTER — OFFICE VISIT (OUTPATIENT)
Dept: INTERNAL MEDICINE | Age: 62
End: 2022-01-08
Payer: COMMERCIAL

## 2022-01-08 VITALS
WEIGHT: 288.6 LBS | HEART RATE: 67 BPM | SYSTOLIC BLOOD PRESSURE: 124 MMHG | OXYGEN SATURATION: 95 % | BODY MASS INDEX: 48.03 KG/M2 | DIASTOLIC BLOOD PRESSURE: 76 MMHG | TEMPERATURE: 97.5 F

## 2022-01-08 DIAGNOSIS — Z20.822 ENCOUNTER FOR SCREENING LABORATORY TESTING FOR COVID-19 VIRUS: Primary | ICD-10-CM

## 2022-01-08 DIAGNOSIS — R68.89 FLU-LIKE SYMPTOMS: ICD-10-CM

## 2022-01-08 LAB
INFLUENZA A ANTIBODY: NORMAL
INFLUENZA B ANTIBODY: NORMAL
Lab: NORMAL
PERFORMING INSTRUMENT: NORMAL
QC PASS/FAIL: NORMAL
SARS-COV-2, POC: NORMAL

## 2022-01-08 PROCEDURE — 87804 INFLUENZA ASSAY W/OPTIC: CPT | Performed by: NURSE PRACTITIONER

## 2022-01-08 PROCEDURE — 99213 OFFICE O/P EST LOW 20 MIN: CPT | Performed by: NURSE PRACTITIONER

## 2022-01-08 PROCEDURE — 87426 SARSCOV CORONAVIRUS AG IA: CPT | Performed by: NURSE PRACTITIONER

## 2022-01-08 RX ORDER — PANTOPRAZOLE SODIUM 40 MG/1
TABLET, DELAYED RELEASE ORAL
COMMUNITY
Start: 2021-10-28 | End: 2022-10-03

## 2022-01-08 ASSESSMENT — ENCOUNTER SYMPTOMS
VOMITING: 0
NAUSEA: 0
SINUS PRESSURE: 1
SORE THROAT: 1
ABDOMINAL PAIN: 0
RHINORRHEA: 1
WHEEZING: 0
SINUS PAIN: 1
SHORTNESS OF BREATH: 0
COUGH: 1
DIARRHEA: 1

## 2022-01-08 NOTE — PROGRESS NOTES
Subjective:      Patient ID: Bryant Foster is a 59 y.o. female who presents today for:  Chief Complaint   Patient presents with    Concern For COVID-19     x 3 days, fever, cough        URI   This is a new problem. Episode onset: 3 days ago. The problem has been gradually worsening. The maximum temperature recorded prior to her arrival was 102 - 102.9 F. The fever has been present for less than 1 day. Associated symptoms include congestion, coughing, diarrhea, ear pain, headaches, rhinorrhea, sinus pain and a sore throat. Pertinent negatives include no abdominal pain, chest pain, nausea, rash, vomiting or wheezing. Associated symptoms comments: Denies loss of sense of taste or smell, no known exposure to covid. She has tried acetaminophen for the symptoms. The treatment provided significant relief.        Past Medical History:   Diagnosis Date    Allergic rhinitis     Anxiety     Cholecystitis     Hyperlipidemia     Hypertension     Osteoarthritis     back worst    Osteoarthritis     knees, spine    PONV (postoperative nausea and vomiting)     from using inhaled anethesia     Seasonal allergies     Sleep apnea     Type 2 diabetes mellitus without complication, without long-term current use of insulin (HCC)     Vitamin D deficiency      Past Surgical History:   Procedure Laterality Date     SECTION      x2     SECTION   &     CHOLECYSTECTOMY, LAPAROSCOPIC N/A 2017    For biliary colic and stones    COLONOSCOPY  2014    [email or web address]    HYSTERECTOMY      fibroids total    HYSTERECTOMY      LUÍS AND BSO Bilateral     TONSILLECTOMY      TONSILLECTOMY  age 20     Family History   Problem Relation Age of Onset    Heart Disease Father 36        heart attack    Heart Attack Father     Heart Disease Sister 40        heart attack age 37    Cancer Brother 62        lung/age 62    Coronary Art Dis Sister     Cancer Maternal Grandfather         lung cancer    Cancer Paternal Grandfather         colon cancer    No Known Problems Daughter      Allergies   Allergen Reactions    Sulfa Antibiotics Swelling    Lisinopril     Lisinopril Other (See Comments)     cough    Sulfa Antibiotics Swelling    Codeine Nausea And Vomiting    Percocet [Oxycodone-Acetaminophen] Nausea And Vomiting         Review of Systems   Constitutional: Positive for chills, fatigue and fever. HENT: Positive for congestion, ear pain, postnasal drip, rhinorrhea, sinus pressure, sinus pain and sore throat. Respiratory: Positive for cough. Negative for shortness of breath and wheezing. Cardiovascular: Negative for chest pain. Gastrointestinal: Positive for diarrhea. Negative for abdominal pain, nausea and vomiting. Musculoskeletal: Positive for myalgias. Negative for arthralgias. Skin: Negative for rash. Neurological: Positive for headaches. Objective:   /76   Pulse 67   Temp 97.5 °F (36.4 °C) (Infrared)   Wt 288 lb 9.6 oz (130.9 kg)   LMP 11/14/1998   SpO2 95%   BMI 48.03 kg/m²     Physical Exam  Vitals reviewed. Constitutional:       General: She is not in acute distress. Appearance: She is well-developed. She is not ill-appearing. HENT:      Head: Normocephalic. Right Ear: Tympanic membrane, ear canal and external ear normal.      Left Ear: Tympanic membrane, ear canal and external ear normal.      Nose: Nose normal. No mucosal edema, congestion or rhinorrhea. Right Sinus: No maxillary sinus tenderness or frontal sinus tenderness. Left Sinus: No maxillary sinus tenderness or frontal sinus tenderness. Mouth/Throat:      Lips: Pink. Mouth: Mucous membranes are moist.      Pharynx: Oropharynx is clear. No oropharyngeal exudate or posterior oropharyngeal erythema. Cardiovascular:      Rate and Rhythm: Normal rate and regular rhythm. Heart sounds: Normal heart sounds.    Pulmonary:      Effort: Pulmonary effort is normal. No respiratory distress. Breath sounds: Normal breath sounds. No decreased breath sounds or wheezing. Abdominal:      General: Bowel sounds are normal.      Palpations: Abdomen is soft. Tenderness: There is no abdominal tenderness. Musculoskeletal:         General: Normal range of motion. Lymphadenopathy:      Cervical: No cervical adenopathy. Skin:     General: Skin is warm and dry. Neurological:      Mental Status: She is alert and oriented to person, place, and time. Assessment:       Diagnosis Orders   1. Encounter for screening laboratory testing for COVID-19 virus  POCT COVID-19, Antigen    Covid-19 Ambulatory   2. Flu-like symptoms  POCT Influenza A/B         Plan:      Orders Placed This Encounter   Procedures    Covid-19 Ambulatory     Standing Status:   Future     Standing Expiration Date:   1/8/2023     Scheduling Instructions:      Saline media preferred given current shortage of viral transport media but both acceptable     Order Specific Question:   Is this test for diagnosis or screening? Answer:   Diagnosis of ill patient     Order Specific Question:   Symptomatic for COVID-19 as defined by CDC? Answer:   Yes     Order Specific Question:   Date of Symptom Onset     Answer:   1/5/2022     Order Specific Question:   Hospitalized for COVID-19? Answer:   No     Order Specific Question:   Admitted to ICU for COVID-19? Answer:   No     Order Specific Question:   Employed in healthcare setting? Answer:   No     Order Specific Question:   Resident in a congregate (group) care setting? Answer:   No     Order Specific Question:   Pregnant? Answer:   No     Order Specific Question:   Previously tested for COVID-19? Answer: Yes    POCT COVID-19, Antigen     Order Specific Question:   Is this test for diagnosis or screening? Answer:   Diagnosis of ill patient     Order Specific Question:   Symptomatic for COVID-19 as defined by CDC?      Answer: Yes     Order Specific Question:   Date of Symptom Onset     Answer:   1/1/2022     Order Specific Question:   Hospitalized for COVID-19? Answer:   No     Order Specific Question:   Admitted to ICU for COVID-19? Answer:   No     Order Specific Question:   Employed in healthcare setting? Answer:   No     Order Specific Question:   Resident in a congregate (group) care setting? Answer:   No     Order Specific Question:   Pregnant? Answer:   No     Order Specific Question:   Previously tested for COVID-19? Answer: Yes    POCT Influenza A/B     Flu negative. Will follow up with additional test results and recommendations as indicated. Reviewed usual course of illness, preventing the spread to others/isolation, and supportive measures for symptom management. Reviewed symptoms requiring ER. Patient verbalizes understanding. I have reviewed and updated the electronic medical record. Return if symptoms worsen or fail to improve, for follow up with PCP.     RAFY Messer - NP

## 2022-01-10 DIAGNOSIS — Z20.822 ENCOUNTER FOR SCREENING LABORATORY TESTING FOR COVID-19 VIRUS: ICD-10-CM

## 2022-01-11 ENCOUNTER — TELEPHONE (OUTPATIENT)
Dept: INTERNAL MEDICINE | Age: 62
End: 2022-01-11

## 2022-01-11 LAB
SARS-COV-2: NOT DETECTED
SOURCE: NORMAL

## 2022-01-11 NOTE — TELEPHONE ENCOUNTER
Pt called for result of pcr test. Advised it was also neg. She would like to know if something can be called into [facility].

## 2022-01-12 RX ORDER — DOXYCYCLINE HYCLATE 100 MG
100 TABLET ORAL 2 TIMES DAILY
Qty: 14 TABLET | Refills: 0 | Status: SHIPPED | OUTPATIENT
Start: 2022-01-12 | End: 2022-01-19

## 2022-01-14 ENCOUNTER — TELEPHONE (OUTPATIENT)
Dept: INTERNAL MEDICINE | Age: 62
End: 2022-01-14

## 2022-01-14 NOTE — TELEPHONE ENCOUNTER
Please advise the patient that I agree with previous recommendation to follow up with PCP if current treatments have been ineffective.

## 2022-01-14 NOTE — TELEPHONE ENCOUNTER
Pt says she has no taste or smell. She took home covid test(s) they were negative. She stated her ears are plugged yellow mucus. Not feeling much better. Is still taking medication prescribed. I did inform pt to follow up with PCP. She declined. I let her know I'd send a message to you.     Thank you

## 2022-01-18 ENCOUNTER — OFFICE VISIT (OUTPATIENT)
Dept: FAMILY MEDICINE CLINIC | Age: 62
End: 2022-01-18
Payer: COMMERCIAL

## 2022-01-18 VITALS
TEMPERATURE: 97 F | HEART RATE: 62 BPM | HEIGHT: 65 IN | BODY MASS INDEX: 47.32 KG/M2 | WEIGHT: 284 LBS | DIASTOLIC BLOOD PRESSURE: 84 MMHG | SYSTOLIC BLOOD PRESSURE: 128 MMHG | OXYGEN SATURATION: 97 %

## 2022-01-18 DIAGNOSIS — J06.9 ACUTE URI: ICD-10-CM

## 2022-01-18 DIAGNOSIS — R05.9 COUGH: Primary | ICD-10-CM

## 2022-01-18 LAB
Lab: NORMAL
PERFORMING INSTRUMENT: NORMAL
QC PASS/FAIL: NORMAL
SARS-COV-2, POC: NORMAL

## 2022-01-18 PROCEDURE — 99213 OFFICE O/P EST LOW 20 MIN: CPT | Performed by: FAMILY MEDICINE

## 2022-01-18 PROCEDURE — 87426 SARSCOV CORONAVIRUS AG IA: CPT | Performed by: FAMILY MEDICINE

## 2022-01-18 RX ORDER — PSEUDOEPHEDRINE HYDROCHLORIDE 30 MG/1
30 TABLET ORAL EVERY 6 HOURS PRN
Qty: 30 TABLET | Refills: 1 | Status: SHIPPED | OUTPATIENT
Start: 2022-01-18 | End: 2022-06-07

## 2022-01-18 RX ORDER — GUAIFENESIN AND DEXTROMETHORPHAN HYDROBROMIDE 600; 30 MG/1; MG/1
1 TABLET, EXTENDED RELEASE ORAL 2 TIMES DAILY PRN
Qty: 28 TABLET | Refills: 0 | Status: SHIPPED | OUTPATIENT
Start: 2022-01-18 | End: 2022-06-07

## 2022-01-18 RX ORDER — METOPROLOL TARTRATE 50 MG/1
TABLET, FILM COATED ORAL
COMMUNITY
Start: 2022-01-05

## 2022-01-18 ASSESSMENT — ENCOUNTER SYMPTOMS
SHORTNESS OF BREATH: 0
DIARRHEA: 0
RHINORRHEA: 0
CONSTIPATION: 0
ABDOMINAL PAIN: 0
SORE THROAT: 0
WHEEZING: 0
COUGH: 1

## 2022-01-18 NOTE — PROGRESS NOTES
[address] [facility] [address] [facility] PRIMARY CARE  [address] [address]  Dept: [phone]  Dept Fax: : [phone]     Chief Complaint  Chief Complaint   Patient presents with    Cough     x18 days, congestion, chills, fever, loss of taste and smell, ear fullness, yellow mucus       HPI:  59 y. o.female who presents for the following:      Seen in urgent care  for 3 days of URI symptoms; tested neg for flu, rapid COVID, and PCR COVID tests; given doxycycline ; still with nasal congestion, ear pressure; postnasal drainage; still with cough from the extra mucus; no fevers or body aches; tolerating PO; loss of taste/smelll; taking tylenol; taking claritin and nasal saline. Review of Systems   Constitutional: Positive for chills. Negative for fever. HENT: Positive for congestion. Negative for rhinorrhea and sore throat. Respiratory: Positive for cough. Negative for shortness of breath and wheezing. Gastrointestinal: Negative for abdominal pain, constipation and diarrhea. Endocrine: Negative for polydipsia and polyuria. Genitourinary: Negative for dysuria, frequency and urgency. Neurological: Negative for syncope, light-headedness, numbness and headaches. Psychiatric/Behavioral: Negative for sleep disturbance. The patient is not nervous/anxious.         Past Medical History:   Diagnosis Date    Allergic rhinitis     Anxiety     Cholecystitis     Hyperlipidemia     Hypertension     Osteoarthritis     back worst    Osteoarthritis     knees, spine    PONV (postoperative nausea and vomiting)     from using inhaled anethesia     Seasonal allergies     Sleep apnea     Type 2 diabetes mellitus without complication, without long-term current use of insulin ([facility])     Vitamin D deficiency      Past Surgical History:   Procedure Laterality Date     SECTION      x2   [facility]  CHOLECYSTECTOMY, LAPAROSCOPIC N/A 08/01/2017    For biliary colic and stones    COLONOSCOPY  09/22/2014    [email or web address]    HYSTERECTOMY  1995    fibroids total    HYSTERECTOMY  1990    LUÍS AND BSO Bilateral     TONSILLECTOMY      TONSILLECTOMY  age 20     Social History     Socioeconomic History    Marital status:      Spouse name: Not on file    Number of children: 2    Years of education: Not on file    Highest education level: Not on file   Occupational History    Not on file   Tobacco Use    Smoking status: Never Smoker    Smokeless tobacco: Never Used   Vaping Use    Vaping Use: Never used   Substance and Sexual Activity    Alcohol use: No    Drug use: No    Sexual activity: Yes     Partners: Male   Other Topics Concern    Not on file   Social History Narrative    ** Merged History Encounter **         ** Data from: 5/4/16 Enc Dept: [facility]         ** Data from: 7/19/14 Enc Dept: [address]    Lives with . 2 daughters. ADs. Living Will. [address], , secondary is daughter [facility]. Social Determinants of Health     Financial Resource Strain: Low Risk     Difficulty of Paying Living Expenses: Not hard at all   Food Insecurity: No Food Insecurity    Worried About Running Out of Food in the Last Year: Never true    Marlon of Food in the Last Year: Never true   Transportation Needs:     Lack of Transportation (Medical): Not on file    Lack of Transportation (Non-Medical):  Not on file   Physical Activity:     Days of Exercise per Week: Not on file    Minutes of Exercise per Session: Not on file   Stress:     Feeling of Stress : Not on file   Social Connections:     Frequency of Communication with Friends and Family: Not on file    Frequency of Social Gatherings with Friends and Family: Not on file    Attends Uatsdin Services: Not on file    Active Member of Clubs or Organizations: Not on file    Attends Club or Organization Meetings: Not on file    Marital Status: Not on file   Intimate Partner Violence:     Fear of Current or Ex-Partner: Not on file    Emotionally Abused: Not on file    Physically Abused: Not on file    Sexually Abused: Not on file   Housing Stability:     Unable to Pay for Housing in the Last Year: Not on file    Number of [place] in the Last Year: Not on file    Unstable Housing in the Last Year: Not on file     Family History   Problem Relation Age of Onset    Heart Disease Father 36        heart attack    Heart Attack Father     Heart Disease Sister 40        heart attack age 40    Cancer Brother 62        lung/age 62    Coronary Art Dis Sister     Cancer Maternal Grandfather         lung cancer    Cancer Paternal Grandfather         colon cancer    No Known Problems Daughter       Allergies   Allergen Reactions    Sulfa Antibiotics Swelling    Lisinopril     Lisinopril Other (See Comments)     cough    Sulfa Antibiotics Swelling    Codeine Nausea And Vomiting    Percocet [Oxycodone-Acetaminophen] Nausea And Vomiting     Current Outpatient Medications   Medication Sig Dispense Refill    metoprolol tartrate (LOPRESSOR) 50 MG tablet       Dextromethorphan-guaiFENesin (MUCINEX DM)  MG TB12 Take 1 tablet by mouth 2 times daily as needed (cough, congestion) 28 tablet 0    pseudoephedrine (DECONGESTANT) 30 MG tablet Take 1 tablet by mouth every 6 hours as needed for Congestion 30 tablet 1    doxycycline hyclate (VIBRA-TABS) 100 MG tablet Take 1 tablet by mouth 2 times daily for 7 days 14 tablet 0    pantoprazole (PROTONIX) 40 MG tablet Take by mouth      topiramate (TOPAMAX) 50 MG tablet Take 1 tablet by mouth daily 30 tablet 0    PARoxetine (PAXIL) 20 MG tablet TAKE 1 TABLET EVERY MORNING 90 tablet 3    fluocinonide (LIDEX) 0.05 % cream Apply topically 2 times daily.  60 g 2    ELIQUIS 5 MG TABS tablet 5 mg 2 times daily       atorvastatin (LIPITOR) 80 MG tablet  metoprolol tartrate (LOPRESSOR) 25 MG tablet Take 25 mg by mouth 2 times daily       Respiratory Therapy Supplies BLAS Dispense cpap supplies and mask 1 Device 0    sotalol (BETAPACE) 120 MG tablet Take 120 mg by mouth       fluticasone (FLONASE) 50 MCG/ACT nasal spray 2 sprays by Nasal route daily 1 Bottle 1    olopatadine (PATANOL) 0.1 % ophthalmic solution 1-2 drops      doxazosin (CARDURA) 2 MG tablet Take 2 mg by mouth nightly       furosemide (LASIX) 20 MG tablet Take 20 mg by mouth daily      spironolactone (ALDACTONE) 25 MG tablet Take 25 mg by mouth daily      potassium chloride (KLOR-CON M) 20 MEQ extended release tablet Take 1 tablet by mouth 2 times daily (Patient taking differently: Take 20 mEq by mouth daily ) 60 tablet 3    loratadine (CLARITIN) 10 MG tablet Take 10 mg by mouth daily       Triamcinolone Acetonide (NASACORT ALLERGY 24HR NA) by Nasal route 3 times daily as needed. No current facility-administered medications for this visit. Vitals:    01/18/22 1301   BP: 128/84   Pulse: 62   Temp: 97 °F (36.1 °C)   TempSrc: Infrared   SpO2: 97%   Weight: 284 lb (128.8 kg)   Height: 5' 5\" (1.651 m)       Physical exam:  Physical Exam  Vitals reviewed. Constitutional:       General: She is not in acute distress. Appearance: She is well-developed. HENT:      Head: Normocephalic and atraumatic. Right Ear: Tympanic membrane, ear canal and external ear normal. Tympanic membrane is not erythematous. Tympanic membrane has normal mobility. Left Ear: Tympanic membrane, ear canal and external ear normal. Tympanic membrane is not erythematous. Tympanic membrane has normal mobility. Nose: Nose normal.      Mouth/Throat:      Pharynx: No oropharyngeal exudate. Neck:      Thyroid: No thyromegaly. Cardiovascular:      Rate and Rhythm: Normal rate and regular rhythm. Heart sounds: Normal heart sounds. No murmur heard.       Pulmonary:      Effort: Pulmonary effort is normal. No respiratory distress. Breath sounds: Normal breath sounds. No wheezing. Abdominal:      General: Bowel sounds are normal. There is no distension. Palpations: Abdomen is soft. Tenderness: There is no abdominal tenderness. There is no guarding or rebound. Musculoskeletal:      Cervical back: Normal range of motion. Lymphadenopathy:      Cervical: No cervical adenopathy. Skin:     General: Skin is warm and dry. Neurological:      Mental Status: She is alert and oriented to person, place, and time. Psychiatric:         Behavior: Behavior normal.         Assessment/Plan:  59 y.o. female here mainly for URI symptoms:  - benign exam; likely viral URI; discussed supportive measures and giving this more time for resolution     Diagnosis Orders   1. Cough  POCT COVID-19, Antigen   2. Acute URI  Dextromethorphan-guaiFENesin (MUCINEX DM)  MG TB12    pseudoephedrine (DECONGESTANT) 30 MG tablet        Return if symptoms worsen or fail to improve.     Liset Hernandez MD

## 2022-01-21 ENCOUNTER — TELEPHONE (OUTPATIENT)
Dept: FAMILY MEDICINE CLINIC | Age: 62
End: 2022-01-21

## 2022-01-21 DIAGNOSIS — Z12.31 ENCOUNTER FOR SCREENING MAMMOGRAM FOR MALIGNANT NEOPLASM OF BREAST: Primary | ICD-10-CM

## 2022-01-21 NOTE — TELEPHONE ENCOUNTER
LM letting pt know the mammogram was order and the number to call to schedule. Will mail order to pt too.

## 2022-01-21 NOTE — TELEPHONE ENCOUNTER
----- Message from Brian Mercer sent at 1/20/2022  4:42 PM EST -----  Subject: Referral Request    QUESTIONS   Reason for referral request? Referral Mammogram at [facility]   Has the physician seen you for this condition before? No   Preferred Specialist (if applicable)? Do you already have an appointment scheduled? No  Additional Information for Provider?   ---------------------------------------------------------------------------  --------------  CALL BACK INFO  What is the best way for the office to contact you? OK to leave message on   voicemail  Preferred Call Back Phone Number?  [phone]

## 2022-02-07 ENCOUNTER — HOSPITAL ENCOUNTER (OUTPATIENT)
Dept: WOMENS IMAGING | Age: 62
Discharge: HOME | End: 2022-02-09
Payer: COMMERCIAL

## 2022-02-07 DIAGNOSIS — Z12.31 ENCOUNTER FOR SCREENING MAMMOGRAM FOR MALIGNANT NEOPLASM OF BREAST: ICD-10-CM

## 2022-02-07 PROCEDURE — 77063 BREAST TOMOSYNTHESIS BI: CPT

## 2022-03-14 ENCOUNTER — TELEPHONE (OUTPATIENT)
Dept: FAMILY MEDICINE CLINIC | Age: 62
End: 2022-03-14

## 2022-03-14 DIAGNOSIS — L91.8 SKIN TAG: Primary | ICD-10-CM

## 2022-03-14 NOTE — TELEPHONE ENCOUNTER
Patient called in today asking for a referral to see Deanna Verduzco [place] for [place] skin tags on her neck. Referral pended.

## 2022-04-12 ENCOUNTER — HOSPITAL ENCOUNTER (OUTPATIENT)
Dept: CARDIOLOGY | Age: 62
Discharge: HOME | End: 2022-04-12
Payer: COMMERCIAL

## 2022-04-12 PROCEDURE — 93280 PM DEVICE PROGR EVAL DUAL: CPT

## 2022-05-05 ENCOUNTER — OFFICE VISIT (OUTPATIENT)
Dept: INTERNAL MEDICINE | Age: 62
End: 2022-05-05
Payer: COMMERCIAL

## 2022-05-05 VITALS — TEMPERATURE: 97.7 F

## 2022-05-05 DIAGNOSIS — Z11.52 ENCOUNTER FOR SCREENING FOR COVID-19: Primary | ICD-10-CM

## 2022-05-05 DIAGNOSIS — N89.8 VAGINAL ITCHING: ICD-10-CM

## 2022-05-05 DIAGNOSIS — Z87.898 HISTORY OF MOTION SICKNESS: ICD-10-CM

## 2022-05-05 DIAGNOSIS — B37.31 VAGINAL YEAST INFECTION: ICD-10-CM

## 2022-05-05 LAB
Lab: NORMAL
PERFORMING INSTRUMENT: NORMAL
QC PASS/FAIL: NORMAL
SARS-COV-2, POC: NORMAL

## 2022-05-05 PROCEDURE — 99213 OFFICE O/P EST LOW 20 MIN: CPT

## 2022-05-05 PROCEDURE — 87426 SARSCOV CORONAVIRUS AG IA: CPT

## 2022-05-05 RX ORDER — FLUCONAZOLE 150 MG/1
150 TABLET ORAL ONCE
Qty: 1 TABLET | Refills: 0 | Status: SHIPPED | OUTPATIENT
Start: 2022-05-05 | End: 2022-05-05

## 2022-05-05 RX ORDER — SCOLOPAMINE TRANSDERMAL SYSTEM 1 MG/1
1 PATCH, EXTENDED RELEASE TRANSDERMAL
Qty: 10 PATCH | Refills: 0 | Status: SHIPPED | OUTPATIENT
Start: 2022-05-05 | End: 2022-06-20 | Stop reason: ALTCHOICE

## 2022-05-05 ASSESSMENT — ENCOUNTER SYMPTOMS
ABDOMINAL PAIN: 0
NAUSEA: 0
BACK PAIN: 0
DIARRHEA: 0
COUGH: 0
WHEEZING: 0
VOMITING: 0
SHORTNESS OF BREATH: 0

## 2022-05-05 NOTE — PROGRESS NOTES
Subjective  Gwenevere Romana, 57 y.o. female presents today with:  Chief Complaint   Patient presents with    Covid Testing     needed for travel       Vaginal Itching  The patient's primary symptoms include genital itching. The patient's pertinent negatives include no genital lesions, genital odor, genital rash, pelvic pain, vaginal bleeding or vaginal discharge. This is a new problem. The current episode started in the past 7 days (Symptoms began 1-2 weeks ago. Treated with OTC monistat cream. Symptoms resolved and pt is concerned that they are starting up again. Pt is traveling on a cruise next week and is concerned that she might have a yeast infection. ). The problem occurs intermittently. The problem has been waxing and waning. The patient is experiencing no pain. The problem affects both sides. She is not pregnant. Pertinent negatives include no abdominal pain, back pain, chills, diarrhea, discolored urine, dysuria, fever, flank pain, frequency, headaches, hematuria, nausea, painful intercourse, rash, urgency or vomiting. Nothing aggravates the symptoms. Treatments tried: monistat cream OTC. The treatment provided moderate relief. She is sexually active. No, her partner does not have an STD. She is postmenopausal.     Pt presents to clinic for COVID testing for travel. Pt also has history of motion sickness and would like a scopolamine patch. Pt is also concerned that she has a yeast infection starting and would like treatment in case it gets worse while on her cruise. Review of Systems   Constitutional: Negative for chills, diaphoresis, fatigue and fever. Respiratory: Negative for cough, shortness of breath and wheezing. Cardiovascular: Negative for chest pain and palpitations. Gastrointestinal: Negative for abdominal pain, diarrhea, nausea and vomiting. Genitourinary: Negative for dysuria, flank pain, frequency, hematuria, pelvic pain, urgency and vaginal discharge.    Musculoskeletal: Negative for back pain. Skin: Negative for rash. Neurological: Negative for dizziness, light-headedness and headaches. PMH, Surgical Hx, Family Hx, and Social Hx reviewed and updated. Objective  Vitals:    05/05/22 1006   Temp: 97.7 °F (36.5 °C)     BP Readings from Last 3 Encounters:   01/18/22 128/84   01/08/22 124/76   09/07/21 122/86     Wt Readings from Last 3 Encounters:   01/18/22 284 lb (128.8 kg)   01/08/22 288 lb 9.6 oz (130.9 kg)   09/07/21 280 lb (127 kg)     Physical Exam  Vitals reviewed. Exam conducted with a chaperone present (pt declines exam). Constitutional:       General: She is not in acute distress. Appearance: Normal appearance. HENT:      Head: Normocephalic and atraumatic. Eyes:      General: Lids are normal.   Cardiovascular:      Rate and Rhythm: Normal rate and regular rhythm. Pulmonary:      Effort: Pulmonary effort is normal. No respiratory distress. Breath sounds: Normal air entry. Genitourinary:     Comments: Pt reports erythema and pruritis to vaginal area. Musculoskeletal:      Cervical back: Normal range of motion. Skin:     General: Skin is warm and dry. Neurological:      General: No focal deficit present. Mental Status: She is alert and oriented to person, place, and time. Mental status is at baseline. Psychiatric:         Mood and Affect: Mood normal.       Assessment & Plan   1. Encounter for screening for COVID-19  -     POCT COVID-19, Antigen- negative  2. History of motion sickness  -     scopolamine (TRANSDERM-SCOP, 1.5 MG,) transdermal patch; Place 1 patch onto the skin every 72 hours, Disp-10 patch, R-0Normal  3. Vaginal yeast infection  -     fluconazole (DIFLUCAN) 150 MG tablet;  Take 1 tablet by mouth once for 1 dose, Disp-1 tablet, R-0Normal  -Keep area clean and dry  -If possible, wear loose cotton shorts to bed until symptoms subside  -Change out of wet swimsuit as soon as possible  -Probiotics/yogurt daily  -Discussed red flags for when to seek care in ER  4. Vaginal itching     Orders Placed This Encounter   Procedures    POCT COVID-19, Antigen     Order Specific Question:   Is this test for diagnosis or screening? Answer:   Screening     Order Specific Question:   Symptomatic for COVID-19 as defined by CDC? Answer:   No     Order Specific Question:   Date of Symptom Onset     Answer:   N/A     Order Specific Question:   Hospitalized for COVID-19? Answer:   No     Order Specific Question:   Admitted to ICU for COVID-19? Answer:   No     Order Specific Question:   Employed in healthcare setting? Answer:   No     Order Specific Question:   Resident in a congregate (group) care setting? Answer:   No     Order Specific Question:   Pregnant? Answer:   No     Order Specific Question:   Previously tested for COVID-19? Answer:   Yes     Orders Placed This Encounter   Medications    scopolamine (TRANSDERM-SCOP, 1.5 MG,) transdermal patch     Sig: Place 1 patch onto the skin every 72 hours     Dispense:  10 patch     Refill:  0    fluconazole (DIFLUCAN) 150 MG tablet     Sig: Take 1 tablet by mouth once for 1 dose     Dispense:  1 tablet     Refill:  0     Return if symptoms worsen or fail to improve, for follow up with PCP. Reviewed with the patient: current clinical status,medications, activities and diet. Side effects, adverse effects of themedication prescribed today, as well as treatment plan/ rationale and result expectations have been discussed with the patient who expresses understanding and desires to proceed. Close follow up to evaluate treatment results and for coordination of care. I have reviewed the patient's medical history in detail and updated the computerized patient record.     RAFY Abarca NP

## 2022-05-20 ENCOUNTER — TELEPHONE (OUTPATIENT)
Dept: FAMILY MEDICINE CLINIC | Age: 62
End: 2022-05-20

## 2022-05-20 NOTE — TELEPHONE ENCOUNTER
Patient is requesting to have labs drawn prior to her physical appointment on 6/7/22; she would like them to include a Lupus test.

## 2022-05-20 NOTE — TELEPHONE ENCOUNTER
----- Message from Pascual Kelly sent at 5/20/2022 11:04 AM EDT -----  Subject: Message to Provider    QUESTIONS  Information for Provider? Pt is scheduled for an annual physical on 6/7   and pt would like to have skin tags removed during this appt also. It is   listed on appt notes but please contact pt to let her know if this is okay   to have done at that appt. Pt is aware that a 2nd appt may be necessary. ---------------------------------------------------------------------------  --------------  Elias ABARCA  What is the best way for the office to contact you? OK to leave message on   voicemail  Preferred Call Back Phone Number? [phone]  ---------------------------------------------------------------------------  --------------  SCRIPT ANSWERS  Relationship to Patient?  Self

## 2022-06-07 ENCOUNTER — OFFICE VISIT (OUTPATIENT)
Dept: FAMILY MEDICINE CLINIC | Age: 62
End: 2022-06-07
Payer: COMMERCIAL

## 2022-06-07 VITALS
BODY MASS INDEX: 48.15 KG/M2 | SYSTOLIC BLOOD PRESSURE: 134 MMHG | HEART RATE: 74 BPM | DIASTOLIC BLOOD PRESSURE: 88 MMHG | TEMPERATURE: 97 F | HEIGHT: 65 IN | WEIGHT: 289 LBS | OXYGEN SATURATION: 96 %

## 2022-06-07 DIAGNOSIS — Z00.00 ANNUAL PHYSICAL EXAM: Primary | ICD-10-CM

## 2022-06-07 DIAGNOSIS — L91.8 SKIN TAG: ICD-10-CM

## 2022-06-07 DIAGNOSIS — E11.9 TYPE 2 DIABETES MELLITUS WITHOUT COMPLICATION, WITHOUT LONG-TERM CURRENT USE OF INSULIN (HCC): ICD-10-CM

## 2022-06-07 PROCEDURE — 99396 PREV VISIT EST AGE 40-64: CPT | Performed by: FAMILY MEDICINE

## 2022-06-07 PROCEDURE — 11200 RMVL SKIN TAGS UP TO&INC 15: CPT | Performed by: FAMILY MEDICINE

## 2022-06-07 SDOH — ECONOMIC STABILITY: FOOD INSECURITY: WITHIN THE PAST 12 MONTHS, YOU WORRIED THAT YOUR FOOD WOULD RUN OUT BEFORE YOU GOT MONEY TO BUY MORE.: NEVER TRUE

## 2022-06-07 SDOH — ECONOMIC STABILITY: FOOD INSECURITY: WITHIN THE PAST 12 MONTHS, THE FOOD YOU BOUGHT JUST DIDN'T LAST AND YOU DIDN'T HAVE MONEY TO GET MORE.: NEVER TRUE

## 2022-06-07 ASSESSMENT — ENCOUNTER SYMPTOMS
COUGH: 0
CONSTIPATION: 0
ABDOMINAL PAIN: 0
DIARRHEA: 0
SORE THROAT: 0
SHORTNESS OF BREATH: 0
RHINORRHEA: 0
WHEEZING: 0

## 2022-06-07 ASSESSMENT — PATIENT HEALTH QUESTIONNAIRE - PHQ9
SUM OF ALL RESPONSES TO PHQ QUESTIONS 1-9: 0
2. FEELING DOWN, DEPRESSED OR HOPELESS: 0
1. LITTLE INTEREST OR PLEASURE IN DOING THINGS: 0
SUM OF ALL RESPONSES TO PHQ QUESTIONS 1-9: 0
SUM OF ALL RESPONSES TO PHQ QUESTIONS 1-9: 0
SUM OF ALL RESPONSES TO PHQ9 QUESTIONS 1 & 2: 0
SUM OF ALL RESPONSES TO PHQ QUESTIONS 1-9: 0

## 2022-06-07 ASSESSMENT — SOCIAL DETERMINANTS OF HEALTH (SDOH): HOW HARD IS IT FOR YOU TO PAY FOR THE VERY BASICS LIKE FOOD, HOUSING, MEDICAL CARE, AND HEATING?: NOT HARD AT ALL

## 2022-06-07 NOTE — PROGRESS NOTES
[address] PRIMARY CARE  [place] [address] [id number]  Dept: [phone]  Dept Fax: : [phone]     Chief Complaint  Chief Complaint   Patient presents with    Annual Exam    Skin Tag     removal, neck       HPI:  57 y. o.female who presents for the following:      Retired from housekeeping supervising; gaining weight; Sees Dr. Niki Lennox and Dr. Douglas Virgen for cardiology (afib/pacer); Nonsmoker. Hx of ALE and uses CPAP    Skin tags: has multiple around the neck, L abdomen, and upper back that get irritated on clothing and she was hoping to get these removed    Review of Systems   Constitutional: Negative for chills and fever. HENT: Negative for congestion, rhinorrhea and sore throat. Respiratory: Negative for cough, shortness of breath and wheezing. Gastrointestinal: Negative for abdominal pain, constipation and diarrhea. Endocrine: Negative for polydipsia and polyuria. Genitourinary: Negative for dysuria, frequency and urgency. Skin: Positive for rash. Neurological: Negative for syncope, light-headedness, numbness and headaches. Psychiatric/Behavioral: Negative for sleep disturbance. The patient is not nervous/anxious.         Past Medical History:   Diagnosis Date    Allergic rhinitis     Anxiety     Cholecystitis     Hyperlipidemia     Hypertension     Osteoarthritis     back worst    Osteoarthritis     knees, spine    PONV (postoperative nausea and vomiting)     from using inhaled anethesia     Seasonal allergies     Sleep apnea     Type 2 diabetes mellitus without complication, without long-term current use of insulin (HCC)     Vitamin D deficiency      Past Surgical History:   Procedure Laterality Date     SECTION      x2     SECTION   &     CHOLECYSTECTOMY, LAPAROSCOPIC N/A 2017    For biliary colic and stones    COLONOSCOPY  2014    franco Ex-Partner: Not on file    Emotionally Abused: Not on file    Physically Abused: Not on file    Sexually Abused: Not on file   Housing Stability:     Unable to Pay for Housing in the Last Year: Not on file    Number of [place] in the Last Year: Not on file    Unstable Housing in the Last Year: Not on file     Family History   Problem Relation Age of Onset    Heart Disease Father 36        heart attack    Heart Attack Father     Heart Disease Sister 40        heart attack age 40    Cancer Brother 62        lung/age 62    Coronary Art Dis Sister     Cancer Maternal Grandfather         lung cancer    Cancer Paternal Grandfather         colon cancer    No Known Problems Daughter       Allergies   Allergen Reactions    Sulfa Antibiotics Swelling    Lisinopril     Lisinopril Other (See Comments)     cough    Sulfa Antibiotics Swelling    Codeine Nausea And Vomiting    Percocet [Oxycodone-Acetaminophen] Nausea And Vomiting     Current Outpatient Medications   Medication Sig Dispense Refill    scopolamine (TRANSDERM-SCOP, 1.5 MG,) transdermal patch Place 1 patch onto the skin every 72 hours 10 patch 0    metoprolol tartrate (LOPRESSOR) 50 MG tablet       pantoprazole (PROTONIX) 40 MG tablet Take by mouth      PARoxetine (PAXIL) 20 MG tablet TAKE 1 TABLET EVERY MORNING 90 tablet 3    fluocinonide (LIDEX) 0.05 % cream Apply topically 2 times daily.  60 g 2    ELIQUIS 5 MG TABS tablet 5 mg 2 times daily       atorvastatin (LIPITOR) 80 MG tablet       metoprolol tartrate (LOPRESSOR) 25 MG tablet Take 25 mg by mouth 2 times daily       Respiratory Therapy Supplies BLAS Dispense cpap supplies and mask 1 Device 0    sotalol (BETAPACE) 120 MG tablet Take 120 mg by mouth       fluticasone (FLONASE) 50 MCG/ACT nasal spray 2 sprays by Nasal route daily 1 Bottle 1    olopatadine (PATANOL) 0.1 % ophthalmic solution 1-2 drops      doxazosin (CARDURA) 2 MG tablet Take 2 mg by mouth nightly       furosemide (LASIX) 20 MG tablet Take 20 mg by mouth daily      spironolactone (ALDACTONE) 25 MG tablet Take 25 mg by mouth daily      potassium chloride (KLOR-CON M) 20 MEQ extended release tablet Take 1 tablet by mouth 2 times daily (Patient taking differently: Take 20 mEq by mouth daily ) 60 tablet 3    loratadine (CLARITIN) 10 MG tablet Take 10 mg by mouth daily       Triamcinolone Acetonide (NASACORT ALLERGY 24HR NA) by Nasal route 3 times daily as needed. No current facility-administered medications for this visit. Vitals:    06/07/22 1003 06/07/22 1006   BP: 136/88 134/88   Site: Right Lower Arm Left Lower Arm   Pulse: 74    Temp: 97 °F (36.1 °C)    TempSrc: Infrared    SpO2: 96%    Weight: 289 lb (131.1 kg)    Height: 5' 5\" (1.651 m)        Physical exam:  Physical Exam  Vitals reviewed. Constitutional:       General: She is not in acute distress. Appearance: She is well-developed. HENT:      Head: Normocephalic and atraumatic. Right Ear: Tympanic membrane, ear canal and external ear normal. Tympanic membrane is not erythematous. Tympanic membrane has normal mobility. Left Ear: Tympanic membrane, ear canal and external ear normal. Tympanic membrane is not erythematous. Tympanic membrane has normal mobility. Nose: Nose normal.      Mouth/Throat:      Pharynx: No oropharyngeal exudate. Neck:      Thyroid: No thyromegaly. Cardiovascular:      Rate and Rhythm: Normal rate and regular rhythm. Heart sounds: Normal heart sounds. No murmur heard. Pulmonary:      Effort: Pulmonary effort is normal. No respiratory distress. Breath sounds: Normal breath sounds. No wheezing. Abdominal:      General: Bowel sounds are normal. There is no distension. Palpations: Abdomen is soft. Tenderness: There is no abdominal tenderness. There is no guarding or rebound. Musculoskeletal:      Cervical back: Normal range of motion.    Lymphadenopathy:      Cervical: No cervical adenopathy. Skin:     General: Skin is warm and dry. Neurological:      Mental Status: She is alert and oriented to person, place, and time. Psychiatric:         Behavior: Behavior normal.         Assessment/Plan:  57 y.o. female here mainly for annual:  - routine labs and screenings; We'll get A1c yearly for now  - She would like to get lupus labs every couple years since she says it runs in the family but she doesn't have specific symptoms; I declined this request  - She prefers to have labs drawn prior to her annual visit; I prefer we discuss and get them during the visit so that we are on the same page with which labs should be done. - numerous skin tabs removed today around the neck abdomen and back    Procedure: Skin tag removal x15  Discussed risks/benefits of procedure. After verbal consent, timeout was performed. Area prepped in the usual fashion with alcohol swabs. Skin tags anesthetized with ethyl chloride spray and removed with scissors. Hemostasis achieved with pressure. Diagnosis Orders   1. Annual physical exam  Lipid, Fasting    Comprehensive Metabolic Panel, Fasting    CBC   2. Type 2 diabetes mellitus without complication, without long-term current use of insulin (HCC)  Hemoglobin A1C   3.  Skin tag  [id number] - [place] REMOVAL OF SKIN TAGS, UP TO 15        Return in about 1 year (around 6/7/2023) for annual exam.    Karime Fonseca MD

## 2022-06-08 ENCOUNTER — TELEPHONE (OUTPATIENT)
Dept: FAMILY MEDICINE CLINIC | Age: 62
End: 2022-06-08

## 2022-06-08 ENCOUNTER — HOSPITAL ENCOUNTER (OUTPATIENT)
Dept: LAB | Age: 62
Discharge: HOME | End: 2022-06-08
Payer: COMMERCIAL

## 2022-06-08 DIAGNOSIS — Z00.00 ANNUAL PHYSICAL EXAM: ICD-10-CM

## 2022-06-08 DIAGNOSIS — E11.9 TYPE 2 DIABETES MELLITUS WITHOUT COMPLICATION, WITHOUT LONG-TERM CURRENT USE OF INSULIN (HCC): ICD-10-CM

## 2022-06-08 LAB
ALBUMIN SERPL-MCNC: 3.8 G/DL (ref 3.5–4.6)
ALP BLD-CCNC: 138 U/L (ref 40–130)
ALT SERPL-CCNC: 28 U/L (ref 0–33)
ANION GAP SERPL CALCULATED.3IONS-SCNC: 16 MEQ/L (ref 9–15)
AST SERPL-CCNC: 24 U/L (ref 0–35)
BILIRUB SERPL-MCNC: 0.5 MG/DL (ref 0.2–0.7)
BUN BLDV-MCNC: 20 MG/DL (ref 8–23)
CALCIUM SERPL-MCNC: 8.9 MG/DL (ref 8.5–9.9)
CHLORIDE BLD-SCNC: 101 MEQ/L (ref 95–107)
CHOLESTEROL, FASTING: 127 MG/DL (ref 0–199)
CO2: 21 MEQ/L (ref 20–31)
CREAT SERPL-MCNC: 0.95 MG/DL (ref 0.5–0.9)
GFR AFRICAN AMERICAN: >60
GFR NON-AFRICAN AMERICAN: 59.2
GLOBULIN: 3.1 G/DL (ref 2.3–3.5)
GLUCOSE FASTING: 127 MG/DL (ref 70–99)
HBA1C MFR BLD: 7 % (ref 4.8–5.9)
HCT VFR BLD CALC: 39.8 % (ref 37–47)
HDLC SERPL-MCNC: 40 MG/DL (ref 40–59)
HEMOGLOBIN: 13.1 G/DL (ref 12–16)
LDL CHOLESTEROL CALCULATED: 71 MG/DL (ref 0–129)
MCH RBC QN AUTO: 29.3 PG (ref 27–31.3)
MCHC RBC AUTO-ENTMCNC: 32.8 % (ref 33–37)
MCV RBC AUTO: 89.4 FL (ref 82–100)
PDW BLD-RTO: 14.1 % (ref 11.5–14.5)
PLATELET # BLD: 207 K/UL (ref 130–400)
POTASSIUM SERPL-SCNC: 4.4 MEQ/L (ref 3.4–4.9)
RBC # BLD: 4.46 M/UL (ref 4.2–5.4)
SODIUM BLD-SCNC: 138 MEQ/L (ref 135–144)
TOTAL PROTEIN: 6.9 G/DL (ref 6.3–8)
TRIGLYCERIDE, FASTING: 81 MG/DL (ref 0–150)
WBC # BLD: 7.7 K/UL (ref 4.8–10.8)

## 2022-06-08 PROCEDURE — 36415 COLL VENOUS BLD VENIPUNCTURE: CPT

## 2022-06-08 PROCEDURE — 80053 COMPREHEN METABOLIC PANEL: CPT

## 2022-06-08 PROCEDURE — 85027 COMPLETE CBC AUTOMATED: CPT

## 2022-06-08 PROCEDURE — 80061 LIPID PANEL: CPT

## 2022-06-08 PROCEDURE — 83036 HEMOGLOBIN GLYCOSYLATED A1C: CPT

## 2022-06-08 NOTE — TELEPHONE ENCOUNTER
Patient made aware of message and states she does not recall that conversation and she will find another physician to order this for her

## 2022-06-08 NOTE — TELEPHONE ENCOUNTER
Patient called in asking for a LUPUS test to be added to her labs. She had her labs drawn already, and the tech already levon additional tubes for this testing.

## 2022-06-14 ENCOUNTER — TELEPHONE (OUTPATIENT)
Dept: FAMILY MEDICINE CLINIC | Age: 62
End: 2022-06-14

## 2022-06-14 DIAGNOSIS — F41.9 ANXIETY: ICD-10-CM

## 2022-06-14 RX ORDER — PAROXETINE HYDROCHLORIDE 20 MG/1
TABLET, FILM COATED ORAL
Qty: 90 TABLET | Refills: 3 | Status: SHIPPED | OUTPATIENT
Start: 2022-06-14

## 2022-06-14 NOTE — TELEPHONE ENCOUNTER
Comments:     Last Office Visit (last PCP visit):   6/7/2022    Next Visit Date:  Future Appointments   Date Time Provider Colleen [place]   8/1/2022  2:00 PM Rodriguez Gilliam MD [facility]       **If hasn't been seen in over a year OR hasn't followed up according to last diabetes/ADHD visit, make appointment for patient before sending refill to provider.     Rx requested:  Requested Prescriptions     Pending Prescriptions Disp Refills    PARoxetine (PAXIL) 20 MG tablet [Pharmacy Med Name: PAROXETINE HCL TABS 20MG] 90 tablet 3     Sig: TAKE 1 TABLET EVERY MORNING

## 2022-06-14 NOTE — TELEPHONE ENCOUNTER
Patient's spouse called in requesting the patients lab results mailed to her. Placed in the mail today.

## 2022-06-19 DIAGNOSIS — L30.1 DYSHIDROTIC ECZEMA: ICD-10-CM

## 2022-06-20 ENCOUNTER — OFFICE VISIT (OUTPATIENT)
Dept: FAMILY MEDICINE CLINIC | Age: 62
End: 2022-06-20
Payer: COMMERCIAL

## 2022-06-20 ENCOUNTER — HOSPITAL ENCOUNTER (OUTPATIENT)
Dept: GENERAL RADIOLOGY | Age: 62
Discharge: HOME | End: 2022-06-22
Payer: COMMERCIAL

## 2022-06-20 ENCOUNTER — HOSPITAL ENCOUNTER (OUTPATIENT)
Age: 62
Discharge: HOME | End: 2022-06-22
Payer: COMMERCIAL

## 2022-06-20 ENCOUNTER — NURSE TRIAGE (OUTPATIENT)
Dept: OTHER | Facility: CLINIC | Age: 62
End: 2022-06-20

## 2022-06-20 VITALS
RESPIRATION RATE: 18 BRPM | OXYGEN SATURATION: 96 % | BODY MASS INDEX: 48.75 KG/M2 | HEART RATE: 62 BPM | WEIGHT: 292.6 LBS | SYSTOLIC BLOOD PRESSURE: 134 MMHG | DIASTOLIC BLOOD PRESSURE: 74 MMHG | HEIGHT: 65 IN | TEMPERATURE: 97.4 F

## 2022-06-20 DIAGNOSIS — M15.9 PRIMARY OSTEOARTHRITIS INVOLVING MULTIPLE JOINTS: ICD-10-CM

## 2022-06-20 DIAGNOSIS — M25.562 PAIN AND SWELLING OF LEFT KNEE: ICD-10-CM

## 2022-06-20 DIAGNOSIS — M25.462 PAIN AND SWELLING OF LEFT KNEE: ICD-10-CM

## 2022-06-20 DIAGNOSIS — M25.462 PAIN AND SWELLING OF LEFT KNEE: Primary | ICD-10-CM

## 2022-06-20 DIAGNOSIS — M25.562 PAIN AND SWELLING OF LEFT KNEE: Primary | ICD-10-CM

## 2022-06-20 PROBLEM — G47.33 OSA (OBSTRUCTIVE SLEEP APNEA): Status: ACTIVE | Noted: 2022-06-20

## 2022-06-20 PROBLEM — Z99.89 OSA ON CPAP: Status: ACTIVE | Noted: 2022-06-20

## 2022-06-20 PROBLEM — M19.90 OSTEOARTHRITIS: Status: ACTIVE | Noted: 2022-06-20

## 2022-06-20 PROCEDURE — 73564 X-RAY EXAM KNEE 4 OR MORE: CPT

## 2022-06-20 PROCEDURE — 99213 OFFICE O/P EST LOW 20 MIN: CPT | Performed by: FAMILY MEDICINE

## 2022-06-20 ASSESSMENT — ENCOUNTER SYMPTOMS
COLOR CHANGE: 0
NAUSEA: 0
SHORTNESS OF BREATH: 0
CHEST TIGHTNESS: 0
VOMITING: 0

## 2022-06-20 NOTE — PROGRESS NOTES
Sweta Crook (: 1960) is a 57 y.o. female, Established patient, who presents today for:    Chief Complaint   Patient presents with    Knee Pain     Pt presents today with c/o left knee pain since . Pt describes the pain to prompted by standing for too long and the pain begins to feel as a sharp pain/stabbing pain. ASSESSMENT/PLAN    1. Pain and swelling of left knee  Comments:  Likely related to OA and MCL strain/sprain based on exam.  Will obtain U/S to evaluate for Baker's cyst. Pt given physical therapy referral for management. Orders:  -     XR KNEE LEFT (MIN 4 VIEWS); Future  -     US DUP LOWER EXTREMITY LEFT MAN; Future  -     [address] [place] [address]  -     Ambulatory referral to Orthopedic Surgery  2. Primary osteoarthritis involving multiple joints  Assessment & Plan: Will obtain x-ray to assess for degree of severity. Patient will manage conservatively with formal physical therapy. Home exercises given and patient instructed to use ice and elevation. If there is no improvement despite conservative management patient was given a referral to orthopedic surgeon and advised to establish care to discuss further management. Orders:  -     XR KNEE LEFT (MIN 4 VIEWS); Future  -     [address] [place] [address]  -     Ambulatory referral to Orthopedic Surgery      Return for [facility] VISIT 2022. SUBJECTIVE/OBJECTIVE:    HPI    Patient presents for acute visit regarding left knee pain. She reports a known history of arthritis, but reports worsening pain over the past 3 days without any preceding injury or change to her activity level. Pain is described as sharp, located over anterior and posterior knee, non-radiating, and rated 6/10 in severity. Pain is worse with standing or walking and improved with laying down. She reports swelling to the knee, but denies any erythema or warmth.  She reports using Tylenol at home with temporary benefit, but no resolution of symptoms. Current Outpatient Medications on File Prior to Visit   Medication Sig Dispense Refill    fluocinonide (LIDEX) 0.05 % cream APPLY TOPICALLY TWICE A DAY 60 g 11    PARoxetine (PAXIL) 20 MG tablet TAKE 1 TABLET EVERY MORNING 90 tablet 3    metoprolol tartrate (LOPRESSOR) 50 MG tablet       pantoprazole (PROTONIX) 40 MG tablet Take by mouth      ELIQUIS 5 MG TABS tablet 5 mg 2 times daily       atorvastatin (LIPITOR) 80 MG tablet       metoprolol tartrate (LOPRESSOR) 25 MG tablet Take 25 mg by mouth 2 times daily       Respiratory Therapy Supplies BLAS Dispense cpap supplies and mask 1 Device 0    sotalol (BETAPACE) 120 MG tablet Take 120 mg by mouth       fluticasone (FLONASE) 50 MCG/ACT nasal spray 2 sprays by Nasal route daily 1 Bottle 1    olopatadine (PATANOL) 0.1 % ophthalmic solution 1-2 drops      doxazosin (CARDURA) 2 MG tablet Take 2 mg by mouth nightly       furosemide (LASIX) 20 MG tablet Take 20 mg by mouth daily      spironolactone (ALDACTONE) 25 MG tablet Take 25 mg by mouth daily      potassium chloride (KLOR-CON M) 20 MEQ extended release tablet Take 1 tablet by mouth 2 times daily (Patient taking differently: Take 20 mEq by mouth daily ) 60 tablet 3    loratadine (CLARITIN) 10 MG tablet Take 10 mg by mouth daily       Triamcinolone Acetonide (NASACORT ALLERGY 24HR NA) by Nasal route 3 times daily as needed. No current facility-administered medications on file prior to visit. Allergies   Allergen Reactions    Sulfa Antibiotics Swelling    Lisinopril     Lisinopril Other (See Comments)     cough    Sulfa Antibiotics Swelling    Codeine Nausea And Vomiting    Percocet [Oxycodone-Acetaminophen] Nausea And Vomiting        Review of Systems   Constitutional: Negative for chills, fatigue and fever. Respiratory: Negative for chest tightness and shortness of breath. Cardiovascular: Negative for chest pain, palpitations and leg swelling. Gastrointestinal: Negative for nausea and vomiting. Musculoskeletal: Positive for arthralgias (left knee), gait problem and joint swelling (left knee). Skin: Negative for color change, rash and wound. Neurological: Negative for dizziness, syncope and light-headedness. Vitals:  /74 (Site: Right Upper Arm, Position: Sitting, Cuff Size: Large Adult)   Pulse 62   Temp 97.4 °F (36.3 °C) (Temporal)   Resp 18   Ht 5' 5\" (1.651 m)   Wt 292 lb 9.6 oz (132.7 kg)   LMP 11/14/1998   SpO2 96%   BMI 48.69 kg/m²     Physical Exam  Vitals reviewed. Constitutional:       General: She is not in acute distress. Appearance: Normal appearance. She is obese. Cardiovascular:      Rate and Rhythm: Normal rate and regular rhythm. Pulmonary:      Effort: Pulmonary effort is normal. No respiratory distress. Breath sounds: Normal breath sounds. No wheezing, rhonchi or rales. Musculoskeletal:      Left knee: Swelling (posterior knee) present. No deformity, effusion, erythema, ecchymosis, lacerations or bony tenderness. Normal range of motion. Tenderness (posterior) present over the MCL. Instability Tests: Anterior drawer test negative. Posterior drawer test negative. Right lower leg: No edema. Left lower leg: No swelling, deformity, lacerations, tenderness or bony tenderness. No edema. Skin:     Findings: No rash. Neurological:      Mental Status: She is alert and oriented to person, place, and time. Psychiatric:         Mood and Affect: Mood normal.         Behavior: Behavior normal.         Thought Content: Thought content normal.         Left Knee Exam     Other   Effusion: no effusion present         (If Applicable)              An electronic signature was used to authenticate this note.      Sunni Lee MD

## 2022-06-20 NOTE — TELEPHONE ENCOUNTER
Received call from [facility]; Patient with Red Flag Complaint requesting to establish care with [facility] with Dr. Lynette Dominguez. Subjective: Caller states \"left knee pain\"     Current Symptoms:   Pain behind the knee  Swelling-localized area behind the knee, 2-3 inches  Denies redness/warmth  Denies injury  Hx Afib-on eliquis from Cardiologist  Always has mild swelling in her legs  New patient appt June 30th    Onset:  Friday; worsening with activity    Associated Symptoms: NA    Pain Severity: 6/10; tender, sharp at times; intermittent    Temperature: denies     What has been tried: tylenol, elevation    LMP: NA Pregnant: NA    Recommended disposition: See PCP within 3 Days    Care advice provided, patient verbalizes understanding; denies any other questions or concerns; instructed to call back for any new or worsening symptoms. Patient/Caller agrees with recommended disposition; writer provided warm transfer to [facility] for appointment scheduling. Attention Provider: Thank you for allowing me to participate in the care of your patient. The patient was connected to triage in response to information provided to the [facility]. Please do not respond through this encounter as the response is not directed to a shared pool.     Reason for Disposition   MODERATE pain (e.g., symptoms interfere with work or school, limping) and present > 3 days    Protocols used: KNEE PAIN-ADULT-OH

## 2022-06-20 NOTE — ASSESSMENT & PLAN NOTE
Will obtain x-ray to assess for degree of severity. Patient will manage conservatively with formal physical therapy. Home exercises given and patient instructed to use ice and elevation. If there is no improvement despite conservative management patient was given a referral to orthopedic surgeon and advised to establish care to discuss further management.

## 2022-06-20 NOTE — TELEPHONE ENCOUNTER
Comments:     Last Office Visit (last PCP visit):   6/7/2022    Next Visit Date:  Future Appointments   Date Time Provider Colleen [place]   7/14/2022  9:00 AM [address] [place] [address]   8/1/2022  2:00 PM Dahiana Ceballos MD [facility]       **If hasn't been seen in over a year OR hasn't followed up according to last diabetes/ADHD visit, make appointment for patient before sending refill to provider.     Rx requested:  Requested Prescriptions     Pending Prescriptions Disp Refills    fluocinonide (LIDEX) 0.05 % cream [Pharmacy Med Name: FLUOCINONIDE CREAM 60GM 0.05%] 60 g 11     Sig: APPLY TOPICALLY TWICE A DAY

## 2022-06-20 NOTE — PATIENT INSTRUCTIONS
Patient Education        Knee Arthritis: Exercises  Introduction  Here are some examples of exercises for you to try. The exercises may be suggested for a condition or for rehabilitation. Start each exercise slowly. Ease off the exercises if you start to have pain. You will be told when to start these exercises and which ones will work bestfor you. How to do the exercises  Knee flexion with heel slide    1. Lie on your back with your knees bent. 2. Slide your heel back by bending your affected knee as far as you can. Then hook your other foot around your ankle to help pull your heel even farther back. 3. Hold for about 6 seconds, then rest for up to 10 seconds. 4. Repeat 8 to 12 times. 5. Switch legs and repeat steps 1 through 4, even if only one knee is sore. Quad sets    1. Sit with your affected leg straight and supported on the floor or a firm bed. Place a small, rolled-up towel under your knee. Your other leg should be bent, with that foot flat on the floor. 2. Tighten the thigh muscles of your affected leg by pressing the back of your knee down into the towel. 3. Hold for about 6 seconds, then rest for up to 10 seconds. 4. Repeat 8 to 12 times. 5. Switch legs and repeat steps 1 through 4, even if only one knee is sore. Straight-leg raises to the front    1. Lie on your back with your good knee bent so that your foot rests flat on the floor. Your affected leg should be straight. Make sure that your low back has a normal curve. You should be able to slip your hand in between the floor and the small of your back, with your palm touching the floor and your back touching the back of your hand. 2. Tighten the thigh muscles in your affected leg by pressing the back of your knee flat down to the floor. Hold your knee straight. 3. Keeping the thigh muscles tight and your leg straight, lift your affected leg up so that your heel is about 12 inches off the floor.  Hold for about 6 seconds, then lower slowly. 4. Relax for up to 10 seconds between repetitions. 5. Repeat 8 to 12 times. 6. Switch legs and repeat steps 1 through 5, even if only one knee is sore. Active knee flexion    1. Lie on your stomach with your knees straight. If your kneecap is uncomfortable, roll up a washcloth and put it under your leg just above your kneecap. 2. Lift the foot of your affected leg by bending the knee so that you bring the foot up toward your buttock. If this motion hurts, try it without bending your knee quite as far. This may help you avoid any painful motion. 3. Slowly move your leg up and down. 4. Repeat 8 to 12 times. 5. Switch legs and repeat steps 1 through 4, even if only one knee is sore. Quadriceps stretch (facedown)    1. Lie flat on your stomach, and rest your face on the floor. 2. Wrap a towel or belt strap around the lower part of your affected leg. Then use the towel or belt strap to slowly pull your heel toward your buttock until you feel a stretch. 3. Hold for about 15 to 30 seconds, then relax your leg against the towel or belt strap. 4. Repeat 2 to 4 times. 5. Switch legs and repeat steps 1 through 4, even if only one knee is sore. Stationary exercise bike    1. If you do not have a stationary exercise bike at home, you can find one to ride at your local health club or community center. 2. Adjust the height of the bike seat so that your knee is slightly bent when your leg is extended downward. If your knee hurts when the pedal reaches the top, you can raise the seat so that your knee does not bend as much. 3. Start slowly. At first, try to do 5 to 10 minutes of cycling with little to no resistance. Then increase your time and the resistance bit by bit until you can do 20 to 30 minutes without pain. 4. If you start to have pain, rest your knee until your pain gets back to the level that is normal for you. Or cycle for less time or with less effort.   Follow-up care is a key part of your treatment and safety. Be sure to make and go to all appointments, and call your doctor if you are having problems. It's also a good idea to know your test results and keep alist of the medicines you take. Where can you learn more? Go to [email or web address].[email or web address] org and sign in to your [employer] account. Enter C159 in the [employer] box to learn more about \"Knee Arthritis: Exercises. \"     If you do not have an account, please click on the \"Sign Up Now\" link. Current as of: July 1, 2021               Content Version: 13.2  © 2006-2022 [employer]. Care instructions adapted under license by [facility] ([facility]). If you have questions about a medical condition or this instruction, always ask your healthcare professional. [address] any warranty or liability for your use of this information. Patient Education        Medial Collateral Ligament Sprain: Rehab Exercises  Introduction  Here are some examples of exercises for you to try. The exercises may be suggested for a condition or for rehabilitation. Start each exercise slowly. Ease off the exercises if you start to have pain. You will be told when to start these exercises and which ones will work bestfor you. How to do the exercises  Knee flexion with heel slide    1. Lie on your back with your knees bent. 2. Slide your heel back by bending your affected knee as far as you can. Then hook your other foot around your ankle to help pull your heel even farther back. 3. Hold for about 6 seconds, then rest for up to 10 seconds. 4. Repeat 8 to 12 times. Heel slides on a wall    1. Lie on the floor close enough to a wall so that you can place both legs up on the wall. Your hips should be as close to the wall as is comfortable for you. 2. Start with both feet resting on the wall. Slowly let the foot of your affected leg slide down the wall until you feel a stretch in your knee.   3. Hold for 15 to 30 seconds. 4. Then slowly slide your foot up to where you started. 5. Repeat 2 to 4 times. Quad sets    1. Sit with your affected leg straight and supported on the floor or a firm bed. Place a small, rolled-up towel under your knee. Your other leg should be bent, with that foot flat on the floor. 2. Tighten the thigh muscles of your affected leg by pressing the back of your knee down into the towel. 3. Hold for about 6 seconds, then rest for up to 10 seconds. 4. Repeat 8 to 12 times. Short-arc quad    1. Lie on your back with your knees bent over a foam roll or a large rolled-up towel. 2. Lift the lower part of your affected leg and straighten your knee by tightening your thigh muscle. Keep the bottom of your knee on the foam roll or rolled-up towel. 3. Hold your knee straight for about 6 seconds, then slowly bend your knee and lower your leg back to the floor. Rest for up to 10 seconds between repetitions. 4. Repeat 8 to 12 times. Straight-leg raises to the front    1. Lie on your back with your good knee bent so that your foot rests flat on the floor. Your affected leg should be straight. Make sure that your low back has a normal curve. You should be able to slip your hand in between the floor and the small of your back, with your palm touching the floor and your back touching the back of your hand. 2. Tighten the thigh muscles in your affected leg by pressing the back of your knee flat down to the floor. Hold your knee straight. 3. Keeping the thigh muscles tight and your leg straight, lift your affected leg up so that your heel is about 12 inches off the floor. Hold for about 6 seconds, then lower slowly. 4. Relax for up to 10 seconds between repetitions. 5. Repeat 8 to 12 times. Hamstring set (heel dig)    1. Sit with your affected leg bent. Your good leg should be straight and supported on the floor.   2. Tighten the muscles on the back of your bent leg (hamstring) by pressing your heel into the floor.  3. Hold for about 6 seconds, then rest for up to 10 seconds. 4. Repeat 8 to 12 times. Hip adduction    You will need a pillow for this exercise. 1. Sit on the floor with your knees bent. 2. Place a pillow between your knees. 3. Put your hands slightly behind your hips for support. 4. Squeeze the pillow by tightening the muscles on the inside of your thighs. 5. Hold for 6 seconds, then rest for up to 10 seconds. 6. Repeat 8 to 12 times. Hip abduction    You will need a small pillow for this exercise. 1. Sit on the floor with your affected knee close to a wall. 2. Bend your affected knee but keep the other leg straight in front of you. 3. Place a pillow between the outside of your knee and the wall. 4. Put your hands slightly behind your hips for support. 5. Push the outside of your knee against the pillow and the wall. 6. Hold for 6 seconds, then rest for up to 10 seconds. 7. Repeat 8 to 12 times. Lateral step-up    1. Stand sideways on the bottom step of a staircase with your injured leg on the step and your other foot on the floor. Hold on to the banister or wall. 2. Use your injured leg to raise yourself up, bringing your other foot level with the stair step. Make sure to keep your hips level as you do this. And try to keep your knee moving in a straight line with your middle toe. Do not put the foot you are raising on the stair step. 3. Slowly lower your foot back down. 4. Repeat 8 to 12 times. Wall squats with ball    You will need a large therapy ball for this exercise. Ask your doctor or physical therapist what size you will need, but it should be large enough tocover your back. 1. Stand with your back facing a wall. Place your feet about a shoulder-width apart. 2. Place the therapy ball between your back and the wall, and move your feet out in front of you so they are about a foot in front of your hips. 3. Keep your arms at your sides, or put your hands on your hips.   4. Slowly

## 2022-06-21 ENCOUNTER — HOSPITAL ENCOUNTER (OUTPATIENT)
Dept: LAB | Age: 62
Discharge: HOME | End: 2022-06-21
Payer: COMMERCIAL

## 2022-06-21 LAB
ANION GAP SERPL CALCULATED.3IONS-SCNC: 16 MEQ/L (ref 9–15)
BUN BLDV-MCNC: 16 MG/DL (ref 8–23)
CALCIUM SERPL-MCNC: 9 MG/DL (ref 8.5–9.9)
CHLORIDE BLD-SCNC: 102 MEQ/L (ref 95–107)
CO2: 22 MEQ/L (ref 20–31)
CREAT SERPL-MCNC: 0.93 MG/DL (ref 0.5–0.9)
GFR AFRICAN AMERICAN: >60
GFR NON-AFRICAN AMERICAN: >60
GLUCOSE BLD-MCNC: 122 MG/DL (ref 70–99)
HCT VFR BLD CALC: 40.7 % (ref 37–47)
HEMOGLOBIN: 13 G/DL (ref 12–16)
MCH RBC QN AUTO: 28.6 PG (ref 27–31.3)
MCHC RBC AUTO-ENTMCNC: 31.9 % (ref 33–37)
MCV RBC AUTO: 89.5 FL (ref 82–100)
PDW BLD-RTO: 13.9 % (ref 11.5–14.5)
PLATELET # BLD: 200 K/UL (ref 130–400)
POTASSIUM SERPL-SCNC: 3.8 MEQ/L (ref 3.4–4.9)
RBC # BLD: 4.54 M/UL (ref 4.2–5.4)
SODIUM BLD-SCNC: 140 MEQ/L (ref 135–144)
WBC # BLD: 6.9 K/UL (ref 4.8–10.8)

## 2022-06-21 PROCEDURE — 80048 BASIC METABOLIC PNL TOTAL CA: CPT

## 2022-06-21 PROCEDURE — 36415 COLL VENOUS BLD VENIPUNCTURE: CPT

## 2022-06-21 PROCEDURE — 85027 COMPLETE CBC AUTOMATED: CPT

## 2022-06-23 ENCOUNTER — HOSPITAL ENCOUNTER (OUTPATIENT)
Dept: ULTRASOUND IMAGING | Age: 62
Discharge: HOME | End: 2022-06-25
Payer: COMMERCIAL

## 2022-06-23 DIAGNOSIS — M25.462 PAIN AND SWELLING OF LEFT KNEE: ICD-10-CM

## 2022-06-23 DIAGNOSIS — M25.562 PAIN AND SWELLING OF LEFT KNEE: ICD-10-CM

## 2022-06-23 PROBLEM — M71.22 BAKER'S CYST OF KNEE, LEFT: Status: ACTIVE | Noted: 2022-06-23

## 2022-06-23 PROCEDURE — 93971 EXTREMITY STUDY: CPT

## 2022-06-28 ENCOUNTER — TELEPHONE (OUTPATIENT)
Dept: FAMILY MEDICINE CLINIC | Age: 62
End: 2022-06-28

## 2022-06-28 DIAGNOSIS — M25.562 PAIN AND SWELLING OF LEFT KNEE: ICD-10-CM

## 2022-06-28 DIAGNOSIS — M15.9 PRIMARY OSTEOARTHRITIS INVOLVING MULTIPLE JOINTS: Primary | ICD-10-CM

## 2022-06-28 DIAGNOSIS — M25.462 PAIN AND SWELLING OF LEFT KNEE: ICD-10-CM

## 2022-06-28 NOTE — TELEPHONE ENCOUNTER
1st attempt to reach patient. Called patient @ [phone] and left message on machine for patient to return call during normal business hours of 8:30 AM and 5 PM @ [phone] option 2.

## 2022-06-28 NOTE — TELEPHONE ENCOUNTER
Patient called and states that insurance will not cover her referral for physical therapy or any physical therapy through [facility]. The physical therapy that will be covered by her insurance is PT services in [place]. She is requesting a new referral be sent to them as she has a scheduled appointment for tomorrow morning at 945 am please approve/deny or advise.      John Gray ([address]  Fax: [phone]

## 2022-06-28 NOTE — TELEPHONE ENCOUNTER
----- Message from Adelina Hassan sent at 6/28/2022  9:32 AM EDT -----  Subject: Referral Request    QUESTIONS   Reason for referral request? Physical therapy   Has the physician seen you for this condition before? No   Preferred Specialist (if applicable)? Do you already have an appointment scheduled? Yes  Select Scheduled Date? 2022-06-29  Select Scheduled Physician? Additional Information for Provider? The fax number is [phone]. P. T   services   ---------------------------------------------------------------------------  --------------  Fin[facility] INFO  What is the best way for the office to contact you? OK to leave message on   voicemail  Preferred Call Back Phone Number? [phone]  ---------------------------------------------------------------------------  --------------  SCRIPT ANSWERS  Relationship to Patient?  Self

## 2022-06-30 ENCOUNTER — OFFICE VISIT (OUTPATIENT)
Dept: FAMILY MEDICINE CLINIC | Age: 62
End: 2022-06-30
Payer: COMMERCIAL

## 2022-06-30 VITALS
OXYGEN SATURATION: 96 % | BODY MASS INDEX: 48.78 KG/M2 | SYSTOLIC BLOOD PRESSURE: 132 MMHG | HEIGHT: 65 IN | DIASTOLIC BLOOD PRESSURE: 76 MMHG | TEMPERATURE: 96.8 F | HEART RATE: 65 BPM | WEIGHT: 292.8 LBS

## 2022-06-30 DIAGNOSIS — E78.5 HYPERLIPIDEMIA, UNSPECIFIED HYPERLIPIDEMIA TYPE: ICD-10-CM

## 2022-06-30 DIAGNOSIS — F41.9 ANXIETY: ICD-10-CM

## 2022-06-30 DIAGNOSIS — Z99.89 OSA ON CPAP: ICD-10-CM

## 2022-06-30 DIAGNOSIS — I10 ESSENTIAL HYPERTENSION: Chronic | ICD-10-CM

## 2022-06-30 DIAGNOSIS — G47.33 OSA ON CPAP: ICD-10-CM

## 2022-06-30 DIAGNOSIS — K21.9 GASTROESOPHAGEAL REFLUX DISEASE, UNSPECIFIED WHETHER ESOPHAGITIS PRESENT: ICD-10-CM

## 2022-06-30 DIAGNOSIS — Z23 NEED FOR VACCINATION: ICD-10-CM

## 2022-06-30 DIAGNOSIS — E11.9 TYPE 2 DIABETES MELLITUS WITHOUT COMPLICATION, WITHOUT LONG-TERM CURRENT USE OF INSULIN (HCC): Primary | ICD-10-CM

## 2022-06-30 DIAGNOSIS — I48.19 ATRIAL FIBRILLATION, PERSISTENT (HCC): ICD-10-CM

## 2022-06-30 DIAGNOSIS — R79.89 ABNORMAL LFTS (LIVER FUNCTION TESTS): ICD-10-CM

## 2022-06-30 DIAGNOSIS — E55.9 VITAMIN D DEFICIENCY: ICD-10-CM

## 2022-06-30 DIAGNOSIS — E66.01 MORBID OBESITY (HCC): ICD-10-CM

## 2022-06-30 PROCEDURE — 3051F HG A1C>EQUAL 7.0%<8.0%: CPT | Performed by: FAMILY MEDICINE

## 2022-06-30 PROCEDURE — 90715 TDAP VACCINE 7 YRS/> IM: CPT | Performed by: FAMILY MEDICINE

## 2022-06-30 PROCEDURE — 90471 IMMUNIZATION ADMIN: CPT | Performed by: FAMILY MEDICINE

## 2022-06-30 PROCEDURE — 90677 PCV20 VACCINE IM: CPT | Performed by: FAMILY MEDICINE

## 2022-06-30 PROCEDURE — 99214 OFFICE O/P EST MOD 30 MIN: CPT | Performed by: FAMILY MEDICINE

## 2022-06-30 RX ORDER — METFORMIN HYDROCHLORIDE 500 MG/1
500 TABLET, EXTENDED RELEASE ORAL
Qty: 90 TABLET | Refills: 1 | Status: SHIPPED | OUTPATIENT
Start: 2022-06-30

## 2022-06-30 SDOH — HEALTH STABILITY: PHYSICAL HEALTH: ON AVERAGE, HOW MANY MINUTES DO YOU ENGAGE IN EXERCISE AT THIS LEVEL?: 10 MIN

## 2022-06-30 SDOH — HEALTH STABILITY: PHYSICAL HEALTH: ON AVERAGE, HOW MANY DAYS PER WEEK DO YOU ENGAGE IN MODERATE TO STRENUOUS EXERCISE (LIKE A BRISK WALK)?: 2 DAYS

## 2022-06-30 ASSESSMENT — ENCOUNTER SYMPTOMS
VOMITING: 0
COUGH: 0
ANAL BLEEDING: 0
BLOOD IN STOOL: 0
SHORTNESS OF BREATH: 0
CONSTIPATION: 0
NAUSEA: 0
DIARRHEA: 0
WHEEZING: 0
CHEST TIGHTNESS: 0
ABDOMINAL PAIN: 0

## 2022-06-30 ASSESSMENT — PATIENT HEALTH QUESTIONNAIRE - PHQ9
2. FEELING DOWN, DEPRESSED OR HOPELESS: 0
SUM OF ALL RESPONSES TO PHQ QUESTIONS 1-9: 0
SUM OF ALL RESPONSES TO PHQ9 QUESTIONS 1 & 2: 0
SUM OF ALL RESPONSES TO PHQ QUESTIONS 1-9: 0
1. LITTLE INTEREST OR PLEASURE IN DOING THINGS: 0

## 2022-06-30 NOTE — ASSESSMENT & PLAN NOTE
Patient counseled on healthy dietary choices and the benefits of a lower salt and/or lower carbohydrate diet as appropriate. Patient also counseled on benefits of moderate intensity cardiovascular exercise for 150 minutes per week as they are able. Advice was given to make small changes over time, setting smaller achievable goals until recommended lifestyle changes are reached. Referral has been placed to dietitian to help with establishing healthy eating plan to promote healthy weight loss.

## 2022-06-30 NOTE — ASSESSMENT & PLAN NOTE
Patient currently asymptomatic and rate controlled. She remains on anticoagulation without signs of bleeding. Patient instructed to continue with current medication and keep chronic follow-up visits with cardiology.

## 2022-06-30 NOTE — ASSESSMENT & PLAN NOTE
Stable with use of medication. No reported worsening anxiety/panic.   Patient instructed to continue with current dose of Paxil

## 2022-06-30 NOTE — PROGRESS NOTES
Elías Quinones (: 1960) is a 57 y.o. female, Established patient, who presents today for:    Chief Complaint   Patient presents with   [facility]     Patient is present to switch providers. ASSESSMENT/PLAN    1. Type 2 diabetes mellitus without complication, without long-term current use of insulin ([facility].)  Assessment & Plan:  Most recent A1c borderline and result is rising over time. I reviewed with patient the benefits of early aggressive management. She agrees to initiate metformin therapy. A referral has been placed to dietitian to help with establishing a healthy eating plan to promote healthy weight loss. Orders:  -     Hemoglobin A1C; Future  -     Comprehensive Metabolic Panel; Future  -     Microalbumin / Creatinine Urine Ratio; Future  -     Amb External Referral To Ophthalmology  -     [address] FOOT EXAM  -     metFORMIN (GLUCOPHAGE-XR) 500 MG extended release tablet; Take 1 tablet by mouth daily (with breakfast), Disp-90 tablet, R-1Normal  2. Atrial fibrillation, persistent ([facility]  Assessment & Plan:  Patient currently asymptomatic and rate controlled. She remains on anticoagulation without signs of bleeding. Patient instructed to continue with current medication and keep chronic follow-up visits with cardiology. Orders:  -     TSH with Reflex; Future  3. Essential hypertension  Assessment & Plan:   Well-controlled, continue current medications  Orders:  -     Lan Dunaway  4. ALE on CPAP  Assessment & Plan:  Managed well with nightly use of CPAP machine. Patient reporting restful sleep with use of machine. 5. Hyperlipidemia, unspecified hyperlipidemia type  -     Comprehensive Metabolic Panel; Future  -     Lan Dunaway  6. Vitamin D deficiency  -     Vitamin D 25 Hydroxy; Future  7.  Morbid obesity ([facility]  Assessment & Plan:  Patient counseled on healthy dietary choices and the benefits of a lower salt and/or lower carbohydrate diet as appropriate. Patient also counseled on benefits of moderate intensity cardiovascular exercise for 150 minutes per week as they are able. Advice was given to make small changes over time, setting smaller achievable goals until recommended lifestyle changes are reached. Referral has been placed to dietitian to help with establishing healthy eating plan to promote healthy weight loss. Orders:  -     Hemoglobin A1C; Future  -     TSH with Reflex; Future  -     [facility]  8. Abnormal LFTs (liver function tests)  -     Comprehensive Metabolic Panel; Future  9. Gastroesophageal reflux disease, unspecified whether esophagitis present  Assessment & Plan:  Managed well with use of medication and avoidance of dietary triggers. Patient instructed to continue with current dose of PPI   10. Anxiety  Assessment & Plan:  Stable with use of medication. No reported worsening anxiety/panic. Patient instructed to continue with current dose of Paxil   11. Need for vaccination  -     Tdap, BOOSTRIX, (age 8 yrs+), IM  -     Pneumococcal, PCV20, PREVNAR 21, (age 25 yrs+), IM, PF      Return in about 3 months (around 9/30/2022) for Chronic Disease Check. SUBJECTIVE/OBJECTIVE:    HPI    Patient presents for chronic atrial fibrillation, hypertension, ALE, diabetes, and hyperlipidemia management visit. Patient reports taking medications as prescribed since the most recent visit. They deny adhering to any specific lower carbohydrate or lower salt diet. They deny any routine exercise outside of normal day-to-day activity. They deny any polyuria or polydipsia, new onset vision changes, or new onset paresthesias. Patient denies any chest pain, dyspnea, palpitations, lightheadedness, dizziness, worsening lower extremity edema, or syncope.   Patient denies any dyspnea at rest, persistent wheezing, worsening cough, chest tightness, or limitation in normal day-to-day activity due to breathing. Patient reports using CPAP machine on a nightly basis and achieving restful sleep with use of the machine. Patient denies any change in appetite, weight changes, dysphagia, early satiety, increased belching, sour brash/heartburn, abdominal pain, nausea/vomiting, diarrhea, constipation, melena, rectal bleeding, or hematochezia. Patient denies any worsening mood, SI/HI, or self harming behaviors. They deny any worsening anxiety or panic attacks. They deny any worsening fatigue, decreased appetite, or problems with sleep. Current Outpatient Medications on File Prior to Visit   Medication Sig Dispense Refill    fluocinonide (LIDEX) 0.05 % cream APPLY TOPICALLY TWICE A DAY 60 g 11    PARoxetine (PAXIL) 20 MG tablet TAKE 1 TABLET EVERY MORNING 90 tablet 3    metoprolol tartrate (LOPRESSOR) 50 MG tablet       pantoprazole (PROTONIX) 40 MG tablet Take by mouth      ELIQUIS 5 MG TABS tablet 5 mg 2 times daily       atorvastatin (LIPITOR) 80 MG tablet       metoprolol tartrate (LOPRESSOR) 25 MG tablet Take 25 mg by mouth 2 times daily       Respiratory Therapy Supplies BLAS Dispense cpap supplies and mask 1 Device 0    sotalol (BETAPACE) 120 MG tablet Take 120 mg by mouth       fluticasone (FLONASE) 50 MCG/ACT nasal spray 2 sprays by Nasal route daily 1 Bottle 1    olopatadine (PATANOL) 0.1 % ophthalmic solution 1-2 drops      doxazosin (CARDURA) 2 MG tablet Take 2 mg by mouth nightly       furosemide (LASIX) 20 MG tablet Take 20 mg by mouth daily      spironolactone (ALDACTONE) 25 MG tablet Take 25 mg by mouth daily      potassium chloride (KLOR-CON M) 20 MEQ extended release tablet Take 1 tablet by mouth 2 times daily (Patient taking differently: Take 20 mEq by mouth daily ) 60 tablet 3    loratadine (CLARITIN) 10 MG tablet Take 10 mg by mouth daily       Triamcinolone Acetonide (NASACORT ALLERGY 24HR NA) by Nasal route 3 times daily as needed. No current facility-administered medications on file prior to visit. Allergies   Allergen Reactions    Sulfa Antibiotics Swelling    Lisinopril     Lisinopril Other (See Comments)     cough    Sulfa Antibiotics Swelling    Codeine Nausea And Vomiting    Percocet [Oxycodone-Acetaminophen] Nausea And Vomiting        Review of Systems   Constitutional: Negative for appetite change, chills, diaphoresis, fatigue, fever and unexpected weight change. Eyes: Negative for visual disturbance. Respiratory: Negative for cough, chest tightness, shortness of breath and wheezing. Cardiovascular: Negative for chest pain, palpitations and leg swelling. No orthopnea, No PND   Gastrointestinal: Negative for abdominal pain, anal bleeding, blood in stool, constipation, diarrhea, nausea and vomiting. No heartburn, No melena   Endocrine: Negative for cold intolerance, heat intolerance, polydipsia, polyphagia and polyuria. Genitourinary: Negative for dysuria and hematuria. Musculoskeletal: Positive for arthralgias (left knee). Negative for myalgias. Skin: Negative for rash. Neurological: Negative for dizziness, syncope, weakness, light-headedness, numbness and headaches. Psychiatric/Behavioral: Negative for dysphoric mood and sleep disturbance. The patient is not nervous/anxious. Vitals:  /76 (Site: Left Upper Arm, Position: Sitting, Cuff Size: Large Adult)   Pulse 65   Temp 96.8 °F (36 °C) (Temporal)   Ht 5' 5\" (1.651 m)   Wt 292 lb 12.8 oz (132.8 kg)   LMP 11/14/1998   SpO2 96%   BMI 48.72 kg/m²     Physical Exam  Vitals reviewed. Constitutional:       General: She is not in acute distress. Appearance: She is obese. She is not ill-appearing. Eyes:      General: No scleral icterus. Neck:      Thyroid: No thyroid mass, thyromegaly or thyroid tenderness. Vascular: No carotid bruit. Cardiovascular:      Rate and Rhythm: Normal rate and regular rhythm. Pulses:           Dorsalis pedis pulses are 2+ on the right side and 2+ on the left side. Posterior tibial pulses are 2+ on the right side and 2+ on the left side. Heart sounds: Normal heart sounds. No murmur heard. Pulmonary:      Effort: Pulmonary effort is normal. No respiratory distress. Breath sounds: Normal breath sounds. No wheezing, rhonchi or rales. Abdominal:      General: Bowel sounds are normal. There is no distension. Palpations: Abdomen is soft. Tenderness: There is no abdominal tenderness. There is no right CVA tenderness, left CVA tenderness, guarding or rebound. Musculoskeletal:      Cervical back: Neck supple. Right lower le+ Edema present. Left lower le+ Edema present. Feet:      Right foot:      Protective Sensation: 10 sites tested. 10 sites sensed. Skin integrity: Callus and dry skin present. No ulcer, blister, skin breakdown, erythema, warmth or fissure. Left foot:      Protective Sensation: 10 sites tested. 10 sites sensed. Skin integrity: Callus and dry skin present. No ulcer, blister, skin breakdown, erythema, warmth or fissure. Lymphadenopathy:      Cervical: No cervical adenopathy. Skin:     Findings: No rash. Neurological:      Mental Status: She is alert and oriented to person, place, and time. Sensory: Sensation is intact. No sensory deficit. Comments: Foot monofilament testing negative for neuropathy bilaterally   Psychiatric:         Mood and Affect: Mood normal.         Behavior: Behavior normal.         Thought Content: Thought content normal.         Ortho Exam (If Applicable)              An electronic signature was used to authenticate this note.      Rolando Morales MD

## 2022-06-30 NOTE — ASSESSMENT & PLAN NOTE
Most recent A1c borderline and result is rising over time. I reviewed with patient the benefits of early aggressive management. She agrees to initiate metformin therapy. A referral has been placed to dietitian to help with establishing a healthy eating plan to promote healthy weight loss.

## 2022-06-30 NOTE — ASSESSMENT & PLAN NOTE
Managed well with use of medication and avoidance of dietary triggers.   Patient instructed to continue with current dose of PPI

## 2022-07-07 ENCOUNTER — OFFICE VISIT (OUTPATIENT)
Dept: ORTHOPEDIC SURGERY | Age: 62
End: 2022-07-07
Payer: COMMERCIAL

## 2022-07-07 VITALS
TEMPERATURE: 97.1 F | RESPIRATION RATE: 16 BRPM | OXYGEN SATURATION: 96 % | HEART RATE: 61 BPM | BODY MASS INDEX: 48.65 KG/M2 | HEIGHT: 65 IN | WEIGHT: 292 LBS

## 2022-07-07 DIAGNOSIS — M17.10 PATELLOFEMORAL ARTHRITIS: Primary | ICD-10-CM

## 2022-07-07 DIAGNOSIS — M71.22 BAKER'S CYST OF KNEE, LEFT: ICD-10-CM

## 2022-07-07 PROCEDURE — 20610 DRAIN/INJ JOINT/BURSA W/O US: CPT | Performed by: ORTHOPAEDIC SURGERY

## 2022-07-07 PROCEDURE — 99204 OFFICE O/P NEW MOD 45 MIN: CPT | Performed by: ORTHOPAEDIC SURGERY

## 2022-07-07 RX ORDER — LIDOCAINE HYDROCHLORIDE 10 MG/ML
5 INJECTION, SOLUTION INFILTRATION; PERINEURAL ONCE
Status: COMPLETED | OUTPATIENT
Start: 2022-07-07 | End: 2022-07-07

## 2022-07-07 RX ORDER — METHYLPREDNISOLONE ACETATE 80 MG/ML
80 INJECTION, SUSPENSION INTRA-ARTICULAR; INTRALESIONAL; INTRAMUSCULAR; SOFT TISSUE ONCE
Status: COMPLETED | OUTPATIENT
Start: 2022-07-07 | End: 2022-07-07

## 2022-07-07 RX ADMIN — LIDOCAINE HYDROCHLORIDE 5 ML: 10 INJECTION, SOLUTION INFILTRATION; PERINEURAL at 15:33

## 2022-07-07 RX ADMIN — METHYLPREDNISOLONE ACETATE 80 MG: 80 INJECTION, SUSPENSION INTRA-ARTICULAR; INTRALESIONAL; INTRAMUSCULAR; SOFT TISSUE at 15:32

## 2022-07-07 SDOH — HEALTH STABILITY: PHYSICAL HEALTH: ON AVERAGE, HOW MANY MINUTES DO YOU ENGAGE IN EXERCISE AT THIS LEVEL?: 20 MIN

## 2022-07-07 SDOH — HEALTH STABILITY: PHYSICAL HEALTH: ON AVERAGE, HOW MANY DAYS PER WEEK DO YOU ENGAGE IN MODERATE TO STRENUOUS EXERCISE (LIKE A BRISK WALK)?: 2 DAYS

## 2022-07-07 ASSESSMENT — SOCIAL DETERMINANTS OF HEALTH (SDOH)

## 2022-07-07 NOTE — PROGRESS NOTES
Subjective:      Patient ID: Oma Dang is a 57 y.o. female who presents today for:  Chief Complaint   Patient presents with    Knee Pain     Left knee, pt states she has a small spot on the back of her knee she is concerned with. Pt rates pain 6/10. XR done 2022 as well as US. According to her history she had pain involving the right knee posteriorly as well as anterior aspect.   She is also worried about a skin spot involving her left knee posteriorly  HPI  As above    Past Medical History:   Diagnosis Date    [facility]) 2018    Anxiety     Atopic rhinitis 2016    Atrial fibrillation, persistent ([facility] [address] 05/10/2018    Baker's cyst of knee, left 2022    Chronic cholecystitis with calculus 2017    Dyshidrotic eczema 01/15/2020    Essential hypertension 2014    GERD (gastroesophageal reflux disease) 2022    Hyperlipidemia     Morbid obesity ([facility] [address] 2009    ALE on CPAP 2022    Osteoarthritis     back, knees, spine    Pacemaker 2018    PONV (postoperative nausea and vomiting)     from using inhaled anethesia     PVC's (premature ventricular contractions) 2016    Seasonal allergies     Symptomatic bradycardia 2018    Type 2 diabetes mellitus without complication, without long-term current use of insulin (HCC)     Vitamin D deficiency      Past Surgical History:   Procedure Laterality Date     SECTION      x2     SECTION   &     CHOLECYSTECTOMY, LAPAROSCOPIC N/A 2017    For biliary colic and stones    COLONOSCOPY  2014    [email or web address]    HYSTERECTOMY (CERVIX STATUS UNKNOWN)      fibroids total    HYSTERECTOMY (CERVIX STATUS UNKNOWN)      LUÍS AND BSO (CERVIX REMOVED) Bilateral     TONSILLECTOMY      TONSILLECTOMY  age 20     Social History     Socioeconomic History    Marital status:      Spouse name: Not on file    Number of children: 2    Years of education: Not on file    Highest education level: Not on file   Occupational History    Not on file   Tobacco Use    Smoking status: Never Smoker    Smokeless tobacco: Never Used   Vaping Use    Vaping Use: Never used   Substance and Sexual Activity    Alcohol use: No    Drug use: No    Sexual activity: Yes     Partners: Male   Other Topics Concern    Not on file   Social History Narrative    ** Merged History Encounter **         ** Data from: 5/4/16 Enc Dept: [facility]         ** Data from: 7/19/14 Enc Dept: [address]    Lives with . 2 daughters. ADs. Living Will. [facility], , secondary is daughter [facility]. Social Determinants of Health     Financial Resource Strain: Low Risk     Difficulty of Paying Living Expenses: Not hard at all   Food Insecurity: No Food Insecurity    Worried About Running Out of Food in the Last Year: Never true    Marlon of Food in the Last Year: Never true   Transportation Needs:     Lack of Transportation (Medical): Not on file    Lack of Transportation (Non-Medical):  Not on file   Physical Activity: Insufficiently Active    Days of Exercise per Week: 2 days    Minutes of Exercise per Session: 20 min   Stress:     Feeling of Stress : Not on file   Social Connections:     Frequency of Communication with Friends and Family: Not on file    Frequency of Social Gatherings with Friends and Family: Not on file    Attends Sabianist Services: Not on file    Active Member of Clubs or Organizations: Not on file    Attends Club or Organization Meetings: Not on file    Marital Status: Not on file   Intimate Partner Violence: Not At Risk    Fear of Current or Ex-Partner: No    Emotionally Abused: No    Physically Abused: No    Sexually Abused: No   Housing Stability:     Unable to Pay for Housing in the Last Year: Not on file    Number of [place] in the Last Year: Not on file    Unstable Housing in the Last Year: Not on file     Allergies   Allergen Reactions    Sulfa Antibiotics Swelling    Lisinopril     Lisinopril Other (See Comments)     cough    Sulfa Antibiotics Swelling    Codeine Nausea And Vomiting    Percocet [Oxycodone-Acetaminophen] Nausea And Vomiting     Current Outpatient Medications on File Prior to Visit   Medication Sig Dispense Refill    potassium chloride (KLOR-CON M) 20 MEQ extended release tablet Take 1 tablet by mouth 2 times daily (Patient taking differently: Take 20 mEq by mouth daily ) 60 tablet 3    metFORMIN (GLUCOPHAGE-XR) 500 MG extended release tablet Take 1 tablet by mouth daily (with breakfast) 90 tablet 1    fluocinonide (LIDEX) 0.05 % cream APPLY TOPICALLY TWICE A DAY 60 g 11    PARoxetine (PAXIL) 20 MG tablet TAKE 1 TABLET EVERY MORNING 90 tablet 3    metoprolol tartrate (LOPRESSOR) 50 MG tablet       pantoprazole (PROTONIX) 40 MG tablet Take by mouth      ELIQUIS 5 MG TABS tablet 5 mg 2 times daily       atorvastatin (LIPITOR) 80 MG tablet       metoprolol tartrate (LOPRESSOR) 25 MG tablet Take 25 mg by mouth 2 times daily       Respiratory Therapy Supplies BLAS Dispense cpap supplies and mask 1 Device 0    sotalol (BETAPACE) 120 MG tablet Take 120 mg by mouth       fluticasone (FLONASE) 50 MCG/ACT nasal spray 2 sprays by Nasal route daily 1 Bottle 1    olopatadine (PATANOL) 0.1 % ophthalmic solution 1-2 drops      doxazosin (CARDURA) 2 MG tablet Take 2 mg by mouth nightly       furosemide (LASIX) 20 MG tablet Take 20 mg by mouth daily      spironolactone (ALDACTONE) 25 MG tablet Take 25 mg by mouth daily      loratadine (CLARITIN) 10 MG tablet Take 10 mg by mouth daily       Triamcinolone Acetonide (NASACORT ALLERGY 24HR NA) by Nasal route 3 times daily as needed. No current facility-administered medications on file prior to visit.         Review of Systems    Objective:   Pulse 61   Temp 97.1 °F (36.2 °C) (Temporal)   Resp 16   Ht 5' 5\" (1.651 m)   Wt 292 lb (132.5 kg)   [facility] 11/14/1998   SpO2 96%   BMI 48.59 kg/m²   Ortho Exam   Examination right knee demonstrates range of motion from full extension to only about 80 to 90 degrees of flexion secondary to her body habitus, there is a very palpable Baker's cyst as well as patellofemoral crepitus and a moderate effusion. Her collateral ligaments are intact Lockman is difficult to perform secondary to her habitus Monique's is negative. Radiographs and Laboratory Studies:     Diagnostic Imaging Studies:    Reviewed radiographs taken of her right knee which failed to show any sign of arthritis involving the femoral-tibial joint however the lateral view does demonstrate on the patella that there is sclerosis and flattening suggesting patellofemoral arthritis although there is no dedicated patellofemoral view. Assessment:       Diagnosis Orders   1. Patellofemoral arthritis     2. Baker's cyst of knee, left           Plan:     Probable pain anteriorly secondary to patellofemoral disease, with Baker's cyst, I feel the most direct way of dealing with this is giving her a cortisone shot, and for this reason using sterile prep her right knee was injected using 5 cc of lidocaine 1 cc of Depo-Medrol 80 mg. No orders of the defined types were placed in this encounter. No orders of the defined types were placed in this encounter. No follow-ups on file.       Lv Hopkins MD

## 2022-07-13 DIAGNOSIS — E78.5 HYPERLIPIDEMIA, UNSPECIFIED HYPERLIPIDEMIA TYPE: ICD-10-CM

## 2022-07-13 DIAGNOSIS — I10 ESSENTIAL HYPERTENSION: ICD-10-CM

## 2022-07-13 DIAGNOSIS — E11.9 TYPE 2 DIABETES MELLITUS WITHOUT COMPLICATION, WITHOUT LONG-TERM CURRENT USE OF INSULIN (HCC): Primary | ICD-10-CM

## 2022-07-13 DIAGNOSIS — E66.01 MORBID OBESITY (HCC): ICD-10-CM

## 2022-07-14 ENCOUNTER — HOSPITAL ENCOUNTER (OUTPATIENT)
Dept: CARDIOLOGY | Age: 62
Discharge: HOME | End: 2022-07-14
Payer: COMMERCIAL

## 2022-07-14 PROCEDURE — 93296 REM INTERROG EVL PM/IDS: CPT

## 2022-07-20 ENCOUNTER — HOSPITAL ENCOUNTER (OUTPATIENT)
Dept: DIABETES SERVICES | Age: 62
Setting detail: THERAPIES SERIES
Discharge: HOME | End: 2022-07-20
Payer: COMMERCIAL

## 2022-07-20 VITALS — BODY MASS INDEX: 48.09 KG/M2 | WEIGHT: 289 LBS

## 2022-07-20 PROCEDURE — G0108 DIAB MANAGE TRN  PER INDIV: HCPCS

## 2022-07-20 SDOH — ECONOMIC STABILITY: FOOD INSECURITY: ADDITIONAL INFORMATION: NO

## 2022-07-20 ASSESSMENT — PROBLEM AREAS IN DIABETES QUESTIONNAIRE (PAID)
FEELING DEPRESSED WHEN YOU THINK ABOUT LIVING WITH DIABETES: 1
COPING WITH COMPLICATIONS OF DIABETES: 0
FEELING SCARED WHEN YOU THINK ABOUT LIVING WITH DIABETES: 1
WORRYING ABOUT THE FUTURE AND THE POSSIBILITY OF SERIOUS COMPLICATIONS: 1
PAID-5 TOTAL SCORE: 3
FEELING THAT DIABETES IS TAKING UP TOO MUCH OF YOUR MENTAL AND PHYSICAL ENERGY EVERY DAY: 0

## 2022-07-20 NOTE — PROGRESS NOTES
Diabetes Self- Management Education Program Assessment and Education Record-   Also see Diabetic Screening    Patient, Agustin Rodríguez, has been diagnosed with  [] Type 1 [x] Type 2 diabetes. [x] Patient is new to diabetes, and here for initial diabetes self-management assessment and education. [] Patient is here for review of diabetes self-management assessment and education. Today's visit was in an [x] individual [] group setting. Patient came [x] alone [] with family member.      Goals setting:  Initial Goal Set with Patient  [x]Yes  [] NO      MEDICAL HISTORY:  Past Medical History:   Diagnosis Date    Angina at rest [facility]) 05/14/2018    Anxiety     Atopic rhinitis 01/19/2016    Atrial fibrillation, persistent ([address] 05/10/2018    Baker's cyst of knee, left 6/23/2022    Chronic cholecystitis with calculus 08/01/2017    Dyshidrotic eczema 01/15/2020    Essential hypertension 03/24/2014    GERD (gastroesophageal reflux disease) 6/30/2022    Hyperlipidemia     Morbid obesity ([address] 04/30/2009    ALE on CPAP 06/20/2022    Osteoarthritis     back, knees, spine    Pacemaker 03/09/2018    PONV (postoperative nausea and vomiting)     from using inhaled anethesia     PVC's (premature ventricular contractions) 01/06/2016    Seasonal allergies     Symptomatic bradycardia 03/09/2018    Type 2 diabetes mellitus without complication, without long-term current use of insulin ([address]     Vitamin D deficiency      Family History   Problem Relation Age of Onset    Lupus Mother     Heart Disease Father     Heart Attack Father 50    Heart Disease Sister     Heart Attack Sister 40    Coronary Art Dis Sister     Lung Cancer Brother 62    Lung Cancer Maternal Grandfather     Colon Cancer Paternal Grandfather     No Known Problems Daughter     No Known Problems Daughter      Sulfa antibiotics, Lisinopril, Lisinopril, Sulfa antibiotics, Codeine, and Percocet [oxycodone-acetaminophen]   Immunization History   Administered Date(s) Administered    COVID-19, MODERNA BLUE border, Primary or Immunocompromised, (age 12y+), IM, 100 mcg/0.5mL 03/11/2021, 04/01/2021, 04/08/2021, 05/01/2021, 11/17/2021, 05/31/2022    Influenza Vaccine, unspecified formulation 09/01/2016    Influenza Virus Vaccine 10/01/2016, 10/01/2017, 02/04/2019, 09/17/2020    Influenza, High Dose (Fluzone 65 yrs and older) 10/11/2017    Influenza, Quadv, IM, PF (6 mo and older Fluzone, Flulaval, Fluarix, and 3 yrs and older Afluria) 10/03/2018, 10/01/2019    Pneumococcal Polysaccharide ([id number]) 03/24/2014    Pneumococcal conjugate PCV20, PF (Prevnar 20) 06/30/2022    Tdap (Boostrix, Adacel) 06/30/2022    Zoster Live (Zostavax) 01/01/2014, 02/22/2014, 06/08/2015       Current Medications  Current Outpatient Medications   Medication Sig Dispense Refill    metFORMIN (GLUCOPHAGE-XR) 500 MG extended release tablet Take 1 tablet by mouth daily (with breakfast) 90 tablet 1    fluocinonide (LIDEX) 0.05 % cream APPLY TOPICALLY TWICE A DAY 60 g 11    PARoxetine (PAXIL) 20 MG tablet TAKE 1 TABLET EVERY MORNING 90 tablet 3    metoprolol tartrate (LOPRESSOR) 50 MG tablet       pantoprazole (PROTONIX) 40 MG tablet Take by mouth      ELIQUIS 5 MG TABS tablet 5 mg 2 times daily       atorvastatin (LIPITOR) 80 MG tablet       metoprolol tartrate (LOPRESSOR) 25 MG tablet Take 25 mg by mouth 2 times daily       Respiratory Therapy Supplies BLAS Dispense cpap supplies and mask 1 Device 0    sotalol (BETAPACE) 120 MG tablet Take 120 mg by mouth       fluticasone (FLONASE) 50 MCG/ACT nasal spray 2 sprays by Nasal route daily 1 Bottle 1    olopatadine (PATANOL) 0.1 % ophthalmic solution 1-2 drops      doxazosin (CARDURA) 2 MG tablet Take 2 mg by mouth nightly       furosemide (LASIX) 20 MG tablet Take 20 mg by mouth daily      spironolactone (ALDACTONE) 25 MG tablet Take 25 mg by mouth daily      potassium chloride (KLOR-CON M) 20 MEQ extended release tablet Take 1 tablet by mouth 2 times daily (Patient taking differently: Take 20 mEq by mouth daily ) 60 tablet 3    loratadine (CLARITIN) 10 MG tablet Take 10 mg by mouth daily       Triamcinolone Acetonide (NASACORT ALLERGY 24HR NA) by Nasal route 3 times daily as needed. No current facility-administered medications for this encounter.   :     Comments:  Allergies: Allergies   Allergen Reactions    Sulfa Antibiotics Swelling    Lisinopril     Lisinopril Other (See Comments)     cough    Sulfa Antibiotics Swelling    Codeine Nausea And Vomiting    Percocet [Oxycodone-Acetaminophen] Nausea And Vomiting       Diabetes 5  / Health Status    1. A1C blood level - at goal < 7%   Lab Results   Component Value Date    LABA1C 7.0 (H) 06/08/2022    LABA1C 6.4 09/07/2021    LABA1C 6.5 (H) 05/04/2020     Lab Results   Component Value Date    GLUF 127 (H) 06/08/2022    LABMICR <1.20 05/24/2021    LDLCALC 71 06/08/2022    CREATININE 0.93 (H) 06/21/2022       2. Blood pressure (140/90)  Or less  BP Readings from Last 3 Encounters:   06/30/22 132/76   06/20/22 134/74   06/07/22 134/88       3. Cholesterol ( LDL under  100)   Lab Results   Component Value Date    LDLCALC 71 06/08/2022    LDLCHOLESTEROL 138 (H) 03/18/2013       4. Smoking ? []Yes   [x]No    5. Taking an Asprin daily? []Yes   [x]No      Diabetes Self- Management Education Record    Participant Name: Agustin Rodríguez  Referring Provider: Rowdy Valencia MD   Assessment/Evaluation Ratings:  1=Needs Instruction   4=Demonstrates Understanding/Competency  2=Needs Review   NC=Not Covered    3=Comprehends Key Points  N/A=Not Applicable    Topics/Learning Objectives Initial Assessment Date:  Comments:  Signature:   Classes 1, 2, 3 or Individual instruction Instr.  Date:  Comment:   Signature:   Reinforce Date:  Comments:  Signature: Post- Education Eval date:  Comments:  Signature:   Pathophysiology of diabetes  Define diabetes & Insulin resistance Identify own type of diabetes; role of the pancreas; signs/symptoms; diagnostic criteria; prevention & treatment options; contributing factors. Assessment Ratin  22  Diabetes Education assessment completed today. Patient has:  [] No knowledge of diabetes disease process   [x] Limited knowledge of diabetes disease process  [] Good knowledge of diabetes disease process. In this session, the role of the pancreas, insulin, and the liver in glucose utilization and insulin resistance was   [x]  Introduced  []  Reviewed. [x] ADA booklet Where Do I Begin used to reinforce teaching. Francisco Staples RN   [] Discussed basic pathophysiology of diabetes with the group including the pancreas, insulin, insulin resistance and the liver's involvement. [] Reviewed the different types of DM, risk factors, diagnosis, symptoms and the treatment options. Evaluation rating:  Recent Health problems:  []Yes []No   Being Active  Verbalize effect of exercise on blood glucose levels; benefits of regular exercise; safety considerations; contraindications; maintenance of activity. Assessment Ratin  22    Patient is   [x] currently being active daily  [] not currently being active daily. [x] Reviewed effects of exercise on glycemic control, risks and benefits, precautions. Patient       [x]has co- morbidities - Bakers cyst - going to PT       [] has no co-morbidities that recommend being seen by [facility] prior to starting exercise program for medical clearance. [x] Briefly reviewed guidelines for frequency, duration, intensity for exercise. Patient currently engages in the following activities: Ruth Vásquez RN     [] Reviewed effects of activity on glycemic control and weight loss, risks and benefits, and precautions. [] Current recommendations for being active by the American Diabetes Association reviewed.      [] Discussed what patient is doing to stay active   Evaluation rating:  Have you made any changes in your activity level? []Yes []No       If yes, how? If yes, how many days/week? Monitoring blood glucose Identify recommended & personal blood glucose targets; importance of testing; testing supplies; HgbA1C target levels; Factors affecting blood glucose; Importance of logging blood glucose levels for pattern recognition; ketone testing; safe lancet disposal..   Assessment Ratin  22  [] Proficient in using meter   [] Recently started using meter  [] Not currently using meter  [x] Patient does not have a meter prescribed & advised to contact PCP for order or to purchase meter. States she will message provider tomorrow to get meter    [x] Reviewed handout Desired blood glucose level citing ADA recommendations for blood glucose goals. Discussed frequency of testing, importance of record keeping, proper handling of meter and test strips, disposal of used strips and lancets. Reviewed importance of testing as a means to evaluate effectiveness of treatment plan. [x] Does not have any Anemias or  hemoglobinopathies that may affect A1c  [] Has anemia or hemoglobinopathy that may affect A1c    Patient had cortisone injection for bakers cyst 2 weeks ago. Encouraged to get meter to assess effects of cortisone on blood glucose    Elaina Solano RN   [] Discussed blood glucose monitoring to include: American Diabetes Association goals for blood glucose, effects of diet, exercise and medications on test results, frequency of testing, the possible causes and appropriate action to take if hypoglycemia (15/15 rule) or hyperglycemia occurs,   importance of record keeping to share with their PCP and when to call their PCP with abnormal results. [] Also reviewed were: proper storage and handling of both the meter and test strips; proper disposal of used strips and lancets, running  checks on the test strips using control solution and loading and using lancet device to obtain an adequate sample. Suggested times were given for routine testing. To increase testing for sick day monitoring, or when a change in diabetes medication, activity, or stress occurs. []  Aware of individualized A1C goal and current A1C. [] Checking for ketones was introduced along with prevention and symptoms of Diabetic Ketoacidosis (DKA)    Evaluation rating:    Checking blood sugar    times a day. Checking before meals? []Yes  []No     Fasting ranges in last week:      Checking 2 hours post prandial? []Yes  []No     Ranges in last week:     Checking at bedtime? []Yes  []No   Ranges in last week:     Knowledgeable of blood glucose goals? []Yes []No             Glucose meter - educate on meter use if new to monitoring. Able to use meter appropriately   Assessment Ratin  22  [] Patient is new to glucose meter and instructed on the following.:   []Overview of meter - 800 number on all machines for help  []Proper storage/ handling of meter and test strips  []Washing hands, turning on meter - setting date and time  []Running  checks on the test strips using control solution   []Loading and using lancet device to obtain an adequate sample  []Obtaining blood sample and reading results on meter  []Proper disposal of used strips and lancets  []Frequency of testing  Importance of record keeping   []When to call their PCM with abnormal results  []Desired blood glucose goals for optimal control - handout given  [] All of the above    [] Patient instructed on home meter. Name of meter:    [] Patient instructed with demonstrator model in office. The patient return demonstrated   [] Verbally   [] Using his own meter:        [] Self-tested Bg:_____    Kala Moore RN   See above  Evaluation rating:    Cleaning hands before testing? []Yes   []No    Using sides of fingers, not pads? []Yes   []No    Using  checks. []Yes   []No    Logging resuts?   []Yes   []No   Risk Reduction - acute complications:  Identify symptoms of hyper & hypoglycemia, and prevention & treatment strategies. Assessment Ratin  22  [] Knowledgeable  [] Limited Knowledge  [x] No knowledge   of hypo or hyperglycemia. [x] Handouts reviewed covering symptoms and treatment for both. (includes Rule of 15)    Patient has [x]low []high risk for hypoglycemia. [] Advised to carry source of fast acting glucose at all times. []  Advised to carry ID on person identifying as having diabetes  Rosy Saenz RN   [] Reviewed signs and symptoms of acute complications of diabetes, (hypoglycemia and hyperglycemia), possible causes and actions to take if occurs. [] Reviewed BG guidelines and troubleshooting unexpected numbers. Evaluation rating:    Knowledge of Hypo and Hyper glycemia treatment []Yes []No     Knowledge of Hypo and Hyper glycemia prevention []Yes []No    Lows since last visit? []Yes []No      Treat appropriately? []Yes  []No    Explainable? []Yes  []No      Ketone testing? []Yes []No   Risk Reduction - sick days   Describe sick day guidelines & indications for physician notification. Identify short term consequences of poor control. Assessment Ratin22  NC   [] Advised of effects of illness on blood glucose. Reviewed management of sick days with group. Advised about importance of continuing diabetes medications, tips for staying hydrated and when to call the doctor. [] Sick day handout given.     [] Sick day toolbox reviewed. Evaluation rating:    Knowledge of sick day plan? []Yes [] No   Healthy Coping:   Identify healthy and unhealthy coping, causes of stress and ways to reduce stress. How depression affects diabetes care and how to seek help if needed. Identify support needed & support network available   Assessment Ratin  22    Patient's PAID-5 Score:3    [x]Patient's PAID-5 (Problem Areas in Diabetes) score is less than 9.  No intervention needed at this time.     [] Patient's PAID-5 (Problem Areas in Diabetes) score is greater than 8 which indicates possible diabetes related emotional distress and warrants further assessment. [] Support given during appointment. Patient advised to follow up with PCP or psychologist.   [] Letter will be sent to PCP indicating further assessment needed. Jenny Franco RN   [] Reviewed healthy coping and unhealthy coping with the group. Discussed what ways of coping each person usually uses and brainstormed ways to change to a healthy coping mechanism with the group. [] Stressed the importance of stress reduction and the negative effects of stress on the body including elevated BG.     [] Community resources and support networks reviewed and handout provided. Evaluation rating:    Feelings/Attitudes/Concerns? Ongoing self-management support plan? [] Yes []No   Problem solving & goal setting:  Behavior Change and goal settingIdentify 7 self-care behaviors, problem solving techniques: Finding problems, identifying solutions and taking action. Assessment Ratin  22  Discussed willingness to change behaviors and setting SMART goals. Patient [x] is willing  [] is not willing to change at this time. Jenny Franco RN [] Identified problem solving techniques: finding problems, identifying solutions (goal setting) and taking action. [] Patient reviewed selected goal set at initial assessment. Evaluation rating:    Identifying Barriers: Depression, unhealthy behaviors, financial    Healthy Eating: Describe effect of type, amount & timing of food on blood glucose; Describe basic meal planning techniques & current nutrition guideline  Correctly read food labels & demonstrate CHO counting & portion control with personalized meal plan.    Identifies dining out strategies, & dietary sick day guidelines   Assessment Ratin22    Meal Plan patient is currently following:  [] plate method  [] portion control  [] carb counting   [] label reading  [x] no meal plan    [x] Booklet from [facility] given. Reviewed plate method, portion control & label reading for carb counting utilizing booklet. [x] Advised that dietitian will recommend individualized number of carbs to eat daily, but in meantime could follow basic recommendations as follows:  [] Men-   [] for weight loss - 3-4 choices/45-60 gms per meal with 1 snack of 15 gm per day. []To maintain weight: 4-5 choices/60-75  gms pre meal with 1 snack of 15 gm per day. [x] Women -   [x]weight loss 2-3 choices/30-45 gms per meal with 1 snack of 15 gm per day. []To maintain weight: 3-4 servings/45-60 gms pre meal with 1 snack of 15 gm per day. Goal is to lose 10 pounds    Patient would benefit from   [] individual appt with dietitian  [] group MNT with dietitian    Jaky Corbett RN   Evaluation rating:    Are you following a meal plan? []Yes []No    [] Portion control   [] Plate method   [] Carb counting   [] Label reading    Weight loss since class? []Yes []No   Taking MedicationsIdentify effects of diabetes medicines on blood glucose levels; List diabetes medication taken, action & side effects   Assessment Ratin  22  Handouts provided on both oral and injectable medications. Currently taking: Metformin  mg daily. Began 2 weeks ago    [] Currently takes the following complementary or alternative medicines:    [x] Reviewed medication compliance, action, side effects and timing of each. Has metallic taste in mouth     Patient is:  [x]compliant with current meds. []noncompliant with current meds. Jaky Corbett RN   [] Discussed all medication classes both oral and injectable to include method of action, side effects and precautions. [] Patient provided handout with their medications for diabetes listed showing method of action and when to take. Evaluation rating:    Any Changes in Medications? Compliant? []Yes []No     Any side effects? []Yes [] No   Insulin / Injectable - Appropriate injection sites; proper storage; supplies needed; proper technique; safe needle disposal guidelines. Assessment Ratin  22    [x] Not on insulin    [] New to insulin   Patient is new to insulin and prescribed:     []  Instructed today on type of insulin(s), onset, peak and duration. []  Demonstrated proper administration technique using       []Vial & syringe       []Pen. [] Return demonstration via          [] Self-injection  __U Site:____             [] Demonstrator        []  Reviewed recommended injection sites, proper storage of insulin, Proper needle disposal, importance of blood glucose monitoring when taking insulin, and risk of hypoglycemia. []  Handouts given.     [] Previously on insulin:  and assessed the following:    [] Knowledge of type of insulin - onset, peak and duration of each type    [] Demonstrates Competency      [] Education provided      [] Proper storage of insulin:     [] Demonstrates Competency      [] Education provided          [] Site Management        [] Demonstrates Competency        [] Education provided      [] Injection site currently uses:     [] Evidence of:        [] bruising       [] infection       [] lipoatrophy        [] lipohypertrophy.      [] Injecting near umbilicus or scars/tattoos    [] Rotates sites appropriately    [] Skin Preparation technique:          [] Injection Procedure:       [] Demonstrates Competency        [] Education provided   [] Sterile Technique   [] Rolling to uniformity (if necessary)   [] Pre-injection priming (pen only)   [] Air into vial (Syringe only)   [] Correct order for mixing in same syringe (if necessary)   [] Dosing accuracy   [] Needle insertion   [] Complete injection -    [] Needle withdrawal - with waiting time before removing needle          [] Disposal Procedure:       [] Demonstrates Competency        [] Education provided    [] Has sharps container /needle clip    [] Uses sharps container / needle clip          [] Injection Device Selection:       [] Demonstrates Competency       [] Education provided    [] Appropriate needle size    [] Sufficient volume    [] Appropriate dosing increments    [] Suited to physical limitations           [] Injection adherence:       [] Demonstrates Competency        [] Education provided     [] Misses doses:         [] Daily [] Weekly          [] Monthly [] Almost Never         [] Hypoglycemia symptoms & treatment:      [] Demonstrates Competency        [] Education provided    Bryan Stearns RN   [] Discussed insulin stigma and proper technique including site selection and self-injection. []  Reviewed both pen and vial/syringe use. Discussed needle disposal and proper insulin storage and importance of times to take insulin. [] Discussed importance of blood glucose monitoring to assess current treatment effectiveness. Evaluation rating: On Insulin? []Yes []No           Injection site used: __________     Rotating sites? []Yes [] No     Problems? []Yes []No      Storing insulin correctly? []Yes [] No     Injecting at proper times? []Yes []No   IRisk Reduction - chronic complications:  Describe the relationship of blood glucose levels to long term complications of diabetes. Identify preventative measures & standards of care. Assessment Ratin22  NC  See Diabetes Assessment for last completed chronic complication prevention appointments.     [] Patient is up-to-date on preventative exams and vaccines  [x] Patient is not up-to date on preventative exams and vaccines and advised to discuss with PCM    [x] Blood pressure is at goal  [] Blood pressure is not at goal    [x] Cholesterol is at goal  [] Cholesterol is not at goal    [] Has microvascular complications  [] Has macrovascular complications  Bryan Stearns RN   [] Reviewed natural course of T2DM with group and how chronic complications are related to elevated blood glucose. [] Discussed macro and microvascular complications and the need for control of lipids, blood pressure, glucose levels, weight reduction and smoking cessation to help reduce risks. [] Instructed on goals for blood pressure, lipids, and glucose for optimal health. [] Individualized scorecard provided with current   health maintenance recommendations from the American Diabetes Association to include Eye, dental exams, foot exams, recommended labs and vaccines for people with diabetes. Evaluation rating:    Using Score card? [] Yes []No    Blood Pressure at goal?  [] Yes []No    Foot exams:  self-exams daily  []Yes []No  Provider yearly   []  Yes []No               Eye and dental exams up to date? []Yes []No               Labs completed & at goal  [] Yes []No        Plan if not at goal? []Yes []No  Immunizations   [] Flu   []pneumonia   []Hep B (19-59)   []Covid   Individualized goal selection. 07/20/22  My goal , to help me improve my health, I will:     Begin to reduce portion size of meals at evening meal 3 times in the next week. 0 %      2. Message PCP for meter tomorrow   0%    Edilia Hein RN Percentage of goal completed from Initial Assessment:   %      2.    %    New revised goal: Percentage of goal completed:  1.      2.    New revised goal: Percentage of goal completed since end of class:  1.      2.    New revised goal:     Plan  Follow-up Appointments planned in group setting. Next Appointment in 3 weeks. Instruction Method: [x]Lecture/Discussion  []Power Point Presentation  [x]Handouts  []Return Demonstration      Education Materials/Equipment Provided:      [x] Self-Management  - Initial Assessment - Where do I Begin booklet and Counting Carbs from the ADA.     [] Self-Management  Class 1 - On the Road to better managing your diabetes - Packet of Handouts from Diabetes Department    [] Self-Management  Class 2 - Meal Plan,  Choose your Foods - Food Lists for [employer] and Nutrition in the [employer] - fast facts about fast food    [] Self-Management  Class 3 -  Diabetes ID card,  foot care tips sheet,  Continuing Your Journey with Diabetes, Individualized Diabetes report card, Sick Day Rules, Diabetes Cookbooks, Magazines and Pedometers when available    [] Glucose Meter     [] BD Getting Started Insulin Kit     [] Other      Encounter Type Date Start Time End Time Comments No Show Dates   Assessment 07/20/22  Royal Najma  300 07/20/22    [x] Patient has no barriers to learning and recommend attending classes. Classes scheduled for: 8/9 & 11 and Aug 17    [] Patient has the following barriers to learning and would benefit from additional education in an individual setting. Barriers:      [] Will follow up:    [] Patient declines classes at this time.       [] Will follow up:    Royal Najma RN    Class 1 - Understanding diabetes      [] First class completed today. Class 2- Nutrition and diabetes        [] Second class completed today    Class 3 - Preventing Complications     [] Third class completed today. [] Patient has completed all 3 classes today. Goal sheets and DSMS Support Plan completed. Will follow up in 3-4 months via telephone. Patient advised to contact Diabetes Education office if any questions. Number provided. [] Patient needs to attend Class #    in order to complete classes. Scheduled for: Individual MNT         3 Month Follow-up      []In Person  []Telephone    Meter Instrx        Insulin Instrx      []Pen  []Vial & Syringe      DSMS Support Plan:  Follow-up plan:    [] Healthy Coping  [] Emotional Support  [] National Leo on Mental Illness (CLEO) - (Depression, bipolar and other support) 596.817.5099; www.cleo.org    [] [address] [place] [address],[place] [address]  [phone]; www.dbsalliance. org                          [] [facility] Suicide Prevention Amxknxsh-305-335-8255                          [] Anxiety & Depression Association of [place]                               Find local support groups by zip code at [employer] phone number [phone]                          [] Counseling:  ______________________________  [] Diabetes Support Groups  [] New Weigh of Life at [facility]  2nd Tuesday of the month at 10:00 AF-[phone]  [] On-line support groups ([employer], [facility], ADA)  [] [facility]  [] I would be interested in a support group at [facility] in [facility] if offered    [] Stress Relief     [] Yoga Class     [] Start a journal     [] Relaxation techniques     [] Icgcnwh0Ydadx- Michael         [] SparkQuote (Free, inspiring quote of the day)    []  Healthy Eating  [] Weight Management & Carb Counting  [] Weight Ntdjsnzk-939-687-6000; www.weightwatchHermes IQ  [] Over Eaters Bxydauqrf-603-634-2664 (support group)- www.[employer]id [employer]. org  [] Follow up individual appointment with [facility] dietitian - call [phone]  [] Food Tracking Apps - My Fitness Pal, Jessika Hiss  [] ADA website - [address], weight loss  [] Myplate. com      []   Monitoring  [] Glucose Beto (Free, tracks blood glucose, graphs)  [] MySugrApp (Basic and Pro patterns)  [] Diabetes:M - logbook    []  Being Active  [] 24 Hour Ujbdzpo-347-010-0240; www.Dime  [] Marcos Barcenas. [employer]  [] [address]  [] Fit Walks: FREE  [facility] [facility] in [place] on Mondays 5:30 pm  [facility] in [address] [place] [address] (for weather)   [employer] on Thursdays at 5:30 MH-[phone]  [] Saurabh Rojas walking videos (Some free on youtube)  [] Other Exercise Plan/Facility _____________________________________________  [] Apps Shirlene Gardner to 5K    []  Reducing Risk  [] Make a sick day toolkit  [] Schedule appointments for eye, dentist  [] Stop smoking              [] Monitor Blood pressure              [] Get vaccinated                [] Other:___________________      []    Taking Medications  [] Michael: Return PathApp - helps track meds  [] Seek financial help with medications:  [] Non-insulin-agents-cost-saving-resource-7-29-19. pdf   SharkBrains.[employer]. org/docs/default-source/practice/educator-tools/non-insulin-agents-cost-saving-resource-7-29-19. pdf?sfvrsn=2  [] Insulin cost saving resource  Sharkarchifys.[employer]. org/docs/default-source/practice/educator-tools/insulin-cost-saving-resources-3-4-19. pdf?sfvrsn=4  []  GetInsulin. org  []  The Affordable insulin Project http://affordableinsulinproject.org/    []  Diabetes Websites - Use as a resource for additional information  [] www.diabetes. org  [] [employer]Wiki[employer]  [] WWW.diabeteseducator. org     Association of Diabetes Care & Education Specialists  [] www.niddk.nih.gov/health-information/diabetes/overview  Professor Sullivan 108  [] www.medicalert. org  - offers emergency medical information service, including an ID bracelet/pendant (have to pay)    []   Journals/Magazines  [] Diabetes Forecast 4-686-181-1880; www.diabetesforecast.org  [] Diabetes Self-Management 2-123.809.4289; www.diabetesselLightSail Education. [employer]  [] Diabetes Health 386-741-2612; www.diabeteshealth. com    []   Other  [] Health Program offered at place of employment   [] Insurance Company's Chronic Disease Management Program  [] Follow up - Diabetes Education or Nutrition Therapy Annually (call in 1 year to set up apt)  [] Join the Living with Type 2 Diabetes Program (FREE)   www.diabetes. org/diabetes/type-2/living-with-type-2-diabetes-program  or call 1-800-DIABETES  [] Stop smoking - www.cancer. org/healthy/stay-away-from-tobacco       Post Education Referrals:        [] [place] Tobacco Quit information sheet and [address] [facility] , [address]       [] Dental care     [] Podiatrist     [] Ophthalmologist     [] Other    Alla Ku, RN

## 2022-07-22 ENCOUNTER — TELEPHONE (OUTPATIENT)
Dept: FAMILY MEDICINE CLINIC | Age: 62
End: 2022-07-22

## 2022-07-22 DIAGNOSIS — E11.9 TYPE 2 DIABETES MELLITUS WITHOUT COMPLICATION, WITHOUT LONG-TERM CURRENT USE OF INSULIN (HCC): Primary | ICD-10-CM

## 2022-07-22 NOTE — TELEPHONE ENCOUNTER
Patient went to Diabetes Education and the instructor was wanting to know if she can get a monitor and  strips to monitor her diabetes. Also, the instructor wants to know if she really has Diabetes Type 2.     [facility] in [place] for Rx

## 2022-07-22 NOTE — TELEPHONE ENCOUNTER
Yes patient has diabetes. She has two separate A1c values of 6.5% or above meeting criteria, including a recent A1c of 7% which is borderline diabetes with hyperglycemia. I have sent diabetic testing supply RX to her pharmacy which includes glucometer, lancets, test strips, and alcohol pads.

## 2022-07-26 RX ORDER — LANCETS 30 GAUGE
EACH MISCELLANEOUS
Qty: 100 EACH | Refills: 5 | Status: SHIPPED | OUTPATIENT
Start: 2022-07-26

## 2022-07-26 RX ORDER — GLUCOSAMINE HCL/CHONDROITIN SU 500-400 MG
CAPSULE ORAL
Qty: 100 STRIP | Refills: 5 | Status: SHIPPED | OUTPATIENT
Start: 2022-07-26

## 2022-07-26 RX ORDER — BLOOD PRESSURE TEST KIT
KIT MISCELLANEOUS
Qty: 100 EACH | Refills: 5 | Status: SHIPPED | OUTPATIENT
Start: 2022-07-26

## 2022-07-26 NOTE — TELEPHONE ENCOUNTER
Patient is requesting medication refill. Please approve or deny this request. Patient came in and said that they were not at the pharmacy. cp    Rx requested:  Requested Prescriptions     Pending Prescriptions Disp Refills    blood glucose monitor kit and supplies 1 kit 0     Sig: DX: diabetes mellitus. Test 1 time(s) daily - Ok to substitute per insurance    blood glucose monitor strips 100 strip 5     Sig: DX: diabetes mellitus. Use 1 time(s) daily - Ok to substitute per insurance    Lancets MISC 100 each 5     Sig: DX: diabetes mellitus. Use 1 time(s) daily - Ok to substitute per insurance (1 each = 1 box)    Alcohol Swabs PADS 100 each 5     Sig: DX: diabetes mellitus.  Test 1 time(s) daily - Ok to substitute per insurance (1 each = 1 box)         Last Office Visit:   6/30/2022      Next Visit Date:  Future Appointments   Date Time Provider Colleen Coleman   8/1/2022  2:00 PM Stefania Cano MD [facility]   8/9/2022  5:30 PM [facility] ED GROUP SESSION Marice Phoenix ED [address] [place] [address]   8/11/2022  5:30 PM [address] ([facility]) ED GROUP SESSION Marice Phoenix ED [address] [place] [address]   8/17/2022  9:00 AM [facility] ED GROUP SESSION Shy Richardson DIAB ED [address] [place] [address]   10/3/2022  9:30 AM MD Stella Valente 94

## 2022-08-01 ENCOUNTER — OFFICE VISIT (OUTPATIENT)
Dept: PULMONOLOGY | Age: 62
End: 2022-08-01
Payer: COMMERCIAL

## 2022-08-01 VITALS
WEIGHT: 290.6 LBS | DIASTOLIC BLOOD PRESSURE: 74 MMHG | OXYGEN SATURATION: 93 % | HEART RATE: 68 BPM | HEIGHT: 65 IN | SYSTOLIC BLOOD PRESSURE: 124 MMHG | TEMPERATURE: 96.9 F | RESPIRATION RATE: 16 BRPM | BODY MASS INDEX: 48.42 KG/M2

## 2022-08-01 DIAGNOSIS — G47.33 OSA (OBSTRUCTIVE SLEEP APNEA): Primary | ICD-10-CM

## 2022-08-01 DIAGNOSIS — I27.20 PULMONARY HYPERTENSION (HCC): ICD-10-CM

## 2022-08-01 DIAGNOSIS — E66.01 CLASS 3 SEVERE OBESITY DUE TO EXCESS CALORIES WITH SERIOUS COMORBIDITY AND BODY MASS INDEX (BMI) OF 40.0 TO 44.9 IN ADULT (HCC): ICD-10-CM

## 2022-08-01 PROCEDURE — 99213 OFFICE O/P EST LOW 20 MIN: CPT | Performed by: INTERNAL MEDICINE

## 2022-08-01 NOTE — PROGRESS NOTES
Subjective:     Sandy Colindres is a 57 y.o. female who complains today of:     Chief Complaint   Patient presents with    Follow-up     1 YR F/U for ALE on CPAP       HPI  Patient presents for ALE      Doing good, symptoms controlled, no chest pain, weight is stable, no nasal congestion or postnasal drip, no heartburn, compliant with CPAP, no fever no chills, no shortness of breath. No excessive daytime sleepiness or tiredness, no issues with CPAP.             Allergies:  Sulfa antibiotics, Lisinopril, Lisinopril, Sulfa antibiotics, Codeine, and Percocet [oxycodone-acetaminophen]  Past Medical History:   Diagnosis Date    Angina at rest [facility]) 2018    Anxiety     Atopic rhinitis 2016    Atrial fibrillation, persistent ([address] 05/10/2018    Baker's cyst of knee, left 2022    Chronic cholecystitis with calculus 2017    Dyshidrotic eczema 01/15/2020    Essential hypertension 2014    GERD (gastroesophageal reflux disease) 2022    Hyperlipidemia     Morbid obesity ([address] 2009    ALE on CPAP 2022    Osteoarthritis     back, knees, spine    Pacemaker 2018    PONV (postoperative nausea and vomiting)     from using inhaled anethesia     PVC's (premature ventricular contractions) 2016    Seasonal allergies     Symptomatic bradycardia 2018    Type 2 diabetes mellitus without complication, without long-term current use of insulin ([address]     Vitamin D deficiency      Past Surgical History:   Procedure Laterality Date     SECTION      x2    [address] [facility] [address] N/A 2017    For biliary colic and stones    COLONOSCOPY  2014    [email or web address]    HYSTERECTOMY (CERVIX STATUS UNKNOWN)      fibroids total    HYSTERECTOMY (CERVIX STATUS UNKNOWN)      LUÍS AND BSO (CERVIX REMOVED) Bilateral     TONSILLECTOMY      TONSILLECTOMY  age 20     Family History   Problem Relation Age of Onset    Lupus Mother     Heart Disease Father     Heart Attack Father 50    Heart Disease Sister     Heart Attack Sister 40    Coronary Art Dis Sister     Lung Cancer Brother 62    Lung Cancer Maternal Grandfather     Colon Cancer Paternal Grandfather     No Known Problems Daughter     No Known Problems Daughter      Social History     Socioeconomic History    Marital status:      Spouse name: Not on file    Number of children: 2    Years of education: Not on file    Highest education level: Not on file   Occupational History    Not on file   Tobacco Use    Smoking status: Never    Smokeless tobacco: Never   Vaping Use    Vaping Use: Never used   Substance and Sexual Activity    Alcohol use: No    Drug use: No    Sexual activity: Yes     Partners: Male   Other Topics Concern    Not on file   Social History Narrative    ** Merged History Encounter **         ** Data from: 5/4/16 Enc Dept: [facility]         ** Data from: 7/19/14 Enc Dept: [address]    Lives with . 2 daughters. ADs. Living Will. [facility], , secondary is daughter [facility].              Social Determinants of Health     Financial Resource Strain: Low Risk     Difficulty of Paying Living Expenses: Not hard at all   Food Insecurity: No Food Insecurity    Worried About Running Out of Food in the Last Year: Never true    Ran Out of Food in the Last Year: Never true   Transportation Needs: Not on file   Physical Activity: Insufficiently Active    Days of Exercise per Week: 2 days    Minutes of Exercise per Session: 20 min   Stress: Not on file   Social Connections: Not on file   Intimate Partner Violence: Not At Risk    Fear of Current or Ex-Partner: No    Emotionally Abused: No    Physically Abused: No    Sexually Abused: No   Housing Stability: Not on file         Review of Systems      ROS: 10 organs review of system is done including general, psychological, ENT, hematological, endocrine, respiratory, cardiovascular, gastrointestinal,musculoskeletal, neurological,  allergy and Immunology is done and is otherwise negative. Current Outpatient Medications   Medication Sig Dispense Refill    blood glucose monitor kit and supplies DX: diabetes mellitus. Test 1 time(s) daily - Ok to substitute per insurance 1 kit 0    blood glucose monitor strips DX: diabetes mellitus. Use 1 time(s) daily - Ok to substitute per insurance 100 strip 5    Lancets MISC DX: diabetes mellitus. Use 1 time(s) daily - Ok to substitute per insurance (1 each = 1 box) 100 each 5    Alcohol Swabs PADS DX: diabetes mellitus. Test 1 time(s) daily - Ok to substitute per insurance (1 each = 1 box) 100 each 5    metFORMIN (GLUCOPHAGE-XR) 500 MG extended release tablet Take 1 tablet by mouth daily (with breakfast) 90 tablet 1    fluocinonide (LIDEX) 0.05 % cream APPLY TOPICALLY TWICE A DAY 60 g 11    PARoxetine (PAXIL) 20 MG tablet TAKE 1 TABLET EVERY MORNING 90 tablet 3    metoprolol tartrate (LOPRESSOR) 50 MG tablet       pantoprazole (PROTONIX) 40 MG tablet Take by mouth      ELIQUIS 5 MG TABS tablet 5 mg 2 times daily       atorvastatin (LIPITOR) 80 MG tablet       metoprolol tartrate (LOPRESSOR) 25 MG tablet Take 75 mg by mouth in the morning and 75 mg before bedtime.       Respiratory Therapy Supplies BLAS Dispense cpap supplies and mask 1 Device 0    sotalol (BETAPACE) 120 MG tablet Take 120 mg by mouth       fluticasone (FLONASE) 50 MCG/ACT nasal spray 2 sprays by Nasal route daily 1 Bottle 1    olopatadine (PATANOL) 0.1 % ophthalmic solution 1-2 drops      doxazosin (CARDURA) 2 MG tablet Take 2 mg by mouth nightly       furosemide (LASIX) 20 MG tablet Take 20 mg by mouth daily      spironolactone (ALDACTONE) 25 MG tablet Take 25 mg by mouth daily      potassium chloride (KLOR-CON M) 20 MEQ extended release tablet Take 1 tablet by mouth 2 times daily (Patient taking differently: Take 20 mEq by mouth in the morning.) 60 tablet 3    loratadine (CLARITIN) 10 MG Diagnosis Orders   1. ALE (obstructive sleep apnea)        2. Class 3 severe obesity due to excess calories with serious comorbidity and body mass index (BMI) of 40.0 to 44.9 in adult ([facility]        3. Pulmonary hypertension (HCC)          Continue CPAP while asleep, patient is compliant, no significant residual sleep apnea or hypersomnia  Weight loss is recommended      No orders of the defined types were placed in this encounter. No orders of the defined types were placed in this encounter. Discussed with patient the importance of exercise and weight control and  overall health and well-being. Reviewed with the patient: current clinical status, medications, activities and diet. Side effects, adverse effects of the medication prescribed today, as well as treatment plan and result expectations have been discussed with the patient who expresses understanding and desires to proceed. Return in about 1 year (around 8/1/2023).       Goldie Pham MD

## 2022-08-10 ENCOUNTER — TELEPHONE (OUTPATIENT)
Dept: DIABETES SERVICES | Age: 62
End: 2022-08-10

## 2022-08-22 ENCOUNTER — OFFICE VISIT (OUTPATIENT)
Dept: INTERNAL MEDICINE | Age: 62
End: 2022-08-22
Payer: COMMERCIAL

## 2022-08-22 VITALS
SYSTOLIC BLOOD PRESSURE: 126 MMHG | TEMPERATURE: 97.2 F | BODY MASS INDEX: 48.48 KG/M2 | HEART RATE: 73 BPM | HEIGHT: 65 IN | RESPIRATION RATE: 15 BRPM | OXYGEN SATURATION: 94 % | DIASTOLIC BLOOD PRESSURE: 68 MMHG | WEIGHT: 291 LBS

## 2022-08-22 DIAGNOSIS — B34.9 VIRAL ILLNESS: ICD-10-CM

## 2022-08-22 DIAGNOSIS — R19.7 DIARRHEA, UNSPECIFIED TYPE: Primary | ICD-10-CM

## 2022-08-22 LAB
Lab: NORMAL
PERFORMING INSTRUMENT: NORMAL
QC PASS/FAIL: NORMAL
SARS-COV-2, POC: NORMAL

## 2022-08-22 PROCEDURE — 87426 SARSCOV CORONAVIRUS AG IA: CPT | Performed by: NURSE PRACTITIONER

## 2022-08-22 PROCEDURE — 99213 OFFICE O/P EST LOW 20 MIN: CPT | Performed by: NURSE PRACTITIONER

## 2022-08-22 ASSESSMENT — ENCOUNTER SYMPTOMS
COUGH: 1
BACK PAIN: 0
NAUSEA: 1
DIARRHEA: 1
VOMITING: 0
ABDOMINAL PAIN: 0

## 2022-08-22 NOTE — PROGRESS NOTES
Linette Farris (:  1960) is a 57 y.o. female, Established patient, here for evaluation of the following chief complaint(s):  Cough (Cough, dizziness, diarrhea, headache and nauseated ongoing 3 days)      Vitals:    22 1625   BP: 126/68   Pulse: 73   Resp: 15   Temp: 97.2 °F (36.2 °C)   SpO2: 94%       ASSESSMENT/PLAN:  1. Diarrhea, unspecified type      -    BRAT diet      -    Avoid spicy, dairy, fried, caffeine, tomato based foods      -    Fluids encouraged, non-caffeine  2. Viral illness  -     POCT COVID-19, Antigen                         NEG        Return if symptoms worsen or fail to improve. SUBJECTIVE/OBJECTIVE:    Other  This is a new problem. Episode onset: x3 days, cough, diarrhea, fatigue, headache, nausea. The problem occurs 2 to 4 times per day. The problem has been gradually improving. Associated symptoms include coughing, headaches and nausea. Pertinent negatives include no abdominal pain, arthralgias, fatigue, fever or vomiting. The symptoms are aggravated by exertion. She has tried nothing for the symptoms. Review of Systems   Constitutional:  Negative for fatigue and fever. Respiratory:  Positive for cough. Cardiovascular:  Negative for palpitations and leg swelling. Gastrointestinal:  Positive for diarrhea and nausea. Negative for abdominal pain and vomiting. Musculoskeletal:  Negative for arthralgias and back pain. Neurological:  Positive for dizziness and headaches. Negative for light-headedness. Physical Exam  Constitutional:       General: She is not in acute distress. Appearance: Normal appearance. HENT:      Right Ear: No middle ear effusion. Tympanic membrane is not erythematous. Left Ear:  No middle ear effusion. Tympanic membrane is not erythematous. Nose: Nose normal.      Mouth/Throat:      Lips: Pink. Mouth: Mucous membranes are moist.   Cardiovascular:      Rate and Rhythm: Normal rate and regular rhythm.       Heart sounds: Normal heart sounds, S1 normal and S2 normal.   Pulmonary:      Effort: Pulmonary effort is normal. No respiratory distress. Breath sounds: Normal air entry. No decreased breath sounds, wheezing, rhonchi or rales. Abdominal:      Palpations: Abdomen is soft. Tenderness: There is no abdominal tenderness. Skin:     General: Skin is warm and dry. Neurological:      Mental Status: She is alert and oriented to person, place, and time. Psychiatric:         Mood and Affect: Mood normal.         Behavior: Behavior is cooperative. An electronic signature was used to authenticate this note.     --RAFY Stone

## 2022-09-19 ENCOUNTER — NURSE TRIAGE (OUTPATIENT)
Dept: OTHER | Facility: CLINIC | Age: 62
End: 2022-09-19

## 2022-09-19 ENCOUNTER — TELEPHONE (OUTPATIENT)
Dept: FAMILY MEDICINE CLINIC | Age: 62
End: 2022-09-19

## 2022-09-19 ENCOUNTER — OFFICE VISIT (OUTPATIENT)
Dept: FAMILY MEDICINE CLINIC | Age: 62
End: 2022-09-19
Payer: COMMERCIAL

## 2022-09-19 VITALS
BODY MASS INDEX: 48.82 KG/M2 | HEART RATE: 80 BPM | DIASTOLIC BLOOD PRESSURE: 72 MMHG | HEIGHT: 65 IN | TEMPERATURE: 97.3 F | WEIGHT: 293 LBS | OXYGEN SATURATION: 94 % | SYSTOLIC BLOOD PRESSURE: 126 MMHG

## 2022-09-19 DIAGNOSIS — H93.8X3 EAR FULLNESS, BILATERAL: ICD-10-CM

## 2022-09-19 DIAGNOSIS — Z23 NEED FOR VACCINATION: ICD-10-CM

## 2022-09-19 DIAGNOSIS — J30.2 SEASONAL ALLERGIES: Primary | ICD-10-CM

## 2022-09-19 DIAGNOSIS — J32.9 SINUSITIS, UNSPECIFIED CHRONICITY, UNSPECIFIED LOCATION: ICD-10-CM

## 2022-09-19 DIAGNOSIS — J02.9 SORE THROAT: ICD-10-CM

## 2022-09-19 DIAGNOSIS — R09.81 NASAL CONGESTION: ICD-10-CM

## 2022-09-19 LAB
Lab: NORMAL
PERFORMING INSTRUMENT: NORMAL
QC PASS/FAIL: NORMAL
SARS-COV-2, POC: NORMAL

## 2022-09-19 PROCEDURE — 87426 SARSCOV CORONAVIRUS AG IA: CPT | Performed by: FAMILY MEDICINE

## 2022-09-19 PROCEDURE — 90674 CCIIV4 VAC NO PRSV 0.5 ML IM: CPT | Performed by: FAMILY MEDICINE

## 2022-09-19 PROCEDURE — 90471 IMMUNIZATION ADMIN: CPT | Performed by: FAMILY MEDICINE

## 2022-09-19 PROCEDURE — 99213 OFFICE O/P EST LOW 20 MIN: CPT | Performed by: FAMILY MEDICINE

## 2022-09-19 RX ORDER — IPRATROPIUM BROMIDE 42 UG/1
2 SPRAY, METERED NASAL 2 TIMES DAILY
Qty: 15 ML | Refills: 5 | Status: SHIPPED | OUTPATIENT
Start: 2022-09-19

## 2022-09-19 RX ORDER — KETOTIFEN FUMARATE 0.35 MG/ML
1 SOLUTION/ DROPS OPHTHALMIC 2 TIMES DAILY
Qty: 5 ML | Refills: 5 | Status: SHIPPED | OUTPATIENT
Start: 2022-09-19

## 2022-09-19 RX ORDER — AMOXICILLIN AND CLAVULANATE POTASSIUM 875; 125 MG/1; MG/1
1 TABLET, FILM COATED ORAL 2 TIMES DAILY
Qty: 20 TABLET | Refills: 0 | Status: SHIPPED | OUTPATIENT
Start: 2022-09-19 | End: 2022-09-19 | Stop reason: SDUPTHER

## 2022-09-19 RX ORDER — FEXOFENADINE HCL 180 MG/1
180 TABLET ORAL DAILY
Qty: 30 TABLET | Refills: 5 | Status: SHIPPED | OUTPATIENT
Start: 2022-09-19

## 2022-09-19 RX ORDER — AMOXICILLIN AND CLAVULANATE POTASSIUM 875; 125 MG/1; MG/1
1 TABLET, FILM COATED ORAL 2 TIMES DAILY
Qty: 20 TABLET | Refills: 0 | Status: SHIPPED | OUTPATIENT
Start: 2022-09-19 | End: 2022-09-29

## 2022-09-19 ASSESSMENT — ENCOUNTER SYMPTOMS
EYE REDNESS: 1
TROUBLE SWALLOWING: 0
RHINORRHEA: 1
WHEEZING: 0
ABDOMINAL PAIN: 0
EYE DISCHARGE: 1
SINUS PAIN: 0
CHEST TIGHTNESS: 0
SORE THROAT: 1
NAUSEA: 0
DIARRHEA: 0
SINUS PRESSURE: 1
COUGH: 0
VOMITING: 0
SHORTNESS OF BREATH: 0
EYE ITCHING: 1

## 2022-09-19 ASSESSMENT — PATIENT HEALTH QUESTIONNAIRE - PHQ9
2. FEELING DOWN, DEPRESSED OR HOPELESS: 0
SUM OF ALL RESPONSES TO PHQ QUESTIONS 1-9: 0
1. LITTLE INTEREST OR PLEASURE IN DOING THINGS: 0
SUM OF ALL RESPONSES TO PHQ9 QUESTIONS 1 & 2: 0
SUM OF ALL RESPONSES TO PHQ QUESTIONS 1-9: 0

## 2022-09-19 NOTE — TELEPHONE ENCOUNTER
----- Message from Bradly Roland sent at 9/19/2022  8:38 AM EDT -----  Subject: Message to Provider    QUESTIONS  Information for Provider? Patient's  Austin Siu called in for wife   Rogerio Machado. .. stating that she is having problems w allergies (sneezing   and coughing). Austin Siu did not wish to be triaged over to NT for the 1st   question asked. .. \"difficulty breathing (he stated yes) Attempted to   triage call over to office. .. but it ranged and then went to a busy   signal)   ---------------------------------------------------------------------------  --------------  [address] [employer]  [phone]; OK to leave message on voicemail  ---------------------------------------------------------------------------  --------------  SCRIPT ANSWERS  Relationship to Patient? Other  Representative Name? Austin Siu  Is the Representative on the appropriate HIPAA document in Epic?  Yes

## 2022-09-19 NOTE — TELEPHONE ENCOUNTER
Received call from Fabby Rangel at [facility] with Red Flag Complaint. Subjective: Caller states \"My allergies have been bad and it is causing some shortness of breath. \"     While on the phone with ECC and patient office returned call to patient to discuss symptoms. No triage done. Care advice provided, patient verbalizes understanding; denies any other questions or concerns; instructed to call back for any new or worsening symptoms. Attention Provider: Thank you for allowing me to participate in the care of your patient. The patient was connected to triage in response to information provided to the ECC/PSC. Please do not respond through this encounter as the response is not directed to a shared pool.     Reason for Disposition   Caller has already spoken with the PCP (or office), and has no further questions    Protocols used: No Contact or Duplicate Contact Call-ADULT-[place]

## 2022-09-19 NOTE — PROGRESS NOTES
Chris Arroyo (: 1960) is a 57 y.o. female, Established patient, who presents today for:    Chief Complaint   Patient presents with    Congestion     Patient states that sx started about 1 week ago Patient states that she has a sore throat as well along with fullness in both ears          ASSESSMENT/PLAN    1. Seasonal allergies  -     ketotifen (ZADITOR) 0.025 % ophthalmic solution; Place 1 drop into both eyes 2 times daily, Disp-5 mL, R-5Normal  -     fexofenadine (ALLEGRA) 180 MG tablet; Take 1 tablet by mouth daily, Disp-30 tablet, R-5Normal  -     ipratropium (ATROVENT) 0.06 % nasal spray; 2 sprays by Each Nostril route 2 times daily, Disp-15 mL, R-5Normal  2. Nasal congestion  -     POCT COVID-19, Antigen  3. Sore throat  -     POCT COVID-19, Antigen  4. Ear fullness, bilateral  -     POCT COVID-19, Antigen  5. Sinusitis, unspecified chronicity, unspecified location  -     amoxicillin-clavulanate (AUGMENTIN) 875-125 MG per tablet; Take 1 tablet by mouth 2 times daily for 10 days, Disp-20 tablet, R-0Print  6. Need for vaccination  -     Influenza, FLUCELVAX, (age 10 mo+), IM, Preservative Free, 0.5 mL      Return for KEEP NEXT SCHEDULED F/U VISIT OCT 2022. SUBJECTIVE/OBJECTIVE:    HPI    Presents for acute visit regarding upper respiratory symptoms. There is a 1 week history of watery, red, itchy eyes, rhinitis, nasal congestion, postnasal drainage, mild scratchy/sore throat, and bilateral ear fullness. Patient reports a known history of seasonal allergies with similar symptoms in the past.  There is reported malaise/fatigue with myalgias, frontal headaches and maxillary sinus pressure. Patient denies any fever/chills/sweats, cough, dyspnea, chest pain, abdominal pain, nausea/vomiting, or diarrhea. She has been taking Claritin, Nasacort, and nasal saline spray over-the-counter over the past week with mild temporary benefit.   Denies any close sick contacts and denies any recent travel. Current Outpatient Medications on File Prior to Visit   Medication Sig Dispense Refill    blood glucose monitor kit and supplies DX: diabetes mellitus. Test 1 time(s) daily - Ok to substitute per insurance 1 kit 0    blood glucose monitor strips DX: diabetes mellitus. Use 1 time(s) daily - Ok to substitute per insurance 100 strip 5    Lancets MISC DX: diabetes mellitus. Use 1 time(s) daily - Ok to substitute per insurance (1 each = 1 box) 100 each 5    Alcohol Swabs PADS DX: diabetes mellitus. Test 1 time(s) daily - Ok to substitute per insurance (1 each = 1 box) 100 each 5    metFORMIN (GLUCOPHAGE-XR) 500 MG extended release tablet Take 1 tablet by mouth daily (with breakfast) 90 tablet 1    fluocinonide (LIDEX) 0.05 % cream APPLY TOPICALLY TWICE A DAY 60 g 11    PARoxetine (PAXIL) 20 MG tablet TAKE 1 TABLET EVERY MORNING 90 tablet 3    metoprolol tartrate (LOPRESSOR) 50 MG tablet       pantoprazole (PROTONIX) 40 MG tablet Take by mouth      ELIQUIS 5 MG TABS tablet 5 mg 2 times daily       atorvastatin (LIPITOR) 80 MG tablet       metoprolol tartrate (LOPRESSOR) 25 MG tablet Take 75 mg by mouth in the morning and 75 mg before bedtime. Respiratory Therapy Supplies BLAS Dispense cpap supplies and mask 1 Device 0    sotalol (BETAPACE) 120 MG tablet Take 120 mg by mouth       fluticasone (FLONASE) 50 MCG/ACT nasal spray 2 sprays by Nasal route daily 1 Bottle 1    doxazosin (CARDURA) 2 MG tablet Take 2 mg by mouth nightly       furosemide (LASIX) 20 MG tablet Take 20 mg by mouth daily      spironolactone (ALDACTONE) 25 MG tablet Take 25 mg by mouth daily      potassium chloride (KLOR-CON M) 20 MEQ extended release tablet Take 1 tablet by mouth 2 times daily (Patient taking differently: Take 20 mEq by mouth daily) 60 tablet 3    loratadine (CLARITIN) 10 MG tablet Take 10 mg by mouth daily       Triamcinolone Acetonide (NASACORT ALLERGY 24HR NA) by Nasal route 3 times daily as needed.        No current facility-administered medications on file prior to visit. Allergies   Allergen Reactions    Sulfa Antibiotics Swelling    Lisinopril     Lisinopril Other (See Comments)     cough    Sulfa Antibiotics Swelling    Codeine Nausea And Vomiting    Percocet [Oxycodone-Acetaminophen] Nausea And Vomiting        Review of Systems   Constitutional:  Positive for appetite change (decreased) and fatigue. Negative for chills, diaphoresis and fever. HENT:  Positive for congestion, postnasal drip, rhinorrhea, sinus pressure and sore throat (scratchy, irritated throat). Negative for ear discharge, sinus pain and trouble swallowing. Ear pain: bilateral ear fullness. Eyes:  Positive for discharge, redness and itching. Negative for visual disturbance. Respiratory:  Negative for cough, chest tightness, shortness of breath and wheezing. Cardiovascular:  Negative for chest pain and palpitations. Gastrointestinal:  Negative for abdominal pain, diarrhea, nausea and vomiting. Genitourinary:  Negative for dysuria and hematuria. Musculoskeletal:  Positive for myalgias. Skin:  Negative for rash. Neurological:  Positive for headaches. Negative for dizziness, syncope and light-headedness. Hematological:  Negative for adenopathy.      Vitals:  /72 (Site: Right Upper Arm, Position: Sitting, Cuff Size: Large Adult)   Pulse 80   Temp 97.3 °F (36.3 °C) (Temporal)   Ht 5' 5\" (1.651 m)   Wt 296 lb 9.6 oz (134.5 kg)   LMP 11/14/1998   SpO2 94%   BMI 49.36 kg/m²      Internal Administration   First Dose COVID-19, MODERNA BLUE border, Primary or Immunocompromised, (age 12y+), IM, 100 mcg/0.5mL  03/11/2021   Second Dose COVID-19, MODERNA BLUE border, Primary or Immunocompromised, (age 12y+), IM, 100 mcg/0.5mL   04/01/2021       Last COVID Lab SARS-CoV-2 (no units)   Date Value   01/08/2022 Not Detected     SARS-CoV-2, PCR (no units)   Date Value   08/12/2021 Not Detected     SARS-COV-2, POC (no units)   Date Value 09/19/2022 Not-Detected           Physical Exam  Vitals reviewed. Constitutional:       General: She is not in acute distress. Appearance: Normal appearance. She is obese. HENT:      Head: Normocephalic and atraumatic. Right Ear: Tympanic membrane, ear canal and external ear normal. No middle ear effusion. Tympanic membrane is not erythematous, retracted or bulging. Left Ear: Tympanic membrane, ear canal and external ear normal.  No middle ear effusion. Tympanic membrane is not erythematous, retracted or bulging. Nose: Congestion present. Right Turbinates: Enlarged, swollen and pale. Left Turbinates: Enlarged, swollen and pale. Right Sinus: Maxillary sinus tenderness and frontal sinus tenderness present. Left Sinus: Maxillary sinus tenderness and frontal sinus tenderness present. Mouth/Throat:      Lips: Pink. No lesions. Mouth: Mucous membranes are moist. No oral lesions. Pharynx: Oropharynx is clear. No oropharyngeal exudate or posterior oropharyngeal erythema. Eyes:      General: Lids are normal. No scleral icterus. Right eye: Discharge (watery) present. Left eye: Discharge (watery) present. Conjunctiva/sclera:      Right eye: Right conjunctiva is injected. Left eye: Left conjunctiva is injected. Cardiovascular:      Rate and Rhythm: Normal rate and regular rhythm. Heart sounds: No murmur heard. Pulmonary:      Effort: Pulmonary effort is normal. No respiratory distress. Breath sounds: Normal breath sounds. No wheezing, rhonchi or rales. Abdominal:      General: Bowel sounds are normal.      Palpations: Abdomen is soft. Tenderness: There is no abdominal tenderness. There is no guarding or rebound. Musculoskeletal:      Cervical back: Neck supple. Right lower leg: No edema. Left lower leg: No edema. Lymphadenopathy:      Cervical: No cervical adenopathy. Skin:     Findings: No rash. Neurological:      Mental Status: She is alert and oriented to person, place, and time. Psychiatric:         Mood and Affect: Mood normal.         Behavior: Behavior normal.         Thought Content: Thought content normal.       Ortho Exam (If Applicable)              An electronic signature was used to authenticate this note.      Abdi Koehler MD

## 2022-09-19 NOTE — PROGRESS NOTES
Vaccine Information Sheet, \"Influenza - Inactivated\"  given to Siobhan De Jesus, or parent/legal guardian of  Siobhan De Jesus and verbalized understanding. Patient responses:    Have you ever had a reaction to a flu vaccine? No  Are you able to eat eggs without adverse effects? No  Do you have any current illness? No  Have you ever had Guillian [place] Syndrome? No    Flu vaccine given per order. Please see immunization tab.

## 2022-09-19 NOTE — ASSESSMENT & PLAN NOTE
Patient with symptoms consistent with seasonal allergies. Patient will treat symptomatically and contact the office if there is not sufficient improvement despite management with eyedrops, oral antihistamine, and nasal spray. The next step would be referral to allergist to discuss formal testing and potential desensitization.

## 2022-09-29 ENCOUNTER — HOSPITAL ENCOUNTER (OUTPATIENT)
Dept: LAB | Age: 62
Discharge: HOME | End: 2022-09-29
Payer: COMMERCIAL

## 2022-09-29 DIAGNOSIS — R79.89 ABNORMAL LFTS (LIVER FUNCTION TESTS): ICD-10-CM

## 2022-09-29 DIAGNOSIS — E66.01 MORBID OBESITY (HCC): ICD-10-CM

## 2022-09-29 DIAGNOSIS — E11.9 TYPE 2 DIABETES MELLITUS WITHOUT COMPLICATION, WITHOUT LONG-TERM CURRENT USE OF INSULIN (HCC): ICD-10-CM

## 2022-09-29 DIAGNOSIS — I48.19 ATRIAL FIBRILLATION, PERSISTENT (HCC): ICD-10-CM

## 2022-09-29 DIAGNOSIS — E55.9 VITAMIN D DEFICIENCY: ICD-10-CM

## 2022-09-29 DIAGNOSIS — E78.5 HYPERLIPIDEMIA, UNSPECIFIED HYPERLIPIDEMIA TYPE: ICD-10-CM

## 2022-09-29 LAB
ALBUMIN SERPL-MCNC: 3.8 G/DL (ref 3.5–4.6)
ALP BLD-CCNC: 130 U/L (ref 40–130)
ALT SERPL-CCNC: 24 U/L (ref 0–33)
ANION GAP SERPL CALCULATED.3IONS-SCNC: 16 MEQ/L (ref 9–15)
AST SERPL-CCNC: 19 U/L (ref 0–35)
BILIRUB SERPL-MCNC: 0.4 MG/DL (ref 0.2–0.7)
BUN BLDV-MCNC: 16 MG/DL (ref 8–23)
CALCIUM SERPL-MCNC: 8.9 MG/DL (ref 8.5–9.9)
CHLORIDE BLD-SCNC: 102 MEQ/L (ref 95–107)
CO2: 21 MEQ/L (ref 20–31)
CREAT SERPL-MCNC: 0.86 MG/DL (ref 0.5–0.9)
CREATININE URINE: 202.9 MG/DL
GFR AFRICAN AMERICAN: >60
GFR NON-AFRICAN AMERICAN: >60
GLOBULIN: 3.2 G/DL (ref 2.3–3.5)
GLUCOSE BLD-MCNC: 129 MG/DL (ref 70–99)
HBA1C MFR BLD: 6.9 % (ref 4.8–5.9)
MICROALBUMIN UR-MCNC: <1.2 MG/DL
MICROALBUMIN/CREAT UR-RTO: NORMAL MG/G (ref 0–30)
POTASSIUM SERPL-SCNC: 3.9 MEQ/L (ref 3.4–4.9)
SODIUM BLD-SCNC: 139 MEQ/L (ref 135–144)
TOTAL PROTEIN: 7 G/DL (ref 6.3–8)
TSH REFLEX: 3.36 UIU/ML (ref 0.44–3.86)

## 2022-09-29 PROCEDURE — 80053 COMPREHEN METABOLIC PANEL: CPT

## 2022-09-29 PROCEDURE — 82570 ASSAY OF URINE CREATININE: CPT

## 2022-09-29 PROCEDURE — 84443 ASSAY THYROID STIM HORMONE: CPT

## 2022-09-29 PROCEDURE — 83036 HEMOGLOBIN GLYCOSYLATED A1C: CPT

## 2022-09-29 PROCEDURE — 82306 VITAMIN D 25 HYDROXY: CPT

## 2022-09-29 PROCEDURE — 82043 UR ALBUMIN QUANTITATIVE: CPT

## 2022-09-29 PROCEDURE — 36415 COLL VENOUS BLD VENIPUNCTURE: CPT

## 2022-09-30 LAB — VITAMIN D 25-HYDROXY: 15.3 NG/ML

## 2022-10-03 ENCOUNTER — OFFICE VISIT (OUTPATIENT)
Dept: FAMILY MEDICINE CLINIC | Age: 62
End: 2022-10-03
Payer: COMMERCIAL

## 2022-10-03 VITALS
HEART RATE: 65 BPM | HEIGHT: 65 IN | BODY MASS INDEX: 48.82 KG/M2 | WEIGHT: 293 LBS | DIASTOLIC BLOOD PRESSURE: 86 MMHG | OXYGEN SATURATION: 97 % | SYSTOLIC BLOOD PRESSURE: 120 MMHG | TEMPERATURE: 97.8 F

## 2022-10-03 DIAGNOSIS — E78.5 HYPERLIPIDEMIA, UNSPECIFIED HYPERLIPIDEMIA TYPE: ICD-10-CM

## 2022-10-03 DIAGNOSIS — E11.9 TYPE 2 DIABETES MELLITUS WITHOUT COMPLICATION, WITHOUT LONG-TERM CURRENT USE OF INSULIN (HCC): Primary | ICD-10-CM

## 2022-10-03 DIAGNOSIS — I48.19 ATRIAL FIBRILLATION, PERSISTENT (HCC): ICD-10-CM

## 2022-10-03 DIAGNOSIS — E66.01 MORBID OBESITY (HCC): ICD-10-CM

## 2022-10-03 DIAGNOSIS — E55.9 VITAMIN D DEFICIENCY: ICD-10-CM

## 2022-10-03 DIAGNOSIS — K21.9 GASTROESOPHAGEAL REFLUX DISEASE, UNSPECIFIED WHETHER ESOPHAGITIS PRESENT: ICD-10-CM

## 2022-10-03 DIAGNOSIS — I10 ESSENTIAL HYPERTENSION: Chronic | ICD-10-CM

## 2022-10-03 LAB
CHP ED QC CHECK: NORMAL
GLUCOSE BLD-MCNC: 133 MG/DL

## 2022-10-03 PROCEDURE — 3044F HG A1C LEVEL LT 7.0%: CPT | Performed by: FAMILY MEDICINE

## 2022-10-03 PROCEDURE — 99214 OFFICE O/P EST MOD 30 MIN: CPT | Performed by: FAMILY MEDICINE

## 2022-10-03 PROCEDURE — 82962 GLUCOSE BLOOD TEST: CPT | Performed by: FAMILY MEDICINE

## 2022-10-03 RX ORDER — ERGOCALCIFEROL 1.25 MG/1
50000 CAPSULE ORAL WEEKLY
Qty: 12 CAPSULE | Refills: 0 | Status: SHIPPED | OUTPATIENT
Start: 2022-10-03 | End: 2022-12-26

## 2022-10-03 RX ORDER — ACETAMINOPHEN 160 MG
1 TABLET,DISINTEGRATING ORAL DAILY
Qty: 90 CAPSULE | Refills: 1 | Status: SHIPPED | OUTPATIENT
Start: 2022-10-03

## 2022-10-03 RX ORDER — OMEPRAZOLE 40 MG/1
40 CAPSULE, DELAYED RELEASE ORAL
Qty: 90 CAPSULE | Refills: 1 | Status: SHIPPED | OUTPATIENT
Start: 2022-10-03

## 2022-10-03 ASSESSMENT — ENCOUNTER SYMPTOMS
WHEEZING: 0
VOMITING: 0
NAUSEA: 0
DIARRHEA: 0
COUGH: 0
CHEST TIGHTNESS: 0
ANAL BLEEDING: 0
SHORTNESS OF BREATH: 0
BLOOD IN STOOL: 0
ABDOMINAL PAIN: 0
CONSTIPATION: 0

## 2022-10-03 NOTE — PROGRESS NOTES
Romy Snell (: 1960) is a 57 y.o. female, Established patient, who presents today for:    Chief Complaint   Patient presents with    Diabetes     F/u on Diabetes Type 2. ASSESSMENT/PLAN    1. Type 2 diabetes mellitus without complication, without long-term current use of insulin (HCC)  Assessment & Plan:  Most recent A1c at goal control. Patient instructed to continue with current medication and to continue making best efforts to eat a lower carbohydrate diet. I reviewed with her the importance of yearly diabetic eye exam, referral to ophthalmology given today in the office. Orders:  -     POCT Glucose  -     Bud Crowell MD, Ophthalmology, [facility]  -     Hemoglobin A1C; Future  -     Basic Metabolic Panel; Future  2. Atrial fibrillation, persistent ([address]  Assessment & Plan:   Asymptomatic, continue current medications and continue current treatment plan  Orders:  -     Basic Metabolic Panel; Future  3. Essential hypertension  Assessment & Plan:   Well-controlled, continue current medications and continue current treatment plan  Orders:  -     Basic Metabolic Panel; Future  4. Hyperlipidemia, unspecified hyperlipidemia type  Assessment & Plan:  Most recent lipid panel at goal control. Patient instructed to continue with current dose of atorvastatin. 5. Gastroesophageal reflux disease, unspecified whether esophagitis present  Assessment & Plan:  Managed well with use of medication and avoidance of dietary triggers. Patient reports difficulty affording current PPI, alternative PPI Rx has been sent to mail order pharmacy for improved cost.  Orders:  -     omeprazole (PRILOSEC) 40 MG delayed release capsule; Take 1 capsule by mouth every morning (before breakfast), Disp-90 capsule, R-1Normal  6. Vitamin D deficiency  Assessment & Plan:  Vitamin D level is low based on most recent labs reviewed in the office.  Patient will start a high dose vitamin D supplement 50,000 IU ONCE A WEEK for the next 12 weeks and have repeat vitamin D level drawn once prescription is completed to re-check value. Future lab ordered and prescription sent to pharmacy. Patient should also start a daily vitamin D3 supplement at 2,000 IU daily. I have also sent this prescription to the pharmacy. Orders:  -     Vitamin D 25 Hydroxy; Future  -     vitamin D (ERGOCALCIFEROL) 1.25 MG (29407 UT) CAPS capsule; Take 1 capsule by mouth once a week, Disp-12 capsule, R-0Normal  -     Cholecalciferol (VITAMIN D3) 50 MCG (2000 UT) CAPS; Take 1 capsule by mouth daily, Disp-90 capsule, R-1Normal  7. Morbid obesity ([facility] [address]  Assessment & Plan:  Patient counseled on healthy dietary choices and the benefits of a lower salt and/or lower carbohydrate diet as appropriate. Patient also counseled on benefits of moderate intensity cardiovascular exercise for 150 minutes per week as they are able. Advice was given to make small changes over time, setting smaller achievable goals until recommended lifestyle changes are reached. Orders:  -     Hemoglobin A1C; Future      Return in about 4 months (around 2/3/2023) for Chronic Disease Check. SUBJECTIVE/OBJECTIVE:    HPI    Patient presents for diabetes, atrial fibrillation, hypertension, hyperlipidemia, and obesity follow-up. Patient reports taking medications as prescribed since the most recent visit. They deny adhering to any specific lower carbohydrate or lower salt diet. They deny any routine exercise outside of normal day-to-day activity. Patient reports home blood glucose values ranging from 120s to 140s since the most recent office visit. They deny any polyuria or polydipsia, new onset vision changes, or new onset paresthesias. Patient denies any chest pain, dyspnea, palpitations, lightheadedness, dizziness, worsening lower extremity edema, or syncope.   Patient denies any dyspnea at rest, persistent wheezing, worsening cough, chest tightness, or limitation in normal day-to-day activity due to breathing. Current Outpatient Medications on File Prior to Visit   Medication Sig Dispense Refill    MELATONIN PO Take 10 mg by mouth Gummies      ketotifen (ZADITOR) 0.025 % ophthalmic solution Place 1 drop into both eyes 2 times daily 5 mL 5    fexofenadine (ALLEGRA) 180 MG tablet Take 1 tablet by mouth daily 30 tablet 5    ipratropium (ATROVENT) 0.06 % nasal spray 2 sprays by Each Nostril route 2 times daily 15 mL 5    blood glucose monitor kit and supplies DX: diabetes mellitus. Test 1 time(s) daily - Ok to substitute per insurance 1 kit 0    blood glucose monitor strips DX: diabetes mellitus. Use 1 time(s) daily - Ok to substitute per insurance 100 strip 5    Lancets MISC DX: diabetes mellitus. Use 1 time(s) daily - Ok to substitute per insurance (1 each = 1 box) 100 each 5    Alcohol Swabs PADS DX: diabetes mellitus.  Test 1 time(s) daily - Ok to substitute per insurance (1 each = 1 box) 100 each 5    metFORMIN (GLUCOPHAGE-XR) 500 MG extended release tablet Take 1 tablet by mouth daily (with breakfast) 90 tablet 1    fluocinonide (LIDEX) 0.05 % cream APPLY TOPICALLY TWICE A DAY 60 g 11    PARoxetine (PAXIL) 20 MG tablet TAKE 1 TABLET EVERY MORNING 90 tablet 3    metoprolol tartrate (LOPRESSOR) 50 MG tablet 2 times a day      ELIQUIS 5 MG TABS tablet 5 mg 2 times daily       atorvastatin (LIPITOR) 80 MG tablet       Respiratory Therapy Supplies BLAS Dispense cpap supplies and mask 1 Device 0    sotalol (BETAPACE) 120 MG tablet Take 120 mg by mouth       doxazosin (CARDURA) 2 MG tablet Take 2 mg by mouth nightly       furosemide (LASIX) 20 MG tablet Take 20 mg by mouth daily      spironolactone (ALDACTONE) 25 MG tablet Take 25 mg by mouth daily      potassium chloride (KLOR-CON M) 20 MEQ extended release tablet Take 1 tablet by mouth 2 times daily (Patient taking differently: Take 20 mEq by mouth daily) 60 tablet 3    Triamcinolone Acetonide (NASACORT ALLERGY 24HR NA) by Nasal route 3 times daily as needed. fluticasone (FLONASE) 50 MCG/ACT nasal spray 2 sprays by Nasal route daily (Patient not taking: Reported on 10/3/2022) 1 Bottle 1    loratadine (CLARITIN) 10 MG tablet Take 10 mg by mouth daily  (Patient not taking: Reported on 10/3/2022)       No current facility-administered medications on file prior to visit. Allergies   Allergen Reactions    Sulfa Antibiotics Swelling    Lisinopril     Lisinopril Other (See Comments)     cough    Sulfa Antibiotics Swelling    Codeine Nausea And Vomiting    Percocet [Oxycodone-Acetaminophen] Nausea And Vomiting        Review of Systems   Constitutional:  Negative for appetite change, chills, diaphoresis, fatigue, fever and unexpected weight change. Eyes:  Negative for visual disturbance. Respiratory:  Negative for cough, chest tightness, shortness of breath and wheezing. Cardiovascular:  Negative for chest pain, palpitations and leg swelling. No orthopnea, No PND   Gastrointestinal:  Negative for abdominal pain, anal bleeding, blood in stool, constipation, diarrhea, nausea and vomiting. No heartburn, No melena   Endocrine: Negative for cold intolerance, heat intolerance, polydipsia, polyphagia and polyuria. Genitourinary:  Negative for dysuria and hematuria. Musculoskeletal:  Negative for myalgias. Skin:  Negative for rash. Neurological:  Negative for dizziness, syncope, weakness, light-headedness, numbness and headaches. Psychiatric/Behavioral:  Negative for dysphoric mood. The patient is not nervous/anxious. Vitals:  /86 (Site: Left Wrist, Position: Sitting, Cuff Size: Medium Adult)   Pulse 65   Temp 97.8 °F (36.6 °C) (Temporal)   Ht 5' 5\" (1.651 m)   Wt 293 lb (132.9 kg)   LMP 11/14/1998   SpO2 97%   BMI 48.76 kg/m²     Physical Exam  Vitals reviewed. Constitutional:       General: She is not in acute distress. Appearance: She is obese. She is not ill-appearing.    Eyes:      General: No scleral icterus. Neck:      Thyroid: No thyroid mass, thyromegaly or thyroid tenderness. Vascular: No carotid bruit. Cardiovascular:      Rate and Rhythm: Normal rate and regular rhythm. Heart sounds: Normal heart sounds. No murmur heard. Pulmonary:      Effort: Pulmonary effort is normal. No respiratory distress. Breath sounds: Normal breath sounds. No wheezing, rhonchi or rales. Abdominal:      General: Bowel sounds are normal. There is no distension. Palpations: Abdomen is soft. Tenderness: There is no abdominal tenderness. There is no right CVA tenderness, left CVA tenderness, guarding or rebound. Musculoskeletal:      Cervical back: Neck supple. Right lower leg: No edema. Left lower leg: No edema. Lymphadenopathy:      Cervical: No cervical adenopathy. Skin:     Findings: No rash. Neurological:      Mental Status: She is alert and oriented to person, place, and time. Psychiatric:         Mood and Affect: Mood normal.         Behavior: Behavior normal.         Thought Content: Thought content normal.       Ortho Exam (If Applicable)              An electronic signature was used to authenticate this note.      Stephanie Ding MD

## 2022-10-03 NOTE — ASSESSMENT & PLAN NOTE
Most recent A1c at goal control. Patient instructed to continue with current medication and to continue making best efforts to eat a lower carbohydrate diet. I reviewed with her the importance of yearly diabetic eye exam, referral to ophthalmology given today in the office.

## 2022-10-03 NOTE — ASSESSMENT & PLAN NOTE
Most recent lipid panel at goal control. Patient instructed to continue with current dose of atorvastatin.

## 2022-10-03 NOTE — ASSESSMENT & PLAN NOTE
Managed well with use of medication and avoidance of dietary triggers.   Patient reports difficulty affording current PPI, alternative PPI Rx has been sent to mail order pharmacy for improved cost.

## 2022-10-03 NOTE — ASSESSMENT & PLAN NOTE
Vitamin D level is low based on most recent labs reviewed in the office. Patient will start a high dose vitamin D supplement 50,000 IU ONCE A WEEK for the next 12 weeks and have repeat vitamin D level drawn once prescription is completed to re-check value. Future lab ordered and prescription sent to pharmacy. Patient should also start a daily vitamin D3 supplement at 2,000 IU daily. I have also sent this prescription to the pharmacy.

## 2022-10-07 NOTE — RESULT ENCOUNTER NOTE
Low vitamin D 15.3, CMP with , TSH normal, urine microalbumin normal, A1c 6.9  Results reviewed with patient during office visit 10/03/2022.   See OV note for discussion/recommendations

## 2022-10-18 ENCOUNTER — HOSPITAL ENCOUNTER (OUTPATIENT)
Dept: DIABETES SERVICES | Age: 62
Setting detail: THERAPIES SERIES
Discharge: HOME | End: 2022-10-18
Payer: COMMERCIAL

## 2022-10-18 ENCOUNTER — HOSPITAL ENCOUNTER (OUTPATIENT)
Dept: CARDIOLOGY | Age: 62
Discharge: HOME | End: 2022-10-18
Payer: COMMERCIAL

## 2022-10-18 PROCEDURE — G0109 DIAB MANAGE TRN IND/GROUP: HCPCS

## 2022-10-18 PROCEDURE — 93280 PM DEVICE PROGR EVAL DUAL: CPT

## 2022-10-18 NOTE — PROGRESS NOTES
Diabetes Self- Management Education Program Assessment and Education Record-   Also see Diabetic Screening    Patient, Yonathan Ham, has been diagnosed with  [] Type 1 [x] Type 2 diabetes. [x] Patient is new to diabetes, and here for initial diabetes self-management assessment and education. Class #1  [] Patient is here for review of diabetes self-management assessment and education. Today's visit was in an [] individual [x] group setting. Patient came [x] alone [] with family member.      Goals setting:  Initial Goal Set with Patient  [x]Yes  [] NO      MEDICAL HISTORY:  Past Medical History:   Diagnosis Date    Angina at rest [facility]) 05/14/2018    Anxiety     Atopic rhinitis 01/19/2016    Atrial fibrillation, persistent ([address] 05/10/2018    Baker's cyst of knee, left 6/23/2022    Chronic cholecystitis with calculus 08/01/2017    Dyshidrotic eczema 01/15/2020    Essential hypertension 03/24/2014    GERD (gastroesophageal reflux disease) 6/30/2022    Hyperlipidemia     Morbid obesity ([address] 04/30/2009    ALE on CPAP 06/20/2022    Osteoarthritis     back, knees, spine    Pacemaker 03/09/2018    PONV (postoperative nausea and vomiting)     from using inhaled anethesia     PVC's (premature ventricular contractions) 01/06/2016    Seasonal allergies     Symptomatic bradycardia 03/09/2018    Type 2 diabetes mellitus without complication, without long-term current use of insulin ([address]     Vitamin D deficiency      Family History   Problem Relation Age of Onset    Lupus Mother     Heart Disease Father     Heart Attack Father 50    Heart Disease Sister     Heart Attack Sister 40    Coronary Art Dis Sister     Lung Cancer Brother 62    Lung Cancer Maternal Grandfather     Colon Cancer Paternal Grandfather     No Known Problems Daughter     No Known Problems Daughter      Sulfa antibiotics, Lisinopril, Lisinopril, Sulfa antibiotics, Codeine, and Percocet [oxycodone-acetaminophen]   Immunization History   Administered Date(s) Administered    COVID-19, MODERNA BLUE border, Primary or Immunocompromised, (age 12y+), IM, 100 mcg/0.5mL 03/11/2021, 04/01/2021, 04/08/2021, 05/01/2021, 11/17/2021, 05/31/2022    Influenza Vaccine, unspecified formulation 09/01/2016    Influenza Virus Vaccine 10/01/2016, 10/01/2017, 02/04/2019, 09/17/2020    Influenza, High Dose (Fluzone 65 yrs and older) 10/11/2017    Influenza, Quadv, IM, PF (6 mo and older Fluzone, Flulaval, Fluarix, and 3 yrs and older Afluria) 10/03/2018, 10/01/2019    Pneumococcal Polysaccharide (Opetgqkdi13) 03/24/2014    Pneumococcal conjugate PCV20, PF (Prevnar 20) 06/30/2022    Tdap (Boostrix, Adacel) 06/30/2022    Zoster Live (Zostavax) 01/01/2014, 02/22/2014, 06/08/2015       Current Medications  Current Outpatient Medications   Medication Sig Dispense Refill    metFORMIN (GLUCOPHAGE-XR) 500 MG extended release tablet Take 1 tablet by mouth daily (with breakfast) 90 tablet 1    fluocinonide (LIDEX) 0.05 % cream APPLY TOPICALLY TWICE A DAY 60 g 11    PARoxetine (PAXIL) 20 MG tablet TAKE 1 TABLET EVERY MORNING 90 tablet 3    metoprolol tartrate (LOPRESSOR) 50 MG tablet       pantoprazole (PROTONIX) 40 MG tablet Take by mouth      ELIQUIS 5 MG TABS tablet 5 mg 2 times daily       atorvastatin (LIPITOR) 80 MG tablet       metoprolol tartrate (LOPRESSOR) 25 MG tablet Take 25 mg by mouth 2 times daily       Respiratory Therapy Supplies BLAS Dispense cpap supplies and mask 1 Device 0    sotalol (BETAPACE) 120 MG tablet Take 120 mg by mouth       fluticasone (FLONASE) 50 MCG/ACT nasal spray 2 sprays by Nasal route daily 1 Bottle 1    olopatadine (PATANOL) 0.1 % ophthalmic solution 1-2 drops      doxazosin (CARDURA) 2 MG tablet Take 2 mg by mouth nightly       furosemide (LASIX) 20 MG tablet Take 20 mg by mouth daily      spironolactone (ALDACTONE) 25 MG tablet Take 25 mg by mouth daily      potassium chloride (KLOR-CON M) 20 MEQ extended release tablet Take 1 tablet by mouth 2 times daily (Patient taking differently: Take 20 mEq by mouth daily ) 60 tablet 3    loratadine (CLARITIN) 10 MG tablet Take 10 mg by mouth daily       Triamcinolone Acetonide (NASACORT ALLERGY 24HR NA) by Nasal route 3 times daily as needed. No current facility-administered medications for this encounter.   :     Comments:  Allergies: Allergies   Allergen Reactions    Sulfa Antibiotics Swelling    Lisinopril     Lisinopril Other (See Comments)     cough    Sulfa Antibiotics Swelling    Codeine Nausea And Vomiting    Percocet [Oxycodone-Acetaminophen] Nausea And Vomiting       Diabetes 5  / Health Status    1. A1C blood level - at goal < 7%   Lab Results   Component Value Date    LABA1C 7.0 (H) 06/08/2022    LABA1C 6.4 09/07/2021    LABA1C 6.5 (H) 05/04/2020     Lab Results   Component Value Date    GLUF 127 (H) 06/08/2022    LABMICR <1.20 05/24/2021    LDLCALC 71 06/08/2022    CREATININE 0.93 (H) 06/21/2022       2. Blood pressure (140/90)  Or less  BP Readings from Last 3 Encounters:   06/30/22 132/76   06/20/22 134/74   06/07/22 134/88       3. Cholesterol ( LDL under  100)   Lab Results   Component Value Date    LDLCALC 71 06/08/2022    LDLCHOLESTEROL 138 (H) 03/18/2013       4. Smoking ? []Yes   [x]No    5. Taking an Asprin daily? []Yes   [x]No      Diabetes Self- Management Education Record    Participant Name: Vinny Appiah  Referring Provider: Sho Echevarria MD   Assessment/Evaluation Ratings:  1=Needs Instruction   4=Demonstrates Understanding/Competency  2=Needs Review   NC=Not Covered    3=Comprehends Key Points  N/A=Not Applicable    Topics/Learning Objectives Initial Assessment Date:  Comments:  Signature:   Classes 1, 2, 3 or Individual instruction Instr.  Date:  Comment:   Signature:   Reinforce Date:  Comments:  Signature: Post- Education Eval date:  Comments:  Signature:   Pathophysiology of diabetes  Define diabetes & Insulin resistance Identify own type of diabetes; role of the pancreas; activity level? []Yes []No       If yes, how? If yes, how many days/week? Monitoring blood glucose Identify recommended & personal blood glucose targets; importance of testing; testing supplies; HgbA1C target levels; Factors affecting blood glucose; Importance of logging blood glucose levels for pattern recognition; ketone testing; safe lancet disposal..   Assessment Ratin  22  [] Proficient in using meter   [] Recently started using meter  [] Not currently using meter  [x] Patient does not have a meter prescribed & advised to contact PCP for order or to purchase meter. States she will message provider tomorrow to get meter    [x] Reviewed handout Desired blood glucose level citing ADA recommendations for blood glucose goals. Discussed frequency of testing, importance of record keeping, proper handling of meter and test strips, disposal of used strips and lancets. Reviewed importance of testing as a means to evaluate effectiveness of treatment plan. [x] Does not have any Anemias or  hemoglobinopathies that may affect A1c  [] Has anemia or hemoglobinopathy that may affect A1c    Patient had cortisone injection for bakers cyst 2 weeks ago. Encouraged to get meter to assess effects of cortisone on blood glucose    Kings Chen RN   [x] Discussed blood glucose monitoring to include: American Diabetes Association goals for blood glucose, effects of diet, exercise and medications on test results, frequency of testing, the possible causes and appropriate action to take if hypoglycemia (15/15 rule) or hyperglycemia occurs,   importance of record keeping to share with their PCP and when to call their PCP with abnormal results.       [x] Also reviewed were: proper storage and handling of both the meter and test strips; proper disposal of used strips and lancets, running  checks on the test strips using control solution and loading and using lancet device to obtain an adequate sample. Suggested times were given for routine testing. To increase testing for sick day monitoring, or when a change in diabetes medication, activity, or stress occurs. [x]  Aware of individualized A1C goal and current A1C. [x] Checking for ketones was introduced along with prevention and symptoms of Diabetic Ketoacidosis (DKA)    Evaluation rating:    Checking blood sugar    times a day. Checking before meals? []Yes  []No     Fasting ranges in last week:      Checking 2 hours post prandial? []Yes  []No     Ranges in last week:     Checking at bedtime? []Yes  []No   Ranges in last week:     Knowledgeable of blood glucose goals? []Yes []No             Glucose meter - educate on meter use if new to monitoring. Able to use meter appropriately   Assessment Ratin  22  [] Patient is new to glucose meter and instructed on the following.:   []Overview of meter - 800 number on all machines for help  []Proper storage/ handling of meter and test strips  []Washing hands, turning on meter - setting date and time  []Running  checks on the test strips using control solution   []Loading and using lancet device to obtain an adequate sample  []Obtaining blood sample and reading results on meter  []Proper disposal of used strips and lancets  []Frequency of testing  Importance of record keeping   []When to call their PCM with abnormal results  []Desired blood glucose goals for optimal control - handout given  [] All of the above    [] Patient instructed on home meter. Name of meter:    [] Patient instructed with demonstrator model in office. The patient return demonstrated   [] Verbally   [] Using his own meter:        [] Self-tested Bg:_____    Delfin Mccracken RN   See above  Evaluation rating:    Cleaning hands before testing? []Yes   []No    Using sides of fingers, not pads? []Yes   []No    Using  checks. []Yes   []No    Logging resuts?   []Yes   []No   Risk Reduction - acute complications:  Identify symptoms of hyper & hypoglycemia, and prevention & treatment strategies. Assessment Ratin  22  [] Knowledgeable  [] Limited Knowledge  [x] No knowledge   of hypo or hyperglycemia. [x] Handouts reviewed covering symptoms and treatment for both. (includes Rule of 15)    Patient has [x]low []high risk for hypoglycemia. [] Advised to carry source of fast acting glucose at all times. []  Advised to carry ID on person identifying as having diabetes  Evi Bender RN   [x] Reviewed signs and symptoms of acute complications of diabetes, (hypoglycemia and hyperglycemia), possible causes and actions to take if occurs. [x] Reviewed BG guidelines and troubleshooting unexpected numbers. Evaluation rating:    Knowledge of Hypo and Hyper glycemia treatment []Yes []No     Knowledge of Hypo and Hyper glycemia prevention []Yes []No    Lows since last visit? []Yes []No      Treat appropriately? []Yes  []No    Explainable? []Yes  []No      Ketone testing? []Yes []No   Risk Reduction - sick days   Describe sick day guidelines & indications for physician notification. Identify short term consequences of poor control. Assessment Ratin22  NC    [x]Advised of effects of illness on blood glucose. Reviewed management of sick days with group. Advised about importance of continuing diabetes medications, tips for staying hydrated and when to call the doctor. [x] Sick day handout given. [x] Sick day toolbox reviewed. Evaluation rating:    Knowledge of sick day plan? []Yes [] No   Healthy Coping:   Identify healthy and unhealthy coping, causes of stress and ways to reduce stress. How depression affects diabetes care and how to seek help if needed. Identify support needed & support network available   Assessment Ratin  22    Patient's PAID-5 Score:3    [x]Patient's PAID-5 (Problem Areas in Diabetes) score is less than 9.  No intervention needed at this time. [] Patient's PAID-5 (Problem Areas in Diabetes) score is greater than 8 which indicates possible diabetes related emotional distress and warrants further assessment. [] Support given during appointment. Patient advised to follow up with PCP or psychologist.   [] Letter will be sent to PCP indicating further assessment needed. Anuel Conrad RN   [x] Reviewed healthy coping and unhealthy coping with the group. Discussed what ways of coping each person usually uses and brainstormed ways to change to a healthy coping mechanism with the group. [x] Stressed the importance of stress reduction and the negative effects of stress on the body including elevated BG. [x] [employer] and support networks reviewed and handout provided. Evaluation rating:    Feelings/Attitudes/Concerns? Ongoing self-management support plan? [] Yes []No   Problem solving & goal setting:  Behavior Change and goal settingIdentify 7 self-care behaviors, problem solving techniques: Finding problems, identifying solutions and taking action. Assessment Ratin22  Discussed willingness to change behaviors and setting SMART goals. Patient [x] is willing  [] is not willing to change at this time. Anuel Conrad RN [x] Identified problem solving techniques: finding problems, identifying solutions (goal setting) and taking action. [x] Patient reviewed selected goal set at initial assessment. Evaluation rating:    Identifying Barriers: Depression, unhealthy behaviors, financial    Healthy Eating: Describe effect of type, amount & timing of food on blood glucose; Describe basic meal planning techniques & current nutrition guideline  Correctly read food labels & demonstrate CHO counting & portion control with personalized meal plan.    Identifies dining out strategies, & dietary sick day guidelines   Assessment Ratin22    Meal Plan patient is currently following:  [] plate method  [] portion control  [] carb counting   [] label reading  [x] no meal plan    [x] Booklet from ADA Counting Carbs given. Reviewed plate method, portion control & label reading for carb counting utilizing booklet. [x] Advised that dietitian will recommend individualized number of carbs to eat daily, but in meantime could follow basic recommendations as follows:  [] Men-   [] for weight loss - 3-4 choices/45-60 gms per meal with 1 snack of 15 gm per day. []To maintain weight: 4-5 choices/60-75  gms pre meal with 1 snack of 15 gm per day. [x] Women -   [x]weight loss 2-3 choices/30-45 gms per meal with 1 snack of 15 gm per day. []To maintain weight: 3-4 servings/45-60 gms pre meal with 1 snack of 15 gm per day. Goal is to lose 10 pounds    Patient would benefit from   [] individual appt with dietitian  [] group MNT with dietitian    Kalli Bear RN   Evaluation rating:    Are you following a meal plan? []Yes []No    [] Portion control   [] Plate method   [] Carb counting   [] Label reading    Weight loss since class? []Yes []No   Taking MedicationsIdentify effects of diabetes medicines on blood glucose levels; List diabetes medication taken, action & side effects   Assessment Ratin  22  Handouts provided on both oral and injectable medications. Currently taking: Metformin  mg daily. Began 2 weeks ago    [] Currently takes the following complementary or alternative medicines:    [x] Reviewed medication compliance, action, side effects and timing of each. Has metallic taste in mouth     Patient is:  [x]compliant with current meds. []noncompliant with current meds. Kalli Bear RN   [] Discussed all medication classes both oral and injectable to include method of action, side effects and precautions. [] Patient provided handout with their medications for diabetes listed showing method of action and when to take.     Evaluation rating:    Any Changes in Medications? Compliant? []Yes []No     Any side effects? []Yes [] No   Insulin / Injectable - Appropriate injection sites; proper storage; supplies needed; proper technique; safe needle disposal guidelines. Assessment Ratin  22    [x] Not on insulin    [] New to insulin   Patient is new to insulin and prescribed:     []  Instructed today on type of insulin(s), onset, peak and duration. []  Demonstrated proper administration technique using       []Vial & syringe       []Pen. [] Return demonstration via          [] Self-injection  __U Site:____             [] Demonstrator        []  Reviewed recommended injection sites, proper storage of insulin, Proper needle disposal, importance of blood glucose monitoring when taking insulin, and risk of hypoglycemia. []  Handouts given.     [] Previously on insulin:  and assessed the following:    [] Knowledge of type of insulin - onset, peak and duration of each type    [] Demonstrates Competency      [] Education provided      [] Proper storage of insulin:     [] Demonstrates Competency      [] Education provided          [] Site Management        [] Demonstrates Competency        [] Education provided      [] Injection site currently uses:     [] Evidence of:        [] bruising       [] infection       [] lipoatrophy        [] lipohypertrophy.      [] Injecting near umbilicus or scars/tattoos    [] Rotates sites appropriately    [] Skin Preparation technique:          [] Injection Procedure:       [] Demonstrates Competency        [] Education provided   [] Sterile Technique   [] Rolling to uniformity (if necessary)   [] Pre-injection priming (pen only)   [] Air into vial (Syringe only)   [] Correct order for mixing in same syringe (if necessary)   [] Dosing accuracy   [] Needle insertion   [] Complete injection -    [] Needle withdrawal - with waiting time before removing needle          [] Disposal Procedure:       [] Demonstrates Competency        [] Education provided    [] Has sharps container /needle clip    [] Uses sharps container / needle clip          [] Injection Device Selection:       [] Demonstrates Competency       [] Education provided    [] Appropriate needle size    [] Sufficient volume    [] Appropriate dosing increments    [] Suited to physical limitations           [] Injection adherence:       [] Demonstrates Competency        [] Education provided     [] Misses doses:         [] Daily [] Weekly          [] Monthly [] Almost Never         [] Hypoglycemia symptoms & treatment:      [] Demonstrates Competency        [] Education provided    Anuel Conrad RN   [] Discussed insulin stigma and proper technique including site selection and self-injection. []  Reviewed both pen and vial/syringe use. Discussed needle disposal and proper insulin storage and importance of times to take insulin. [] Discussed importance of blood glucose monitoring to assess current treatment effectiveness. Evaluation rating: On Insulin? []Yes []No           Injection site used: __________     Rotating sites? []Yes [] No     Problems? []Yes []No      Storing insulin correctly? []Yes [] No     Injecting at proper times? []Yes []No   IRisk Reduction - chronic complications:  Describe the relationship of blood glucose levels to long term complications of diabetes. Identify preventative measures & standards of care. Assessment Ratin22  NC  See Diabetes Assessment for last completed chronic complication prevention appointments.     [] Patient is up-to-date on preventative exams and vaccines  [x] Patient is not up-to date on preventative exams and vaccines and advised to discuss with PCM    [x] Blood pressure is at goal  [] Blood pressure is not at goal    [x] Cholesterol is at goal  [] Cholesterol is not at goal    [] Has microvascular complications  [] Has macrovascular complications  Anuel Conrad RN   [] Reviewed natural course of T2DM with group and how chronic complications are related to elevated blood glucose. [] Discussed macro and microvascular complications and the need for control of lipids, blood pressure, glucose levels, weight reduction and smoking cessation to help reduce risks. [] Instructed on goals for blood pressure, lipids, and glucose for optimal health. [] Individualized scorecard provided with current   health maintenance recommendations from the American Diabetes Association to include Eye, dental exams, foot exams, recommended labs and vaccines for people with diabetes. Evaluation rating:    Using Score card? [] Yes []No    Blood Pressure at goal?  [] Yes []No    Foot exams:  self-exams daily  []Yes []No  Provider yearly   []  Yes []No               Eye and dental exams up to date? []Yes []No               Labs completed & at goal  [] Yes []No        Plan if not at goal? []Yes []No  Immunizations   [] Flu   []pneumonia   []Hep B (19-59)   []Covid   Individualized goal selection. 07/20/22  My goal , to help me improve my health, I will:     Begin to reduce portion size of meals at evening meal 3 times in the next week. 0 %      2. Message PCP for meter tomorrow   0%    Aura Alonzo RN Percentage of goal completed from Initial Assessment:10/18/2022    Begin to reduce portion size of meals at evening meal 3 times in the next week. 25 %    2. Message PCP for meter tomorrow   50%    New revised goal:adjust meal portions Percentage of goal completed:  1.      2.    New revised goal: Percentage of goal completed since end of class:  1.      2.    New revised goal:     Plan  Follow-up Appointments planned in group setting. Next Appointment tomorrow.     Instruction Method: [x]Lecture/Discussion  []Power Point Presentation  [x]Handouts  []Return Demonstration      Education Materials/Equipment Provided:      [x] Self-Management  - Initial Assessment - Where do I Begin booklet and Counting Carbs from the ADA. [x] Self-Management  Class 1 - On the Road to better managing your diabetes - Packet of Handouts from Diabetes Department    [] Self-Management  Class 2 - Meal Plan,  Choose your Foods - Food Lists for [employer] and Nutrition in the [employer] - fast facts about fast food    [] Self-Management  Class 3 -  Diabetes ID card,  foot care tips sheet,  Continuing Your Journey with Diabetes, Individualized Diabetes report card, Sick Day Rules, Diabetes Cookbooks, Magazines and Pedometers when available    [] Glucose Meter     [] BD Getting Started Insulin Kit     [] Other      Encounter Type Date Start Time End Time Comments No Show Dates   Assessment 07/20/22  Anuel Conrad  300 07/20/22    [x] Patient has no barriers to learning and recommend attending classes. Classes scheduled for: 8/9 & 11 and Aug 17    [] Patient has the following barriers to learning and would benefit from additional education in an individual setting. Barriers:      [] Will follow up:    [] Patient declines classes at this time.       [] Will follow up:    Anuel Conrad RN    Class 1 - Understanding diabetes 10/18/2022  Anuel Conrad RN   0900 1200   [x] First class completed today. Class 2- Nutrition and diabetes        [] Second class completed today    Class 3 - Preventing Complications     [] Third class completed today. [] Patient has completed all 3 classes today. Goal sheets and DSMS Support Plan completed. Will follow up in 3-4 months via telephone. Patient advised to contact Diabetes Education office if any questions. Number provided. [] Patient needs to attend Class #    in order to complete classes. Scheduled for:        Individual MNT         3 Month Follow-up      []In Person  []Telephone    Meter Instrx        Insulin Instrx      []Pen  []Vial & Syringe      DSMS Support Plan:  Follow-up plan:    [] Healthy Coping  [] Emotional Support  [] National [place] [employer] Mental Illness (CLEO) - (Depression, bipolar and other support) 912.370.8402; www.cleo.org    [] [facility], [address]  247.544.4012; www.dbsalliance. org                          [] National Suicide Prevention Tiizjxmi-602-524-8255                          [] Anxiety & Depression Association of [place]                               Find local support groups by zip code at [facility] number 820-543-9653                          [] Counseling:  ______________________________  [] Diabetes Support Groups  [] [facility]  2nd Tuesday of the month at 10:00 BZ-[phone]  [] On-line support groups ([employer], [employer], ADA)  [] [facility]  [] I would be interested in a support group at [facility] if offered    [] Stress Relief     [] Yoga Class     [] Start a journal     [] Relaxation techniques     [] [email or web address]- Michael         [] SparkQuote (Free, inspiring quote of the day)    []  Healthy Eating  [] Weight Management & Carb Counting  [] Weight Keacejvl-[phone]; www.weightwatchWork For Pie. ZEEF.com  [] Over Eaters Kzddvvxmx-[phone] (support group)- www.[employer]. org  [] Follow up individual appointment with [facility] dietitian - call [phone]  [] Food Tracking Apps - My Fitness Mj Darling  [] ADA website - [address], weight loss  [] Vinny. com      []   Monitoring  [] Glucose Beto (Free, tracks blood glucose, graphs)  [] MySugrApp (Basic and Pro patterns)  [] Diabetes:M - logbook    []  Being Active  [] 24 Hour Wcfcrqu-[phone]; www.[employer]. ZEEF.com  [] Marcos Barcenas. 10Six  [] [facility] Arin  [] Fit Walks: FREE  [facility] in [place] on Mondays 5:30 pm  [facility] in [facility] (for weather)   [place] on Thursdays at 5:30 [phone]  [] Thu Severe walking videos (Some free on youtube)  [] Other Exercise Plan/Facility _____________________________________________  [] Apps Jaqueline Locket to 5K    []  Reducing Risk  [] Make a sick day toolkit  [] Schedule appointments for eye, dentist  [] Stop smoking              [] Monitor Blood pressure              [] Get vaccinated                [] Other:___________________      []    Taking Medications  [] Michael: MyTherapyApp - helps track meds  [] Seek financial help with medications:  [] Non-insulin-agents-cost-saving-resource-7-29-19. pdf   [employer].Wummelkiste/docs/default-source/practice/educator-tools/non-insulin-agents-cost-saving-resource-7-29-19. pdf?sfvrsn=2  [] Insulin cost saving resource  [employer]. org/docs/default-source/practice/educator-tools/insulin-cost-saving-resources-3-4-19. pdf?sfvrsn=4  []  GetInsulin. org  []  The Affordable insulin Project http://affordableinsulinproject.org/    []  Diabetes Websites - Use as a resource for additional information  [] www.diabetes. org  [] Park.com.G-CON  [] WWW.diabeteseducator. org     Association of Diabetes Care & Education Specialists  [] www.niddk.nih.gov/health-information/diabetes/overview  Professor Sullivan 108  [] www.medicalert. org  - offers emergency medical information service, including an ID bracelet/pendant (have to pay)    []   Journals/Magazines  [] Diabetes Forecast 1-546.803.9044; www.diabetesforecast.org  [] Diabetes Self-Management 1-605.940.3159; www.diabetesselFamelyanagement. com  [] Diabetes Health [phone]; www.diabeteshealth. com    []   Other  [] Health Program offered at place of employment   [] [employer]'s Chronic Disease Management Program  [] Follow up - Diabetes Education or Nutrition Therapy Annually (call in 1 year to set up apt)  [] Join the Living with Type 2 Diabetes Program (FREE)   www.diabetes. org/diabetes/type-2/living-with-type-2-diabetes-program  or call 1-800-DIABETES  [] Stop smoking - www.cancer. org/healthy/stay-away-from-tobacco       Post Education Referrals:        [] [place] Tobacco Quit information sheet and [address] [facility] , [address] [facility]      [] Dental care     [] Podiatrist     [] Ophthalmologist     [] Other    Karen Rain RN

## 2022-10-19 ENCOUNTER — HOSPITAL ENCOUNTER (OUTPATIENT)
Dept: DIABETES SERVICES | Age: 62
Setting detail: THERAPIES SERIES
Discharge: HOME | End: 2022-10-19
Payer: COMMERCIAL

## 2022-10-19 PROCEDURE — 97804 MEDICAL NUTRITION GROUP: CPT

## 2022-10-20 ENCOUNTER — HOSPITAL ENCOUNTER (OUTPATIENT)
Dept: DIABETES SERVICES | Age: 62
Setting detail: THERAPIES SERIES
Discharge: HOME | End: 2022-10-20
Payer: COMMERCIAL

## 2022-10-20 PROCEDURE — G0109 DIAB MANAGE TRN IND/GROUP: HCPCS

## 2022-10-20 NOTE — PROGRESS NOTES
Diabetes Self- Management Education Program Assessment and Education Record-   Also see Diabetic Screening    Patient, Christiano Casas, has been diagnosed with  [] Type 1 [x] Type 2 diabetes. [x] Patient is new to diabetes, and here for initial diabetes self-management assessment and education. Class #1  [] Patient is here for review of diabetes self-management assessment and education. Today's visit was in an [] individual [x] group setting. Patient came [x] alone [] with family member.      Goals setting:  Initial Goal Set with Patient  [x]Yes  [] NO      MEDICAL HISTORY:  Past Medical History:   Diagnosis Date    Angina at rest [facility]) 05/14/2018    Anxiety     Atopic rhinitis 01/19/2016    Atrial fibrillation, persistent ([address] 05/10/2018    Baker's cyst of knee, left 6/23/2022    Chronic cholecystitis with calculus 08/01/2017    Dyshidrotic eczema 01/15/2020    Essential hypertension 03/24/2014    GERD (gastroesophageal reflux disease) 6/30/2022    Hyperlipidemia     Morbid obesity ([address] 04/30/2009    ALE on CPAP 06/20/2022    Osteoarthritis     back, knees, spine    Pacemaker 03/09/2018    PONV (postoperative nausea and vomiting)     from using inhaled anethesia     PVC's (premature ventricular contractions) 01/06/2016    Seasonal allergies     Symptomatic bradycardia 03/09/2018    Type 2 diabetes mellitus without complication, without long-term current use of insulin ([address]     Vitamin D deficiency      Family History   Problem Relation Age of Onset    Lupus Mother     Heart Disease Father     Heart Attack Father 50    Heart Disease Sister     Heart Attack Sister 40    Coronary Art Dis Sister     Lung Cancer Brother 62    Lung Cancer Maternal Grandfather     Colon Cancer Paternal Grandfather     No Known Problems Daughter     No Known Problems Daughter      Sulfa antibiotics, Lisinopril, Lisinopril, Sulfa antibiotics, Codeine, and Percocet [oxycodone-acetaminophen]   Immunization History   Administered Date(s) Administered    COVID-19, MODERNA BLUE border, Primary or Immunocompromised, (age 12y+), IM, 100 mcg/0.5mL 03/11/2021, 04/01/2021, 04/08/2021, 05/01/2021, 11/17/2021, 05/31/2022    Influenza Vaccine, unspecified formulation 09/01/2016    Influenza Virus Vaccine 10/01/2016, 10/01/2017, 02/04/2019, 09/17/2020    Influenza, High Dose (Fluzone 65 yrs and older) 10/11/2017    Influenza, Quadv, IM, PF (6 mo and older Fluzone, Flulaval, Fluarix, and 3 yrs and older Afluria) 10/03/2018, 10/01/2019    Pneumococcal Polysaccharide ([id number]) 03/24/2014    Pneumococcal conjugate PCV20, PF (Prevnar 20) 06/30/2022    Tdap (Boostrix, Adacel) 06/30/2022    Zoster Live (Zostavax) 01/01/2014, 02/22/2014, 06/08/2015       Current Medications  Current Outpatient Medications   Medication Sig Dispense Refill    metFORMIN (GLUCOPHAGE-XR) 500 MG extended release tablet Take 1 tablet by mouth daily (with breakfast) 90 tablet 1    fluocinonide (LIDEX) 0.05 % cream APPLY TOPICALLY TWICE A DAY 60 g 11    PARoxetine (PAXIL) 20 MG tablet TAKE 1 TABLET EVERY MORNING 90 tablet 3    metoprolol tartrate (LOPRESSOR) 50 MG tablet       pantoprazole (PROTONIX) 40 MG tablet Take by mouth      ELIQUIS 5 MG TABS tablet 5 mg 2 times daily       atorvastatin (LIPITOR) 80 MG tablet       metoprolol tartrate (LOPRESSOR) 25 MG tablet Take 25 mg by mouth 2 times daily       Respiratory Therapy Supplies BLAS Dispense cpap supplies and mask 1 Device 0    sotalol (BETAPACE) 120 MG tablet Take 120 mg by mouth       fluticasone (FLONASE) 50 MCG/ACT nasal spray 2 sprays by Nasal route daily 1 Bottle 1    olopatadine (PATANOL) 0.1 % ophthalmic solution 1-2 drops      doxazosin (CARDURA) 2 MG tablet Take 2 mg by mouth nightly       furosemide (LASIX) 20 MG tablet Take 20 mg by mouth daily      spironolactone (ALDACTONE) 25 MG tablet Take 25 mg by mouth daily      potassium chloride (KLOR-CON M) 20 MEQ extended release tablet Take 1 tablet by mouth 2 times daily (Patient taking differently: Take 20 mEq by mouth daily ) 60 tablet 3    loratadine (CLARITIN) 10 MG tablet Take 10 mg by mouth daily       Triamcinolone Acetonide (NASACORT ALLERGY 24HR NA) by Nasal route 3 times daily as needed. No current facility-administered medications for this encounter.   :     Comments:  Allergies: Allergies   Allergen Reactions    Sulfa Antibiotics Swelling    Lisinopril     Lisinopril Other (See Comments)     cough    Sulfa Antibiotics Swelling    Codeine Nausea And Vomiting    Percocet [Oxycodone-Acetaminophen] Nausea And Vomiting       Diabetes 5  / Health Status    1. A1C blood level - at goal < 7%   Lab Results   Component Value Date    LABA1C 7.0 (H) 06/08/2022    LABA1C 6.4 09/07/2021    LABA1C 6.5 (H) 05/04/2020     Lab Results   Component Value Date    GLUF 127 (H) 06/08/2022    LABMICR <1.20 05/24/2021    LDLCALC 71 06/08/2022    CREATININE 0.93 (H) 06/21/2022       2. Blood pressure (140/90)  Or less  BP Readings from Last 3 Encounters:   06/30/22 132/76   06/20/22 134/74   06/07/22 134/88       3. Cholesterol ( LDL under  100)   Lab Results   Component Value Date    LDLCALC 71 06/08/2022    LDLCHOLESTEROL 138 (H) 03/18/2013       4. Smoking ? []Yes   [x]No    5. Taking an Asprin daily? []Yes   [x]No      Diabetes Self- Management Education Record    Participant Name: Roberto Aguero  Referring Provider: Farooq Card MD   Assessment/Evaluation Ratings:  1=Needs Instruction   4=Demonstrates Understanding/Competency  2=Needs Review   NC=Not Covered    3=Comprehends Key Points  N/A=Not Applicable    Topics/Learning Objectives Initial Assessment Date:  Comments:  Signature:   Classes 1, 2, 3 or Individual instruction Instr.  Date:  Comment:   Signature:   Reinforce Date:  Comments:  Signature: Post- Education Eval date:  Comments:  Signature:   Pathophysiology of diabetes  Define diabetes & Insulin resistance Identify own type of diabetes; role of the pancreas; signs/symptoms; diagnostic criteria; prevention & treatment options; contributing factors. Assessment Ratin  22  Diabetes Education assessment completed today. Patient has:  [] No knowledge of diabetes disease process   [x] Limited knowledge of diabetes disease process  [] Good knowledge of diabetes disease process. In this session, the role of the pancreas, insulin, and the liver in glucose utilization and insulin resistance was   [x]  Introduced  []  Reviewed. [x] ADA booklet Where Do I Begin used to reinforce teaching. Susana Rojas RN   [x] Discussed basic pathophysiology of diabetes with the group including the pancreas, insulin, insulin resistance and the liver's involvement. [x] Reviewed the different types of DM, risk factors, diagnosis, symptoms and the treatment options. Evaluation rating:  Recent Health problems:  []Yes []No   Being Active  Verbalize effect of exercise on blood glucose levels; benefits of regular exercise; safety considerations; contraindications; maintenance of activity. Assessment Ratin22    Patient is   [x] currently being active daily  [] not currently being active daily. [x] Reviewed effects of exercise on glycemic control, risks and benefits, precautions. Patient       [x]has co- morbidities - Bakers cyst - going to PT       [] has no co-morbidities that recommend being seen by [facility] prior to starting exercise program for medical clearance. [x] Briefly reviewed guidelines for frequency, duration, intensity for exercise. Patient currently engages in the following activities: Juan Luis Bell RN     [x] Reviewed effects of activity on glycemic control and weight loss, risks and benefits, and precautions. [x] Current recommendations for being active by the American Diabetes Association reviewed.      [x] Discussed what patient is doing to stay active   Evaluation rating:  Have you made any changes in your activity level? []Yes []No       If yes, how? If yes, how many days/week? Monitoring blood glucose Identify recommended & personal blood glucose targets; importance of testing; testing supplies; HgbA1C target levels; Factors affecting blood glucose; Importance of logging blood glucose levels for pattern recognition; ketone testing; safe lancet disposal..   Assessment Ratin  22  [] Proficient in using meter   [] Recently started using meter  [] Not currently using meter  [x] Patient does not have a meter prescribed & advised to contact PCP for order or to purchase meter. States she will message provider tomorrow to get meter    [x] Reviewed handout Desired blood glucose level citing ADA recommendations for blood glucose goals. Discussed frequency of testing, importance of record keeping, proper handling of meter and test strips, disposal of used strips and lancets. Reviewed importance of testing as a means to evaluate effectiveness of treatment plan. [x] Does not have any Anemias or  hemoglobinopathies that may affect A1c  [] Has anemia or hemoglobinopathy that may affect A1c    Patient had cortisone injection for bakers cyst 2 weeks ago. Encouraged to get meter to assess effects of cortisone on blood glucose    Evi Bender RN   [x] Discussed blood glucose monitoring to include: American Diabetes Association goals for blood glucose, effects of diet, exercise and medications on test results, frequency of testing, the possible causes and appropriate action to take if hypoglycemia (15/15 rule) or hyperglycemia occurs,   importance of record keeping to share with their PCP and when to call their PCP with abnormal results.       [x] Also reviewed were: proper storage and handling of both the meter and test strips; proper disposal of used strips and lancets, running  checks on the test strips using control solution and loading and using lancet device to obtain an adequate sample. Suggested times were given for routine testing. To increase testing for sick day monitoring, or when a change in diabetes medication, activity, or stress occurs. [x]  Aware of individualized A1C goal and current A1C. [x] Checking for ketones was introduced along with prevention and symptoms of Diabetic Ketoacidosis (DKA)    Evaluation rating:    Checking blood sugar    times a day. Checking before meals? []Yes  []No     Fasting ranges in last week:      Checking 2 hours post prandial? []Yes  []No     Ranges in last week:     Checking at bedtime? []Yes  []No   Ranges in last week:     Knowledgeable of blood glucose goals? []Yes []No             Glucose meter - educate on meter use if new to monitoring. Able to use meter appropriately   Assessment Ratin  22  [] Patient is new to glucose meter and instructed on the following.:   []Overview of meter - 800 number on all machines for help  []Proper storage/ handling of meter and test strips  []Washing hands, turning on meter - setting date and time  []Running  checks on the test strips using control solution   []Loading and using lancet device to obtain an adequate sample  []Obtaining blood sample and reading results on meter  []Proper disposal of used strips and lancets  []Frequency of testing  Importance of record keeping   []When to call their PCM with abnormal results  []Desired blood glucose goals for optimal control - handout given  [] All of the above    [] Patient instructed on home meter. Name of meter:    [] Patient instructed with demonstrator model in office. The patient return demonstrated   [] Verbally   [] Using his own meter:        [] Self-tested Bg:_____    Kristen Ambrose RN   See above  Evaluation rating:    Cleaning hands before testing? []Yes   []No    Using sides of fingers, not pads? []Yes   []No    Using  checks. []Yes   []No    Logging resuts?   []Yes   []No   Risk Reduction - acute complications:  Identify symptoms of hyper & hypoglycemia, and prevention & treatment strategies. Assessment Ratin  22  [] Knowledgeable  [] Limited Knowledge  [x] No knowledge   of hypo or hyperglycemia. [x] Handouts reviewed covering symptoms and treatment for both. (includes Rule of 15)    Patient has [x]low []high risk for hypoglycemia. [] Advised to carry source of fast acting glucose at all times. []  Advised to carry ID on person identifying as having diabetes  Delfin Mccracken RN   [x] Reviewed signs and symptoms of acute complications of diabetes, (hypoglycemia and hyperglycemia), possible causes and actions to take if occurs. [x] Reviewed BG guidelines and troubleshooting unexpected numbers. Evaluation rating:    Knowledge of Hypo and Hyper glycemia treatment []Yes []No     Knowledge of Hypo and Hyper glycemia prevention []Yes []No    Lows since last visit? []Yes []No      Treat appropriately? []Yes  []No    Explainable? []Yes  []No      Ketone testing? []Yes []No   Risk Reduction - sick days   Describe sick day guidelines & indications for physician notification. Identify short term consequences of poor control. Assessment Ratin22  NC    [x]Advised of effects of illness on blood glucose. Reviewed management of sick days with group. Advised about importance of continuing diabetes medications, tips for staying hydrated and when to call the doctor. [x] Sick day handout given. [x] Sick day toolbox reviewed. Evaluation rating:    Knowledge of sick day plan? []Yes [] No   Healthy Coping:   Identify healthy and unhealthy coping, causes of stress and ways to reduce stress. How depression affects diabetes care and how to seek help if needed. Identify support needed & support network available   Assessment Ratin  22    Patient's PAID-5 Score:3    [x]Patient's PAID-5 (Problem Areas in Diabetes) score is less than 9.  No intervention needed at this time. [] Patient's PAID-5 (Problem Areas in Diabetes) score is greater than 8 which indicates possible diabetes related emotional distress and warrants further assessment. [] Support given during appointment. Patient advised to follow up with PCP or psychologist.   [] Letter will be sent to PCP indicating further assessment needed. Ananya Sweet RN   [x] Reviewed healthy coping and unhealthy coping with the group. Discussed what ways of coping each person usually uses and brainstormed ways to change to a healthy coping mechanism with the group. [x] Stressed the importance of stress reduction and the negative effects of stress on the body including elevated BG. [x] [employer] and support networks reviewed and handout provided. Evaluation rating:    Feelings/Attitudes/Concerns? Ongoing self-management support plan? [] Yes []No   Problem solving & goal setting:  Behavior Change and goal settingIdentify 7 self-care behaviors, problem solving techniques: Finding problems, identifying solutions and taking action. Assessment Ratin22  Discussed willingness to change behaviors and setting SMART goals. Patient [x] is willing  [] is not willing to change at this time. Ananya Sweet RN [x] Identified problem solving techniques: finding problems, identifying solutions (goal setting) and taking action. [x] Patient reviewed selected goal set at initial assessment. Evaluation rating:    Identifying Barriers: Depression, unhealthy behaviors, financial    Healthy Eating: Describe effect of type, amount & timing of food on blood glucose; Describe basic meal planning techniques & current nutrition guideline  Correctly read food labels & demonstrate CHO counting & portion control with personalized meal plan.    Identifies dining out strategies, & dietary sick day guidelines   Assessment Ratin22    Meal Plan patient is currently following:  [] plate method  [] portion control  [] carb counting   [] label reading  [x] no meal plan    [x] Booklet from ADA Counting Carbs given. Reviewed plate method, portion control & label reading for carb counting utilizing booklet. [x] Advised that dietitian will recommend individualized number of carbs to eat daily, but in meantime could follow basic recommendations as follows:  [] Men-   [] for weight loss - 3-4 choices/45-60 gms per meal with 1 snack of 15 gm per day. []To maintain weight: 4-5 choices/60-75  gms pre meal with 1 snack of 15 gm per day. [x] Women -   [x]weight loss 2-3 choices/30-45 gms per meal with 1 snack of 15 gm per day. []To maintain weight: 3-4 servings/45-60 gms pre meal with 1 snack of 15 gm per day. Goal is to lose 10 pounds    Patient would benefit from   [] individual appt with dietitian  [] group MNT with dietitian    Heike Diaz RN   Evaluation rating:    Are you following a meal plan? []Yes []No    [] Portion control   [] Plate method   [] Carb counting   [] Label reading    Weight loss since class? []Yes []No   Taking MedicationsIdentify effects of diabetes medicines on blood glucose levels; List diabetes medication taken, action & side effects   Assessment Ratin  22  Handouts provided on both oral and injectable medications. Currently taking: Metformin  mg daily. Began 2 weeks ago    [] Currently takes the following complementary or alternative medicines:    [x] Reviewed medication compliance, action, side effects and timing of each. Has metallic taste in mouth     Patient is:  [x]compliant with current meds. []noncompliant with current meds. Heike Diaz RN 10/20/2022  Margo Murphy RN    [x] Discussed all medication classes both oral and injectable to include method of action, side effects and precautions. [x] Patient provided handout with their medications for diabetes listed showing method of action and when to take.     Evaluation rating:    Any Changes in Medications? Compliant? []Yes []No     Any side effects? []Yes [] No   Insulin / Injectable - Appropriate injection sites; proper storage; supplies needed; proper technique; safe needle disposal guidelines. Assessment Ratin  22    [x] Not on insulin    [] New to insulin   Patient is new to insulin and prescribed:     []  Instructed today on type of insulin(s), onset, peak and duration. []  Demonstrated proper administration technique using       []Vial & syringe       []Pen. [] Return demonstration via          [] Self-injection  __U Site:____             [] Demonstrator        []  Reviewed recommended injection sites, proper storage of insulin, Proper needle disposal, importance of blood glucose monitoring when taking insulin, and risk of hypoglycemia. []  Handouts given.     [] Previously on insulin:  and assessed the following:    [] Knowledge of type of insulin - onset, peak and duration of each type    [] Demonstrates Competency      [] Education provided      [] Proper storage of insulin:     [] Demonstrates Competency      [] Education provided          [] Site Management        [] Demonstrates Competency        [] Education provided      [] Injection site currently uses:     [] Evidence of:        [] bruising       [] infection       [] lipoatrophy        [] lipohypertrophy.      [] Injecting near umbilicus or scars/tattoos    [] Rotates sites appropriately    [] Skin Preparation technique:          [] Injection Procedure:       [] Demonstrates Competency        [] Education provided   [] Sterile Technique   [] Rolling to uniformity (if necessary)   [] Pre-injection priming (pen only)   [] Air into vial (Syringe only)   [] Correct order for mixing in same syringe (if necessary)   [] Dosing accuracy   [] Needle insertion   [] Complete injection -    [] Needle withdrawal - with waiting time before removing needle          [] Disposal Procedure: [] Demonstrates Competency        [] Education provided    [] Has sharps container /needle clip    [] Uses sharps container / needle clip          [] Injection Device Selection:       [] Demonstrates Competency       [] Education provided    [] Appropriate needle size    [] Sufficient volume    [] Appropriate dosing increments    [] Suited to physical limitations           [] Injection adherence:       [] Demonstrates Competency        [] Education provided     [] Misses doses:         [] Daily [] Weekly          [] Monthly [] Almost Never         [] Hypoglycemia symptoms & treatment:      [] Demonstrates Competency        [] Education provided    Michele Barrios RN 10/20/2022  Cammy Lyons RN    [x] Discussed insulin stigma and proper technique including site selection and self-injection. [x]  Reviewed both pen and vial/syringe use. Discussed needle disposal and proper insulin storage and importance of times to take insulin. [x] Discussed importance of blood glucose monitoring to assess current treatment effectiveness. Evaluation rating: On Insulin? []Yes []No           Injection site used: __________     Rotating sites? []Yes [] No     Problems? []Yes []No      Storing insulin correctly? []Yes [] No     Injecting at proper times? []Yes []No   IRisk Reduction - chronic complications:  Describe the relationship of blood glucose levels to long term complications of diabetes. Identify preventative measures & standards of care. Assessment Ratin22  NC  See Diabetes Assessment for last completed chronic complication prevention appointments.     [] Patient is up-to-date on preventative exams and vaccines  [x] Patient is not up-to date on preventative exams and vaccines and advised to discuss with PCM    [x] Blood pressure is at goal  [] Blood pressure is not at goal    [x] Cholesterol is at goal  [] Cholesterol is not at goal    [] Has microvascular complications  [] Has macrovascular complications  Anuel Conrad RN 10/20/2022  Gregoria Anderson RN    [x] Reviewed natural course of T2DM with group and how chronic complications are related to elevated blood glucose. [x] Discussed macro and microvascular complications and the need for control of lipids, blood pressure, glucose levels, weight reduction and smoking cessation to help reduce risks. [x] Instructed on goals for blood pressure, lipids, and glucose for optimal health. [x] Individualized scorecard provided with current   health maintenance recommendations from the American Diabetes Association to include Eye, dental exams, foot exams, recommended labs and vaccines for people with diabetes. Evaluation rating:    Using Score card? [] Yes []No    Blood Pressure at goal?  [] Yes []No    Foot exams:  self-exams daily  []Yes []No  Provider yearly   []  Yes []No               Eye and dental exams up to date? []Yes []No               Labs completed & at goal  [] Yes []No        Plan if not at goal? []Yes []No  Immunizations   [] Flu   []pneumonia   []Hep B (19-59)   []Covid   Individualized goal selection. 07/20/22  My goal , to help me improve my health, I will:     Begin to reduce portion size of meals at evening meal 3 times in the next week. 0 %      2. Message PCP for meter tomorrow   0%    Anuel Conrad RN Percentage of goal completed from Initial Assessment:10/18/2022    Begin to reduce portion size of meals at evening meal 3 times in the next week. 25 %    2. Message PCP for meter tomorrow   50%    New revised goal:adjust meal portions Percentage of goal completed:  1.      2.  10/20/2022  Gregoria Anderson RN    New revised goal:Complete eye feet and dentists exams. Get flu , pneumonia and covid vaccines. Percentage of goal completed since end of class:  1.      2.    New revised goal:     Plan  Follow-up Appointments planned in group setting. Next Appointment tomorrow.     Instruction Method: [x]Lecture/Discussion  []Power Point Presentation  [x]Handouts  []Return Demonstration      Education Materials/Equipment Provided:      [x] Self-Management  - Initial Assessment - Where do I Begin booklet and Counting Carbs from the ADA. [x] Self-Management  Class 1 - On the Road to better managing your diabetes - Packet of Handouts from Diabetes Department    [] Self-Management  Class 2 - Meal Plan,  Choose your Foods - Food Lists for [employer] and Nutrition in the [employer] - fast facts about fast food    [x] Self-Management  Class 3 -  Diabetes ID card,  foot care tips sheet,  Continuing Your Journey with Diabetes, Individualized Diabetes report card, Sick Day Rules, Diabetes Cookbooks, Magazines and Pedometers when available    [] Glucose Meter     [] BD Getting Started Insulin Kit     [] Other      Encounter Type Date Start Time End Time Comments No Show Dates   Assessment 07/20/22  Darlin Jewell  300 07/20/22    [x] Patient has no barriers to learning and recommend attending classes. Classes scheduled for: 8/9 & 11 and Aug 17    [] Patient has the following barriers to learning and would benefit from additional education in an individual setting. Barriers:      [] Will follow up:    [] Patient declines classes at this time.       [] Will follow up:    Darlin Jewell RN    Class 1 - Understanding diabetes 10/18/2022  Darlin Jewell RN   0900 1200   [x] First class completed today. Class 2- Nutrition and diabetes        [] Second class completed today    Class 3 - Preventing Complications 70/31/1747  Juan C Franklin RN   9am 1139  [x] Third class completed today. [] Patient has completed all 3 classes today. Goal sheets and DSMS Support Plan completed. Will follow up in 3-4 months via telephone. Patient advised to contact Diabetes Education office if any questions. Number provided. [] Patient needs to attend Class #    in order to complete classes.  Scheduled for: Individual MNT         3 Month Follow-up      []In Person  []Telephone    Meter Instrx        Insulin Instrx      []Pen  []Vial & Syringe      DSMS Support Plan:  Follow-up plan:    [x] Healthy Coping  [] Emotional Support  [] National [place] on Mental Illness (CLEO) - (Depression, bipolar and other support) 451.127.7197; www.cleo.org    [] [address] [place] [address]  866.891.5836; www.dbsalliance. org                          [] National Suicide Prevention Tqsnrikw-335-833-8255                          [] Anxiety & Depression Association of [place]                               Find local support groups by zip code at [facility] number 000-958-4518                          [] Counseling:  ______________________________  [] Diabetes Support Groups  [] [facility] Weigh of Life at [facility]  2nd Tuesday of the month at 10:00 LC-[phone]  [] On-line support groups (StayClassy, Happy Cosas.Bio-Tree Systems, ADA)  [x] [facility]  [] I would be interested in a support group at [facility] in [place] if offered    [x] Stress Relief     [] Yoga Class     [] Start a journal     [x] Relaxation techniques     [] Gephvzw1Asfpa- Michael         [] SparkQuote (Free, inspiring quote of the day)    [x]  Healthy Eating  [x] Weight Management & Carb Counting  [] Weight Zslzomap-104-610-6000; www.weightwatchAllakos. ClarityAd  [] Over Eaters Ayupyjpen-561-265-2664 (support group)- www.oa. org  [] Follow up individual appointment with [facility] dietitian - call [phone]  [] Food Tracking Apps - My Fitness Phong, Suzette Whitten  [] ADA website - Pilekrogen 55, weight loss  [] Myplate. com      [x]   Monitoring  [] Glucose Beto (Free, tracks blood glucose, graphs)  [] MySugrApp (Basic and Pro patterns)  [x] Diabetes:M - logbook    [x]  Being Active  [] 24 Hour Acrtkmz-285-165-0240; www.Trends Brands  [] Marcos Barcenas. planetfitLanyrd. com  [] [facility] [address]  [x] Fit Walks: FREE  [facility] in [place] on Mondays 5:30 pm  [facility] in [place] (for weather)   [place] on Thursdays at 5:30 [phone]  [] Chen Feng walking videos (Some free on youtube)  [] Other Exercise Plan/Facility _____________________________________________  [] Hardwick Hamper to 5K    [x]  Reducing Risk  [x] Make a sick day toolkit  [] Schedule appointments for eye, dentist  [] Stop smoking              [] Monitor Blood pressure              [] Get vaccinated                [] Other:___________________      []    Taking Medications  [] Michael: SpokenLayerTherapyApp - helps track meds  [] Seek financial help with medications:  [] Non-insulin-agents-cost-saving-resource-7-29-19. pdf   [employer].Kickfire. org/docs/default-source/practice/educator-tools/non-insulin-agents-cost-saving-resource-7-29-19. pdf?sfvrsn=2  [] Insulin cost saving resource  [employer].Kickfire. org/docs/default-source/practice/educator-tools/insulin-cost-saving-resources-3-4-19. pdf?sfvrsn=4  []  GetInsulin. org  []  The Affordable insulin Project http://affordableinsulinproject.org/    [x]  Diabetes Websites - Use as a resource for additional information  [x] www.diabetes. org  [] My.[employer]. Matchmaker Videos  [] WWW.diabeteseducator. org     Association of Diabetes Care & Education Specialists  [] www.niddk.nih.gov/health-information/diabetes/overview  Professor Sullivan 108  [] www.medicalert. org  - offers emergency medical information service, including an ID bracelet/pendant (have to pay)    []   Journals/Magazines  [] Diabetes Forecast 1-315.873.2664; www.diabetesforecast.org  [] Diabetes Self-Management 5-943-610-384-044-8516; www.diabetesselfmanagement. com  [] Diabetes Health 070-171-8946; www.diabeteshealth. com    []   Other  [] Health Program offered at place of employment   [] Insurance Company's Chronic Disease Management Program  [] Follow up -

## 2022-10-22 ENCOUNTER — OFFICE VISIT (OUTPATIENT)
Dept: FAMILY MEDICINE CLINIC | Age: 62
End: 2022-10-22
Payer: COMMERCIAL

## 2022-10-22 VITALS
TEMPERATURE: 97 F | HEIGHT: 65 IN | BODY MASS INDEX: 48.82 KG/M2 | WEIGHT: 293 LBS | DIASTOLIC BLOOD PRESSURE: 84 MMHG | OXYGEN SATURATION: 96 % | HEART RATE: 63 BPM | SYSTOLIC BLOOD PRESSURE: 122 MMHG

## 2022-10-22 DIAGNOSIS — J06.9 VIRAL URI: Primary | ICD-10-CM

## 2022-10-22 LAB
Lab: NORMAL
PERFORMING INSTRUMENT: NORMAL
QC PASS/FAIL: NORMAL
SARS-COV-2, POC: NORMAL

## 2022-10-22 PROCEDURE — 99213 OFFICE O/P EST LOW 20 MIN: CPT

## 2022-10-22 PROCEDURE — 87426 SARSCOV CORONAVIRUS AG IA: CPT

## 2022-10-22 ASSESSMENT — ENCOUNTER SYMPTOMS
SORE THROAT: 1
COUGH: 0
RHINORRHEA: 0
VOMITING: 0
EYES NEGATIVE: 1
DIARRHEA: 0
NAUSEA: 0
SHORTNESS OF BREATH: 0
SINUS PRESSURE: 0
ABDOMINAL PAIN: 0
WHEEZING: 0
CHEST TIGHTNESS: 0

## 2022-10-22 NOTE — PATIENT INSTRUCTIONS
Use Tylenol to treat pain or fever, any fever greater than 101F should be treated. Coricidin HBP (Chlorpheniramine) or Afrin 12-hour nasal spray (oxymetazoline ) for symptom of sinus congestion if you have history of elevated blood pressure. Tussin cough syrup (Dextromethorphan) if needed to treat symptom of irritating cough. Mucinex (guaifenesin)  is indicated if you have chest mucus that you are having difficulty coughing up. Mucinex will make mucus more watery, but may increase the amount of mucus    Expect up to a 7 day course of the illness. Symptoms usually begin to improve between days 3 and 5. It is important to REST and increase water intake. Watch for signs of worsening illness such as feeling light headed, reduced urine output, or shortness of breath when walking.   Return immediately if you experience these symptoms or fevers that cannot be controlled

## 2022-10-22 NOTE — PROGRESS NOTES
[address] Encounter        ASSESSMENT/PLAN     Kala Tillman is a 57 y.o. female who presents with:  Headache fatigue scratchy throat and sinus congestion starting 2 days ago. Patient reports history of chronic seasonal allergies this time year. Wants tested for COVID because her  tested +2 days ago. On examination ears appear normal bilaterally. Neck is supple no masses. Respirations unlabored lung sounds clear throughout. Rapid COVID test is negative. 1. Viral URI      Advised patient on expectation for course of viral illness. Patient denies need of additional therapies at this time patient provided education on recommended treatments for sinus congestion and cough if needed. PATIENT REFERRED TO:  Return if symptoms worsen or fail to improve. DISCHARGE MEDICATIONS:  New Prescriptions    No medications on file     Cannot display discharge medications since this is not an admission. RAFY Hernandez - ELIAS    CHIEF COMPLAINT       Chief Complaint   Patient presents with    URI     Headache, sore throat, fatigue.  tested pos for Covid yesterday. SUBJECTIVE/REVIEW OF SYSTEMS     Review of Systems   Constitutional:  Positive for fatigue. Negative for chills and fever. HENT:  Positive for congestion and sore throat. Negative for ear pain, hearing loss, rhinorrhea and sinus pressure. Eyes: Negative. Respiratory:  Negative for cough, chest tightness, shortness of breath and wheezing. Cardiovascular:  Negative for chest pain and palpitations. Gastrointestinal:  Negative for abdominal pain, diarrhea, nausea and vomiting. Endocrine: Negative. Musculoskeletal:  Negative for arthralgias and myalgias. Skin:  Negative for rash. Neurological:  Positive for headaches. Negative for dizziness, weakness and light-headedness. Hematological:  Negative for adenopathy. Psychiatric/Behavioral: Negative. OBJECTIVE/PHYSICAL EXAM     Physical Exam  Vitals reviewed. Constitutional:       Appearance: Normal appearance. She is not ill-appearing or toxic-appearing. HENT:      Head: Normocephalic. Right Ear: External ear normal.      Left Ear: External ear normal.      Nose: No congestion or rhinorrhea. Mouth/Throat:      Mouth: Mucous membranes are moist.      Pharynx: Oropharynx is clear. No pharyngeal swelling, oropharyngeal exudate or posterior oropharyngeal erythema. Tonsils: No tonsillar exudate or tonsillar abscesses. 0 on the right. 0 on the left. Eyes:      General:         Right eye: No discharge. Left eye: No discharge. Cardiovascular:      Rate and Rhythm: Normal rate and regular rhythm. Pulses: Normal pulses. Heart sounds: Normal heart sounds. No murmur heard. No gallop. Pulmonary:      Effort: Pulmonary effort is normal. No respiratory distress. Breath sounds: Normal breath sounds. No stridor. No wheezing, rhonchi or rales. Chest:      Chest wall: No tenderness. Abdominal:      General: Abdomen is flat. Musculoskeletal:      Cervical back: No rigidity or tenderness. Lymphadenopathy:      Head:      Right side of head: No submandibular adenopathy. Left side of head: No submandibular adenopathy. Cervical: No cervical adenopathy. Skin:     General: Skin is warm and dry. Capillary Refill: Capillary refill takes less than 2 seconds. Coloration: Skin is not pale. Neurological:      Mental Status: She is alert and oriented to person, place, and time. Mental status is at baseline.    Psychiatric:         Mood and Affect: Mood normal.         Behavior: Behavior normal.       VITALS  BP: 122/84, Temp: 97 °F (36.1 °C), Temp Source: Temporal, Heart Rate: 63,  , SpO2: 96 %      PAST MEDICAL HISTORY         Diagnosis Date    Angina at rest [facility]) 05/14/2018    Anxiety     Atopic rhinitis 01/19/2016    Atrial fibrillation, persistent ([facility] 05/10/2018    Baker's cyst of knee, left 2022    Chronic cholecystitis with calculus 2017    Dyshidrotic eczema 01/15/2020    Essential hypertension 2014    GERD (gastroesophageal reflux disease) 2022    Hyperlipidemia     Morbid obesity ([facility] 2009    ALE on CPAP 2022    Osteoarthritis     back, knees, spine    Pacemaker 2018    PONV (postoperative nausea and vomiting)     from using inhaled anethesia     PVC's (premature ventricular contractions) 2016    Seasonal allergies     Symptomatic bradycardia 2018    Type 2 diabetes mellitus without complication, without long-term current use of insulin ([facility])     Vitamin D deficiency      SURGICAL HISTORY     Patient  has a past surgical history that includes  section; Hysterectomy (); Tonsillectomy; Hysterectomy ();  section ( & ); Tonsillectomy (age 20); Colonoscopy (2014); Cholecystectomy, laparoscopic (N/A, 2017); and Total abdominal hysterectomy w/ bilateral salpingoophorectomy (Bilateral). CURRENT MEDICATIONS       Previous Medications    ALCOHOL SWABS PADS    DX: diabetes mellitus. Test 1 time(s) daily - Ok to substitute per insurance (1 each = 1 box)    ATORVASTATIN (LIPITOR) 80 MG TABLET        BLOOD GLUCOSE MONITOR KIT AND SUPPLIES    DX: diabetes mellitus. Test 1 time(s) daily - Ok to substitute per insurance    BLOOD GLUCOSE MONITOR STRIPS    DX: diabetes mellitus.  Use 1 time(s) daily - Ok to substitute per insurance    CHOLECALCIFEROL (VITAMIN D3) 50 MCG ( UT) CAPS    Take 1 capsule by mouth daily    DOXAZOSIN (CARDURA) 2 MG TABLET    Take 2 mg by mouth nightly     ELIQUIS 5 MG TABS TABLET    5 mg 2 times daily     FEXOFENADINE (ALLEGRA) 180 MG TABLET    Take 1 tablet by mouth daily    FLUOCINONIDE (LIDEX) 0.05 % CREAM    APPLY TOPICALLY TWICE A DAY    FLUTICASONE (FLONASE) 50 MCG/ACT NASAL SPRAY    2 sprays by Nasal route daily FUROSEMIDE (LASIX) 20 MG TABLET    Take 20 mg by mouth daily    IPRATROPIUM (ATROVENT) 0.06 % NASAL SPRAY    2 sprays by Each Nostril route 2 times daily    KETOTIFEN (ZADITOR) 0.025 % OPHTHALMIC SOLUTION    Place 1 drop into both eyes 2 times daily    LANCETS MISC    DX: diabetes mellitus. Use 1 time(s) daily - Ok to substitute per insurance (1 each = 1 box)    LORATADINE (CLARITIN) 10 MG TABLET    Take 10 mg by mouth daily    MELATONIN PO    Take 10 mg by mouth Gummies    METFORMIN (GLUCOPHAGE-XR) 500 MG EXTENDED RELEASE TABLET    Take 1 tablet by mouth daily (with breakfast)    METOPROLOL TARTRATE (LOPRESSOR) 50 MG TABLET    2 times a day    OMEPRAZOLE (PRILOSEC) 40 MG DELAYED RELEASE CAPSULE    Take 1 capsule by mouth every morning (before breakfast)    PAROXETINE (PAXIL) 20 MG TABLET    TAKE 1 TABLET EVERY MORNING    POTASSIUM CHLORIDE (KLOR-CON M) 20 MEQ EXTENDED RELEASE TABLET    Take 1 tablet by mouth 2 times daily    RESPIRATORY THERAPY SUPPLIES BLAS    Dispense cpap supplies and mask    SOTALOL (BETAPACE) 120 MG TABLET    Take 120 mg by mouth     SPIRONOLACTONE (ALDACTONE) 25 MG TABLET    Take 25 mg by mouth daily    TRIAMCINOLONE ACETONIDE (NASACORT ALLERGY 24HR NA)    by Nasal route 3 times daily as needed. VITAMIN D (ERGOCALCIFEROL) 1.25 MG (90485 UT) CAPS CAPSULE    Take 1 capsule by mouth once a week     ALLERGIES     Patient is is allergic to sulfa antibiotics, lisinopril, lisinopril, sulfa antibiotics, codeine, and percocet [oxycodone-acetaminophen]. FAMILY HISTORY     Patient'sfamily history includes Colon Cancer in her paternal grandfather; Coronary Art Dis in her sister; Heart Attack (age of onset: 40) in her sister; Heart Attack (age of onset: 50) in her father; Heart Disease in her father and sister; Laura Piper in her maternal grandfather; Lung Cancer (age of onset: 62) in her brother; Lupus in her mother; No Known Problems in her daughter and daughter.   HISTORY     Patient  reports that she has never smoked. She has never been exposed to tobacco smoke. She has never used smokeless tobacco. She reports that she does not drink alcohol and does not use drugs. READY CARE COURSE     Orders Placed This Encounter   Procedures    POCT COVID-19, Antigen     Order Specific Question:   Is this test for diagnosis or screening? Answer:   Diagnosis of ill patient     Order Specific Question:   Symptomatic for COVID-19 as defined by CDC? Answer:   Yes     Order Specific Question:   Date of Symptom Onset     Answer:   10/22/2022     Order Specific Question:   Hospitalized for COVID-19? Answer:   No     Order Specific Question:   Admitted to ICU for COVID-19? Answer:   No     Order Specific Question:   Employed in healthcare setting? Answer:   Unknown     Order Specific Question:   Resident in a congregate (group) care setting? Answer:   No     Order Specific Question:   Pregnant? Answer:   No     Order Specific Question:   Previously tested for COVID-19? Answer:   Unknown        Labs:  No results found for this visit on 10/22/22. IMAGING:  No orders to display     Scheduled Meds:  Continuous Infusions:  PRN Meds:.

## 2022-10-25 ENCOUNTER — OFFICE VISIT (OUTPATIENT)
Dept: FAMILY MEDICINE CLINIC | Age: 62
End: 2022-10-25
Payer: COMMERCIAL

## 2022-10-25 ENCOUNTER — TELEPHONE (OUTPATIENT)
Dept: FAMILY MEDICINE CLINIC | Age: 62
End: 2022-10-25

## 2022-10-25 VITALS
HEART RATE: 67 BPM | BODY MASS INDEX: 48.82 KG/M2 | WEIGHT: 293 LBS | TEMPERATURE: 97.4 F | DIASTOLIC BLOOD PRESSURE: 82 MMHG | SYSTOLIC BLOOD PRESSURE: 136 MMHG | OXYGEN SATURATION: 92 % | HEIGHT: 65 IN

## 2022-10-25 DIAGNOSIS — U07.1 COVID-19: Primary | ICD-10-CM

## 2022-10-25 DIAGNOSIS — R05.9 COUGH, UNSPECIFIED TYPE: ICD-10-CM

## 2022-10-25 LAB
Lab: ABNORMAL
PERFORMING INSTRUMENT: ABNORMAL
QC PASS/FAIL: ABNORMAL
SARS-COV-2, POC: DETECTED

## 2022-10-25 PROCEDURE — 3074F SYST BP LT 130 MM HG: CPT | Performed by: PHYSICIAN ASSISTANT

## 2022-10-25 PROCEDURE — 3078F DIAST BP <80 MM HG: CPT | Performed by: PHYSICIAN ASSISTANT

## 2022-10-25 PROCEDURE — 87426 SARSCOV CORONAVIRUS AG IA: CPT | Performed by: PHYSICIAN ASSISTANT

## 2022-10-25 PROCEDURE — 99213 OFFICE O/P EST LOW 20 MIN: CPT | Performed by: PHYSICIAN ASSISTANT

## 2022-10-25 RX ORDER — ZINC GLUCONATE 50 MG
50 TABLET ORAL DAILY
Qty: 7 TABLET | Refills: 0 | Status: SHIPPED | OUTPATIENT
Start: 2022-10-25 | End: 2022-11-01

## 2022-10-25 RX ORDER — DEXAMETHASONE 6 MG/1
6 TABLET ORAL DAILY
Qty: 7 TABLET | Refills: 0 | Status: SHIPPED | OUTPATIENT
Start: 2022-10-25 | End: 2022-11-01

## 2022-10-25 RX ORDER — ALBUTEROL SULFATE 90 UG/1
2 AEROSOL, METERED RESPIRATORY (INHALATION) 4 TIMES DAILY PRN
Qty: 18 G | Refills: 0 | Status: SHIPPED | OUTPATIENT
Start: 2022-10-25

## 2022-10-25 ASSESSMENT — ENCOUNTER SYMPTOMS
SINUS PRESSURE: 1
RESPIRATORY NEGATIVE: 1
GASTROINTESTINAL NEGATIVE: 1
EYES NEGATIVE: 1
SORE THROAT: 1

## 2022-10-25 NOTE — PATIENT INSTRUCTIONS
Patient Education     Seek emergency evaluation if symptoms worsen or if new concerning symptoms arise  Drink plenty of fluid to stay hydrated  Increase vitamin C rich foods  Elderberry chews or organic syrup over-the-counter for immune system support  Green tea daily   Fermented foods such as [place] yogurt, sauerkraut, or pickled beets to help fight the virus  Continue on your vitamin D as prescribed    Learning About Coronavirus (COVID-19)  What is coronavirus (COVID-19)? COVID-19 is a disease caused by a type of coronavirus. This illness was first found in December 2019. It has since spread worldwide. Coronaviruses are a large group of viruses. They cause the common cold. They also cause more serious illnesses like [place] respiratory syndrome (MERS) and severe acute respiratory syndrome (SARS). COVID-19 is caused by a novel coronavirus. That means it's a new type that has not been seen in people before. What are the symptoms? COVID-19 symptoms may include:  Fever. Cough. Trouble breathing. Chills or repeated shaking with chills. Muscle and body aches. Headache. Sore throat. New loss of taste or smell. Vomiting. Diarrhea. In severe cases, COVID-19 can cause pneumonia and make it hard to breathe without help from a machine. It can cause death. How is it diagnosed? COVID-19 is diagnosed with a viral test. This may also be called a PCR test or antigen test. It looks for evidence of the virus in your breathing passages or lungs (respiratory system). The test is most often done on a sample from the nose, throat, or lungs. It's sometimes done on a sample of saliva. One way a sample is collected is by putting a long swab into the back of your nose. If you have questions about COVID-19 testing, ask your doctor or go to cdc.gov to use the COVID-19 Viral Testing Tool. How is it treated? Mild cases of COVID-19 can be treated at home.  Serious cases need treatment in the hospital. Treatment may include medicines, plus breathing support such as oxygen therapy or a ventilator. Some people may be placed on their belly to help their oxygen levels. Treatments that may help people who have COVID-19 include:  Antiviral medicines. These medicines treat viral infections. Immune-based therapy. These medicines help the immune system fight COVID-19. Examples include monoclonal antibodies. Blood thinners. These medicines help prevent blood clots. People with severe illness are at risk for blood clots. How can you protect yourself and others? Stay up to date on your COVID-19 vaccines. Avoid sick people, and stay away from others if you are sick. Stay at least 6 feet away from other people. Avoid crowds, especially inside. Get tested for COVID-19 before you have an indoor visit with people who don't live with you. Improve the airflow when you spend time indoors with people who don't live with you. If you can, open windows and doors. Or you can use a fan to blow air away from people and out a window. Cover your mouth with a tissue when you cough or sneeze. Wash your hands often, especially after you cough or sneeze. Use soap and water, and scrub for at least 20 seconds. If soap and water aren't available, use an alcohol-based hand . Avoid touching your mouth, nose, and eyes. Check the CDC website at cdc.gov for the most current information on how to protect yourself. And if you have questions, ask your doctor or go to cdc.gov to use the COVID-19 Quarantine and Isolation Calculator. Here are some other steps you may need to take. If you are not up to date on your COVID-19 vaccines:  Wear a mask with the best fit, protection, and comfort for you. A mask can protect you even when others aren't wearing one. This might be especially important if you:  Have certain health conditions. Live with someone who has a compromised immune system.   Live with someone who is not up to date on their COVID-23 vaccines. If you have been exposed to the virus AND are not up to date on your COVID-19 vaccines:  Talk to your doctor as soon as you can. Your doctor might have you take medicine to help prevent serious illness. Get a COVID-19 test. You may need to be tested more than once. And if your test is positive, follow the instructions below. Stay home. Try to separate from other people where you live. Don't go to school, work, or public areas. Wear a well-fitting mask around other people for a full 10 days. Avoid travel, and stay away from people at high risk for serious illness. Watch for symptoms. If you have been exposed AND either tested positive for COVID-19 in the last 90 days and have recovered or you are up to date on your COVID-19 vaccines:  Talk to your doctor as soon as you can. Your doctor may have you take medicine to help prevent serious illness. Get a COVID-19 test. Wait 5 days after you were last exposed. You may need to be tested more than once. And if your test is positive, follow the instructions below. Wear a well-fitting mask around other people for a full 10 days. Avoid travel and stay away from people at high risk for serious illness. Watch for symptoms. If you tested positive for COVID-19 in the last 90 days and have not recovered, another COVID-19 test may not be needed. If you're sick or test positive for COVID-19:  Talk to your doctor as soon as you can. Your doctor may have you take medicine to help prevent serious illness. Get a COVID-19 test unless you have already been tested. You may need to be tested more than once. Stay home. Leave only if you need to get medical care. If you were seriously ill or if you have a weakened immune system, you may need to isolate for several weeks. For a full 10 days, wear a well-fitting mask whenever you're around other people. Avoid travel and stay away from people at high risk for serious illness.   Limit contact with pets and people in your home. If possible, stay in a separate bedroom and use a separate bathroom. Clean and disinfect your home every day. Use household  and disinfectant wipes or sprays. Take special care to clean things that you touch with your hands. How can you self-isolate when you have COVID-19? If you have COVID-19, there are things you can do to help avoid spreading the virus to others. Stay home, and avoid contact with other people. Limit contact with people in your home. If possible, stay in a separate bedroom and use a separate bathroom. Wear a well-fitting mask when you are around other people. Avoid contact with pets and other animals. Cover your mouth and nose with a tissue when you cough or sneeze. Then throw it in the trash right away. Wash your hands often, especially after you cough or sneeze. Use soap and water, and scrub for at least 20 seconds. If soap and water aren't available, use an alcohol-based hand . Don't share personal household items. These include bedding, towels, cups and glasses, and eating utensils. [address] in the warmest water allowed for the fabric type, and dry it completely. It's okay to wash other people's laundry with yours. Clean and disinfect your home. Use household  and disinfectant wipes or sprays. If you have questions, visit cdc.gov to check the Quarantine and Isolation Calculator. When should you call for help? Call 911 anytime you think you may need emergency care. For example, call if you have life-threatening symptoms, such as: You have severe trouble breathing. (You can't talk at all.)     You have constant chest pain or pressure. You are severely dizzy or lightheaded. You are confused or can't think clearly. You have pale, gray, or blue-colored skin or lips. You pass out (lose consciousness) or are very hard to wake up. You have loss of balance or trouble walking. You have trouble seeing out of one or both eyes. You have weakness or drooping on one side of the face. You have weakness or numbness in an arm or a leg. You have trouble speaking. You have a severe headache. You have a seizure. Call your doctor now or seek immediate medical care if:    You have moderate trouble breathing. (You can't speak a full sentence.)     You are coughing up blood. You have signs of low blood pressure. These include feeling lightheaded; being too weak to stand; and having cold, pale, clammy skin. Watch closely for changes in your health, and be sure to contact your doctor if:    Your symptoms get worse. You are not getting better as expected. You have new or worse symptoms of anxiety, depression, nightmares, or flashbacks. Call before you go to the doctor's office. Follow their instructions. And wear a mask. Where can you learn more? Go to https://[employer] Knowledge Solutionspe"[employer]. org and sign in to your [employer] account. Enter C008 in the NASOFORM box to learn more about \"Learning About Coronavirus (COVID-19). \"     If you do not have an account, please click on the \"Sign Up Now\" link. Current as of: July 28, 2022               Content Version: 13.4  © 2006-2022 [employer]. Care instructions adapted under license by [facility] ([facility]). If you have questions about a medical condition or this instruction, always ask your healthcare professional. [place] [address] any warranty or liability for your use of this information. Patient Education        Coronavirus (FTNDD-24): Care Instructions  Overview  The coronavirus disease (COVID-19) is caused by a virus. Symptoms may include a fever, a cough, and shortness of breath. It can spread through droplets from coughing and sneezing, breathing, and singing. The virus also can spread when people are in close contact with someone who is infected.   Most people have mild symptoms and can take care of themselves at home with medicine to reduce symptoms. Talk to your doctor. They might have you take medicine to help prevent serious illness. If your symptoms get worse, you may need care in a hospital. Treatment may include medicines, plus breathing support such as oxygen therapy or a ventilator. It's important to not spread the virus to others. If you have COVID-19, wear a well-fitting mask anytime you are around other people. Isolate yourself while you are sick. Leave your home only if you need to get medical care or testing. Follow-up care is a key part of your treatment and safety. Be sure to make and go to all appointments, and call your doctor if you are having problems. It's also a good idea to know your test results and keep a list of the medicines you take. How can you care for yourself at home? Get extra rest. It can help you feel better. Drink plenty of fluids. This helps replace fluids lost from fever. Fluids may also help ease a scratchy throat. You can take acetaminophen (Tylenol) or ibuprofen (Advil, Motrin) to reduce a fever. It may also help with muscle and body aches. Read and follow all instructions on the label. Use petroleum jelly on sore skin. This can help if the skin around your nose and lips becomes sore from rubbing a lot with tissues. If you use oxygen, use a water-based product instead of petroleum jelly. Keep track of symptoms such as fever and shortness of breath. This can help you know if you need to call your doctor. It can also help you know when it's safe to be around other people. In some cases, your doctor might suggest that you get a pulse oximeter. How can you self-isolate when you have COVID-19? If you have COVID-19, there are things you can do to help avoid spreading the virus to others. Stay home, and avoid contact with other people. Limit contact with people in your home. If possible, stay in a separate bedroom and use a separate bathroom.   Wear a well-fitting mask when you are around other people. Avoid contact with pets and other animals. Cover your mouth and nose with a tissue when you cough or sneeze. Then throw it in the trash right away. Wash your hands often, especially after you cough or sneeze. Use soap and water, and scrub for at least 20 seconds. If soap and water aren't available, use an alcohol-based hand . Don't share personal household items. These include bedding, towels, cups and glasses, and eating utensils. [address] in the warmest water allowed for the fabric type, and dry it completely. It's okay to wash other people's laundry with yours. Clean and disinfect your home. Use household  and disinfectant wipes or sprays. If you have questions, visit cdc.gov to check the Quarantine and Isolation Calculator. When can you end self-isolation for COVID-19? If you know or think that you have the virus, you will need to self-isolate. When you can be around other people you live with and leave home depends on whether you have symptoms. Important: Day 0 is the day your symptoms started or the day you tested positive. Day 1 is the day after your symptoms first started or your test was positive. Isolation starts on Day 0. If you have symptoms, you can end isolation at the end of Day 5 if you haven't had a fever for 24 hours while not taking medicines to lower the fever and your symptoms are getting better. If you tested positive but have NO symptoms, you can end isolation at the end of Day 5. But if you start to have symptoms, follow the steps above. Use Day 0 as your first day of symptoms. You may take a COVID-19 test at the end of Day 5. If it is positive, continue to isolate until the end of Day 10. If you have a weakened immune system or are seriously ill, you may need to isolate for up to 20 days. After you have ended self-isolation, you still need to wear a well-fitting mask around other people for at least 10 days.  Avoid travel and stay away from people at high risk for serious illness for at least 10 days. If you have questions, go to the [facility] website at cdc.gov to check the Quarantine and Isolation Calculator. When should you call for help? Call 911 anytime you think you may need emergency care. For example, call if you have life-threatening symptoms, such as: You have severe trouble breathing. (You can't talk at all.)     You have constant chest pain or pressure. You are severely dizzy or lightheaded. You are confused or can't think clearly. You have pale, gray, or blue-colored skin or lips. You pass out (lose consciousness) or are very hard to wake up. You have loss of balance or trouble walking. You have trouble seeing out of one or both eyes. You have weakness or drooping on one side of the face. You have weakness or numbness in an arm or a leg. You have trouble speaking. You have a severe headache. You have a seizure. Call your doctor now or seek immediate medical care if:    You have moderate trouble breathing. (You can't speak a full sentence.)     You are coughing up blood. You have signs of low blood pressure. These include feeling lightheaded; being too weak to stand; and having cold, pale, clammy skin. Watch closely for changes in your health, and be sure to contact your doctor if:    Your symptoms get worse. You are not getting better as expected. You have new or worse symptoms of anxiety, depression, nightmares, or flashbacks. Call before you go to the doctor's office. Follow their instructions. And wear a mask. Where can you learn more? Go to https://edPULSEpeChaologix.[employer]. org and sign in to your [employer] account. Enter C007 in the [employer] box to learn more about \"Coronavirus (COVID-19): Care Instructions. \"     If you do not have an account, please click on the \"Sign Up Now\" link.   Current as of: July 28, 2022               Content Version: 13.4  © 1417-4604 [employer], Inc[employer]. Care instructions adapted under license by [facility] ([facility]). If you have questions about a medical condition or this instruction, always ask your healthcare professional. [address] any warranty or liability for your use of this information.

## 2022-10-25 NOTE — TELEPHONE ENCOUNTER
Pt calling states she tested positive for covid, pt asking for paxlovid, advised pt we need an official test to prescribe this medication. Pt states she will come to the walk in clinic.

## 2022-10-25 NOTE — PROGRESS NOTES
[id number] [facility] PRIMARY CARE  [address] [place] [address]  Dept: [phone]: [phone]     Pt Name: Matthew Sinclair  MRN: [id number]  Alidagfguillermo 1960      HISTORY OF PRESENT ILLNESS    Matthew Sinclair is a 57 y.o. female who presents to the [facility] with chief complaint of sore throat, stuffy nose, fever, chills and cough. Her  diagnosed with Covid last week on Thursday or Friday. Patient states her symptoms started on 10/23/22. She had a negative swab on 10/22. She took tylenol about 45 minutes ago. She admits to short of breath with activity. She denies chest pain. She has not yet had Covid that she is aware of. REVIEW OF SYSTEMS       Review of Systems   Constitutional: Negative. HENT:  Positive for congestion, sinus pressure, sneezing and sore throat. Eyes: Negative. Respiratory: Negative. Cardiovascular: Negative. Gastrointestinal: Negative. Endocrine: Negative. Genitourinary: Negative. Musculoskeletal: Negative. Skin: Negative. Neurological: Negative. Psychiatric/Behavioral: Negative.          PAST MEDICAL HISTORY     Past Medical History:   Diagnosis Date    Angina at rest [facility]) 05/14/2018    Anxiety     Atopic rhinitis 01/19/2016    Atrial fibrillation, persistent ([address] 05/10/2018    Baker's cyst of knee, left 6/23/2022    Chronic cholecystitis with calculus 08/01/2017    Dyshidrotic eczema 01/15/2020    Essential hypertension 03/24/2014    GERD (gastroesophageal reflux disease) 6/30/2022    Hyperlipidemia     Morbid obesity ([address] 04/30/2009    ALE on CPAP 06/20/2022    Osteoarthritis     back, knees, spine    Pacemaker 03/09/2018    PONV (postoperative nausea and vomiting)     from using inhaled anethesia     PVC's (premature ventricular contractions) 01/06/2016    Seasonal allergies     Symptomatic bradycardia 03/09/2018    Type 2 diabetes mellitus without complication, without long-term current use of insulin ([facility] [address]     Vitamin D deficiency          SURGICAL HISTORY       Past Surgical History:   Procedure Laterality Date     SECTION      x2    [address], LAPAROSCOPIC N/A 2017    For biliary colic and stones    COLONOSCOPY  2014    [email or web address]    HYSTERECTOMY (CERVIX STATUS UNKNOWN)      fibroids total    HYSTERECTOMY (CERVIX STATUS UNKNOWN)      LUÍS AND BSO (CERVIX REMOVED) Bilateral     TONSILLECTOMY      TONSILLECTOMY  age 20         CURRENT MEDICATIONS       Current Outpatient Medications   Medication Sig Dispense Refill    dexamethasone (DECADRON) 6 MG tablet Take 1 tablet by mouth daily for 7 days 7 tablet 0    molnupiravir 200 MG capsule Take 4 capsules by mouth in the morning and 4 capsules in the evening. Do all this for 5 days. 40 capsule 0    zinc 50 MG TABS tablet Take 1 tablet by mouth daily for 7 days 7 tablet 0    albuterol sulfate HFA (VENTOLIN HFA) 108 (90 Base) MCG/ACT inhaler Inhale 2 puffs into the lungs 4 times daily as needed for Wheezing 18 g 0    MELATONIN PO Take 10 mg by mouth Gummies      omeprazole (PRILOSEC) 40 MG delayed release capsule Take 1 capsule by mouth every morning (before breakfast) 90 capsule 1    vitamin D (ERGOCALCIFEROL) 1.25 MG (11134 UT) CAPS capsule Take 1 capsule by mouth once a week 12 capsule 0    Cholecalciferol (VITAMIN D3) 50 MCG (2000 UT) CAPS Take 1 capsule by mouth daily 90 capsule 1    ketotifen (ZADITOR) 0.025 % ophthalmic solution Place 1 drop into both eyes 2 times daily 5 mL 5    fexofenadine (ALLEGRA) 180 MG tablet Take 1 tablet by mouth daily 30 tablet 5    ipratropium (ATROVENT) 0.06 % nasal spray 2 sprays by Each Nostril route 2 times daily 15 mL 5    blood glucose monitor kit and supplies DX: diabetes mellitus. Test 1 time(s) daily - Ok to substitute per insurance 1 kit 0    blood glucose monitor strips DX: diabetes mellitus.  Use 1 time(s) daily - Ok to substitute per insurance 100 strip 5    Lancets MISC DX: diabetes mellitus. Use 1 time(s) daily - Ok to substitute per insurance (1 each = 1 box) 100 each 5    Alcohol Swabs PADS DX: diabetes mellitus. Test 1 time(s) daily - Ok to substitute per insurance (1 each = 1 box) 100 each 5    metFORMIN (GLUCOPHAGE-XR) 500 MG extended release tablet Take 1 tablet by mouth daily (with breakfast) 90 tablet 1    fluocinonide (LIDEX) 0.05 % cream APPLY TOPICALLY TWICE A DAY 60 g 11    PARoxetine (PAXIL) 20 MG tablet TAKE 1 TABLET EVERY MORNING 90 tablet 3    metoprolol tartrate (LOPRESSOR) 50 MG tablet 2 times a day      ELIQUIS 5 MG TABS tablet 5 mg 2 times daily       atorvastatin (LIPITOR) 80 MG tablet       Respiratory Therapy Supplies BLAS Dispense cpap supplies and mask 1 Device 0    sotalol (BETAPACE) 120 MG tablet Take 120 mg by mouth       doxazosin (CARDURA) 2 MG tablet Take 2 mg by mouth nightly       furosemide (LASIX) 20 MG tablet Take 20 mg by mouth daily      spironolactone (ALDACTONE) 25 MG tablet Take 25 mg by mouth daily      potassium chloride (KLOR-CON M) 20 MEQ extended release tablet Take 1 tablet by mouth 2 times daily (Patient taking differently: Take 20 mEq by mouth daily) 60 tablet 3    loratadine (CLARITIN) 10 MG tablet Take 10 mg by mouth daily      Triamcinolone Acetonide (NASACORT ALLERGY 24HR NA) by Nasal route 3 times daily as needed. fluticasone (FLONASE) 50 MCG/ACT nasal spray 2 sprays by Nasal route daily (Patient not taking: No sig reported) 1 Bottle 1     No current facility-administered medications for this visit.       ALLERGIES     Sulfa antibiotics, Lisinopril, Lisinopril, Sulfa antibiotics, Codeine, and Percocet [oxycodone-acetaminophen]    FAMILY HISTORY       Family History   Problem Relation Age of Onset    Lupus Mother     Heart Disease Father     Heart Attack Father 50    Heart Disease Sister     Heart Attack Sister 40    Coronary Art Dis Sister     Lung Cancer Brother 62    Lung Cancer Maternal Grandfather     Colon Cancer Paternal Grandfather     No Known Problems Daughter     No Known Problems Daughter           SOCIAL HISTORY       Social History     Socioeconomic History    Marital status:      Spouse name: None    Number of children: 2    Years of education: None    Highest education level: None   Tobacco Use    Smoking status: Never     Passive exposure: Never    Smokeless tobacco: Never   Vaping Use    Vaping Use: Never used   Substance and Sexual Activity    Alcohol use: No    Drug use: No    Sexual activity: Not Currently     Partners: Male   Social History Narrative    ** Merged History Encounter **         ** Data from: 5/4/16 Enc Dept: [facility]         ** Data from: 7/19/14 Enc Dept: [address]    Lives with . 2 daughters. ADs. Living Will. [facility], , secondary is daughter Nahum Alejo. Social Determinants of Health     Financial Resource Strain: Low Risk     Difficulty of Paying Living Expenses: Not hard at all   Food Insecurity: No Food Insecurity    Worried About Running Out of Food in the Last Year: Never true    Ran Out of Food in the Last Year: Never true   Physical Activity: Insufficiently Active    Days of Exercise per Week: 2 days    Minutes of Exercise per Session: 20 min   Intimate Partner Violence: Not At Risk    Fear of Current or Ex-Partner: No    Emotionally Abused: No    Physically Abused: No    Sexually Abused: No         PHYSICAL EXAM    (up to 7 for level 4, 8 or more for level 5)       Physical Exam  Constitutional:       General: She is not in acute distress. Appearance: She is well-developed. She is obese. HENT:      Head: Normocephalic and atraumatic. Nose: Congestion present. Eyes:      Conjunctiva/sclera: Conjunctivae normal.      Pupils: Pupils are equal, round, and reactive to light. Cardiovascular:      Rate and Rhythm: Normal rate and regular rhythm.       Heart sounds: No murmur heard.  Pulmonary:      Effort: No respiratory distress. Breath sounds: Normal breath sounds. No wheezing or rales. Abdominal:      General: There is no distension. Palpations: Abdomen is soft. Tenderness: There is no abdominal tenderness. Musculoskeletal:         General: Normal range of motion. Cervical back: Normal range of motion and neck supple. Skin:     General: Skin is warm and dry. Findings: No erythema or rash. Neurological:      Mental Status: She is alert and oriented to person, place, and time. Cranial Nerves: No cranial nerve deficit. Psychiatric:         Judgment: Judgment normal.         All other labs were within normal range or not returned as of this dictation. Vitals:    Vitals:    10/25/22 1540   BP: 136/82   Site: Right Wrist   Position: Sitting   Cuff Size: Medium Adult   Pulse: 67   Temp: 97.4 °F (36.3 °C)   TempSrc: Temporal   SpO2: 92%   Weight: 293 lb (132.9 kg)   Height: 5' 5\" (1.651 m)     Rapid COVID test is positive. I discussed with patient the unknown risk versus potential benefit of utilizing the antiviral medication. Patient assuming all risks upon herself if she would choose to take the antiviral medication as it is currently experimental.  Patient will be placed on the antiviral along with dexamethasone zinc, and albuterol. Patient to seek emergency room evaluation if symptoms worsen or if new concerning symptoms arise. Tylenol as needed for pain. Patient verbalizes understanding of plan at discharge and has no further questions. DISPOSITION/PLAN   1. COVID-19      2.  Cough, unspecified type

## 2022-10-28 NOTE — PROGRESS NOTES
Diabetes Self- Management Education Program Assessment and Education Record-   Also see Diabetic Screening    Patient, Matthew Sinclair, has been diagnosed with  [] Type 1 [x] Type 2 diabetes. [x] Patient is new to diabetes, and here for initial diabetes self-management assessment and education. Class #1  [] Patient is here for review of diabetes self-management assessment and education. Today's visit was in an [] individual [x] group setting. Patient came [x] alone [] with family member.      Goals setting:  Initial Goal Set with Patient  [x]Yes  [] NO      MEDICAL HISTORY:  Past Medical History:   Diagnosis Date    Angina at rest [facility]) 05/14/2018    Anxiety     Atopic rhinitis 01/19/2016    Atrial fibrillation, persistent ([address] 05/10/2018    Baker's cyst of knee, left 6/23/2022    Chronic cholecystitis with calculus 08/01/2017    Dyshidrotic eczema 01/15/2020    Essential hypertension 03/24/2014    GERD (gastroesophageal reflux disease) 6/30/2022    Hyperlipidemia     Morbid obesity ([address] 04/30/2009    ALE on CPAP 06/20/2022    Osteoarthritis     back, knees, spine    Pacemaker 03/09/2018    PONV (postoperative nausea and vomiting)     from using inhaled anethesia     PVC's (premature ventricular contractions) 01/06/2016    Seasonal allergies     Symptomatic bradycardia 03/09/2018    Type 2 diabetes mellitus without complication, without long-term current use of insulin ([address]     Vitamin D deficiency      Family History   Problem Relation Age of Onset    Lupus Mother     Heart Disease Father     Heart Attack Father 50    Heart Disease Sister     Heart Attack Sister 40    Coronary Art Dis Sister     Lung Cancer Brother 62    Lung Cancer Maternal Grandfather     Colon Cancer Paternal Grandfather     No Known Problems Daughter     No Known Problems Daughter      Sulfa antibiotics, Lisinopril, Lisinopril, Sulfa antibiotics, Codeine, and Percocet [oxycodone-acetaminophen]   Immunization History   Administered Date(s) Administered    COVID-19, MODERNA BLUE border, Primary or Immunocompromised, (age 12y+), IM, 100 mcg/0.5mL 03/11/2021, 04/01/2021, 04/08/2021, 05/01/2021, 11/17/2021, 05/31/2022    Influenza Vaccine, unspecified formulation 09/01/2016    Influenza Virus Vaccine 10/01/2016, 10/01/2017, 02/04/2019, 09/17/2020    Influenza, High Dose (Fluzone 65 yrs and older) 10/11/2017    Influenza, Quadv, IM, PF (6 mo and older Fluzone, Flulaval, Fluarix, and 3 yrs and older Afluria) 10/03/2018, 10/01/2019    Pneumococcal Polysaccharide ([id number]) 03/24/2014    Pneumococcal conjugate PCV20, PF (Prevnar 20) 06/30/2022    Tdap (Boostrix, Adacel) 06/30/2022    Zoster Live (Zostavax) 01/01/2014, 02/22/2014, 06/08/2015       Current Medications  Current Outpatient Medications   Medication Sig Dispense Refill    metFORMIN (GLUCOPHAGE-XR) 500 MG extended release tablet Take 1 tablet by mouth daily (with breakfast) 90 tablet 1    fluocinonide (LIDEX) 0.05 % cream APPLY TOPICALLY TWICE A DAY 60 g 11    PARoxetine (PAXIL) 20 MG tablet TAKE 1 TABLET EVERY MORNING 90 tablet 3    metoprolol tartrate (LOPRESSOR) 50 MG tablet       pantoprazole (PROTONIX) 40 MG tablet Take by mouth      ELIQUIS 5 MG TABS tablet 5 mg 2 times daily       atorvastatin (LIPITOR) 80 MG tablet       metoprolol tartrate (LOPRESSOR) 25 MG tablet Take 25 mg by mouth 2 times daily       Respiratory Therapy Supplies BLAS Dispense cpap supplies and mask 1 Device 0    sotalol (BETAPACE) 120 MG tablet Take 120 mg by mouth       fluticasone (FLONASE) 50 MCG/ACT nasal spray 2 sprays by Nasal route daily 1 Bottle 1    olopatadine (PATANOL) 0.1 % ophthalmic solution 1-2 drops      doxazosin (CARDURA) 2 MG tablet Take 2 mg by mouth nightly       furosemide (LASIX) 20 MG tablet Take 20 mg by mouth daily      spironolactone (ALDACTONE) 25 MG tablet Take 25 mg by mouth daily      potassium chloride (KLOR-CON M) 20 MEQ extended release tablet Take 1 tablet by mouth 2 times daily (Patient taking differently: Take 20 mEq by mouth daily ) 60 tablet 3    loratadine (CLARITIN) 10 MG tablet Take 10 mg by mouth daily       Triamcinolone Acetonide (NASACORT ALLERGY 24HR NA) by Nasal route 3 times daily as needed. No current facility-administered medications for this encounter.   :     Comments:  Allergies: Allergies   Allergen Reactions    Sulfa Antibiotics Swelling    Lisinopril     Lisinopril Other (See Comments)     cough    Sulfa Antibiotics Swelling    Codeine Nausea And Vomiting    Percocet [Oxycodone-Acetaminophen] Nausea And Vomiting       Diabetes 5  / Health Status    1. A1C blood level - at goal < 7%   Lab Results   Component Value Date    LABA1C 7.0 (H) 06/08/2022    LABA1C 6.4 09/07/2021    LABA1C 6.5 (H) 05/04/2020     Lab Results   Component Value Date    GLUF 127 (H) 06/08/2022    LABMICR <1.20 05/24/2021    LDLCALC 71 06/08/2022    CREATININE 0.93 (H) 06/21/2022       2. Blood pressure (140/90)  Or less  BP Readings from Last 3 Encounters:   06/30/22 132/76   06/20/22 134/74   06/07/22 134/88       3. Cholesterol ( LDL under  100)   Lab Results   Component Value Date    LDLCALC 71 06/08/2022    LDLCHOLESTEROL 138 (H) 03/18/2013       4. Smoking ? []Yes   [x]No    5. Taking an Asprin daily? []Yes   [x]No      Diabetes Self- Management Education Record    Participant Name: Gissel Kizziang  Referring Provider: Edel Gilmore MD   Assessment/Evaluation Ratings:  1=Needs Instruction   4=Demonstrates Understanding/Competency  2=Needs Review   NC=Not Covered    3=Comprehends Key Points  N/A=Not Applicable    Topics/Learning Objectives Initial Assessment Date:  Comments:  Signature:   Classes 1, 2, 3 or Individual instruction Instr.  Date:  Comment:   Signature:   Reinforce Date:  Comments:  Signature: Post- Education Eval date:  Comments:  Signature:   Pathophysiology of diabetes  Define diabetes & Insulin resistance Identify own type of diabetes; role of the pancreas; signs/symptoms; diagnostic criteria; prevention & treatment options; contributing factors. Assessment Ratin  22  Diabetes Education assessment completed today. Patient has:  [] No knowledge of diabetes disease process   [x] Limited knowledge of diabetes disease process  [] Good knowledge of diabetes disease process. In this session, the role of the pancreas, insulin, and the liver in glucose utilization and insulin resistance was   [x]  Introduced  []  Reviewed. [x] ADA booklet Where Do I Begin used to reinforce teaching. Kade Ramos RN   [x] Discussed basic pathophysiology of diabetes with the group including the pancreas, insulin, insulin resistance and the liver's involvement. [x] Reviewed the different types of DM, risk factors, diagnosis, symptoms and the treatment options. Evaluation rating:  Recent Health problems:  []Yes []No   Being Active  Verbalize effect of exercise on blood glucose levels; benefits of regular exercise; safety considerations; contraindications; maintenance of activity. Assessment Ratin  22    Patient is   [x] currently being active daily  [] not currently being active daily. [x] Reviewed effects of exercise on glycemic control, risks and benefits, precautions. Patient       [x]has co- morbidities - Bakers cyst - going to PT       [] has no co-morbidities that recommend being seen by [facility] prior to starting exercise program for medical clearance. [x] Briefly reviewed guidelines for frequency, duration, intensity for exercise. Patient currently engages in the following activities: Goldy Peters RN     [x] Reviewed effects of activity on glycemic control and weight loss, risks and benefits, and precautions. [x] Current recommendations for being active by the American Diabetes Association reviewed.      [x] Discussed what patient is doing to stay active   Evaluation rating:  Have you made any changes in your activity level? []Yes []No       If yes, how? If yes, how many days/week? Monitoring blood glucose Identify recommended & personal blood glucose targets; importance of testing; testing supplies; HgbA1C target levels; Factors affecting blood glucose; Importance of logging blood glucose levels for pattern recognition; ketone testing; safe lancet disposal..   Assessment Ratin  22  [] Proficient in using meter   [] Recently started using meter  [] Not currently using meter  [x] Patient does not have a meter prescribed & advised to contact PCP for order or to purchase meter. States she will message provider tomorrow to get meter    [x] Reviewed handout Desired blood glucose level citing ADA recommendations for blood glucose goals. Discussed frequency of testing, importance of record keeping, proper handling of meter and test strips, disposal of used strips and lancets. Reviewed importance of testing as a means to evaluate effectiveness of treatment plan. [x] Does not have any Anemias or  hemoglobinopathies that may affect A1c  [] Has anemia or hemoglobinopathy that may affect A1c    Patient had cortisone injection for bakers cyst 2 weeks ago. Encouraged to get meter to assess effects of cortisone on blood glucose    Kalli Bear RN   [x] Discussed blood glucose monitoring to include: American Diabetes Association goals for blood glucose, effects of diet, exercise and medications on test results, frequency of testing, the possible causes and appropriate action to take if hypoglycemia (15/15 rule) or hyperglycemia occurs,   importance of record keeping to share with their PCP and when to call their PCP with abnormal results.       [x] Also reviewed were: proper storage and handling of both the meter and test strips; proper disposal of used strips and lancets, running  checks on the test strips using control solution and loading and using lancet device to obtain an adequate sample. Suggested times were given for routine testing. To increase testing for sick day monitoring, or when a change in diabetes medication, activity, or stress occurs. [x]  Aware of individualized A1C goal and current A1C. [x] Checking for ketones was introduced along with prevention and symptoms of Diabetic Ketoacidosis (DKA)    Evaluation rating:    Checking blood sugar    times a day. Checking before meals? []Yes  []No     Fasting ranges in last week:      Checking 2 hours post prandial? []Yes  []No     Ranges in last week:     Checking at bedtime? []Yes  []No   Ranges in last week:     Knowledgeable of blood glucose goals? []Yes []No             Glucose meter - educate on meter use if new to monitoring. Able to use meter appropriately   Assessment Ratin  22  [] Patient is new to glucose meter and instructed on the following.:   []Overview of meter - 800 number on all machines for help  []Proper storage/ handling of meter and test strips  []Washing hands, turning on meter - setting date and time  []Running  checks on the test strips using control solution   []Loading and using lancet device to obtain an adequate sample  []Obtaining blood sample and reading results on meter  []Proper disposal of used strips and lancets  []Frequency of testing  Importance of record keeping   []When to call their PCM with abnormal results  []Desired blood glucose goals for optimal control - handout given  [] All of the above    [] Patient instructed on home meter. Name of meter:    [] Patient instructed with demonstrator model in office. The patient return demonstrated   [] Verbally   [] Using his own meter:        [] Self-tested Bg:_____    Michele Barrios RN   See above  Evaluation rating:    Cleaning hands before testing? []Yes   []No    Using sides of fingers, not pads? []Yes   []No    Using  checks. []Yes   []No    Logging resuts?   []Yes   []No   Risk Reduction - acute complications:  Identify symptoms of hyper & hypoglycemia, and prevention & treatment strategies. Assessment Ratin  22  [] Knowledgeable  [] Limited Knowledge  [x] No knowledge   of hypo or hyperglycemia. [x] Handouts reviewed covering symptoms and treatment for both. (includes Rule of 15)    Patient has [x]low []high risk for hypoglycemia. [] Advised to carry source of fast acting glucose at all times. []  Advised to carry ID on person identifying as having diabetes  Kristen Ambrose RN   [x] Reviewed signs and symptoms of acute complications of diabetes, (hypoglycemia and hyperglycemia), possible causes and actions to take if occurs. [x] Reviewed BG guidelines and troubleshooting unexpected numbers. Evaluation rating:    Knowledge of Hypo and Hyper glycemia treatment []Yes []No     Knowledge of Hypo and Hyper glycemia prevention []Yes []No    Lows since last visit? []Yes []No      Treat appropriately? []Yes  []No    Explainable? []Yes  []No      Ketone testing? []Yes []No   Risk Reduction - sick days   Describe sick day guidelines & indications for physician notification. Identify short term consequences of poor control. Assessment Ratin22  NC    [x]Advised of effects of illness on blood glucose. Reviewed management of sick days with group. Advised about importance of continuing diabetes medications, tips for staying hydrated and when to call the doctor. [x] Sick day handout given. [x] Sick day toolbox reviewed. Evaluation rating:    Knowledge of sick day plan? []Yes [] No   Healthy Coping:   Identify healthy and unhealthy coping, causes of stress and ways to reduce stress. How depression affects diabetes care and how to seek help if needed. Identify support needed & support network available   Assessment Ratin  22    Patient's PAID-5 Score:3    [x]Patient's PAID-5 (Problem Areas in Diabetes) score is less than 9.  No intervention needed at this time. [] Patient's PAID-5 (Problem Areas in Diabetes) score is greater than 8 which indicates possible diabetes related emotional distress and warrants further assessment. [] Support given during appointment. Patient advised to follow up with PCP or psychologist.   [] Letter will be sent to PCP indicating further assessment needed. Morgan Ryan RN   [x] Reviewed healthy coping and unhealthy coping with the group. Discussed what ways of coping each person usually uses and brainstormed ways to change to a healthy coping mechanism with the group. [x] Stressed the importance of stress reduction and the negative effects of stress on the body including elevated BG. [x] [employer] and support networks reviewed and handout provided. Evaluation rating:    Feelings/Attitudes/Concerns? Ongoing self-management support plan? [] Yes []No   Problem solving & goal setting:  Behavior Change and goal settingIdentify 7 self-care behaviors, problem solving techniques: Finding problems, identifying solutions and taking action. Assessment Ratin22  Discussed willingness to change behaviors and setting SMART goals. Patient [x] is willing  [] is not willing to change at this time. Morgan Ryan RN [x] Identified problem solving techniques: finding problems, identifying solutions (goal setting) and taking action. [x] Patient reviewed selected goal set at initial assessment. Evaluation rating:    Identifying Barriers: Depression, unhealthy behaviors, financial    Healthy Eating: Describe effect of type, amount & timing of food on blood glucose; Describe basic meal planning techniques & current nutrition guideline  Correctly read food labels & demonstrate CHO counting & portion control with personalized meal plan.    Identifies dining out strategies, & dietary sick day guidelines   Assessment Ratin22    Meal Plan patient is currently following:  [] plate method  [] portion control  [] carb counting   [] label reading  [x] no meal plan    [x] Booklet from ADA Counting Carbs given. Reviewed plate method, portion control & label reading for carb counting utilizing booklet. [x] Advised that dietitian will recommend individualized number of carbs to eat daily, but in meantime could follow basic recommendations as follows:  [] Men-   [] for weight loss - 3-4 choices/45-60 gms per meal with 1 snack of 15 gm per day. []To maintain weight: 4-5 choices/60-75  gms pre meal with 1 snack of 15 gm per day. [x] Women -   [x]weight loss 2-3 choices/30-45 gms per meal with 1 snack of 15 gm per day. []To maintain weight: 3-4 servings/45-60 gms pre meal with 1 snack of 15 gm per day. Goal is to lose 10 pounds    Patient would benefit from   [] individual appt with dietitian  [] group MNT with dietitian    Susana Rojas RN 10/19/2022  Annabel Dahl MS, RD, LD  Discussed basic diabetic diet guidelines. We discussed carb counting, foods that contained carbs, and how to incorporate the correct portions in each meals. I reviewed the importance of not skipping meals. Emphasis was placed on having a set meal schedule, the MyPlate method, and getting regular physical activity. Reviewed strategies when dining out. Advised 2-3 servings of carbs at every meal and 1 serving of carb as an evening snack. Evaluation rating:    Are you following a meal plan? []Yes []No    [] Portion control   [] Plate method   [] Carb counting   [] Label reading    Weight loss since class? []Yes []No   Taking MedicationsIdentify effects of diabetes medicines on blood glucose levels; List diabetes medication taken, action & side effects   Assessment Ratin  22  Handouts provided on both oral and injectable medications. Currently taking: Metformin  mg daily.  Began 2 weeks ago    [] Currently takes the following complementary or alternative medicines:    [x] Reviewed medication compliance, action, side effects and timing of each. Has metallic taste in mouth     Patient is:  [x]compliant with current meds. []noncompliant with current meds. Laxmi Booker RN 10/20/2022  Carroll Cho RN    [x] Discussed all medication classes both oral and injectable to include method of action, side effects and precautions. [x] Patient provided handout with their medications for diabetes listed showing method of action and when to take. Evaluation rating:    Any Changes in Medications? Compliant? []Yes []No     Any side effects? []Yes [] No   Insulin / Injectable - Appropriate injection sites; proper storage; supplies needed; proper technique; safe needle disposal guidelines. Assessment Ratin  22    [x] Not on insulin    [] New to insulin   Patient is new to insulin and prescribed:     []  Instructed today on type of insulin(s), onset, peak and duration. []  Demonstrated proper administration technique using       []Vial & syringe       []Pen. [] Return demonstration via          [] Self-injection  __U Site:____             [] Demonstrator        []  Reviewed recommended injection sites, proper storage of insulin, Proper needle disposal, importance of blood glucose monitoring when taking insulin, and risk of hypoglycemia. []  Handouts given.     [] Previously on insulin:  and assessed the following:    [] Knowledge of type of insulin - onset, peak and duration of each type    [] Demonstrates Competency      [] Education provided      [] Proper storage of insulin:     [] Demonstrates Competency      [] Education provided          [] Site Management        [] Demonstrates Competency        [] Education provided      [] Injection site currently uses:     [] Evidence of:        [] bruising       [] infection       [] lipoatrophy        [] lipohypertrophy.      [] Injecting near umbilicus or scars/tattoos    [] Rotates sites appropriately    [] Skin Preparation technique:          [] Injection Procedure:       [] Demonstrates Competency        [] Education provided   [] Sterile Technique   [] Rolling to uniformity (if necessary)   [] Pre-injection priming (pen only)   [] Air into vial (Syringe only)   [] Correct order for mixing in same syringe (if necessary)   [] Dosing accuracy   [] Needle insertion   [] Complete injection -    [] Needle withdrawal - with waiting time before removing needle          [] Disposal Procedure:       [] Demonstrates Competency        [] Education provided    [] Has sharps container /needle clip    [] Uses sharps container / needle clip          [] Injection Device Selection:       [] Demonstrates Competency       [] Education provided    [] Appropriate needle size    [] Sufficient volume    [] Appropriate dosing increments    [] Suited to physical limitations           [] Injection adherence:       [] Demonstrates Competency        [] Education provided     [] Misses doses:         [] Daily [] Weekly          [] Monthly [] Almost Never         [] Hypoglycemia symptoms & treatment:      [] Demonstrates Competency        [] Education provided    Mitchell Villeda RN 10/20/2022  Shantell Currie RN    [x] Discussed insulin stigma and proper technique including site selection and self-injection. [x]  Reviewed both pen and vial/syringe use. Discussed needle disposal and proper insulin storage and importance of times to take insulin. [x] Discussed importance of blood glucose monitoring to assess current treatment effectiveness. Evaluation rating: On Insulin? []Yes []No           Injection site used: __________     Rotating sites? []Yes [] No     Problems? []Yes []No      Storing insulin correctly? []Yes [] No     Injecting at proper times? []Yes []No   IRisk Reduction - chronic complications:  Describe the relationship of blood glucose levels to long term complications of diabetes.  Identify preventative measures & standards of care. Assessment Ratin22  NC  See Diabetes Assessment for last completed chronic complication prevention appointments. [] Patient is up-to-date on preventative exams and vaccines  [x] Patient is not up-to date on preventative exams and vaccines and advised to discuss with PCM    [x] Blood pressure is at goal  [] Blood pressure is not at goal    [x] Cholesterol is at goal  [] Cholesterol is not at goal    [] Has microvascular complications  [] Has macrovascular complications  Janay Mandujano RN 10/20/2022  Simeon Moon RN    [x] Reviewed natural course of T2DM with group and how chronic complications are related to elevated blood glucose. [x] Discussed macro and microvascular complications and the need for control of lipids, blood pressure, glucose levels, weight reduction and smoking cessation to help reduce risks. [x] Instructed on goals for blood pressure, lipids, and glucose for optimal health. [x] Individualized scorecard provided with current   health maintenance recommendations from the American Diabetes Association to include Eye, dental exams, foot exams, recommended labs and vaccines for people with diabetes. Evaluation rating:    Using Score card? [] Yes []No    Blood Pressure at goal?  [] Yes []No    Foot exams:  self-exams daily  []Yes []No  Provider yearly   []  Yes []No               Eye and dental exams up to date? []Yes []No               Labs completed & at goal  [] Yes []No        Plan if not at goal? []Yes []No  Immunizations   [] Flu   []pneumonia   []Hep B (19-59)   []Covid   Individualized goal selection. 22  My goal , to help me improve my health, I will:     Begin to reduce portion size of meals at evening meal 3 times in the next week. 0 %      2.  Message PCP for meter tomorrow   0%    Janay Mandujano RN Percentage of goal completed from Initial Assessment:10/18/2022    Begin to reduce portion size of meals at evening meal 3 times in the next week. 25 %    2. Message PCP for meter tomorrow   50%    New revised goal:adjust meal portions Percentage of goal completed:  1.      2.  10/20/2022  Marizol Kim RN    New revised goal:Complete eye feet and dentists exams. Get flu , pneumonia and covid vaccines. Percentage of goal completed since end of class:  1.      2.    New revised goal:     Plan  Follow-up Appointments planned in group setting. Next Appointment tomorrow. Instruction Method: [x]Lecture/Discussion  []Power Point Presentation  [x]Handouts  []Return Demonstration      Education Materials/Equipment Provided:      [x] Self-Management  - Initial Assessment - Where do I Begin booklet and Counting Carbs from the ADA. [x] Self-Management  Class 1 - On the Road to better managing your diabetes - Packet of Handouts from Diabetes Department    [x] Self-Management  Class 2 - Meal Plan,  Choose your Foods - Food Lists for [employer] and Nutrition in the [employer] Resources - fast facts about fast food    [x] Self-Management  Class 3 -  Diabetes ID card,  foot care tips sheet,  Continuing Your Journey with Diabetes, Individualized Diabetes report card, Sick Day Rules, Diabetes Cookbooks, Magazines and Pedometers when available    [] Glucose Meter     [] BD Getting Started Insulin Kit     [] Other      Encounter Type Date Start Time End Time Comments No Show Dates   Assessment 07/20/22  Sugey Montiel  300 07/20/22    [x] Patient has no barriers to learning and recommend attending classes. Classes scheduled for: 8/9 & 11 and Aug 17    [] Patient has the following barriers to learning and would benefit from additional education in an individual setting. Barriers:      [] Will follow up:    [] Patient declines classes at this time.       [] Will follow up:    Sugey Montiel RN    Class 1 - Understanding diabetes 10/18/2022  Sugey Montiel RN   0916 1200   [x] First class completed today.      Class 2- Nutrition and diabetes 10/19/2022  Shanice Mcmahon, MS, RD, LD   0900 1130   [x] Second class completed today    Class 3 - Preventing Complications 12/36/9885  Olya Marlow RN   9am 1139  [x] Third class completed today. [] Patient has completed all 3 classes today. Goal sheets and DSMS Support Plan completed. Will follow up in 3-4 months via telephone. Patient advised to contact Diabetes Education office if any questions. Number provided. [] Patient needs to attend Class #    in order to complete classes. Scheduled for: Individual MNT         3 Month Follow-up      []In Person  []Telephone    Meter Instrx        Insulin Instrx      []Pen  []Vial & Syringe      DSMS Support Plan:  Follow-up plan:    [x] Healthy Coping  [] Emotional Support  [] National [place] on Mental Illness (CLEO) - (Depression, bipolar and other support) 672.836.8700; www.cleo.org    [] [address] [place] [address]  796.115.5737; www.dbsalliance. org                          [] National Suicide Prevention Ppdigvkk-969-640-8255                          [] Anxiety & Depression Association of [place]                               Find local support groups by [facility] code at [facility] number 302-057-5396                          [] Counseling:  ______________________________  [] Diabetes Support Groups  [] New Weigh of Life at [facility]  2nd Tuesday of the month at 10:00 YL-800-899-124-218-6787  [] On-line support groups (Gendel, R.RSTEGOSYSTEMS. Living [employer], ADA)  [x] [facility]  [] I would be interested in a support group at [address] [facility] in [facility] if offered    [x] Stress Relief     [] Yoga Class     [] Start a journal     [x] Relaxation techniques     [] Haibcrr1Vgtib- Michael         [] SparkQuote (Free, inspiring quote of the day)    [x]  Healthy Eating  [x] Weight Management & Carb Counting  [] Weight Nwafuscx-031-506-6000; www.weightQikServe. Living [employer]vest Foods  [] [employer] Atihkfmiz-021-959-2664 (support group)- www.oa. org  [] Follow up individual appointment with [facility] dietitian - call [phone]  [] Food Tracking Apps - My Fitness Naveen Darling  [] ADA website - [address], weight loss  [] Myplate. com      [x]   Monitoring  [] Glucose Beto (Free, tracks blood glucose, graphs)  [] MySugrApp (Basic and Pro patterns)  [x] Diabetes:M - logbook    [x]  Being Active  [] 24 Hour Jqgqclx-089-771-0240; www.Swivl  [] Marcos Barcenas. Biotz  [] [address]  [x] Fit Walks: FREE  [facility] in [place] on Mondays 5:30 pm  [facility] in [place] (for weather)   [place] on Thursdays at 5:30 OL-[phone]  [] Margo Escalera walking videos (Some free on youtube)  [] Other Exercise Plan/Facility _____________________________________________  [] Smith Nails to 5K    [x]  Reducing Risk  [x] Make a sick day toolkit  [] Schedule appointments for eye, dentist  [] Stop smoking              [] Monitor Blood pressure              [] Get vaccinated                [] Other:___________________      []    Taking Medications  [] Michael: MyTherapyApp - helps track meds  [] Seek financial help with medications:  [] Non-insulin-agents-cost-saving-resource-7-29-19. pdf   [employer].Pearltrees. org/docs/default-source/practice/educator-tools/non-insulin-agents-cost-saving-resource-7-29-19. pdf?sfvrsn=2  [] Insulin cost saving resource  [employer].Pearltrees. org/docs/default-source/practice/educator-tools/insulin-cost-saving-resources-3-4-19. pdf?sfvrsn=4  []  GetInsulin. org  []  The Affordable insulin Project http://affordableinsulinproject.org/    [x]  Diabetes Websites - Use as a resource for additional information  [x] www.diabetes. org  [] My.Ripple TV. StormWind  [] WWW.diabeteseducator. org     Association of Diabetes Care & Education Specialists  [] www.niddk.nih.gov/health-information/diabetes/overview  Automatic Data of Health  [] www.medicalert. org  - offers emergency medical information service, including an ID bracelet/pendant (have to pay)    []   Journals/Magazines  [] Diabetes Forecast 7-461-472-4977; www.diabetesforecast.org  [] Diabetes Self-Management 1-804-423-336.246.3081; www.diabetesseKleer. Luminal  [] Diabetes Health 668-933-6802; www.diabeteshealth. com    []   Other  [] Health Program offered at place of employment   [] Insurance Company's Chronic Disease Management Program  [] Follow up - Diabetes Education or Nutrition Therapy Annually (call in 1 year to set up apt)  [] Join the Living with Type 2 Diabetes Program (FREE)   www.diabetes. org/diabetes/type-2/living-with-type-2-diabetes-program  or call 1-800-DIABETES  [] Stop smoking - www.cancer. org/healthy/stay-away-from-tobacco       Post Education Referrals:        [] [place] Tobacco Quit information sheet and [address] [facility] , [address]       [] Dental care     [] Podiatrist     [] Ophthalmologist     [] Other    Matt Luque, JR Bentley, MS, RD, LD

## 2022-12-08 DIAGNOSIS — E11.9 TYPE 2 DIABETES MELLITUS WITHOUT COMPLICATION, WITHOUT LONG-TERM CURRENT USE OF INSULIN (HCC): ICD-10-CM

## 2022-12-12 RX ORDER — METFORMIN HYDROCHLORIDE 500 MG/1
500 TABLET, EXTENDED RELEASE ORAL
Qty: 90 TABLET | Refills: 1 | Status: SHIPPED | OUTPATIENT
Start: 2022-12-12

## 2022-12-12 NOTE — TELEPHONE ENCOUNTER
Future Appointments    Encounter Information    Provider Department Appt Notes   2/3/2023 Shubham Norton MD [facility] Primary Care 4 month f/u Chronic Disease Check.      Past Visits    Date Provider Specialty Visit Type Primary Dx   10/03/2022 Shubham Norton MD Family Medicine Office Visit Type 2 diabetes mellitus without complication, without long-term current use of insulin ([facility] [address]

## 2023-01-13 ENCOUNTER — TELEPHONE (OUTPATIENT)
Dept: FAMILY MEDICINE CLINIC | Age: 63
End: 2023-01-13

## 2023-01-13 DIAGNOSIS — Z12.31 ENCOUNTER FOR SCREENING MAMMOGRAM FOR MALIGNANT NEOPLASM OF BREAST: Primary | ICD-10-CM

## 2023-01-13 NOTE — TELEPHONE ENCOUNTER
Mammogram order signed.   Patient should schedule after 02/07/2023 (last mammogram was done 02/07/2022)

## 2023-01-13 NOTE — TELEPHONE ENCOUNTER
----- Message from Ricarda Millan sent at 1/11/2023 11:48 AM EST -----  Subject: Referral Request    Reason for referral request? mammogram screening  Provider patient wants to be referred to(if known):     Provider Phone Number(if known): Additional Information for Provider?  3D screening  ---------------------------------------------------------------------------  --------------  Stacy ABARCA    [phone]; OK to leave message on voicemail  ---------------------------------------------------------------------------  --------------

## 2023-01-17 ENCOUNTER — HOSPITAL ENCOUNTER (OUTPATIENT)
Dept: CARDIOLOGY | Age: 63
Discharge: HOME | End: 2023-01-17
Payer: COMMERCIAL

## 2023-01-17 PROCEDURE — 93296 REM INTERROG EVL PM/IDS: CPT

## 2023-01-30 ENCOUNTER — HOSPITAL ENCOUNTER (OUTPATIENT)
Dept: LAB | Age: 63
Discharge: HOME | End: 2023-01-30
Payer: COMMERCIAL

## 2023-01-30 DIAGNOSIS — E55.9 VITAMIN D DEFICIENCY: ICD-10-CM

## 2023-01-30 DIAGNOSIS — E11.9 TYPE 2 DIABETES MELLITUS WITHOUT COMPLICATION, WITHOUT LONG-TERM CURRENT USE OF INSULIN (HCC): ICD-10-CM

## 2023-01-30 DIAGNOSIS — I10 ESSENTIAL HYPERTENSION: Chronic | ICD-10-CM

## 2023-01-30 DIAGNOSIS — E66.01 MORBID OBESITY (HCC): ICD-10-CM

## 2023-01-30 DIAGNOSIS — I48.19 ATRIAL FIBRILLATION, PERSISTENT (HCC): ICD-10-CM

## 2023-01-30 LAB
ANION GAP SERPL CALCULATED.3IONS-SCNC: 12 MEQ/L (ref 9–15)
BUN BLDV-MCNC: 12 MG/DL (ref 8–23)
CALCIUM SERPL-MCNC: 8.7 MG/DL (ref 8.5–9.9)
CHLORIDE BLD-SCNC: 105 MEQ/L (ref 95–107)
CO2: 25 MEQ/L (ref 20–31)
CREAT SERPL-MCNC: 0.81 MG/DL (ref 0.5–0.9)
GFR SERPL CREATININE-BSD FRML MDRD: >60 ML/MIN/{1.73_M2}
GLUCOSE BLD-MCNC: 126 MG/DL (ref 70–99)
HBA1C MFR BLD: 7.3 % (ref 4.8–5.9)
POTASSIUM SERPL-SCNC: 4 MEQ/L (ref 3.4–4.9)
SODIUM BLD-SCNC: 142 MEQ/L (ref 135–144)
VITAMIN D 25-HYDROXY: 31.6 NG/ML

## 2023-01-30 PROCEDURE — 82306 VITAMIN D 25 HYDROXY: CPT

## 2023-01-30 PROCEDURE — 83036 HEMOGLOBIN GLYCOSYLATED A1C: CPT

## 2023-01-30 PROCEDURE — 80048 BASIC METABOLIC PNL TOTAL CA: CPT

## 2023-01-30 PROCEDURE — 36415 COLL VENOUS BLD VENIPUNCTURE: CPT

## 2023-02-01 NOTE — RESULT ENCOUNTER NOTE
Vitamin D normal, high A1c 7.3, BMP with     Will review results with patient at upcoming office visit.

## 2023-02-03 ENCOUNTER — OFFICE VISIT (OUTPATIENT)
Dept: FAMILY MEDICINE CLINIC | Age: 63
End: 2023-02-03
Payer: COMMERCIAL

## 2023-02-03 VITALS
SYSTOLIC BLOOD PRESSURE: 118 MMHG | TEMPERATURE: 96.8 F | BODY MASS INDEX: 48.82 KG/M2 | HEART RATE: 84 BPM | HEIGHT: 65 IN | WEIGHT: 293 LBS | DIASTOLIC BLOOD PRESSURE: 76 MMHG | OXYGEN SATURATION: 93 %

## 2023-02-03 DIAGNOSIS — E11.9 TYPE 2 DIABETES MELLITUS WITHOUT COMPLICATION, WITHOUT LONG-TERM CURRENT USE OF INSULIN (HCC): Primary | ICD-10-CM

## 2023-02-03 DIAGNOSIS — K21.9 GASTROESOPHAGEAL REFLUX DISEASE, UNSPECIFIED WHETHER ESOPHAGITIS PRESENT: ICD-10-CM

## 2023-02-03 DIAGNOSIS — I48.19 ATRIAL FIBRILLATION, PERSISTENT (HCC): ICD-10-CM

## 2023-02-03 DIAGNOSIS — E66.01 MORBID OBESITY (HCC): ICD-10-CM

## 2023-02-03 DIAGNOSIS — I10 ESSENTIAL HYPERTENSION: Chronic | ICD-10-CM

## 2023-02-03 DIAGNOSIS — E55.9 VITAMIN D DEFICIENCY: ICD-10-CM

## 2023-02-03 DIAGNOSIS — E78.5 HYPERLIPIDEMIA, UNSPECIFIED HYPERLIPIDEMIA TYPE: ICD-10-CM

## 2023-02-03 PROCEDURE — 3074F SYST BP LT 130 MM HG: CPT | Performed by: FAMILY MEDICINE

## 2023-02-03 PROCEDURE — 99214 OFFICE O/P EST MOD 30 MIN: CPT | Performed by: FAMILY MEDICINE

## 2023-02-03 PROCEDURE — 3078F DIAST BP <80 MM HG: CPT | Performed by: FAMILY MEDICINE

## 2023-02-03 PROCEDURE — 3051F HG A1C>EQUAL 7.0%<8.0%: CPT | Performed by: FAMILY MEDICINE

## 2023-02-03 RX ORDER — METFORMIN HYDROCHLORIDE 500 MG/1
1000 TABLET, EXTENDED RELEASE ORAL
Qty: 180 TABLET | Refills: 1 | Status: SHIPPED | OUTPATIENT
Start: 2023-02-03

## 2023-02-03 RX ORDER — MAGNESIUM OXIDE 400 MG/1
TABLET ORAL
COMMUNITY
Start: 2022-11-22

## 2023-02-03 RX ORDER — OMEPRAZOLE 40 MG/1
40 CAPSULE, DELAYED RELEASE ORAL
Qty: 90 CAPSULE | Refills: 1 | Status: SHIPPED | OUTPATIENT
Start: 2023-02-03

## 2023-02-03 SDOH — ECONOMIC STABILITY: FOOD INSECURITY: WITHIN THE PAST 12 MONTHS, YOU WORRIED THAT YOUR FOOD WOULD RUN OUT BEFORE YOU GOT MONEY TO BUY MORE.: NEVER TRUE

## 2023-02-03 SDOH — ECONOMIC STABILITY: INCOME INSECURITY: HOW HARD IS IT FOR YOU TO PAY FOR THE VERY BASICS LIKE FOOD, HOUSING, MEDICAL CARE, AND HEATING?: NOT HARD AT ALL

## 2023-02-03 SDOH — ECONOMIC STABILITY: FOOD INSECURITY: WITHIN THE PAST 12 MONTHS, THE FOOD YOU BOUGHT JUST DIDN'T LAST AND YOU DIDN'T HAVE MONEY TO GET MORE.: NEVER TRUE

## 2023-02-03 SDOH — ECONOMIC STABILITY: HOUSING INSECURITY
IN THE LAST 12 MONTHS, WAS THERE A TIME WHEN YOU DID NOT HAVE A STEADY PLACE TO SLEEP OR SLEPT IN A SHELTER (INCLUDING NOW)?: NO

## 2023-02-03 ASSESSMENT — ENCOUNTER SYMPTOMS
ABDOMINAL PAIN: 0
COUGH: 0
VOMITING: 0
SHORTNESS OF BREATH: 0
CHEST TIGHTNESS: 0
CONSTIPATION: 0
NAUSEA: 0
DIARRHEA: 0
BLOOD IN STOOL: 0
ANAL BLEEDING: 0
WHEEZING: 0

## 2023-02-03 ASSESSMENT — PATIENT HEALTH QUESTIONNAIRE - PHQ9
SUM OF ALL RESPONSES TO PHQ QUESTIONS 1-9: 0
SUM OF ALL RESPONSES TO PHQ9 QUESTIONS 1 & 2: 0
SUM OF ALL RESPONSES TO PHQ QUESTIONS 1-9: 0
1. LITTLE INTEREST OR PLEASURE IN DOING THINGS: 0
2. FEELING DOWN, DEPRESSED OR HOPELESS: 0

## 2023-02-03 NOTE — ASSESSMENT & PLAN NOTE
Blood pressure within normal limits today in the office.   Patient instructed to continue with current dose of metoprolol

## 2023-02-03 NOTE — ASSESSMENT & PLAN NOTE
Most recent vitamin D level now within normal range. Patient instructed to continue with current vitamin D supplement.

## 2023-02-03 NOTE — ASSESSMENT & PLAN NOTE
A1c above goal control on most recent testing. Patient instructed to increase dose of metformin to 1000 mg daily. I advised her to double down on efforts to a lower carbohydrate diet.   We will continue to follow blood glucose values over time and make further medication adjustments as indicated

## 2023-02-03 NOTE — PROGRESS NOTES
Melyssa Dominguez (: 1960) is a 57 y.o. female, Established patient, who presents today for:    Chief Complaint   Patient presents with    Hypertension     Patient is present for [facility]. Diabetes    Hyperlipidemia         ASSESSMENT/PLAN    1. Type 2 diabetes mellitus without complication, without long-term current use of insulin (HCC)  Assessment & Plan:  A1c above goal control on most recent testing. Patient instructed to increase dose of metformin to 1000 mg daily. I advised her to double down on efforts to a lower carbohydrate diet. We will continue to follow blood glucose values over time and make further medication adjustments as indicated  Orders:  -     Comprehensive Metabolic Panel; Future  -     Lipid Panel; Future  -     Hemoglobin A1C; Future  -     Microalbumin / Creatinine Urine Ratio; Future  -     metFORMIN (GLUCOPHAGE-XR) 500 MG extended release tablet; Take 2 tablets by mouth daily (with breakfast), Disp-180 tablet, R-1Normal  2. Atrial fibrillation, persistent (HCC)  Assessment & Plan:  Regular rhythm and rate controlled today in the office. Patient with reported intermittent bouts of palpitations likely related to breakthrough atrial fibrillation. She has upcoming follow-up visit with the specialist to discuss potential further adjustments with antiarrhythmic medication. Patient remains on anticoagulation without signs of bleeding. She was instructed to continue with current dose of Eliquis. Orders:  -     CBC with Auto Differential; Future  -     Comprehensive Metabolic Panel; Future  -     TSH; Future  3. Essential hypertension  Assessment & Plan:  Blood pressure within normal limits today in the office. Patient instructed to continue with current dose of metoprolol   Orders:  -     CBC with Auto Differential; Future  -     Comprehensive Metabolic Panel; Future  4. Hyperlipidemia, unspecified hyperlipidemia type  -     Comprehensive Metabolic Panel;  Future  -     Lipid Panel; Future  5. Gastroesophageal reflux disease, unspecified whether esophagitis present  -     CBC with Auto Differential; Future  -     omeprazole (PRILOSEC) 40 MG delayed release capsule; Take 1 capsule by mouth every morning (before breakfast), Disp-90 capsule, R-1Normal  6. Vitamin D deficiency  Assessment & Plan:  Most recent vitamin D level now within normal range. Patient instructed to continue with current vitamin D supplement. Orders:  -     Vitamin D 25 Hydroxy; Future  7. Morbid obesity ([address]  Assessment & Plan:  Patient counseled on healthy dietary choices and the benefits of a lower salt and/or lower carbohydrate diet as appropriate. Patient also counseled on benefits of moderate intensity cardiovascular exercise for 150 minutes per week as they are able. Advice was given to make small changes over time, setting smaller achievable goals until recommended lifestyle changes are reached. Orders:  -     Comprehensive Metabolic Panel; Future  -     Lipid Panel; Future  -     Hemoglobin A1C; Future  -     TSH; Future    Return in about 6 months (around 8/3/2023) for Annual Physical.       SUBJECTIVE/OBJECTIVE:    HPI    Patient presents for chronic diabetes, atrial fibrillation, hypertension, hyperlipidemia, GERD, and obesity follow-up. Patient reports intermittent bouts of palpitations at least once a week. She denies any chest pain, dyspnea, lightheadedness, dizziness, worsening lower extremity edema, or syncope. Care remains established with cardiology and cardiac electrophysiology for long-term management of antiarrhythmic medication, she reports having visit scheduled with the specialist in the next several days to discuss medication adjustments. Patient reports home blood glucose values ranging from 110s to 150s since the most recent office visit. They deny any polyuria or polydipsia, new onset vision changes, or new onset paresthesias.     Patient reports frequent bouts of heartburn since her most recent visit. She reports not having omeprazole at home for management. She denies any change in appetite, weight changes, dysphagia, early satiety, increased belching, abdominal pain, nausea/vomiting, diarrhea, constipation, melena, rectal bleeding, or hematochezia. Current Outpatient Medications on File Prior to Visit   Medication Sig Dispense Refill    magnesium oxide (MAG-OX) 400 MG tablet Take by mouth      albuterol sulfate HFA (VENTOLIN HFA) 108 (90 Base) MCG/ACT inhaler Inhale 2 puffs into the lungs 4 times daily as needed for Wheezing 18 g 0    MELATONIN PO Take 10 mg by mouth Gummies      Cholecalciferol (VITAMIN D3) 50 MCG (2000 UT) CAPS Take 1 capsule by mouth daily 90 capsule 1    ketotifen (ZADITOR) 0.025 % ophthalmic solution Place 1 drop into both eyes 2 times daily 5 mL 5    fexofenadine (ALLEGRA) 180 MG tablet Take 1 tablet by mouth daily 30 tablet 5    ipratropium (ATROVENT) 0.06 % nasal spray 2 sprays by Each Nostril route 2 times daily 15 mL 5    blood glucose monitor kit and supplies DX: diabetes mellitus. Test 1 time(s) daily - Ok to substitute per insurance 1 kit 0    blood glucose monitor strips DX: diabetes mellitus. Use 1 time(s) daily - Ok to substitute per insurance 100 strip 5    Lancets MISC DX: diabetes mellitus. Use 1 time(s) daily - Ok to substitute per insurance (1 each = 1 box) 100 each 5    Alcohol Swabs PADS DX: diabetes mellitus.  Test 1 time(s) daily - Ok to substitute per insurance (1 each = 1 box) 100 each 5    fluocinonide (LIDEX) 0.05 % cream APPLY TOPICALLY TWICE A DAY 60 g 11    PARoxetine (PAXIL) 20 MG tablet TAKE 1 TABLET EVERY MORNING 90 tablet 3    metoprolol tartrate (LOPRESSOR) 50 MG tablet 2 times a day      ELIQUIS 5 MG TABS tablet 5 mg 2 times daily       atorvastatin (LIPITOR) 80 MG tablet       Respiratory Therapy Supplies BLAS Dispense cpap supplies and mask 1 Device 0    sotalol (BETAPACE) 120 MG tablet Take 120 mg by mouth       fluticasone (FLONASE) 50 MCG/ACT nasal spray 2 sprays by Nasal route daily 1 Bottle 1    doxazosin (CARDURA) 2 MG tablet Take 2 mg by mouth nightly       furosemide (LASIX) 20 MG tablet Take 20 mg by mouth daily      spironolactone (ALDACTONE) 25 MG tablet Take 25 mg by mouth daily      potassium chloride (KLOR-CON M) 20 MEQ extended release tablet Take 1 tablet by mouth 2 times daily (Patient taking differently: Take 20 mEq by mouth daily) 60 tablet 3    loratadine (CLARITIN) 10 MG tablet Take 10 mg by mouth daily      Triamcinolone Acetonide (NASACORT ALLERGY 24HR NA) by Nasal route 3 times daily as needed. zinc 50 MG TABS tablet Take 1 tablet by mouth daily for 7 days 7 tablet 0    vitamin D (ERGOCALCIFEROL) 1.25 MG (71771 UT) CAPS capsule Take 1 capsule by mouth once a week 12 capsule 0     No current facility-administered medications on file prior to visit. Allergies   Allergen Reactions    Sulfa Antibiotics Swelling    Lisinopril     Lisinopril Other (See Comments)     cough    Sulfa Antibiotics Swelling    Codeine Nausea And Vomiting    Percocet [Oxycodone-Acetaminophen] Nausea And Vomiting        Review of Systems   Constitutional:  Negative for appetite change, chills, diaphoresis, fatigue, fever and unexpected weight change. Eyes:  Negative for visual disturbance. Respiratory:  Negative for cough, chest tightness, shortness of breath and wheezing. Cardiovascular:  Positive for palpitations (intermittent). Negative for chest pain and leg swelling. No orthopnea, No PND   Gastrointestinal:  Negative for abdominal pain, anal bleeding, blood in stool, constipation, diarrhea, nausea and vomiting. +heartburn, No melena   Endocrine: Negative for cold intolerance, heat intolerance, polydipsia, polyphagia and polyuria. Genitourinary:  Negative for dysuria and hematuria. Musculoskeletal:  Negative for myalgias. Skin:  Negative for rash.    Neurological:  Negative for dizziness, syncope, weakness, light-headedness, numbness and headaches. Psychiatric/Behavioral:  Negative for dysphoric mood. The patient is not nervous/anxious. Vitals:  /76 (Site: Left Upper Arm, Position: Sitting, Cuff Size: Large Adult)   Pulse 84   Temp 96.8 °F (36 °C) (Temporal)   Ht 5' 5\" (1.651 m)   Wt 297 lb 9.6 oz (135 kg)   LMP 11/14/1998   SpO2 93%   BMI 49.52 kg/m²     Physical Exam  Vitals reviewed. Constitutional:       General: She is not in acute distress. Appearance: She is obese. She is not ill-appearing. Eyes:      General: No scleral icterus. Neck:      Thyroid: No thyroid mass, thyromegaly or thyroid tenderness. Vascular: No carotid bruit. Cardiovascular:      Rate and Rhythm: Normal rate and regular rhythm. Heart sounds: Normal heart sounds. No murmur heard. Pulmonary:      Effort: Pulmonary effort is normal. No respiratory distress. Breath sounds: Normal breath sounds. No wheezing, rhonchi or rales. Abdominal:      General: Bowel sounds are normal. There is no distension. Palpations: Abdomen is soft. Tenderness: There is no abdominal tenderness. There is no right CVA tenderness, left CVA tenderness, guarding or rebound. Musculoskeletal:      Cervical back: Neck supple. Right lower leg: Edema (1+ to ankle) present. Left lower leg: Edema (1+ to ankle) present. Lymphadenopathy:      Cervical: No cervical adenopathy. Skin:     Findings: No rash. Neurological:      Mental Status: She is alert and oriented to person, place, and time. Psychiatric:         Mood and Affect: Mood normal.         Behavior: Behavior normal.         Thought Content: Thought content normal.       Ortho Exam (If Applicable)              An electronic signature was used to authenticate this note.      Darrel Patel MD

## 2023-02-03 NOTE — ASSESSMENT & PLAN NOTE
Regular rhythm and rate controlled today in the office. Patient with reported intermittent bouts of palpitations likely related to breakthrough atrial fibrillation. She has upcoming follow-up visit with the specialist to discuss potential further adjustments with antiarrhythmic medication. Patient remains on anticoagulation without signs of bleeding. She was instructed to continue with current dose of Eliquis.

## 2023-02-10 ENCOUNTER — PATIENT MESSAGE (OUTPATIENT)
Dept: FAMILY MEDICINE CLINIC | Age: 63
End: 2023-02-10

## 2023-02-10 ENCOUNTER — HOSPITAL ENCOUNTER (OUTPATIENT)
Dept: WOMENS IMAGING | Age: 63
End: 2023-02-10
Payer: MEDICARE

## 2023-02-10 DIAGNOSIS — Z12.31 ENCOUNTER FOR SCREENING MAMMOGRAM FOR MALIGNANT NEOPLASM OF BREAST: ICD-10-CM

## 2023-02-10 DIAGNOSIS — E11.9 TYPE 2 DIABETES MELLITUS WITHOUT COMPLICATION, WITHOUT LONG-TERM CURRENT USE OF INSULIN (HCC): ICD-10-CM

## 2023-02-10 DIAGNOSIS — E66.01 MORBID OBESITY (HCC): Primary | ICD-10-CM

## 2023-02-10 PROCEDURE — 77063 BREAST TOMOSYNTHESIS BI: CPT

## 2023-02-10 NOTE — TELEPHONE ENCOUNTER
From: Tesfaye Jaimes  To: Dr. Jeanne Teresa: 2/10/2023 3:31 PM EST  Subject: Metformin increase     The increase of Metformin 500mg to 1000mg. Is causing severe diarrhea had it all week. Just seems to be getting worse not better. Asking do you want me to go back to 500mg. Or try something else.  Thank you Xiomy Crowder

## 2023-02-11 RX ORDER — METFORMIN HYDROCHLORIDE 500 MG/1
500 TABLET, EXTENDED RELEASE ORAL
Qty: 90 TABLET | Refills: 1
Start: 2023-02-11

## 2023-02-21 ENCOUNTER — TELEPHONE (OUTPATIENT)
Dept: DIABETES SERVICES | Age: 63
End: 2023-02-21

## 2023-03-01 ENCOUNTER — TELEPHONE (OUTPATIENT)
Dept: DIABETES SERVICES | Age: 63
End: 2023-03-01

## 2023-03-30 ENCOUNTER — TELEPHONE (OUTPATIENT)
Dept: DIABETES SERVICES | Age: 63
End: 2023-03-30

## 2023-03-30 NOTE — PROGRESS NOTES
Contacted patient for follow up after attending Diabetes Education classes. Patient answered but was busy with another call and stated she would call back later.

## 2023-04-17 ENCOUNTER — TELEPHONE (OUTPATIENT)
Dept: FAMILY MEDICINE CLINIC | Age: 63
End: 2023-04-17

## 2023-04-17 DIAGNOSIS — K21.9 GASTROESOPHAGEAL REFLUX DISEASE, UNSPECIFIED WHETHER ESOPHAGITIS PRESENT: ICD-10-CM

## 2023-04-17 RX ORDER — OMEPRAZOLE 40 MG/1
40 CAPSULE, DELAYED RELEASE ORAL 2 TIMES DAILY
Qty: 60 CAPSULE | Refills: 2 | Status: SHIPPED | OUTPATIENT
Start: 2023-04-17

## 2023-04-17 NOTE — TELEPHONE ENCOUNTER
Pt taking omeprazole (PRILOSEC) 40 MG delayed release capsule - Still having acid reflux is there something else she can take ? Please advise.      Please leave a voicemail message

## 2023-04-17 NOTE — TELEPHONE ENCOUNTER
Patient should increase omeprazole dosing to twice a day (once in the morning before breakfast and once in the evening before dinner). I have sent a new RX to her pharmacy with the increased dosing. I have also referred her to gastroenterology to have further evaluation. This is the next step, to meet with the specialist and determine if endoscopy is needed to rule out ulcer or other GI abnormality.  Please help her schedule visit with the specialist.

## 2023-04-18 NOTE — TELEPHONE ENCOUNTER
I have attempted to contact this patient by phone with the following results: message left to return my call with patients  .

## 2023-04-21 ENCOUNTER — OFFICE VISIT (OUTPATIENT)
Dept: CARDIOLOGY CLINIC | Age: 63
End: 2023-04-21
Payer: MEDICARE

## 2023-04-21 VITALS
DIASTOLIC BLOOD PRESSURE: 80 MMHG | BODY MASS INDEX: 48.09 KG/M2 | HEART RATE: 68 BPM | SYSTOLIC BLOOD PRESSURE: 130 MMHG | WEIGHT: 289 LBS

## 2023-04-21 DIAGNOSIS — I48.19 ATRIAL FIBRILLATION, PERSISTENT (HCC): ICD-10-CM

## 2023-04-21 DIAGNOSIS — G47.33 OSA ON CPAP: ICD-10-CM

## 2023-04-21 DIAGNOSIS — Z99.89 OSA ON CPAP: ICD-10-CM

## 2023-04-21 DIAGNOSIS — I10 ESSENTIAL HYPERTENSION: Chronic | ICD-10-CM

## 2023-04-21 DIAGNOSIS — E78.5 HYPERLIPIDEMIA, UNSPECIFIED HYPERLIPIDEMIA TYPE: ICD-10-CM

## 2023-04-21 DIAGNOSIS — E66.01 MORBID OBESITY (HCC): ICD-10-CM

## 2023-04-21 DIAGNOSIS — Z95.0 PACEMAKER: ICD-10-CM

## 2023-04-21 DIAGNOSIS — I49.3 PVC'S (PREMATURE VENTRICULAR CONTRACTIONS): Primary | Chronic | ICD-10-CM

## 2023-04-21 PROCEDURE — 3075F SYST BP GE 130 - 139MM HG: CPT | Performed by: INTERNAL MEDICINE

## 2023-04-21 PROCEDURE — G8417 CALC BMI ABV UP PARAM F/U: HCPCS | Performed by: INTERNAL MEDICINE

## 2023-04-21 PROCEDURE — 3017F COLORECTAL CA SCREEN DOC REV: CPT | Performed by: INTERNAL MEDICINE

## 2023-04-21 PROCEDURE — 99204 OFFICE O/P NEW MOD 45 MIN: CPT | Performed by: INTERNAL MEDICINE

## 2023-04-21 PROCEDURE — 93000 ELECTROCARDIOGRAM COMPLETE: CPT | Performed by: INTERNAL MEDICINE

## 2023-04-21 PROCEDURE — 1123F ACP DISCUSS/DSCN MKR DOCD: CPT | Performed by: INTERNAL MEDICINE

## 2023-04-21 PROCEDURE — 1036F TOBACCO NON-USER: CPT | Performed by: INTERNAL MEDICINE

## 2023-04-21 PROCEDURE — G8400 PT W/DXA NO RESULTS DOC: HCPCS | Performed by: INTERNAL MEDICINE

## 2023-04-21 PROCEDURE — G8427 DOCREV CUR MEDS BY ELIG CLIN: HCPCS | Performed by: INTERNAL MEDICINE

## 2023-04-21 PROCEDURE — 3079F DIAST BP 80-89 MM HG: CPT | Performed by: INTERNAL MEDICINE

## 2023-04-21 PROCEDURE — 1090F PRES/ABSN URINE INCON ASSESS: CPT | Performed by: INTERNAL MEDICINE

## 2023-04-21 RX ORDER — DOFETILIDE 0.5 MG/1
500 CAPSULE ORAL 2 TIMES DAILY
COMMUNITY
Start: 2023-04-01

## 2023-04-21 NOTE — PROGRESS NOTES
the procedure with patient. Would like to refer for bariatric surgery evaluation given diabetes sleep apnea A-fib and other symptoms/comorbidities. Patient was advised and encouraged to check blood pressure at home or at a pharmacy, maintain a logbook, and also call us back if blood pressure are above the target ranges or if it is low. Patient clearly understands and agrees to the instructions. We will need to continue to monitor muscle and liver enzymes, BUN, CR, and electrolytes.     Check EKG

## 2023-05-02 ENCOUNTER — HOSPITAL ENCOUNTER (OUTPATIENT)
Dept: CARDIOLOGY | Age: 63
Discharge: HOME | End: 2023-05-02
Payer: MEDICARE

## 2023-05-02 PROCEDURE — 93280 PM DEVICE PROGR EVAL DUAL: CPT

## 2023-05-03 ENCOUNTER — TELEPHONE (OUTPATIENT)
Dept: PULMONOLOGY | Age: 63
End: 2023-05-03

## 2023-05-03 NOTE — TELEPHONE ENCOUNTER
PT CALLED IN TO THE OFFICE REQUESTING A NEW RX FOR HER CPAP MACHINE TO BE SENT TO [facility].       SHE ALSO IS GOING TO PURCHAGE A TRAVEL SIZE CPAP OUT OF POCKET AND SHE ALSO NEEDS A RX FOR THAT.          PLEASE FAX  Fanshawe .

## 2023-05-17 ENCOUNTER — OFFICE VISIT (OUTPATIENT)
Dept: GASTROENTEROLOGY | Age: 63
End: 2023-05-17
Payer: MEDICARE

## 2023-05-17 ENCOUNTER — PREP FOR PROCEDURE (OUTPATIENT)
Dept: GASTROENTEROLOGY | Age: 63
End: 2023-05-17

## 2023-05-17 VITALS — BODY MASS INDEX: 48.32 KG/M2 | WEIGHT: 290 LBS | OXYGEN SATURATION: 90 % | HEIGHT: 65 IN | HEART RATE: 76 BPM

## 2023-05-17 DIAGNOSIS — R10.13 DYSPEPSIA: ICD-10-CM

## 2023-05-17 DIAGNOSIS — R19.7 DIARRHEA, UNSPECIFIED TYPE: ICD-10-CM

## 2023-05-17 DIAGNOSIS — K21.9 GASTROESOPHAGEAL REFLUX DISEASE, UNSPECIFIED WHETHER ESOPHAGITIS PRESENT: Primary | ICD-10-CM

## 2023-05-17 PROCEDURE — 99204 OFFICE O/P NEW MOD 45 MIN: CPT | Performed by: SPECIALIST

## 2023-05-17 PROCEDURE — 1123F ACP DISCUSS/DSCN MKR DOCD: CPT | Performed by: SPECIALIST

## 2023-05-17 RX ORDER — CHOLESTYRAMINE 4 G/9G
1 POWDER, FOR SUSPENSION ORAL 2 TIMES DAILY
Qty: 30 PACKET | Refills: 3 | Status: SHIPPED | OUTPATIENT
Start: 2023-05-17

## 2023-05-17 ASSESSMENT — ENCOUNTER SYMPTOMS
ANAL BLEEDING: 0
EYES NEGATIVE: 1
BLOOD IN STOOL: 0
RECTAL PAIN: 0
CONSTIPATION: 0
DIARRHEA: 1
RESPIRATORY NEGATIVE: 1
ABDOMINAL PAIN: 0
BACK PAIN: 1
ABDOMINAL DISTENTION: 0
NAUSEA: 0
VOMITING: 0

## 2023-05-17 NOTE — PROGRESS NOTES
blockers to PPI. Pepcid to be taken in the evening. Diagnosis Orders   1. Gastroesophageal reflux disease, unspecified whether esophagitis present  EGD      2. Dyspepsia  EGD      3. Diarrhea, unspecified type  EGD        Return in about 3 months (around 8/17/2023). Patricio Barnes MD   Staff Gastroenterologist  [facility]    Please note this report has been partially produced using speech recognition software and may cause contain errors related to thatsystem including grammar, punctuation and spelling as well as words and phrases that may seem inappropriate. If there are questions or concerns please feel free to contact me to clarify.

## 2023-05-28 DIAGNOSIS — F41.9 ANXIETY: ICD-10-CM

## 2023-05-28 RX ORDER — PAROXETINE HYDROCHLORIDE 20 MG/1
TABLET, FILM COATED ORAL
Qty: 90 TABLET | Refills: 3 | OUTPATIENT
Start: 2023-05-28

## 2023-06-13 RX ORDER — SODIUM CHLORIDE 9 MG/ML
INJECTION, SOLUTION INTRAVENOUS CONTINUOUS
Status: CANCELLED | OUTPATIENT
Start: 2023-06-13

## 2023-06-13 RX ORDER — SODIUM CHLORIDE 9 MG/ML
INJECTION, SOLUTION INTRAVENOUS PRN
Status: CANCELLED | OUTPATIENT
Start: 2023-06-13

## 2023-06-13 RX ORDER — SODIUM CHLORIDE 0.9 % (FLUSH) 0.9 %
5-40 SYRINGE (ML) INJECTION EVERY 12 HOURS SCHEDULED
Status: CANCELLED | OUTPATIENT
Start: 2023-06-13

## 2023-06-15 ENCOUNTER — HOSPITAL ENCOUNTER (OUTPATIENT)
Age: 63
Setting detail: OUTPATIENT SURGERY
Discharge: HOME | End: 2023-06-15
Attending: SPECIALIST | Admitting: SPECIALIST
Payer: MEDICARE

## 2023-06-15 VITALS
BODY MASS INDEX: 48.32 KG/M2 | HEIGHT: 65 IN | RESPIRATION RATE: 18 BRPM | OXYGEN SATURATION: 94 % | TEMPERATURE: 97.1 F | SYSTOLIC BLOOD PRESSURE: 129 MMHG | DIASTOLIC BLOOD PRESSURE: 60 MMHG | WEIGHT: 290 LBS | HEART RATE: 53 BPM

## 2023-06-15 DIAGNOSIS — R10.13 DYSPEPSIA: ICD-10-CM

## 2023-06-15 DIAGNOSIS — K21.9 ESOPHAGEAL REFLUX: ICD-10-CM

## 2023-06-15 PROCEDURE — 3700000000 HC ANESTHESIA ATTENDED CARE: Performed by: SPECIALIST

## 2023-06-15 PROCEDURE — 7100000010 HC PHASE II RECOVERY - FIRST 15 MIN: Performed by: SPECIALIST

## 2023-06-15 PROCEDURE — 88342 IMHCHEM/IMCYTCHM 1ST ANTB: CPT

## 2023-06-15 PROCEDURE — 88305 TISSUE EXAM BY PATHOLOGIST: CPT

## 2023-06-15 PROCEDURE — 2580000003 HC RX 258: Performed by: SPECIALIST

## 2023-06-15 PROCEDURE — 3609017100 HC EGD: Performed by: SPECIALIST

## 2023-06-15 PROCEDURE — 6370000000 HC RX 637 (ALT 250 FOR IP): Performed by: SPECIALIST

## 2023-06-15 PROCEDURE — 43239 EGD BIOPSY SINGLE/MULTIPLE: CPT | Performed by: SPECIALIST

## 2023-06-15 PROCEDURE — 2709999900 HC NON-CHARGEABLE SUPPLY: Performed by: SPECIALIST

## 2023-06-15 RX ORDER — SODIUM CHLORIDE 9 MG/ML
INJECTION, SOLUTION INTRAVENOUS
Status: DISCONTINUED
Start: 2023-06-15 | End: 2023-06-15 | Stop reason: HOSPADM

## 2023-06-15 RX ORDER — MAGNESIUM HYDROXIDE 1200 MG/15ML
LIQUID ORAL PRN
Status: DISCONTINUED | OUTPATIENT
Start: 2023-06-15 | End: 2023-06-15 | Stop reason: ALTCHOICE

## 2023-06-15 RX ORDER — SODIUM CHLORIDE 9 MG/ML
INJECTION, SOLUTION INTRAVENOUS PRN
Status: DISCONTINUED | OUTPATIENT
Start: 2023-06-15 | End: 2023-06-15 | Stop reason: HOSPADM

## 2023-06-15 RX ORDER — SIMETHICONE 20 MG/.3ML
EMULSION ORAL PRN
Status: DISCONTINUED | OUTPATIENT
Start: 2023-06-15 | End: 2023-06-15 | Stop reason: ALTCHOICE

## 2023-06-15 RX ORDER — SODIUM CHLORIDE 0.9 % (FLUSH) 0.9 %
5-40 SYRINGE (ML) INJECTION EVERY 12 HOURS SCHEDULED
Status: DISCONTINUED | OUTPATIENT
Start: 2023-06-15 | End: 2023-06-15 | Stop reason: HOSPADM

## 2023-06-15 RX ORDER — SODIUM CHLORIDE 9 MG/ML
INJECTION, SOLUTION INTRAVENOUS CONTINUOUS
Status: DISCONTINUED | OUTPATIENT
Start: 2023-06-15 | End: 2023-06-15 | Stop reason: HOSPADM

## 2023-06-15 RX ADMIN — SODIUM CHLORIDE: 9 INJECTION, SOLUTION INTRAVENOUS at 11:25

## 2023-06-15 ASSESSMENT — PAIN - FUNCTIONAL ASSESSMENT: PAIN_FUNCTIONAL_ASSESSMENT: 0-10

## 2023-06-15 NOTE — H&P
Patient Name: Kathie iY  : 1960  MRN: [id number]  DATE: 06/15/23      ENDOSCOPY  History and Physical    Procedure:    [] Diagnostic Colonoscopy       [] Screening Colonoscopy  [x] EGD      [] ERCP      [] EUS       [] Other    [x] Previous office notes/History and Physical reviewed from the patients chart. Please see EMR for further details of HPI. I have examined the patient's status immediately prior to the procedure and:      Indications/HPI:    []Abdominal Pain  []Cancer- GI/Lung  []Fhx of colon CA/polyps  []History of Polyps  []Tijerinas   []Melena  []Abnormal Imaging  []Dysphagia    []Persistent Pneumonia  []Anemia  []Food Impaction  []History of Polyps  []GI Bleed  []Pulmonary nodule/Mass  []Change in bowel habits []Heartburn/Reflux  []Rectal Bleed (BRBPR)  []Chest Pain - Non Cardiac []Heme (+) Stoo  l[]Ulcers  []Constipation  []Hemoptysis   []Varices  []Diarrhea  []Hypoxemia  []Nausea/Vomiting  []Screening   []Crohns/Colitis  []Other: gerd,dyspepsia    Anesthesia:   [x] MAC [] Moderate Sedation   [] General   [] None     ROS: 12 pt Review of Symptoms was negative unless mentioned above    Medications:   Prior to Admission medications    Medication Sig Start Date End Date Taking?  Authorizing Provider   cholestyramine (QUESTRAN) 4 g packet Take 1 packet by mouth 2 times daily  Patient not taking: Reported on 6/15/2023 5/17/23   Anna Wong MD   dofetilide (TIKOSYN) 500 MCG capsule Take 1 capsule by mouth 2 times daily 23   Historical Provider, MD   omeprazole (PRILOSEC) 40 MG delayed release capsule Take 1 capsule by mouth 2 times daily 23   Tobe Landau, MD   Dulaglutide (TRULICITY) 6.77 SF/7.0RR SOPN Inject 0.75 mg into the skin once a week 23   Tobe Landau, MD   metFORMIN (GLUCOPHAGE-XR) 500 MG extended release tablet Take 1 tablet by mouth daily (with breakfast) 23   Tobe Landau, MD   magnesium oxide (MAG-OX) 400 MG tablet Take by mouth 22   Historical

## 2023-06-19 PROBLEM — K29.50 CHRONIC GASTRITIS: Status: ACTIVE | Noted: 2023-06-19

## 2023-07-03 DIAGNOSIS — F41.9 ANXIETY: ICD-10-CM

## 2023-07-03 NOTE — TELEPHONE ENCOUNTER
Pharmacy is requesting medication refill.  Please approve or deny this request.    Rx requested:  Requested Prescriptions     Pending Prescriptions Disp Refills    PARoxetine (PAXIL) 20 MG tablet 90 tablet 3     Sig: Take 1 tablet by mouth every morning         Last Office Visit:   2/3/2023      Next Visit Date:  Future Appointments   Date Time Provider [address]   7/11/2023 10:00 AM [address] [facility] RAD   8/3/2023 10:15 AM Shilpi Marquez MD [address]   8/9/2023  1:00 PM Litojose Galeas, [address] [facility]   8/18/2023 11:00 AM Suad Self MD [facility]

## 2023-07-05 RX ORDER — PAROXETINE HYDROCHLORIDE 20 MG/1
20 TABLET, FILM COATED ORAL EVERY MORNING
Qty: 90 TABLET | Refills: 1 | Status: SHIPPED | OUTPATIENT
Start: 2023-07-05

## 2023-07-11 ENCOUNTER — HOSPITAL ENCOUNTER (OUTPATIENT)
Dept: NUCLEAR MEDICINE | Age: 63
Discharge: HOME | End: 2023-07-13
Attending: SPECIALIST
Payer: MEDICARE

## 2023-07-11 DIAGNOSIS — R10.13 DYSPEPSIA: ICD-10-CM

## 2023-07-11 PROCEDURE — 3430000000 HC RX DIAGNOSTIC RADIOPHARMACEUTICAL: Performed by: SPECIALIST

## 2023-07-11 PROCEDURE — 78264 GASTRIC EMPTYING IMG STUDY: CPT

## 2023-07-11 PROCEDURE — A9541 TC99M SULFUR COLLOID: HCPCS | Performed by: SPECIALIST

## 2023-07-11 RX ORDER — TECHNETIUM TC 99M SULFUR COLLOID 2 MG
2 KIT MISCELLANEOUS
Status: COMPLETED | OUTPATIENT
Start: 2023-07-11 | End: 2023-07-11

## 2023-07-11 RX ADMIN — TECHNETIUM TC 99M SULFUR COLLOID 2 MILLICURIE: KIT at 10:25

## 2023-07-14 ENCOUNTER — TELEPHONE (OUTPATIENT)
Dept: FAMILY MEDICINE CLINIC | Age: 63
End: 2023-07-14

## 2023-07-14 DIAGNOSIS — M25.562 CHRONIC PAIN OF LEFT KNEE: Primary | ICD-10-CM

## 2023-07-14 DIAGNOSIS — G89.29 CHRONIC PAIN OF LEFT KNEE: Primary | ICD-10-CM

## 2023-07-18 NOTE — TELEPHONE ENCOUNTER
Referral to Dr. Luana Diggs for chronic left knee pain placed in the chart. Please help patient arrange.

## 2023-07-18 NOTE — TELEPHONE ENCOUNTER
Pt called asking if referral has been placed - gave pt contact info  from referral for Dr Ariana Arredondo

## 2023-07-19 ENCOUNTER — OFFICE VISIT (OUTPATIENT)
Dept: ORTHOPEDIC SURGERY | Age: 63
End: 2023-07-19

## 2023-07-19 ENCOUNTER — HOSPITAL ENCOUNTER (OUTPATIENT)
Dept: GENERAL RADIOLOGY | Age: 63
Discharge: HOME | End: 2023-07-21
Payer: MEDICARE

## 2023-07-19 VITALS — WEIGHT: 285 LBS | HEIGHT: 65 IN | BODY MASS INDEX: 47.48 KG/M2

## 2023-07-19 DIAGNOSIS — M17.12 PRIMARY OSTEOARTHRITIS OF LEFT KNEE: Primary | ICD-10-CM

## 2023-07-19 DIAGNOSIS — M17.12 PRIMARY OSTEOARTHRITIS OF LEFT KNEE: ICD-10-CM

## 2023-07-19 PROCEDURE — 73564 X-RAY EXAM KNEE 4 OR MORE: CPT

## 2023-07-19 RX ORDER — TRIAMCINOLONE ACETONIDE 40 MG/ML
80 INJECTION, SUSPENSION INTRA-ARTICULAR; INTRAMUSCULAR ONCE
Status: COMPLETED | OUTPATIENT
Start: 2023-07-19 | End: 2023-07-19

## 2023-07-19 RX ORDER — LIDOCAINE HYDROCHLORIDE 10 MG/ML
8 INJECTION, SOLUTION INFILTRATION; PERINEURAL ONCE
Status: COMPLETED | OUTPATIENT
Start: 2023-07-19 | End: 2023-07-19

## 2023-07-19 RX ADMIN — LIDOCAINE HYDROCHLORIDE 8 ML: 10 INJECTION, SOLUTION INFILTRATION; PERINEURAL at 11:15

## 2023-07-19 RX ADMIN — TRIAMCINOLONE ACETONIDE 80 MG: 40 INJECTION, SUSPENSION INTRA-ARTICULAR; INTRAMUSCULAR at 11:16

## 2023-07-19 ASSESSMENT — ENCOUNTER SYMPTOMS
SHORTNESS OF BREATH: 0
COUGH: 0
ABDOMINAL PAIN: 0
CONSTIPATION: 0
EYE ITCHING: 0
DIARRHEA: 0
EYE PAIN: 0
EYE DISCHARGE: 0

## 2023-07-19 NOTE — PROGRESS NOTES
ASPIR&/INJ MAJOR JT/BURSA W/O US       Orders Placed This Encounter   Medications    lidocaine 1 % injection 8 mL    triamcinolone acetonide (KENALOG-40) injection 80 mg       No follow-ups on file.     Lazarus Nim, MD

## 2023-07-24 ENCOUNTER — HOSPITAL ENCOUNTER (OUTPATIENT)
Dept: LAB | Age: 63
Discharge: HOME | End: 2023-07-24
Payer: MEDICARE

## 2023-07-24 DIAGNOSIS — I10 ESSENTIAL HYPERTENSION: Chronic | ICD-10-CM

## 2023-07-24 DIAGNOSIS — I48.19 ATRIAL FIBRILLATION, PERSISTENT (HCC): ICD-10-CM

## 2023-07-24 DIAGNOSIS — E11.9 TYPE 2 DIABETES MELLITUS WITHOUT COMPLICATION, WITHOUT LONG-TERM CURRENT USE OF INSULIN (HCC): ICD-10-CM

## 2023-07-24 DIAGNOSIS — E78.5 HYPERLIPIDEMIA, UNSPECIFIED HYPERLIPIDEMIA TYPE: ICD-10-CM

## 2023-07-24 DIAGNOSIS — K21.9 GASTROESOPHAGEAL REFLUX DISEASE, UNSPECIFIED WHETHER ESOPHAGITIS PRESENT: ICD-10-CM

## 2023-07-24 DIAGNOSIS — E66.01 MORBID OBESITY (HCC): ICD-10-CM

## 2023-07-24 DIAGNOSIS — E55.9 VITAMIN D DEFICIENCY: ICD-10-CM

## 2023-07-24 LAB
ALBUMIN SERPL-MCNC: 3.7 G/DL (ref 3.5–4.6)
ALP SERPL-CCNC: 115 U/L (ref 40–130)
ALT SERPL-CCNC: 18 U/L (ref 0–33)
ANION GAP SERPL CALCULATED.3IONS-SCNC: 11 MEQ/L (ref 9–15)
AST SERPL-CCNC: 18 U/L (ref 0–35)
BASOPHILS # BLD: 0 K/UL (ref 0–0.2)
BASOPHILS NFR BLD: 0.4 %
BILIRUB SERPL-MCNC: 0.5 MG/DL (ref 0.2–0.7)
BUN SERPL-MCNC: 16 MG/DL (ref 8–23)
CALCIUM SERPL-MCNC: 9.4 MG/DL (ref 8.5–9.9)
CHLORIDE SERPL-SCNC: 102 MEQ/L (ref 95–107)
CHOLEST SERPL-MCNC: 108 MG/DL (ref 0–199)
CO2 SERPL-SCNC: 23 MEQ/L (ref 20–31)
CREAT SERPL-MCNC: 0.78 MG/DL (ref 0.5–0.9)
CREAT UR-MCNC: 93.6 MG/DL
EOSINOPHIL # BLD: 0.1 K/UL (ref 0–0.7)
EOSINOPHIL NFR BLD: 1.4 %
ERYTHROCYTE [DISTWIDTH] IN BLOOD BY AUTOMATED COUNT: 15.6 % (ref 11.5–14.5)
GLOBULIN SER CALC-MCNC: 3.1 G/DL (ref 2.3–3.5)
GLUCOSE SERPL-MCNC: 88 MG/DL (ref 70–99)
HBA1C MFR BLD: 6.5 % (ref 4.8–5.9)
HCT VFR BLD AUTO: 40 % (ref 37–47)
HDLC SERPL-MCNC: 41 MG/DL (ref 40–59)
HGB BLD-MCNC: 13.1 G/DL (ref 12–16)
LDLC SERPL CALC-MCNC: 56 MG/DL (ref 0–129)
LYMPHOCYTES # BLD: 1.5 K/UL (ref 1–4.8)
LYMPHOCYTES NFR BLD: 17.1 %
MCH RBC QN AUTO: 28.3 PG (ref 27–31.3)
MCHC RBC AUTO-ENTMCNC: 32.7 % (ref 33–37)
MCV RBC AUTO: 86.6 FL (ref 79.4–94.8)
MICROALBUMIN UR-MCNC: <1.2 MG/DL
MICROALBUMIN/CREAT UR-RTO: NORMAL MG/G (ref 0–30)
MONOCYTES # BLD: 0.7 K/UL (ref 0.2–0.8)
MONOCYTES NFR BLD: 7.6 %
NEUTROPHILS # BLD: 6.6 K/UL (ref 1.4–6.5)
NEUTS SEG NFR BLD: 73.5 %
PLATELET # BLD AUTO: 221 K/UL (ref 130–400)
POTASSIUM SERPL-SCNC: 4.8 MEQ/L (ref 3.4–4.9)
PROT SERPL-MCNC: 6.8 G/DL (ref 6.3–8)
RBC # BLD AUTO: 4.62 M/UL (ref 4.2–5.4)
SODIUM SERPL-SCNC: 136 MEQ/L (ref 135–144)
TRIGL SERPL-MCNC: 56 MG/DL (ref 0–150)
TSH SERPL-MCNC: 3.39 UIU/ML (ref 0.44–3.86)
WBC # BLD AUTO: 9 K/UL (ref 4.8–10.8)

## 2023-07-24 PROCEDURE — 82306 VITAMIN D 25 HYDROXY: CPT

## 2023-07-24 PROCEDURE — 82570 ASSAY OF URINE CREATININE: CPT

## 2023-07-24 PROCEDURE — 80061 LIPID PANEL: CPT

## 2023-07-24 PROCEDURE — 82043 UR ALBUMIN QUANTITATIVE: CPT

## 2023-07-24 PROCEDURE — 85025 COMPLETE CBC W/AUTO DIFF WBC: CPT

## 2023-07-24 PROCEDURE — 36415 COLL VENOUS BLD VENIPUNCTURE: CPT

## 2023-07-24 PROCEDURE — 83036 HEMOGLOBIN GLYCOSYLATED A1C: CPT

## 2023-07-24 PROCEDURE — 80053 COMPREHEN METABOLIC PANEL: CPT

## 2023-07-24 PROCEDURE — 84443 ASSAY THYROID STIM HORMONE: CPT

## 2023-07-25 LAB — VITAMIN D 25-HYDROXY: 30.2 NG/ML

## 2023-08-03 ENCOUNTER — OFFICE VISIT (OUTPATIENT)
Dept: FAMILY MEDICINE CLINIC | Age: 63
End: 2023-08-03
Payer: MEDICARE

## 2023-08-03 ENCOUNTER — HOSPITAL ENCOUNTER (OUTPATIENT)
Dept: CARDIOLOGY | Age: 63
Discharge: HOME | End: 2023-08-03
Payer: MEDICARE

## 2023-08-03 VITALS
TEMPERATURE: 97.2 F | BODY MASS INDEX: 46.58 KG/M2 | HEIGHT: 65 IN | DIASTOLIC BLOOD PRESSURE: 80 MMHG | OXYGEN SATURATION: 94 % | HEART RATE: 78 BPM | SYSTOLIC BLOOD PRESSURE: 134 MMHG | WEIGHT: 279.6 LBS

## 2023-08-03 DIAGNOSIS — Z78.0 POSTMENOPAUSAL: ICD-10-CM

## 2023-08-03 DIAGNOSIS — E11.9 TYPE 2 DIABETES MELLITUS WITHOUT COMPLICATION, WITHOUT LONG-TERM CURRENT USE OF INSULIN (HCC): ICD-10-CM

## 2023-08-03 DIAGNOSIS — Z00.00 INITIAL MEDICARE ANNUAL WELLNESS VISIT: Primary | ICD-10-CM

## 2023-08-03 DIAGNOSIS — E78.5 HYPERLIPIDEMIA, UNSPECIFIED HYPERLIPIDEMIA TYPE: ICD-10-CM

## 2023-08-03 DIAGNOSIS — Z13.820 SCREENING FOR OSTEOPOROSIS: ICD-10-CM

## 2023-08-03 DIAGNOSIS — Z23 NEED FOR VACCINATION: ICD-10-CM

## 2023-08-03 DIAGNOSIS — I48.19 ATRIAL FIBRILLATION, PERSISTENT (HCC): ICD-10-CM

## 2023-08-03 DIAGNOSIS — E55.9 VITAMIN D DEFICIENCY: ICD-10-CM

## 2023-08-03 PROCEDURE — G0438 PPPS, INITIAL VISIT: HCPCS | Performed by: FAMILY MEDICINE

## 2023-08-03 PROCEDURE — 3044F HG A1C LEVEL LT 7.0%: CPT | Performed by: FAMILY MEDICINE

## 2023-08-03 PROCEDURE — 93296 REM INTERROG EVL PM/IDS: CPT

## 2023-08-03 PROCEDURE — 1123F ACP DISCUSS/DSCN MKR DOCD: CPT | Performed by: FAMILY MEDICINE

## 2023-08-03 PROCEDURE — 3079F DIAST BP 80-89 MM HG: CPT | Performed by: FAMILY MEDICINE

## 2023-08-03 PROCEDURE — 3075F SYST BP GE 130 - 139MM HG: CPT | Performed by: FAMILY MEDICINE

## 2023-08-03 RX ORDER — ZOSTER VACCINE RECOMBINANT, ADJUVANTED 50 MCG/0.5
0.5 KIT INTRAMUSCULAR SEE ADMIN INSTRUCTIONS
Qty: 0.5 ML | Refills: 0 | Status: SHIPPED | OUTPATIENT
Start: 2023-08-03 | End: 2024-01-30

## 2023-08-03 ASSESSMENT — COLUMBIA-SUICIDE SEVERITY RATING SCALE - C-SSRS
6. HAVE YOU EVER DONE ANYTHING, STARTED TO DO ANYTHING, OR PREPARED TO DO ANYTHING TO END YOUR LIFE?: NO
1. WITHIN THE PAST MONTH, HAVE YOU WISHED YOU WERE DEAD OR WISHED YOU COULD GO TO SLEEP AND NOT WAKE UP?: NO
2. HAVE YOU ACTUALLY HAD ANY THOUGHTS OF KILLING YOURSELF?: NO

## 2023-08-03 ASSESSMENT — PATIENT HEALTH QUESTIONNAIRE - PHQ9
6. FEELING BAD ABOUT YOURSELF - OR THAT YOU ARE A FAILURE OR HAVE LET YOURSELF OR YOUR FAMILY DOWN: 0
SUM OF ALL RESPONSES TO PHQ9 QUESTIONS 1 & 2: 0
7. TROUBLE CONCENTRATING ON THINGS, SUCH AS READING THE NEWSPAPER OR WATCHING TELEVISION: 0
2. FEELING DOWN, DEPRESSED OR HOPELESS: 0
8. MOVING OR SPEAKING SO SLOWLY THAT OTHER PEOPLE COULD HAVE NOTICED. OR THE OPPOSITE, BEING SO FIGETY OR RESTLESS THAT YOU HAVE BEEN MOVING AROUND A LOT MORE THAN USUAL: 0
SUM OF ALL RESPONSES TO PHQ QUESTIONS 1-9: 0
SUM OF ALL RESPONSES TO PHQ QUESTIONS 1-9: 0
1. LITTLE INTEREST OR PLEASURE IN DOING THINGS: 0
4. FEELING TIRED OR HAVING LITTLE ENERGY: 0
SUM OF ALL RESPONSES TO PHQ QUESTIONS 1-9: 0
3. TROUBLE FALLING OR STAYING ASLEEP: 0
10. IF YOU CHECKED OFF ANY PROBLEMS, HOW DIFFICULT HAVE THESE PROBLEMS MADE IT FOR YOU TO DO YOUR WORK, TAKE CARE OF THINGS AT HOME, OR GET ALONG WITH OTHER PEOPLE: 0
9. THOUGHTS THAT YOU WOULD BE BETTER OFF DEAD, OR OF HURTING YOURSELF: 0
5. POOR APPETITE OR OVEREATING: 0
SUM OF ALL RESPONSES TO PHQ QUESTIONS 1-9: 0

## 2023-08-03 ASSESSMENT — LIFESTYLE VARIABLES
HOW MANY STANDARD DRINKS CONTAINING ALCOHOL DO YOU HAVE ON A TYPICAL DAY: PATIENT DOES NOT DRINK
HOW OFTEN DO YOU HAVE A DRINK CONTAINING ALCOHOL: NEVER

## 2023-08-03 NOTE — PROGRESS NOTES
Medicare Annual Wellness Visit    Ashwin Leblanc is here for Medicare AWV    Assessment & Plan   1. Initial Medicare annual wellness visit  2. Need for vaccination  The following orders have not been finalized:  -     zoster recombinant adjuvanted vaccine (SHINGRIX) 50 MCG/0.5ML SUSR injection  3. Screening for osteoporosis  -     DEXA BONE DENSITY AXIAL SKELETON; Future  4. Postmenopausal  -     DEXA BONE DENSITY AXIAL SKELETON; Future  5. Type 2 diabetes mellitus without complication, without long-term current use of insulin ([address])  -     [facility] - Raquel Lopez, Ophthalmology, [address] [place] Metabolic Panel; Future  -     Lipid Panel; Future  -     Hemoglobin A1C; Future  6. Atrial fibrillation, persistent (HCC)  -     CBC with Auto Differential; Future  7. Hyperlipidemia, unspecified hyperlipidemia type  -     Comprehensive Metabolic Panel; Future  -     Lipid Panel; Future  8. Vitamin D deficiency  -     Vitamin D 25 Hydroxy; Future        Recommendations for Preventive Services Due: see orders and patient instructions/AVS.  Recommended screening schedule for the next 5-10 years is provided to the patient in written form: see Patient Instructions/AVS.     Return in about 6 months (around 2/3/2024) for Chronic Disease Check. Subjective       Patient's complete Health Risk Assessment and screening values have been reviewed and are found in Flowsheets. The following problems were reviewed today and where indicated follow up appointments were made and/or referrals ordered.     Positive Risk Factor Screenings with Interventions:                 Weight and Activity:  Physical Activity: Insufficiently Active    Days of Exercise per Week: 2 days    Minutes of Exercise per Session: 30 min     On average, how many days per week do you engage in moderate to strenuous exercise (like a brisk walk)?: 2 days  Have you lost any weight without trying in the past 3 months?: (!) Yes (pt has loss

## 2023-08-09 ENCOUNTER — OFFICE VISIT (OUTPATIENT)
Dept: PULMONOLOGY | Age: 63
End: 2023-08-09
Payer: MEDICARE

## 2023-08-09 VITALS
TEMPERATURE: 97.1 F | OXYGEN SATURATION: 96 % | DIASTOLIC BLOOD PRESSURE: 80 MMHG | HEIGHT: 65 IN | RESPIRATION RATE: 16 BRPM | WEIGHT: 279.8 LBS | HEART RATE: 55 BPM | SYSTOLIC BLOOD PRESSURE: 136 MMHG | BODY MASS INDEX: 46.62 KG/M2

## 2023-08-09 DIAGNOSIS — I27.20 PULMONARY HYPERTENSION (HCC): ICD-10-CM

## 2023-08-09 DIAGNOSIS — G47.33 OSA (OBSTRUCTIVE SLEEP APNEA): Primary | ICD-10-CM

## 2023-08-09 DIAGNOSIS — E66.01 CLASS 3 SEVERE OBESITY DUE TO EXCESS CALORIES WITH SERIOUS COMORBIDITY AND BODY MASS INDEX (BMI) OF 40.0 TO 44.9 IN ADULT (HCC): ICD-10-CM

## 2023-08-09 PROCEDURE — 1123F ACP DISCUSS/DSCN MKR DOCD: CPT | Performed by: INTERNAL MEDICINE

## 2023-08-09 PROCEDURE — 3075F SYST BP GE 130 - 139MM HG: CPT | Performed by: INTERNAL MEDICINE

## 2023-08-09 PROCEDURE — 3079F DIAST BP 80-89 MM HG: CPT | Performed by: INTERNAL MEDICINE

## 2023-08-09 PROCEDURE — 99213 OFFICE O/P EST LOW 20 MIN: CPT | Performed by: INTERNAL MEDICINE

## 2023-08-09 NOTE — PROGRESS NOTES
well-being. Reviewed with the patient: current clinical status, medications, activities and diet. Side effects, adverse effects of the medication prescribed today, as well as treatment plan and result expectations have been discussed with the patient who expresses understanding and desires to proceed. Return in about 1 year (around 8/9/2024).       Lopez Teran MD

## 2023-08-14 DIAGNOSIS — E11.9 TYPE 2 DIABETES MELLITUS WITHOUT COMPLICATION, WITHOUT LONG-TERM CURRENT USE OF INSULIN (HCC): ICD-10-CM

## 2023-08-14 RX ORDER — METFORMIN HYDROCHLORIDE 500 MG/1
1000 TABLET, EXTENDED RELEASE ORAL
Qty: 180 TABLET | Refills: 1 | Status: SHIPPED | OUTPATIENT
Start: 2023-08-14

## 2023-08-14 NOTE — TELEPHONE ENCOUNTER
Pharmacy is requesting medication refill.  Please approve or deny this request.    Rx requested:  Requested Prescriptions     Pending Prescriptions Disp Refills    metFORMIN (GLUCOPHAGE-XR) 500 MG extended release tablet [Pharmacy Med Name: metFORMIN HCl  MG Oral Tablet Extended Release 24 Hour] 180 tablet 3     Sig: Take 1 tablet by mouth daily (with breakfast)         Last Office Visit:   8/3/2023      Next Visit Date:  Future Appointments   Date Time Provider [address]   8/16/2023 10:00 AM BUD BONE DENSITY ROOM 1 [facility]   8/18/2023 11:00 AM Cindie Romberg,  [address]   2/5/2024 10:15 AM Tonie Nino MD [address]   8/8/2024 10:00 AM Salome Noel MD [facility]

## 2023-08-16 ENCOUNTER — HOSPITAL ENCOUNTER (OUTPATIENT)
Dept: WOMENS IMAGING | Age: 63
Discharge: HOME | End: 2023-08-18
Payer: MEDICARE

## 2023-08-16 DIAGNOSIS — Z78.0 POSTMENOPAUSAL: ICD-10-CM

## 2023-08-16 DIAGNOSIS — E66.01 MORBID OBESITY (HCC): ICD-10-CM

## 2023-08-16 DIAGNOSIS — E11.9 TYPE 2 DIABETES MELLITUS WITHOUT COMPLICATION, WITHOUT LONG-TERM CURRENT USE OF INSULIN (HCC): ICD-10-CM

## 2023-08-16 DIAGNOSIS — Z13.820 SCREENING FOR OSTEOPOROSIS: ICD-10-CM

## 2023-08-16 PROCEDURE — 77080 DXA BONE DENSITY AXIAL: CPT

## 2023-08-16 RX ORDER — DULAGLUTIDE 0.75 MG/.5ML
0.75 INJECTION, SOLUTION SUBCUTANEOUS WEEKLY
Qty: 6 ML | Refills: 1 | Status: SHIPPED | OUTPATIENT
Start: 2023-08-16

## 2023-08-17 ENCOUNTER — TELEPHONE (OUTPATIENT)
Dept: FAMILY MEDICINE CLINIC | Age: 63
End: 2023-08-17

## 2023-08-17 DIAGNOSIS — E11.9 TYPE 2 DIABETES MELLITUS WITHOUT COMPLICATION, WITHOUT LONG-TERM CURRENT USE OF INSULIN (HCC): Primary | ICD-10-CM

## 2023-08-17 NOTE — TELEPHONE ENCOUNTER
Pt would like to know if the Ophthalmology Referral could be sent over to the [facility]    Dr. Ari Portillo   [address], [address]    Ph. [phone]. [phone]

## 2023-08-18 ENCOUNTER — OFFICE VISIT (OUTPATIENT)
Dept: GASTROENTEROLOGY | Age: 63
End: 2023-08-18
Payer: MEDICARE

## 2023-08-18 VITALS
DIASTOLIC BLOOD PRESSURE: 62 MMHG | BODY MASS INDEX: 46.48 KG/M2 | OXYGEN SATURATION: 96 % | WEIGHT: 279 LBS | HEART RATE: 61 BPM | SYSTOLIC BLOOD PRESSURE: 154 MMHG | HEIGHT: 65 IN

## 2023-08-18 DIAGNOSIS — K21.9 GASTROESOPHAGEAL REFLUX DISEASE, UNSPECIFIED WHETHER ESOPHAGITIS PRESENT: Primary | ICD-10-CM

## 2023-08-18 PROCEDURE — 3077F SYST BP >= 140 MM HG: CPT | Performed by: SPECIALIST

## 2023-08-18 PROCEDURE — 99213 OFFICE O/P EST LOW 20 MIN: CPT | Performed by: SPECIALIST

## 2023-08-18 PROCEDURE — 3078F DIAST BP <80 MM HG: CPT | Performed by: SPECIALIST

## 2023-08-18 PROCEDURE — 1123F ACP DISCUSS/DSCN MKR DOCD: CPT | Performed by: SPECIALIST

## 2023-08-18 ASSESSMENT — ENCOUNTER SYMPTOMS
ANAL BLEEDING: 0
EYES NEGATIVE: 1
BLOOD IN STOOL: 0
RESPIRATORY NEGATIVE: 1
GASTROINTESTINAL NEGATIVE: 1
ABDOMINAL DISTENTION: 0
DIARRHEA: 0
NAUSEA: 0
ABDOMINAL PAIN: 0
VOMITING: 0
CONSTIPATION: 0
RECTAL PAIN: 0

## 2023-08-18 NOTE — TELEPHONE ENCOUNTER
Please help with pending referral. I see no Dr. Ernesto Umanzor in preference list for [facility] and the address provided is for [facility], which is a totally different office. Even for [facility] in the preference list I cannot find a Dr. Ernesto Umanzor, and when I place this name manually it does not show Dr. Ernesto Umanzor as having a [place] location. Please pend correct referral requested by patient and I will sign.

## 2023-08-18 NOTE — PROGRESS NOTES
Gastroenterology Clinic Follow up Visit    Cherise De Souza  [id number]  Chief Complaint   Patient presents with    Follow-up     3 mos follow up EGD. HPI and A/P at last visit summarized below:  Patient is here for follow-up, gastric biopsy showed chronic gastritis and no H. pylori seen. Patient had gastric emptying scan that shows no gastroparesis in fact mild rapid gastric emptying. Patient is on Prilosec 40 mg a day, patient experienced a rumbling sensation after a meal and also has a BM following a meal at times, no emesis. Patient still has heartburn in spite of taking omeprazole 40 mg a day, and is also on cholestyramine for possible bile acid diarrhea. Patient does report having nocturnal regurgitation. Review of Systems   Constitutional: Negative. HENT: Negative. Eyes: Negative. Respiratory: Negative. Cardiovascular: Negative. Gastrointestinal: Negative. Negative for abdominal distention, abdominal pain, anal bleeding, blood in stool, constipation, diarrhea, nausea, rectal pain and vomiting. Dyspepsia and has regurgitation retrosternal burning sensation. Endocrine: Negative. Genitourinary: Negative. Musculoskeletal: Negative. Skin: Negative. Allergic/Immunologic: Positive for food allergies. Neurological: Negative. Hematological: Negative. Psychiatric/Behavioral: Negative. Past medical history, past surgical history, medication list, social and familyhistory reviewed    Blood pressure (!) 154/62, pulse 61, height 5' 5\" (1.651 m), weight 279 lb (126.6 kg), last menstrual period 11/14/1998, SpO2 96 %, not currently breastfeeding. Physical Exam  Constitutional:       Appearance: She is well-developed. HENT:      Head: Normocephalic and atraumatic. Eyes:      Conjunctiva/sclera: Conjunctivae normal.      Pupils: Pupils are equal, round, and reactive to light. Cardiovascular:      Rate and Rhythm: Normal rate.    Pulmonary:      Effort:

## 2023-08-22 DIAGNOSIS — E55.9 VITAMIN D DEFICIENCY: ICD-10-CM

## 2023-08-22 DIAGNOSIS — M85.852 OSTEOPENIA OF BOTH HIPS: Primary | ICD-10-CM

## 2023-08-22 DIAGNOSIS — M85.851 OSTEOPENIA OF BOTH HIPS: Primary | ICD-10-CM

## 2023-08-22 RX ORDER — PHENOL 1.4 %
1 AEROSOL, SPRAY (ML) MUCOUS MEMBRANE 2 TIMES DAILY
Qty: 180 TABLET | Refills: 1 | Status: SHIPPED | OUTPATIENT
Start: 2023-08-22

## 2023-08-22 RX ORDER — ACETAMINOPHEN 160 MG
1 TABLET,DISINTEGRATING ORAL DAILY
Qty: 90 CAPSULE | Refills: 1 | Status: SHIPPED | OUTPATIENT
Start: 2023-08-22

## 2023-08-25 NOTE — TELEPHONE ENCOUNTER
Pt returned office call - she has an appt scheduled on Monday Aug 28  Dr. Justina Raman of [facility]  [address], [address]   Ph. [phone]  F. [phone]

## 2023-10-09 ENCOUNTER — TELEPHONE (OUTPATIENT)
Dept: FAMILY MEDICINE CLINIC | Age: 63
End: 2023-10-09

## 2023-10-09 NOTE — TELEPHONE ENCOUNTER
Patient should come to walk in clinic or go to urgent care. A urine sample is needed to send out for culture before any antibiotic can be prescribed. [Follow-Up Evaluation] : a follow-up evaluation for [Dizziness] : dizziness [Tics] : tics [Other: ____] : [unfilled]

## 2023-10-09 NOTE — TELEPHONE ENCOUNTER
Pt requesting an antibiotic for UTI  asking for the one day pill . Pt does not want to make an appt. ... Pt took over the counter med for UTI but it has not helped.    Pt leaving for a cruise on Weds Oct 11 asking if PCP can call in the one day antibiotic

## 2023-10-10 NOTE — TELEPHONE ENCOUNTER
Pt returned call - I offered to make an appt w/ PCP ( no appt made ) . I offered the hours of our walk in clinic .

## 2023-10-18 ENCOUNTER — OFFICE VISIT (OUTPATIENT)
Dept: FAMILY MEDICINE CLINIC | Age: 63
End: 2023-10-18
Payer: MEDICARE

## 2023-10-18 ENCOUNTER — TELEPHONE (OUTPATIENT)
Dept: CARDIOLOGY | Facility: CLINIC | Age: 63
End: 2023-10-18
Payer: MEDICARE

## 2023-10-18 VITALS
HEART RATE: 80 BPM | DIASTOLIC BLOOD PRESSURE: 84 MMHG | TEMPERATURE: 97.6 F | WEIGHT: 280.2 LBS | BODY MASS INDEX: 46.63 KG/M2 | SYSTOLIC BLOOD PRESSURE: 136 MMHG | OXYGEN SATURATION: 97 %

## 2023-10-18 DIAGNOSIS — H81.11 BENIGN PAROXYSMAL POSITIONAL VERTIGO OF RIGHT EAR: Primary | ICD-10-CM

## 2023-10-18 PROCEDURE — 99213 OFFICE O/P EST LOW 20 MIN: CPT

## 2023-10-18 PROCEDURE — 3074F SYST BP LT 130 MM HG: CPT

## 2023-10-18 PROCEDURE — 3078F DIAST BP <80 MM HG: CPT

## 2023-10-18 PROCEDURE — 1123F ACP DISCUSS/DSCN MKR DOCD: CPT

## 2023-10-18 RX ORDER — MECLIZINE HYDROCHLORIDE 25 MG/1
25 TABLET ORAL 3 TIMES DAILY PRN
Qty: 30 TABLET | Refills: 0 | Status: SHIPPED | OUTPATIENT
Start: 2023-10-18 | End: 2023-10-28

## 2023-10-18 NOTE — TELEPHONE ENCOUNTER
Pt left message on [employer] asking if her appt with Dr. Guo has been rescheduled. She was supposed to see him tomorrow but it was cancelled. Next I see is Fabien. Is this the rescheduled date?

## 2023-10-18 NOTE — PROGRESS NOTES
[address] [facility]          ASSESSMENT/PLAN     Naun Fernandez is a 70 y.o. female who presents with:  Sympoms of room spinning intermitnetly with dizziness. Starting while on cruise ship last week, worse on Monday after returning. Treatment with [facility] for dental infection completed 1 week ago. On examination ears appear normal bilaterally. No swelling to the oral cavity structures. Neuro exam is unremarkable as documented below. Mic-Hallpike is positive with head turned to right side. Minimal symptoms with head turn to the left side. We will treat patient with Antivert, provide her with Epley maneuver instructions to be performed at home. If symptoms do not resolve after 5 to 7 days she is to notify office and I will place referral for physical therapy. 1. Benign paroxysmal positional vertigo of right ear  -     meclizine (ANTIVERT) 25 MG tablet; Take 1 tablet by mouth 3 times daily as needed for Dizziness or Nausea, Disp-30 tablet, R-0Normal           PATIENT REFERRED TO:  Return if symptoms worsen or fail to improve. DISCHARGE MEDICATIONS:  New Prescriptions    MECLIZINE (ANTIVERT) 25 MG TABLET    Take 1 tablet by mouth 3 times daily as needed for Dizziness or Nausea     Cannot display discharge medications since this is not an admission. Nieto Rule, APRN - CNP    CHIEF COMPLAINT       Chief Complaint   Patient presents with    Dizziness     Pt presents today with c/o dizzy spells x2 days          SUBJECTIVE/REVIEW OF SYSTEMS     Review of Systems   Constitutional:  Negative for fever. HENT: Negative. Eyes:  Positive for visual disturbance (Room spinning). Respiratory:  Negative for chest tightness and shortness of breath. Cardiovascular:  Negative for chest pain. Gastrointestinal: Negative. Genitourinary: Negative. Musculoskeletal: Negative. Skin: Negative. Negative for color change. Neurological:  Positive for dizziness.  Negative for speech

## 2023-10-19 ENCOUNTER — APPOINTMENT (OUTPATIENT)
Dept: CARDIOLOGY | Facility: CLINIC | Age: 63
End: 2023-10-19
Payer: MEDICARE

## 2023-10-19 ASSESSMENT — ENCOUNTER SYMPTOMS
COLOR CHANGE: 0
SHORTNESS OF BREATH: 0
GASTROINTESTINAL NEGATIVE: 1
CHEST TIGHTNESS: 0

## 2023-10-19 ASSESSMENT — VISUAL ACUITY: OU: 1

## 2023-10-20 ENCOUNTER — TELEPHONE (OUTPATIENT)
Dept: FAMILY MEDICINE CLINIC | Age: 63
End: 2023-10-20

## 2023-10-20 DIAGNOSIS — F41.9 ANXIETY: ICD-10-CM

## 2023-10-20 RX ORDER — PAROXETINE HYDROCHLORIDE 20 MG/1
20 TABLET, FILM COATED ORAL EVERY MORNING
Qty: 14 TABLET | Refills: 0 | Status: SHIPPED | OUTPATIENT
Start: 2023-10-20

## 2023-10-20 NOTE — TELEPHONE ENCOUNTER
Pt asking for 7 day refill  for Paxil . Jimmie Closs Pt stated mail service will deliver on Oct 27, hoping for 7 day refill from PCP .

## 2023-10-24 PROBLEM — I49.5 SINUS NODE DYSFUNCTION (MULTI): Status: ACTIVE | Noted: 2023-10-24

## 2023-10-24 PROBLEM — G47.33 OBSTRUCTIVE SLEEP APNEA, ADULT: Status: ACTIVE | Noted: 2023-10-24

## 2023-10-24 PROBLEM — I48.0 PAROXYSMAL ATRIAL FIBRILLATION (MULTI): Status: ACTIVE | Noted: 2023-10-24

## 2023-10-24 PROBLEM — R07.89 TIGHTNESS IN CHEST: Status: ACTIVE | Noted: 2023-10-24

## 2023-10-24 PROBLEM — I25.10 ARTERIOSCLEROSIS OF CORONARY ARTERY: Status: ACTIVE | Noted: 2023-10-24

## 2023-10-24 PROBLEM — I48.19 PERSISTENT ATRIAL FIBRILLATION (MULTI): Status: ACTIVE | Noted: 2023-10-24

## 2023-10-24 PROBLEM — Z95.0 PACEMAKER: Status: ACTIVE | Noted: 2023-10-24

## 2023-10-24 PROBLEM — R00.2 PALPITATIONS: Status: ACTIVE | Noted: 2023-10-24

## 2023-10-24 PROBLEM — R12 HEART BURN: Status: ACTIVE | Noted: 2023-10-24

## 2023-10-24 PROBLEM — R07.89 CHEST PRESSURE: Status: ACTIVE | Noted: 2023-10-24

## 2023-10-24 PROBLEM — I10 HTN (HYPERTENSION): Status: ACTIVE | Noted: 2023-10-24

## 2023-10-24 PROBLEM — F41.9 ANXIETY: Status: ACTIVE | Noted: 2023-10-24

## 2023-10-24 RX ORDER — DOFETILIDE 0.5 MG/1
1 CAPSULE ORAL 2 TIMES DAILY
COMMUNITY
Start: 2023-08-21 | End: 2024-04-29 | Stop reason: SDUPTHER

## 2023-10-24 RX ORDER — LORATADINE 10 MG/1
1 TABLET ORAL DAILY
COMMUNITY

## 2023-10-24 RX ORDER — ATORVASTATIN CALCIUM 80 MG/1
1 TABLET, FILM COATED ORAL NIGHTLY
COMMUNITY
Start: 2021-08-06 | End: 2023-11-22 | Stop reason: SDUPTHER

## 2023-10-24 RX ORDER — DOXAZOSIN 2 MG/1
TABLET ORAL
COMMUNITY
End: 2023-11-13 | Stop reason: SDUPTHER

## 2023-10-24 RX ORDER — SPIRONOLACTONE 25 MG/1
TABLET ORAL
COMMUNITY
Start: 2021-04-23 | End: 2023-11-22 | Stop reason: SDUPTHER

## 2023-10-24 RX ORDER — FUROSEMIDE 20 MG/1
1 TABLET ORAL DAILY
COMMUNITY
End: 2023-11-22 | Stop reason: SDUPTHER

## 2023-10-24 RX ORDER — METFORMIN HYDROCHLORIDE 500 MG/1
1 TABLET, EXTENDED RELEASE ORAL DAILY
COMMUNITY

## 2023-10-24 RX ORDER — METOPROLOL TARTRATE 100 MG/1
1 TABLET ORAL 2 TIMES DAILY
COMMUNITY
Start: 2022-07-21 | End: 2023-10-31 | Stop reason: ENTERED-IN-ERROR

## 2023-10-24 RX ORDER — POTASSIUM CHLORIDE 20 MEQ/1
TABLET, EXTENDED RELEASE ORAL 2 TIMES DAILY
COMMUNITY
End: 2023-11-22 | Stop reason: SDUPTHER

## 2023-10-24 RX ORDER — PAROXETINE HYDROCHLORIDE 20 MG/1
1 TABLET, FILM COATED ORAL DAILY
COMMUNITY
Start: 2021-05-24

## 2023-10-27 PROBLEM — E66.01 MORBID OBESITY WITH BMI OF 45.0-49.9, ADULT (MULTI): Status: ACTIVE | Noted: 2023-10-27

## 2023-10-27 PROBLEM — Z79.899 HIGH RISK MEDICATION USE: Status: ACTIVE | Noted: 2023-10-27

## 2023-10-30 ENCOUNTER — OFFICE VISIT (OUTPATIENT)
Dept: CARDIOLOGY | Facility: CLINIC | Age: 63
End: 2023-10-30
Payer: MEDICARE

## 2023-10-30 VITALS
BODY MASS INDEX: 46.26 KG/M2 | DIASTOLIC BLOOD PRESSURE: 78 MMHG | SYSTOLIC BLOOD PRESSURE: 135 MMHG | WEIGHT: 278 LBS | HEART RATE: 65 BPM

## 2023-10-30 DIAGNOSIS — T82.110D PACEMAKER LEAD FAILURE, SUBSEQUENT ENCOUNTER: ICD-10-CM

## 2023-10-30 DIAGNOSIS — R00.2 PALPITATIONS: ICD-10-CM

## 2023-10-30 DIAGNOSIS — Z78.9 NEVER SMOKED TOBACCO: ICD-10-CM

## 2023-10-30 DIAGNOSIS — I49.5 SINUS NODE DYSFUNCTION (MULTI): Primary | ICD-10-CM

## 2023-10-30 DIAGNOSIS — Z95.0 PACEMAKER: ICD-10-CM

## 2023-10-30 DIAGNOSIS — I48.19 PERSISTENT ATRIAL FIBRILLATION (MULTI): ICD-10-CM

## 2023-10-30 DIAGNOSIS — Z79.899 HIGH RISK MEDICATION USE: ICD-10-CM

## 2023-10-30 PROCEDURE — 3008F BODY MASS INDEX DOCD: CPT | Performed by: INTERNAL MEDICINE

## 2023-10-30 PROCEDURE — 1159F MED LIST DOCD IN RCRD: CPT | Performed by: INTERNAL MEDICINE

## 2023-10-30 PROCEDURE — 93280 PM DEVICE PROGR EVAL DUAL: CPT | Performed by: INTERNAL MEDICINE

## 2023-10-30 PROCEDURE — 3075F SYST BP GE 130 - 139MM HG: CPT | Performed by: INTERNAL MEDICINE

## 2023-10-30 PROCEDURE — 99215 OFFICE O/P EST HI 40 MIN: CPT | Performed by: INTERNAL MEDICINE

## 2023-10-30 PROCEDURE — 3078F DIAST BP <80 MM HG: CPT | Performed by: INTERNAL MEDICINE

## 2023-10-30 RX ORDER — METOPROLOL TARTRATE 50 MG/1
150 TABLET ORAL 2 TIMES DAILY
Qty: 540 TABLET | Refills: 3 | Status: SHIPPED | OUTPATIENT
Start: 2023-10-30 | End: 2023-10-31 | Stop reason: ENTERED-IN-ERROR

## 2023-10-30 ASSESSMENT — ENCOUNTER SYMPTOMS
LOSS OF SENSATION IN FEET: 0
DEPRESSION: 0
OCCASIONAL FEELINGS OF UNSTEADINESS: 0

## 2023-10-30 NOTE — PROGRESS NOTES
CARDIOLOGY OFFICE VISIT      CHIEF COMPLAINT  Chief Complaint   Patient presents with    Follow-up       HISTORY OF PRESENT ILLNESS  HPI    63-year-old female with a past medical history of atrial fibrillation and sinus node dysfunction with prior pacemaker implanted in March 2018. She presented to emergency department in March 2019 due to feeling a jumping sensation in her right chest that she has not had in the past. X-ray shows a pacemaker with right atrial lead and right ventricular lead around the right atrial and left subclavian area. She underwent a repositioning of both leads back in March 2019 with successful results.    Due to persistent palpitations, we increased the dose of sotalol to 240 mg twice a day.     she has persisting atrial fibrillation. We discussed the option of changing antiarrhythmic therapy. Patient was admitted in February 2023 at [facility] to change sotalol for dofetilide. She did tolerate dofetilide 250 mcg twice a day. Patient underwent cardioversion with successful restoration to sinus rhythm but she had early recurrence of atrial fibrillation.  During the last office visit, she states that she start noticing more palpitations.    Device interrogation performed today during this office visit shows burden of atrial fibrillation 5%.  Otherwise device functioning well with adequate sensing, capture and impedances of all leads.    Patient is still using dofetilide 500 mcg twice a day.  Eliquis 5 mg 1 tablet twice a day.  Metoprolol tartrate 100 mg 1 tablet twice a day.  Impedances of the right ventricular unipolar lead is steady between 500-536 ohms.        Past Medical History  Past Medical History:   Diagnosis Date    Acute kidney failure, unspecified (CMS/HCC)     Acute kidney injury    Other dorsalgia     Interscapular pain    Personal history of other diseases of the circulatory system     History of sinus bradycardia    Personal history of other diseases of urinary  system     History of chronic kidney disease    Personal history of other endocrine, nutritional and metabolic disease     History of hypokalemia    Personal history of other specified conditions     History of fatigue    Personal history of other specified conditions     H/O shortness of breath       Social History  Social History     Tobacco Use    Smoking status: Never     Passive exposure: Never    Smokeless tobacco: Never   Substance Use Topics    Alcohol use: Not on file    Drug use: Not on file       Family History     Family History   Problem Relation Name Age of Onset    Other (systemic lupus erythematosus) Mother      Heart attack Father      Other (arteriosclerotic cardiovascular disease) Sister      Heart attack Sister      Lung cancer Brother      Other (cardiac disorder) Child          Allergies:  Allergies   Allergen Reactions    Amlodipine Unknown    Dofetilide Unknown    Losartan Cough    Milk Containing Products (Dairy) Unknown    Sulfa (Sulfonamide Antibiotics) Unknown        Outpatient Medications:  Current Outpatient Medications   Medication Instructions    apixaban (Eliquis) 5 mg tablet 1 tablet, oral, 2 times daily    atorvastatin (Lipitor) 80 mg tablet 1 tablet, oral, Nightly    dofetilide (Tikosyn) 500 mcg capsule oral, 2 times daily    doxazosin (Cardura) 2 mg tablet 0.5 tab(s) orally once a day    furosemide (Lasix) 20 mg tablet 1 tablet, oral, Daily    loratadine (Claritin) 10 mg tablet 1 tablet, oral, Daily    metFORMIN  mg 24 hr tablet 1 tablet, oral, Daily    metoprolol tartrate (Lopressor) 100 mg tablet 1 tablet, oral, 2 times daily    PARoxetine (Paxil) 20 mg tablet 1 tablet, oral, Daily    potassium chloride CR (Klor-Con M20) 20 mEq ER tablet oral, 2 times daily    spironolactone (Aldactone) 25 mg tablet TAKE 2 TABLETS IN THE MORNING AND 1 TABLET IN THE EVENING          REVIEW OF SYSTEMS  Review of Systems   All other systems reviewed and are negative.        VITALS  Vitals:     10/30/23 0942   BP: 135/78   Pulse: 65       PHYSICAL EXAM  Constitutional:       Appearance: Healthy appearance. Not in distress.   Neck:      Vascular: No JVR. JVD normal.   Pulmonary:      Effort: Pulmonary effort is normal.      Breath sounds: Normal breath sounds. No wheezing. No rhonchi. No rales.   Chest:      Chest wall: Not tender to palpatation.   Cardiovascular:      PMI at left midclavicular line. Normal rate. Regular rhythm. Normal S1. Normal S2.       Murmurs: There is no murmur.      No gallop.  No click. No rub.   Pulses:     Intact distal pulses.   Edema:     Peripheral edema absent.   Abdominal:      General: Bowel sounds are normal.      Palpations: Abdomen is soft.      Tenderness: There is no abdominal tenderness.   Musculoskeletal: Normal range of motion.         General: No tenderness. Skin:     General: Skin is warm and dry.   Neurological:      General: No focal deficit present.      Mental Status: Alert and oriented to person, place and time.           ASSESSMENT AND PLAN    Clinical impression      1. Sinus node dysfunction, status post dual-chamber pacemaker, resolved  2. Status post dual-chamber pacemaker will reposition of the right atrial ventricular lead in March 2019. Device interrogation reviewed patient during this office visit  3. persistent atrial fibrillation, burden of atrial fibrillation by pacemaker interrogation 2.9%. Plan discussed with patient during this office visit  4. Coronary artery disease, stable  5. Normal left ventricular function per echocardiogram showing 60%, stable  6. Hypertension, controlled  7. Sleep apnea  8. High risk medication (dofetilide). Patient failed sotalol therapy. She had recurrence of atrial fibrillation on sotalol therapy.      Plan recommendations    So far device interrogation shows the RV impedance unipolar is still between normal values.  We will continue with observation for now.    Follow my office in 3 months or sooner needed.    We  will continue with device interrogations in our office for possible right ventricular lead fracture.    Follow device clinic as scheduled.    Regarding atrial fibrillation she is already on maximum dose of dofetilide.  We will increase the dose of metoprolol to tartrate to 150 mg twice a day.  We also discussed the option of adding digoxin to her medical therapy to decrease the rates during atrial fibrillation.    Risk factor modification and lifestyle modification discussed with patient. Diet , exercise and hydration discussed with patient.    I have personally review with patient during this office visit, laboratory data, echocardiogram results, stress test results, Holter-event monitor results prior and after the last electrophysiology visit. All questions has been answered.    Please excuse any errors in grammar or translation related to this dictation.  Voice recognition software was utilized to prepare this document.

## 2023-10-31 ENCOUNTER — HOSPITAL ENCOUNTER (OUTPATIENT)
Dept: CARDIOLOGY | Age: 63
Discharge: HOME | End: 2023-10-31
Payer: MEDICARE

## 2023-10-31 DIAGNOSIS — I49.5 SINUS NODE DYSFUNCTION (MULTI): Primary | ICD-10-CM

## 2023-10-31 PROCEDURE — 93296 REM INTERROG EVL PM/IDS: CPT

## 2023-10-31 RX ORDER — METOPROLOL TARTRATE 100 MG/1
100 TABLET ORAL 2 TIMES DAILY
Qty: 180 TABLET | Refills: 3 | Status: SHIPPED | OUTPATIENT
Start: 2023-10-31 | End: 2024-10-30

## 2023-10-31 NOTE — TELEPHONE ENCOUNTER
Advised pt verbalized understanding. David    Pt will take Metoprolol 100mg take one tab twice daily

## 2023-10-31 NOTE — TELEPHONE ENCOUNTER
Patient called office requesting clarification on what her dose of Metoprolol Tartrate should be.  She states she was using a 100 mg tablet but only taking 1/2 tablet or 50 mg twice a day. This is different than was was dictated in patient's office visit note. Patient states her paperwork instructed her to stop her previous dose but no details were given on the new dose.  Note to Dr. Guo for review.  Dana Negron, CMA

## 2023-11-02 NOTE — TELEPHONE ENCOUNTER
Rec'd call from Tej, Pharmacist from [facility].  Advised pt is to be taking 100 mg bid. Informed pt was advised and she will utilize her 50 mg tablets by taking 2 tablets (100 mg) bid until gone. Message complete.

## 2023-11-06 ENCOUNTER — HOSPITAL ENCOUNTER (OUTPATIENT)
Dept: LAB | Age: 63
Discharge: HOME | End: 2023-11-06
Payer: MEDICARE

## 2023-11-06 LAB
ALBUMIN SERPL-MCNC: 4 G/DL (ref 3.5–4.6)
ALP SERPL-CCNC: 135 U/L (ref 40–130)
ALT SERPL-CCNC: 23 U/L (ref 0–33)
ANION GAP SERPL CALCULATED.3IONS-SCNC: 11 MEQ/L (ref 9–15)
AST SERPL-CCNC: 22 U/L (ref 0–35)
BILIRUB SERPL-MCNC: 0.5 MG/DL (ref 0.2–0.7)
BUN SERPL-MCNC: 18 MG/DL (ref 8–23)
CALCIUM SERPL-MCNC: 9.2 MG/DL (ref 8.5–9.9)
CHLORIDE SERPL-SCNC: 103 MEQ/L (ref 95–107)
CHOLEST SERPL-MCNC: 118 MG/DL (ref 0–199)
CO2 SERPL-SCNC: 27 MEQ/L (ref 20–31)
CREAT SERPL-MCNC: 0.88 MG/DL (ref 0.5–0.9)
GLOBULIN SER CALC-MCNC: 3.1 G/DL (ref 2.3–3.5)
GLUCOSE SERPL-MCNC: 114 MG/DL (ref 70–99)
HDLC SERPL-MCNC: 40 MG/DL (ref 40–59)
LDLC SERPL CALC-MCNC: 66 MG/DL (ref 0–129)
POTASSIUM SERPL-SCNC: 4.7 MEQ/L (ref 3.4–4.9)
PROT SERPL-MCNC: 7.1 G/DL (ref 6.3–8)
SODIUM SERPL-SCNC: 141 MEQ/L (ref 135–144)
TRIGL SERPL-MCNC: 61 MG/DL (ref 0–150)

## 2023-11-06 PROCEDURE — 80053 COMPREHEN METABOLIC PANEL: CPT

## 2023-11-06 PROCEDURE — 36415 COLL VENOUS BLD VENIPUNCTURE: CPT

## 2023-11-06 PROCEDURE — 80061 LIPID PANEL: CPT

## 2023-11-13 DIAGNOSIS — I10 HYPERTENSION, UNSPECIFIED TYPE: ICD-10-CM

## 2023-11-13 DIAGNOSIS — R00.2 PALPITATIONS: ICD-10-CM

## 2023-11-14 RX ORDER — DOXAZOSIN 2 MG/1
TABLET ORAL
Qty: 45 TABLET | Refills: 0 | Status: SHIPPED | OUTPATIENT
Start: 2023-11-14 | End: 2023-11-22 | Stop reason: SDUPTHER

## 2023-11-21 NOTE — PROGRESS NOTES
"I last saw patient in November 2022.  Since then patient has had several visits with the EP service.    \"Regarding atrial fibrillation she is already on maximum dose of dofetilide.  We will increase the dose of metoprolol to tartrate to 150 mg twice a day.  We also discussed the option of adding digoxin to her medical therapy to decrease the rates during atrial fibrillation.\"  Per most recent office note by EP from October 2023 atrial fibrillation burden 5%, there is concern for impaired RV lead function, which is being followed.       Subjective :   Interval review of systems is negative for chest discomfort pressure tightness heaviness palpitations lightheadedness orthopnea paroxysmal nocturnal dyspnea dependent edema or claudication TIA or CVA type symptoms or bleeding diathesis        History so Far :  History so far :  1. Hypertension  2. Atrial fibrillation-patient extremely symptomatic with episodes, on high-dose sotalol, along with metoprolol, managed by EP service, patient says consideration is being given for ablation.   3. Normal perfusion stress test, continue risk factor modification  4. Significant GERD symptoms, on prophylaxis, and controlled.  5. Morbid obesity-a major player in several of her symptoms  6. High risk medications-EP managing  7. Obstructive sleep apnea-compliant with CPAP therapy  8. Sinus node dysfunction with permanent pacemaker in place  9. Hypokalemia-resolved  10. Atherosclerotic native vessel coronary artery disease-cardiac catheterization May 2018, LVEDP 12 to 15 mmHg, no systolic gradient across the aortic valve, LVEF 60% 40 to 50% smooth eccentric proximal LAD stenosis normal left main nondominant left circumflex with minimal disease RCA relatively small dominant with no significant disease IFR of proximal LAD stenosis was 0.97  11.Echocardiogram May 2018 LVEF 50% 1+ tricuspid regurgitation PA pressure 32 mmHg left atrial size reported to be normal  Echocardiogram 10/14/2021 " LVEF 60 to 65% left atrial diameter 3.2 cm left atrial volume index not reported 1+ mitral and 1+ tricuspid regurgitation RVSP 42 mmHg normal pattern of LV diastolic filling LV wall thickness normal  12. Stress Myoview October 2021-CAP protocol 77% age-predicted maximum heart rate 3.6 METS Baseline blood pressure 124/80 peak blood pressure 148/72 converted to Lexiscan Myoview which was reported to be normal.  13. Chronotropic blunting  14. Personal history of COVID October 2022    Objective      Wt Readings from Last 3 Encounters:   11/22/23 130 kg (286 lb)   10/30/23 126 kg (278 lb)   07/31/23 126 kg (278 lb 2 oz)            Physical Exam:    GENERAL APPEARANCE: in no acute distress.  CHEST: Symmetric and non-tender.  INTEGUMENT: Skin warm and dry  HEENT: No gross abnormalities identified.No pallor or scleral icterus.  NECK: Supple, no JVD, no bruit.   NEURO/PSHCY: Alert and oriented x3; appropriate behavior and responses and responses  LUNGS: Clear to auscultation bilaterally; normal respiratory effort.  HEART: Rate and rhythm regular with no evident murmur; no gallop appreciated.   ABDOMEN: Soft, non tender.  MUSCULOSKELETAL: No gross deformities.  EXTREMITIES: Warm  There is no edema noted.    Meds:  Current Outpatient Medications   Medication Instructions    apixaban (Eliquis) 5 mg tablet 1 tablet, oral, 2 times daily    atorvastatin (LIPITOR) 80 mg, oral, Nightly    dofetilide (Tikosyn) 500 mcg capsule oral, 2 times daily    doxazosin (Cardura) 2 mg tablet 0.5 tab(s) orally once a day    furosemide (LASIX) 20 mg, oral, Daily    loratadine (Claritin) 10 mg tablet 1 tablet, oral, Daily    metFORMIN  mg 24 hr tablet 1 tablet, oral, Daily    metoprolol tartrate (LOPRESSOR) 100 mg, oral, 2 times daily    PARoxetine (Paxil) 20 mg tablet 1 tablet, oral, Daily    potassium chloride CR (Klor-Con M20) 20 mEq ER tablet Take 1 tablet every evening    spironolactone (Aldactone) 25 mg tablet TAKE 2 TABLETS IN THE  MORNING AND 1 TABLET IN THE EVENING          Allergies   Allergen Reactions    Amlodipine Unknown    Losartan Cough    Milk Containing Products (Dairy) Unknown    Sulfa (Sulfonamide Antibiotics) Unknown       Reviewed hemoglobin A1c comprehensive profile lipid profile CBC and TSH, dating back to July 2023.  Lipid profile from November 2023 is at target LDL 66 hemoglobin A1c 6.5  No anemia no bleeding diathesis    LABS:    Lab Results   Component Value Date    WBC 8.2 02/13/2023    HGB 14.3 02/13/2023    HCT 43.6 02/13/2023     02/13/2023    ALT 24 02/13/2023    AST 21 02/13/2023     02/15/2023    K 4.2 02/15/2023     02/15/2023    CREATININE 0.84 02/15/2023    BUN 16 02/15/2023    CO2 25 02/15/2023    TSH 3.76 02/13/2023    INR 1.3 (H) 02/13/2023    HGBA1C 6.5 (H) 07/24/2023                   Problem List:    Patient Active Problem List    Diagnosis Date Noted    High risk medication use 10/27/2023    Morbid obesity with BMI of 45.0-49.9, adult (CMS/HCC) 10/27/2023    Anxiety 10/24/2023    Arteriosclerosis of coronary artery 10/24/2023    Heart burn 10/24/2023    HTN (hypertension) 10/24/2023    Obstructive sleep apnea, adult 10/24/2023    Pacemaker 10/24/2023    Palpitations 10/24/2023    Paroxysmal atrial fibrillation (CMS/HCC) 10/24/2023    Persistent atrial fibrillation (CMS/[facility]) 10/24/2023    Sinus node dysfunction (CMS/[facility]) 10/24/2023    Chest pressure 10/24/2023    Tightness in chest 10/24/2023                 Assessment:  1.  Primary hypertension-at target  2.  Non-insulin-dependent diabetes-no hypoglycemia hemoglobin A1c is at target  3.  Persistent atrial fibrillation, still with short bursts of symptomatic atrial fibrillation, on high risk medication dofetilide, and anticoagulated, EP managing.  Reviewed EP notes and device check reports.  4.  Morbid obesity-consider Rybelsus or Ozempic if applicable, defer to primary care  5.  Obstructive sleep apnea-compliant with CPAP therapy  6.   Sinus node dysfunction , dual-chamber permanent pacemaker in place, followed by EP  7.  Lipid profile is at target continue current regimen of high-dose high intensity statin  8.  Coronary artery disease, based on prior evaluation, nonflow-limiting, no angina or anginal equivalent       Recommendations:  Orders Placed This Encounter   Procedures    Comprehensive Metabolic Panel    Lipid Panel    CBC    Hemoglobin A1C   Patient to continue efforts at weight loss, continue heart healthy lifestyle, follow-up in 1 year with me, sooner if interval problems arise    Follow up : 1 year      Jazmyn Fitch MD

## 2023-11-22 ENCOUNTER — OFFICE VISIT (OUTPATIENT)
Dept: CARDIOLOGY | Facility: CLINIC | Age: 63
End: 2023-11-22
Payer: MEDICARE

## 2023-11-22 VITALS
DIASTOLIC BLOOD PRESSURE: 71 MMHG | WEIGHT: 286 LBS | HEART RATE: 59 BPM | SYSTOLIC BLOOD PRESSURE: 127 MMHG | HEIGHT: 65 IN | BODY MASS INDEX: 47.65 KG/M2

## 2023-11-22 DIAGNOSIS — R00.2 PALPITATIONS: ICD-10-CM

## 2023-11-22 DIAGNOSIS — I10 HYPERTENSION, UNSPECIFIED TYPE: ICD-10-CM

## 2023-11-22 DIAGNOSIS — I10 PRIMARY HYPERTENSION: ICD-10-CM

## 2023-11-22 DIAGNOSIS — E66.01 MORBID OBESITY WITH BMI OF 45.0-49.9, ADULT (MULTI): ICD-10-CM

## 2023-11-22 DIAGNOSIS — Z79.899 HIGH RISK MEDICATION USE: Primary | ICD-10-CM

## 2023-11-22 DIAGNOSIS — I25.10 ARTERIOSCLEROSIS OF CORONARY ARTERY: ICD-10-CM

## 2023-11-22 DIAGNOSIS — I48.19 PERSISTENT ATRIAL FIBRILLATION (MULTI): ICD-10-CM

## 2023-11-22 DIAGNOSIS — Z95.0 PACEMAKER: ICD-10-CM

## 2023-11-22 DIAGNOSIS — G47.33 OBSTRUCTIVE SLEEP APNEA, ADULT: ICD-10-CM

## 2023-11-22 PROCEDURE — 1159F MED LIST DOCD IN RCRD: CPT | Performed by: INTERNAL MEDICINE

## 2023-11-22 PROCEDURE — 1036F TOBACCO NON-USER: CPT | Performed by: INTERNAL MEDICINE

## 2023-11-22 PROCEDURE — 3078F DIAST BP <80 MM HG: CPT | Performed by: INTERNAL MEDICINE

## 2023-11-22 PROCEDURE — 99214 OFFICE O/P EST MOD 30 MIN: CPT | Performed by: INTERNAL MEDICINE

## 2023-11-22 PROCEDURE — 1160F RVW MEDS BY RX/DR IN RCRD: CPT | Performed by: INTERNAL MEDICINE

## 2023-11-22 PROCEDURE — 3008F BODY MASS INDEX DOCD: CPT | Performed by: INTERNAL MEDICINE

## 2023-11-22 PROCEDURE — 3074F SYST BP LT 130 MM HG: CPT | Performed by: INTERNAL MEDICINE

## 2023-11-22 RX ORDER — ATORVASTATIN CALCIUM 80 MG/1
80 TABLET, FILM COATED ORAL NIGHTLY
Qty: 90 TABLET | Refills: 3 | Status: SHIPPED | OUTPATIENT
Start: 2023-11-22

## 2023-11-22 RX ORDER — POTASSIUM CHLORIDE 20 MEQ/1
TABLET, EXTENDED RELEASE ORAL
Qty: 90 TABLET | Refills: 3 | Status: SHIPPED | OUTPATIENT
Start: 2023-11-22

## 2023-11-22 RX ORDER — DOXAZOSIN 2 MG/1
TABLET ORAL
Qty: 45 TABLET | Refills: 3 | Status: SHIPPED | OUTPATIENT
Start: 2023-11-22

## 2023-11-22 RX ORDER — SPIRONOLACTONE 25 MG/1
TABLET ORAL
Qty: 270 TABLET | Refills: 3 | Status: SHIPPED | OUTPATIENT
Start: 2023-11-22

## 2023-11-22 RX ORDER — FUROSEMIDE 20 MG/1
20 TABLET ORAL DAILY
Qty: 90 TABLET | Refills: 3 | Status: SHIPPED | OUTPATIENT
Start: 2023-11-22 | End: 2024-03-07 | Stop reason: DRUGHIGH

## 2023-12-26 DIAGNOSIS — F41.9 ANXIETY: ICD-10-CM

## 2023-12-26 RX ORDER — PAROXETINE HYDROCHLORIDE 20 MG/1
20 TABLET, FILM COATED ORAL EVERY MORNING
Qty: 90 TABLET | Refills: 0 | Status: SHIPPED | OUTPATIENT
Start: 2023-12-26

## 2023-12-26 NOTE — TELEPHONE ENCOUNTER
Comments:     Last Office Visit (last PCP visit):   8/3/2023    Next Visit Date:  Future Appointments   Date Time Provider [address]   2/5/2024 10:15 AM Rodger Harman MD [address]   8/8/2024 10:00 AM Rosalina Kaye MD [facility]       **If hasn't been seen in over a year OR hasn't followed up according to last diabetes/ADHD visit, make appointment for patient before sending refill to provider.     Rx requested:  Requested Prescriptions     Pending Prescriptions Disp Refills    PARoxetine (PAXIL) 20 MG tablet [Pharmacy Med Name: PARoxetine HCl 20 MG Oral Tablet] 90 tablet 3     Sig: TAKE 1 TABLET BY MOUTH IN THE  MORNING

## 2024-01-12 DIAGNOSIS — E66.01 MORBID OBESITY (HCC): ICD-10-CM

## 2024-01-12 DIAGNOSIS — E11.9 TYPE 2 DIABETES MELLITUS WITHOUT COMPLICATION, WITHOUT LONG-TERM CURRENT USE OF INSULIN (HCC): ICD-10-CM

## 2024-01-14 RX ORDER — DULAGLUTIDE 0.75 MG/.5ML
0.75 INJECTION, SOLUTION SUBCUTANEOUS WEEKLY
Qty: 6 ML | Refills: 1 | Status: SHIPPED | OUTPATIENT
Start: 2024-01-14

## 2024-01-29 ENCOUNTER — HOSPITAL ENCOUNTER (OUTPATIENT)
Dept: LAB | Age: 64
Discharge: HOME | End: 2024-01-29
Payer: MEDICARE

## 2024-01-29 DIAGNOSIS — E55.9 VITAMIN D DEFICIENCY: ICD-10-CM

## 2024-01-29 DIAGNOSIS — E11.9 TYPE 2 DIABETES MELLITUS WITHOUT COMPLICATION, WITHOUT LONG-TERM CURRENT USE OF INSULIN (HCC): ICD-10-CM

## 2024-01-29 DIAGNOSIS — E78.5 HYPERLIPIDEMIA, UNSPECIFIED HYPERLIPIDEMIA TYPE: ICD-10-CM

## 2024-01-29 DIAGNOSIS — I48.19 ATRIAL FIBRILLATION, PERSISTENT (HCC): ICD-10-CM

## 2024-01-29 LAB
ALBUMIN SERPL-MCNC: 4.1 G/DL (ref 3.5–4.6)
ALP SERPL-CCNC: 126 U/L (ref 40–130)
ALT SERPL-CCNC: 22 U/L (ref 0–33)
ANION GAP SERPL CALCULATED.3IONS-SCNC: 11 MEQ/L (ref 9–15)
AST SERPL-CCNC: 24 U/L (ref 0–35)
BASOPHILS # BLD: 0 K/UL (ref 0–0.2)
BASOPHILS NFR BLD: 0.1 %
BILIRUB SERPL-MCNC: 0.6 MG/DL (ref 0.2–0.7)
BUN SERPL-MCNC: 16 MG/DL (ref 8–23)
CALCIUM SERPL-MCNC: 9.4 MG/DL (ref 8.5–9.9)
CHLORIDE SERPL-SCNC: 99 MEQ/L (ref 95–107)
CHOLEST SERPL-MCNC: 103 MG/DL (ref 0–199)
CO2 SERPL-SCNC: 29 MEQ/L (ref 20–31)
CREAT SERPL-MCNC: 0.87 MG/DL (ref 0.5–0.9)
EOSINOPHIL # BLD: 0.2 K/UL (ref 0–0.7)
EOSINOPHIL NFR BLD: 2.2 %
ERYTHROCYTE [DISTWIDTH] IN BLOOD BY AUTOMATED COUNT: 13.3 % (ref 11.5–14.5)
GLOBULIN SER CALC-MCNC: 2.9 G/DL (ref 2.3–3.5)
GLUCOSE SERPL-MCNC: 108 MG/DL (ref 70–99)
HBA1C MFR BLD: 6.2 % (ref 4.8–5.9)
HCT VFR BLD AUTO: 45.2 % (ref 37–47)
HDLC SERPL-MCNC: 39 MG/DL (ref 40–59)
HGB BLD-MCNC: 13.8 G/DL (ref 12–16)
LDLC SERPL CALC-MCNC: 51 MG/DL (ref 0–129)
LYMPHOCYTES # BLD: 1.4 K/UL (ref 1–4.8)
LYMPHOCYTES NFR BLD: 18.7 %
MCH RBC QN AUTO: 28.3 PG (ref 27–31.3)
MCHC RBC AUTO-ENTMCNC: 30.5 % (ref 33–37)
MCV RBC AUTO: 92.6 FL (ref 79.4–94.8)
MONOCYTES # BLD: 0.6 K/UL (ref 0.2–0.8)
MONOCYTES NFR BLD: 8 %
NEUTROPHILS # BLD: 5.4 K/UL (ref 1.4–6.5)
NEUTS SEG NFR BLD: 70.6 %
PLATELET # BLD AUTO: 223 K/UL (ref 130–400)
POTASSIUM SERPL-SCNC: 4.1 MEQ/L (ref 3.4–4.9)
PROT SERPL-MCNC: 7 G/DL (ref 6.3–8)
RBC # BLD AUTO: 4.88 M/UL (ref 4.2–5.4)
SODIUM SERPL-SCNC: 139 MEQ/L (ref 135–144)
TRIGL SERPL-MCNC: 66 MG/DL (ref 0–150)
WBC # BLD AUTO: 7.6 K/UL (ref 4.8–10.8)

## 2024-01-29 PROCEDURE — 80061 LIPID PANEL: CPT

## 2024-01-29 PROCEDURE — 85025 COMPLETE CBC W/AUTO DIFF WBC: CPT

## 2024-01-29 PROCEDURE — 36415 COLL VENOUS BLD VENIPUNCTURE: CPT

## 2024-01-29 PROCEDURE — 82306 VITAMIN D 25 HYDROXY: CPT

## 2024-01-29 PROCEDURE — 83036 HEMOGLOBIN GLYCOSYLATED A1C: CPT

## 2024-01-29 PROCEDURE — 80053 COMPREHEN METABOLIC PANEL: CPT

## 2024-01-30 LAB — VITAMIN D 25-HYDROXY: 30.2 NG/ML

## 2024-02-01 ENCOUNTER — HOSPITAL ENCOUNTER (OUTPATIENT)
Dept: CARDIOLOGY | Age: 64
Discharge: HOME | End: 2024-02-01
Payer: MEDICARE

## 2024-02-01 PROCEDURE — 93296 REM INTERROG EVL PM/IDS: CPT

## 2024-02-05 ENCOUNTER — OFFICE VISIT (OUTPATIENT)
Dept: FAMILY MEDICINE CLINIC | Age: 64
End: 2024-02-05
Payer: MEDICARE

## 2024-02-05 VITALS
HEIGHT: 65 IN | WEIGHT: 282.6 LBS | HEART RATE: 64 BPM | TEMPERATURE: 97.1 F | BODY MASS INDEX: 47.09 KG/M2 | OXYGEN SATURATION: 97 % | SYSTOLIC BLOOD PRESSURE: 128 MMHG | DIASTOLIC BLOOD PRESSURE: 72 MMHG

## 2024-02-05 DIAGNOSIS — E55.9 VITAMIN D DEFICIENCY: ICD-10-CM

## 2024-02-05 DIAGNOSIS — I10 ESSENTIAL HYPERTENSION: Chronic | ICD-10-CM

## 2024-02-05 DIAGNOSIS — M85.851 OSTEOPENIA OF BOTH HIPS: ICD-10-CM

## 2024-02-05 DIAGNOSIS — M85.852 OSTEOPENIA OF BOTH HIPS: ICD-10-CM

## 2024-02-05 DIAGNOSIS — E78.5 HYPERLIPIDEMIA, UNSPECIFIED HYPERLIPIDEMIA TYPE: ICD-10-CM

## 2024-02-05 DIAGNOSIS — E11.9 TYPE 2 DIABETES MELLITUS WITHOUT COMPLICATION, WITHOUT LONG-TERM CURRENT USE OF INSULIN (HCC): ICD-10-CM

## 2024-02-05 DIAGNOSIS — E66.01 MORBID OBESITY (HCC): ICD-10-CM

## 2024-02-05 DIAGNOSIS — I48.19 ATRIAL FIBRILLATION, PERSISTENT (HCC): ICD-10-CM

## 2024-02-05 DIAGNOSIS — Z12.31 ENCOUNTER FOR SCREENING MAMMOGRAM FOR MALIGNANT NEOPLASM OF BREAST: ICD-10-CM

## 2024-02-05 DIAGNOSIS — Z12.11 SCREENING FOR COLON CANCER: ICD-10-CM

## 2024-02-05 DIAGNOSIS — Z00.00 MEDICARE ANNUAL WELLNESS VISIT, SUBSEQUENT: Primary | ICD-10-CM

## 2024-02-05 DIAGNOSIS — Z23 NEED FOR VACCINATION: ICD-10-CM

## 2024-02-05 PROCEDURE — 1123F ACP DISCUSS/DSCN MKR DOCD: CPT | Performed by: FAMILY MEDICINE

## 2024-02-05 PROCEDURE — G0439 PPPS, SUBSEQ VISIT: HCPCS | Performed by: FAMILY MEDICINE

## 2024-02-05 PROCEDURE — 3044F HG A1C LEVEL LT 7.0%: CPT | Performed by: FAMILY MEDICINE

## 2024-02-05 PROCEDURE — 3078F DIAST BP <80 MM HG: CPT | Performed by: FAMILY MEDICINE

## 2024-02-05 PROCEDURE — 3074F SYST BP LT 130 MM HG: CPT | Performed by: FAMILY MEDICINE

## 2024-02-05 SDOH — ECONOMIC STABILITY: INCOME INSECURITY: HOW HARD IS IT FOR YOU TO PAY FOR THE VERY BASICS LIKE FOOD, HOUSING, MEDICAL CARE, AND HEATING?: NOT HARD AT ALL

## 2024-02-05 SDOH — ECONOMIC STABILITY: FOOD INSECURITY: WITHIN THE PAST 12 MONTHS, YOU WORRIED THAT YOUR FOOD WOULD RUN OUT BEFORE YOU GOT MONEY TO BUY MORE.: NEVER TRUE

## 2024-02-05 SDOH — ECONOMIC STABILITY: FOOD INSECURITY: WITHIN THE PAST 12 MONTHS, THE FOOD YOU BOUGHT JUST DIDN'T LAST AND YOU DIDN'T HAVE MONEY TO GET MORE.: NEVER TRUE

## 2024-02-05 ASSESSMENT — PATIENT HEALTH QUESTIONNAIRE - PHQ9
7. TROUBLE CONCENTRATING ON THINGS, SUCH AS READING THE NEWSPAPER OR WATCHING TELEVISION: 0
10. IF YOU CHECKED OFF ANY PROBLEMS, HOW DIFFICULT HAVE THESE PROBLEMS MADE IT FOR YOU TO DO YOUR WORK, TAKE CARE OF THINGS AT HOME, OR GET ALONG WITH OTHER PEOPLE: 0
4. FEELING TIRED OR HAVING LITTLE ENERGY: 0
SUM OF ALL RESPONSES TO PHQ QUESTIONS 1-9: 0
6. FEELING BAD ABOUT YOURSELF - OR THAT YOU ARE A FAILURE OR HAVE LET YOURSELF OR YOUR FAMILY DOWN: 0
5. POOR APPETITE OR OVEREATING: 0
SUM OF ALL RESPONSES TO PHQ QUESTIONS 1-9: 0
9. THOUGHTS THAT YOU WOULD BE BETTER OFF DEAD, OR OF HURTING YOURSELF: 0
SUM OF ALL RESPONSES TO PHQ QUESTIONS 1-9: 0
1. LITTLE INTEREST OR PLEASURE IN DOING THINGS: 0
8. MOVING OR SPEAKING SO SLOWLY THAT OTHER PEOPLE COULD HAVE NOTICED. OR THE OPPOSITE, BEING SO FIGETY OR RESTLESS THAT YOU HAVE BEEN MOVING AROUND A LOT MORE THAN USUAL: 0
SUM OF ALL RESPONSES TO PHQ QUESTIONS 1-9: 0
2. FEELING DOWN, DEPRESSED OR HOPELESS: 0
SUM OF ALL RESPONSES TO PHQ9 QUESTIONS 1 & 2: 0
3. TROUBLE FALLING OR STAYING ASLEEP: 0

## 2024-02-05 NOTE — ASSESSMENT & PLAN NOTE
Asymptomatic and rate controlled today in the office.  Patient remains on anticoagulation without signs of bleeding.  Instructions were given to continue with current doses of and metoprolol.  Chronic follow-up remains in place with cardiology for management of antiarrhythmic medication.

## 2024-02-05 NOTE — PATIENT INSTRUCTIONS
your doctor, nurse, and dietitian on a regular basis after surgery to monitor your health, diet, and weight loss.   You will be able to slowly increase how much you eat over time, although it will always be important to:  Eat small, frequent meals and not skip meals   Chew your food slowly and completely   Avoid eating while \"distracted\" (such as eating while watching TV)   Stop eating when you feel full   Drink liquids at least 30 minutes before or after eating   Avoid foods high in fat or sugar   Take vitamin supplements, as recommended  It can take several months to learn to listen to your body so that you know when you are hungry and when you are full. You may dislike foods you previously loved, and you may begin to prefer new foods. This can be a frustrating process for some people, so talk to your dietitian if you are having trouble.  It usually takes between one and two years to lose weight after surgery. After reaching their goal weight, some people have plastic surgery (called \"body contouring\") to remove excess skin from the body, particularly in the abdominal area.  Before you decide to have weight loss surgery, you must commit to staying healthy for life. This includes following up with your healthcare team, exercising most days of the week, and eating a sensible diet every day. It can be difficult to develop new eating and exercise habits after weight loss surgery, and you will have to work hard to stick to your goals.  Recovering from surgery and losing weight can be stressful and emotional, and it is important to have the support of family and friends. Working with a , therapist, or support group can help you through the ups and downs.  WHERE TO GET MORE INFORMATION -- Your healthcare provider is the best source of information for questions and concerns related to your medical problem.  This article will be updated as needed every four months on our Web site

## 2024-02-05 NOTE — ASSESSMENT & PLAN NOTE
Patient counseled on healthy dietary choices and the benefits of a lower salt and/or lower carbohydrate diet as appropriate. Patient also counseled on benefits of moderate intensity cardiovascular exercise for 150 minutes per week as they are able. Advice was given to make small changes over time, setting smaller achievable goals until recommended lifestyle changes are reached.

## 2024-02-05 NOTE — ASSESSMENT & PLAN NOTE
Most recent A1c at goal control.  Patient instructed to continue with current doses of metformin and Trulicity.

## 2024-02-09 ENCOUNTER — OFFICE VISIT (OUTPATIENT)
Dept: CARDIOLOGY | Facility: CLINIC | Age: 64
End: 2024-02-09
Payer: MEDICARE

## 2024-02-09 VITALS
DIASTOLIC BLOOD PRESSURE: 84 MMHG | SYSTOLIC BLOOD PRESSURE: 118 MMHG | BODY MASS INDEX: 47.48 KG/M2 | WEIGHT: 285 LBS | HEIGHT: 65 IN | HEART RATE: 51 BPM

## 2024-02-09 DIAGNOSIS — R93.1 ABNORMAL FINDINGS ON DIAGNOSTIC IMAGING OF HEART AND CORONARY CIRCULATION: ICD-10-CM

## 2024-02-09 DIAGNOSIS — R94.31 ABNORMAL ELECTROCARDIOGRAM (ECG) (EKG): ICD-10-CM

## 2024-02-09 DIAGNOSIS — T82.110D PACEMAKER LEAD FAILURE, SUBSEQUENT ENCOUNTER: ICD-10-CM

## 2024-02-09 DIAGNOSIS — Z79.899 HIGH RISK MEDICATION USE: ICD-10-CM

## 2024-02-09 DIAGNOSIS — I48.19 PERSISTENT ATRIAL FIBRILLATION (MULTI): ICD-10-CM

## 2024-02-09 DIAGNOSIS — I49.5 SINUS NODE DYSFUNCTION (MULTI): ICD-10-CM

## 2024-02-09 DIAGNOSIS — R00.2 PALPITATIONS: ICD-10-CM

## 2024-02-09 DIAGNOSIS — Z78.9 NEVER SMOKED TOBACCO: ICD-10-CM

## 2024-02-09 DIAGNOSIS — Z95.0 PACEMAKER: ICD-10-CM

## 2024-02-09 DIAGNOSIS — R94.30 ABNORMAL RESULT OF CARDIOVASCULAR FUNCTION STUDY, UNSPECIFIED: ICD-10-CM

## 2024-02-09 PROCEDURE — 3079F DIAST BP 80-89 MM HG: CPT | Performed by: INTERNAL MEDICINE

## 2024-02-09 PROCEDURE — 1036F TOBACCO NON-USER: CPT | Performed by: INTERNAL MEDICINE

## 2024-02-09 PROCEDURE — 99215 OFFICE O/P EST HI 40 MIN: CPT | Performed by: INTERNAL MEDICINE

## 2024-02-09 PROCEDURE — 3074F SYST BP LT 130 MM HG: CPT | Performed by: INTERNAL MEDICINE

## 2024-02-09 PROCEDURE — 1159F MED LIST DOCD IN RCRD: CPT | Performed by: INTERNAL MEDICINE

## 2024-02-09 PROCEDURE — 3008F BODY MASS INDEX DOCD: CPT | Performed by: INTERNAL MEDICINE

## 2024-02-09 PROCEDURE — 93000 ELECTROCARDIOGRAM COMPLETE: CPT | Performed by: INTERNAL MEDICINE

## 2024-02-09 RX ORDER — DIGOXIN 125 MCG
TABLET ORAL
Qty: 30 TABLET | Refills: 11 | Status: SHIPPED | OUTPATIENT
Start: 2024-02-09 | End: 2024-02-13 | Stop reason: SDUPTHER

## 2024-02-09 RX ORDER — DULAGLUTIDE 0.75 MG/.5ML
0.75 INJECTION, SOLUTION SUBCUTANEOUS
COMMUNITY
Start: 2024-01-22

## 2024-02-09 NOTE — PATIENT INSTRUCTIONS
Start Digoxin 125 mcg- Dr. Guo is giving you a loading dose.   Take 4 tablets once you  script. Then take 2 tablets 4 hours later and then take additional 2 more tablets 4 hours later. (Total of 8 tablet on day 1). Tomorrow 2/10/24 take 1 tablet daily thereafter.     Labs to be done prior to next appointment  These labs should be fasting.        Please bring all medicines, vitamins, and herbal supplements with you in original bottles to every appointment!!!!    Prescriptions will not be filled unless you are compliant with your follow up appointments or have a follow up appointment scheduled as per instruction of your physician. Refills should be requested at the time of your visit.

## 2024-02-09 NOTE — PROGRESS NOTES
CARDIOLOGY OFFICE VISIT      CHIEF COMPLAINT  Chief Complaint   Patient presents with    Follow-up     6m       HISTORY OF PRESENT ILLNESS  HPI    63-year-old female with a past medical history of atrial fibrillation and sinus node dysfunction with prior pacemaker implanted in March 2018. She presented to emergency department in March 2019 due to feeling a jumping sensation in her right chest that she has not had in the past. X-ray shows a pacemaker with right atrial lead and right ventricular lead around the right atrial and left subclavian area. She underwent a repositioning of both leads back in March 2019 with successful results.     Due to persistent palpitations, we increased the dose of sotalol to 240 mg twice a day.      she has persisting atrial fibrillation. We discussed the option of changing antiarrhythmic therapy. Patient was admitted in February 2023 at [facility] to change sotalol for dofetilide. She did tolerate dofetilide 250 mcg twice a day. Patient underwent cardioversion with successful restoration to sinus rhythm but she had early recurrence of atrial fibrillation.    Patient states that lately she has been noticing more palpitations.    Palpitations sometimes are limiting her level of activities.    Device interrogation February 2024 shows battery longevity 7 years.  [place] of atrial fraction 6.1% of the time.  No other significant findings.    EKG performed today shows sinus bradycardia at a rate of 51 bpm QRS ration 86 ms QT corrected 412 ms.  Rhythm strip shows the same pattern.    Past Medical History  Past Medical History:   Diagnosis Date    Acute kidney failure, unspecified (CMS/HCC)     Acute kidney injury    Other dorsalgia     Interscapular pain    Personal history of other diseases of the circulatory system     History of sinus bradycardia    Personal history of other diseases of urinary system     History of chronic kidney disease    Personal history of other endocrine,  nutritional and metabolic disease     History of hypokalemia    Personal history of other specified conditions     History of fatigue    Personal history of other specified conditions     H/O shortness of breath       Social History  Social History     Tobacco Use    Smoking status: Never     Passive exposure: Never    Smokeless tobacco: Never   Substance Use Topics    Alcohol use: Not on file    Drug use: Not on file       Family History     Family History   Problem Relation Name Age of Onset    Other (systemic lupus erythematosus) Mother      Heart attack Father      Other (arteriosclerotic cardiovascular disease) Sister      Heart attack Sister      Lung cancer Brother      Other (cardiac disorder) Child          Allergies:  Allergies   Allergen Reactions    Amlodipine Unknown    Losartan Cough    Milk Containing Products (Dairy) Unknown    Sulfa (Sulfonamide Antibiotics) Unknown        Outpatient Medications:  Current Outpatient Medications   Medication Instructions    apixaban (Eliquis) 5 mg tablet 1 tablet, oral, 2 times daily    atorvastatin (LIPITOR) 80 mg, oral, Nightly    dofetilide (Tikosyn) 500 mcg capsule 1 capsule, oral, 2 times daily    doxazosin (Cardura) 2 mg tablet 0.5 tab(s) orally once a day    furosemide (LASIX) 20 mg, oral, Daily    loratadine (Claritin) 10 mg tablet 1 tablet, oral, Daily    metFORMIN  mg 24 hr tablet 1 tablet, oral, Daily    metoprolol tartrate (LOPRESSOR) 100 mg, oral, 2 times daily    PARoxetine (Paxil) 20 mg tablet 1 tablet, oral, Daily    potassium chloride CR (Klor-Con M20) 20 mEq ER tablet Take 1 tablet every evening    spironolactone (Aldactone) 25 mg tablet TAKE 2 TABLETS IN THE MORNING AND 1 TABLET IN THE EVENING    Trulicity 0.75 mg, subcutaneous, Weekly          REVIEW OF SYSTEMS  Review of Systems   All other systems reviewed and are negative.        VITALS  Vitals:    02/09/24 1017   BP: 118/84   Pulse: 51       PHYSICAL EXAM  Constitutional:        Appearance: Healthy appearance. Not in distress.   Neck:      Vascular: No JVR. JVD normal.   Pulmonary:      Effort: Pulmonary effort is normal.      Breath sounds: Normal breath sounds. No wheezing. No rhonchi. No rales.   Chest:      Chest wall: Not tender to palpatation.   Cardiovascular:      PMI at left midclavicular line. Normal rate. Regular rhythm. Normal S1. Normal S2.       Murmurs: There is no murmur.      No gallop.  No click. No rub.   Pulses:     Intact distal pulses.   Edema:     Peripheral edema absent.   Abdominal:      General: Bowel sounds are normal.      Palpations: Abdomen is soft.      Tenderness: There is no abdominal tenderness.   Musculoskeletal: Normal range of motion.         General: No tenderness. Skin:     General: Skin is warm and dry.   Neurological:      General: No focal deficit present.      Mental Status: Alert and oriented to person, place and time.           ASSESSMENT AND PLAN  Clinical impression        1. Sinus node dysfunction, status post dual-chamber pacemaker, resolved  2. Status post dual-chamber pacemaker will reposition of the right atrial ventricular lead in March 2019. Device interrogation reviewed patient during this office visit  3. persistent atrial fibrillation, burden of atrial fibrillation by pacemaker interrogation 2.9%. Plan discussed with patient during this office visit  4. Coronary artery disease, stable  5. Normal left ventricular function per echocardiogram showing 60%, stable  6. Hypertension, controlled  7. Sleep apnea  8. High risk medication (dofetilide). Patient failed sotalol therapy. She had recurrence of atrial fibrillation on sotalol therapy.    Plan recommendations    I had a lengthy discussion with patient regarding management for atrial fibrillation.  [place] of atrial fraction is still minimal but she is becoming symptomatic.  We will add digoxin therapy.  She will start digoxin 0.125 mg 4 tablets x 1 and then 2 tablets x 2 every 4 hours.   Then 0.125 mg of digoxin daily.    Patient also will be scheduled for pulmonary isolation to prevent atrial fibrillation episodes in the next few weeks.   Procedure, risk, benefits and possible complications were explained to patient.  All questions were answered.  Patient agrees with plan.  Informed consent was signed.    Patient is to continue with current medical therapy including antiarrhythmic therapy and anticoagulant therapy.    Follow my office in 3 to 4 weeks or sooner needed.    Will obtain a CMP and digoxin level prior to next office visit.    Follow-up device clinic as scheduled.    Risk factor modification and lifestyle modification discussed with patient. Diet , exercise and hydration discussed with patient.      I have personally review with patient during this office visit, laboratory data, echocardiogram results, stress test results, Holter-event monitor results prior and after the last electrophysiology visit. All questions has been answered.    Please excuse any errors in grammar or translation related to this dictation.  Voice recognition software was utilized to prepare this document.

## 2024-02-12 ENCOUNTER — PREP FOR PROCEDURE (OUTPATIENT)
Dept: CARDIOLOGY | Facility: CLINIC | Age: 64
End: 2024-02-12
Payer: MEDICARE

## 2024-02-12 DIAGNOSIS — I48.91 ATRIAL FIBRILLATION, UNSPECIFIED TYPE (MULTI): Primary | ICD-10-CM

## 2024-02-12 RX ORDER — MUPIROCIN 20 MG/G
1 OINTMENT TOPICAL ONCE
Status: CANCELLED | OUTPATIENT
Start: 2024-02-12 | End: 2024-02-12

## 2024-02-12 RX ORDER — CHLORHEXIDINE GLUCONATE 40 MG/ML
SOLUTION TOPICAL ONCE
Status: CANCELLED | OUTPATIENT
Start: 2024-02-12 | End: 2024-02-12

## 2024-02-12 RX ORDER — SODIUM CHLORIDE 9 MG/ML
100 INJECTION, SOLUTION INTRAVENOUS CONTINUOUS
Status: CANCELLED | OUTPATIENT
Start: 2024-02-12

## 2024-02-13 DIAGNOSIS — I48.19 PERSISTENT ATRIAL FIBRILLATION (MULTI): ICD-10-CM

## 2024-02-13 PROBLEM — I48.91 ATRIAL FIBRILLATION (MULTI): Status: ACTIVE | Noted: 2024-02-12

## 2024-02-13 RX ORDER — DIGOXIN 125 MCG
TABLET ORAL
Qty: 98 TABLET | Refills: 0 | Status: SHIPPED | OUTPATIENT
Start: 2024-02-13 | End: 2024-03-08 | Stop reason: DRUGHIGH

## 2024-02-13 NOTE — TELEPHONE ENCOUNTER
Patient called the office staitng she was in to see Tutu Guo M.D.  2/12/24. Patient was ordered the following:    Start Digoxin 125 mcg- Dr. Guo is giving you a loading dose.   Take 4 tablets once you  script. Then take 2 tablets 4 hours later and then take additional 2 more tablets 4 hours later. (Total of 8 tablet on day 1). Tomorrow 2/10/24 take 1 tablet daily thereafter.     Medication went to mail order instead of local pharmacy. Pending apt 3/7/24. Please authorize.   Sana Mukherjee MA

## 2024-02-19 DIAGNOSIS — K21.9 GASTROESOPHAGEAL REFLUX DISEASE, UNSPECIFIED WHETHER ESOPHAGITIS PRESENT: ICD-10-CM

## 2024-02-19 RX ORDER — OMEPRAZOLE 40 MG/1
40 CAPSULE, DELAYED RELEASE ORAL
Qty: 90 CAPSULE | Refills: 1 | Status: SHIPPED | OUTPATIENT
Start: 2024-02-19

## 2024-02-19 NOTE — TELEPHONE ENCOUNTER
Comments:     Last Office Visit (last PCP visit):   2/5/2024    Next Visit Date:  Future Appointments   Date Time Provider Department Center   2/21/2024  9:00 AM [facility]   8/5/2024 10:30 AM Hosea Harman MD [facility]   8/8/2024 10:00 AM Paul Dotson MD [facility]   2/6/2025 10:00 AM Hosea Harman MD [facility]       **If hasn't been seen in over a year OR hasn't followed up according to last diabetes/ADHD visit, make appointment for patient before sending refill to provider.    Rx requested:  Requested Prescriptions     Pending Prescriptions Disp Refills    omeprazole (PRILOSEC) 40 MG delayed release capsule [Pharmacy Med Name: Omeprazole 40 MG Oral Capsule Delayed Release] 90 capsule 3     Sig: TAKE 1 CAPSULE BY MOUTH IN THE  MORNING BEFORE BREAKFAST

## 2024-02-21 ENCOUNTER — HOSPITAL ENCOUNTER (OUTPATIENT)
Dept: WOMENS IMAGING | Age: 64
Discharge: HOME | End: 2024-02-23
Payer: MEDICARE

## 2024-02-21 DIAGNOSIS — Z12.31 ENCOUNTER FOR SCREENING MAMMOGRAM FOR MALIGNANT NEOPLASM OF BREAST: ICD-10-CM

## 2024-02-21 PROCEDURE — 77063 BREAST TOMOSYNTHESIS BI: CPT

## 2024-02-26 ENCOUNTER — LAB (OUTPATIENT)
Dept: LAB | Facility: LAB | Age: 64
End: 2024-02-26
Payer: MEDICARE

## 2024-02-26 DIAGNOSIS — Z79.899 HIGH RISK MEDICATION USE: ICD-10-CM

## 2024-02-26 DIAGNOSIS — Z95.0 PACEMAKER: ICD-10-CM

## 2024-02-26 DIAGNOSIS — I49.5 SINUS NODE DYSFUNCTION (MULTI): ICD-10-CM

## 2024-02-26 DIAGNOSIS — I48.19 PERSISTENT ATRIAL FIBRILLATION (MULTI): ICD-10-CM

## 2024-02-26 DIAGNOSIS — T82.110D PACEMAKER LEAD FAILURE, SUBSEQUENT ENCOUNTER: ICD-10-CM

## 2024-02-26 DIAGNOSIS — I48.91 ATRIAL FIBRILLATION, UNSPECIFIED TYPE (MULTI): ICD-10-CM

## 2024-02-26 DIAGNOSIS — R00.2 PALPITATIONS: ICD-10-CM

## 2024-02-26 LAB
ALBUMIN SERPL BCP-MCNC: 3.9 G/DL (ref 3.4–5)
ALP SERPL-CCNC: 97 U/L (ref 33–136)
ALT SERPL W P-5'-P-CCNC: 30 U/L (ref 7–45)
ANION GAP SERPL CALC-SCNC: 13 MMOL/L (ref 10–20)
AST SERPL W P-5'-P-CCNC: 29 U/L (ref 9–39)
BILIRUB SERPL-MCNC: 0.6 MG/DL (ref 0–1.2)
BUN SERPL-MCNC: 13 MG/DL (ref 6–23)
CALCIUM SERPL-MCNC: 9 MG/DL (ref 8.6–10.3)
CHLORIDE SERPL-SCNC: 103 MMOL/L (ref 98–107)
CO2 SERPL-SCNC: 29 MMOL/L (ref 21–32)
CREAT SERPL-MCNC: 0.77 MG/DL (ref 0.5–1.05)
EGFRCR SERPLBLD CKD-EPI 2021: 85 ML/MIN/1.73M*2
ERYTHROCYTE [DISTWIDTH] IN BLOOD BY AUTOMATED COUNT: 13.5 % (ref 11.5–14.5)
GLUCOSE SERPL-MCNC: 133 MG/DL (ref 74–99)
HCT VFR BLD AUTO: 41.5 % (ref 36–46)
HGB BLD-MCNC: 13.5 G/DL (ref 12–16)
INR PPP: 1.2 (ref 0.9–1.1)
MCH RBC QN AUTO: 29.5 PG (ref 26–34)
MCHC RBC AUTO-ENTMCNC: 32.5 G/DL (ref 32–36)
MCV RBC AUTO: 91 FL (ref 80–100)
NRBC BLD-RTO: 0 /100 WBCS (ref 0–0)
PLATELET # BLD AUTO: 178 X10*3/UL (ref 150–450)
POTASSIUM SERPL-SCNC: 3.8 MMOL/L (ref 3.5–5.3)
PROT SERPL-MCNC: 6.2 G/DL (ref 6.4–8.2)
PROTHROMBIN TIME: 13.5 SECONDS (ref 9.8–12.8)
RBC # BLD AUTO: 4.57 X10*6/UL (ref 4–5.2)
SODIUM SERPL-SCNC: 141 MMOL/L (ref 136–145)
TSH SERPL-ACNC: 2.27 MIU/L (ref 0.44–3.98)
WBC # BLD AUTO: 6.4 X10*3/UL (ref 4.4–11.3)

## 2024-02-26 PROCEDURE — 84443 ASSAY THYROID STIM HORMONE: CPT

## 2024-02-26 PROCEDURE — 80053 COMPREHEN METABOLIC PANEL: CPT

## 2024-02-26 PROCEDURE — 85610 PROTHROMBIN TIME: CPT

## 2024-02-26 PROCEDURE — 85027 COMPLETE CBC AUTOMATED: CPT

## 2024-02-26 PROCEDURE — 36415 COLL VENOUS BLD VENIPUNCTURE: CPT

## 2024-03-05 ENCOUNTER — HOSPITAL ENCOUNTER (OUTPATIENT)
Dept: RADIOLOGY | Facility: CLINIC | Age: 64
Discharge: HOME | End: 2024-03-05
Payer: MEDICARE

## 2024-03-05 DIAGNOSIS — I49.5 SINUS NODE DYSFUNCTION (MULTI): ICD-10-CM

## 2024-03-05 DIAGNOSIS — R94.30 ABNORMAL RESULT OF CARDIOVASCULAR FUNCTION STUDY, UNSPECIFIED: ICD-10-CM

## 2024-03-05 PROCEDURE — 75572 CT HRT W/3D IMAGE: CPT

## 2024-03-05 PROCEDURE — 2550000001 HC RX 255 CONTRASTS: Performed by: INTERNAL MEDICINE

## 2024-03-05 RX ADMIN — IOHEXOL 80 ML: 350 INJECTION, SOLUTION INTRAVENOUS at 10:03

## 2024-03-07 ENCOUNTER — OFFICE VISIT (OUTPATIENT)
Dept: CARDIOLOGY | Facility: CLINIC | Age: 64
End: 2024-03-07
Payer: MEDICARE

## 2024-03-07 ENCOUNTER — LAB (OUTPATIENT)
Dept: LAB | Facility: LAB | Age: 64
End: 2024-03-07
Payer: MEDICARE

## 2024-03-07 VITALS
WEIGHT: 288 LBS | HEART RATE: 58 BPM | DIASTOLIC BLOOD PRESSURE: 78 MMHG | SYSTOLIC BLOOD PRESSURE: 126 MMHG | BODY MASS INDEX: 47.93 KG/M2

## 2024-03-07 DIAGNOSIS — Z78.9 NEVER SMOKED TOBACCO: ICD-10-CM

## 2024-03-07 DIAGNOSIS — Z79.899 HIGH RISK MEDICATION USE: ICD-10-CM

## 2024-03-07 DIAGNOSIS — T82.110D PACEMAKER LEAD FAILURE, SUBSEQUENT ENCOUNTER: ICD-10-CM

## 2024-03-07 DIAGNOSIS — R00.2 PALPITATIONS: ICD-10-CM

## 2024-03-07 DIAGNOSIS — I49.5 SINUS NODE DYSFUNCTION (MULTI): ICD-10-CM

## 2024-03-07 DIAGNOSIS — I48.19 PERSISTENT ATRIAL FIBRILLATION (MULTI): ICD-10-CM

## 2024-03-07 DIAGNOSIS — Z95.0 PACEMAKER: ICD-10-CM

## 2024-03-07 LAB
ANION GAP SERPL CALC-SCNC: 15 MMOL/L (ref 10–20)
BUN SERPL-MCNC: 17 MG/DL (ref 6–23)
CALCIUM SERPL-MCNC: 9.3 MG/DL (ref 8.6–10.3)
CHLORIDE SERPL-SCNC: 102 MMOL/L (ref 98–107)
CO2 SERPL-SCNC: 28 MMOL/L (ref 21–32)
CREAT SERPL-MCNC: 0.85 MG/DL (ref 0.5–1.05)
DIGOXIN SERPL-MCNC: <0.3 NG/ML (ref 0.8–?)
EGFRCR SERPLBLD CKD-EPI 2021: 76 ML/MIN/1.73M*2
GLUCOSE SERPL-MCNC: 124 MG/DL (ref 74–99)
POTASSIUM SERPL-SCNC: 4.1 MMOL/L (ref 3.5–5.3)
SODIUM SERPL-SCNC: 141 MMOL/L (ref 136–145)

## 2024-03-07 PROCEDURE — 1159F MED LIST DOCD IN RCRD: CPT | Performed by: INTERNAL MEDICINE

## 2024-03-07 PROCEDURE — 3008F BODY MASS INDEX DOCD: CPT | Performed by: INTERNAL MEDICINE

## 2024-03-07 PROCEDURE — 1036F TOBACCO NON-USER: CPT | Performed by: INTERNAL MEDICINE

## 2024-03-07 PROCEDURE — 3074F SYST BP LT 130 MM HG: CPT | Performed by: INTERNAL MEDICINE

## 2024-03-07 PROCEDURE — 3078F DIAST BP <80 MM HG: CPT | Performed by: INTERNAL MEDICINE

## 2024-03-07 PROCEDURE — 80048 BASIC METABOLIC PNL TOTAL CA: CPT

## 2024-03-07 PROCEDURE — 36415 COLL VENOUS BLD VENIPUNCTURE: CPT

## 2024-03-07 PROCEDURE — 80162 ASSAY OF DIGOXIN TOTAL: CPT

## 2024-03-07 PROCEDURE — 99215 OFFICE O/P EST HI 40 MIN: CPT | Performed by: INTERNAL MEDICINE

## 2024-03-07 PROCEDURE — 93280 PM DEVICE PROGR EVAL DUAL: CPT | Performed by: INTERNAL MEDICINE

## 2024-03-07 PROCEDURE — 93000 ELECTROCARDIOGRAM COMPLETE: CPT | Performed by: INTERNAL MEDICINE

## 2024-03-07 RX ORDER — FUROSEMIDE 20 MG/1
TABLET ORAL
Qty: 100 TABLET | Refills: 0 | Status: ON HOLD | OUTPATIENT
Start: 2024-03-07 | End: 2024-03-29 | Stop reason: SDUPTHER

## 2024-03-07 NOTE — PATIENT INSTRUCTIONS
Increase Furosemide to 40 mg daily x 1 week then decrease back to 20 mg daily thereafter    Labs to be done today    Echo to be done next week  Follow up with Dr. Guo 6-8 weeks        -Please bring all medicines, vitamins, and herbal supplements with you in original bottles to every appointment!!!!    -Prescriptions will not be filled unless you are compliant with your follow up appointments or have a follow up appointment scheduled as per instruction of your physician. Refills should be requested at the time of your visit.

## 2024-03-07 NOTE — PROGRESS NOTES
CARDIOLOGY OFFICE VISIT      CHIEF COMPLAINT  Chief Complaint   Patient presents with    Follow-up       HISTORY OF PRESENT ILLNESS  HPI    64-year-old female with a past medical history of atrial fibrillation and sinus node dysfunction with prior pacemaker implanted in March 2018. She presented to emergency department in March 2019 due to feeling a jumping sensation in her right chest that she has not had in the past. X-ray shows a pacemaker with right atrial lead and right ventricular lead around the right atrial and left subclavian area. She underwent a repositioning of both leads back in March 2019 with successful results.     Due to persistent palpitations, we increased the dose of sotalol to 240 mg twice a day.      she has persisting atrial fibrillation. We discussed the option of changing antiarrhythmic therapy. Patient was admitted in February 2023 at [facility] to change sotalol for dofetilide. She did tolerate dofetilide 250 mcg twice a day. Patient underwent cardioversion with successful restoration to sinus rhythm but she had early recurrence of atrial fibrillation.    Due to persistent palpitations during the last visit patient was started on digoxin therapy.    She states that her palpitations are much better but is still having episodes of palpitations occasionally and also shortness of breath especially with moderate activities.  She also has been noticing that she is retaining more fluid in the lower extremities.    EKG performed today shows sinus bradycardia at a rate of 51 bpm QRS duration 86 ms QT corrected 412 ms.  Rhythm strip shows the same pattern.    Patient had a device interrogation today in our office and it shows adequate sensing, capture and impedances of all leads.  [place] of atrial fibrillation 7.5% of the time and rates controlled during atrial fibrillation.        Past Medical History  Past Medical History:   Diagnosis Date    Acute kidney failure, unspecified (CMS/[facility])      Acute kidney injury    Other dorsalgia     Interscapular pain    Personal history of other diseases of the circulatory system     History of sinus bradycardia    Personal history of other diseases of urinary system     History of chronic kidney disease    Personal history of other endocrine, nutritional and metabolic disease     History of hypokalemia    Personal history of other specified conditions     History of fatigue    Personal history of other specified conditions     H/O shortness of breath       Social History  Social History     Tobacco Use    Smoking status: Never     Passive exposure: Never    Smokeless tobacco: Never   Substance Use Topics    Alcohol use: Not on file    Drug use: Never       Family History     Family History   Problem Relation Name Age of Onset    Other (systemic lupus erythematosus) Mother      Heart attack Father      Other (arteriosclerotic cardiovascular disease) Sister      Heart attack Sister      Lung cancer Brother      Other (cardiac disorder) Child          Allergies:  Allergies   Allergen Reactions    Amlodipine Unknown    Losartan Cough    Milk Containing Products (Dairy) Unknown    Sulfa (Sulfonamide Antibiotics) Unknown        Outpatient Medications:  Current Outpatient Medications   Medication Instructions    apixaban (Eliquis) 5 mg tablet 1 tablet, oral, 2 times daily    atorvastatin (LIPITOR) 80 mg, oral, Nightly    digoxin (Lanoxin) 125 MCG tablet Take 4 tablets now , then take 2 tablets 4 hours later, then take 2 additional tablets 4 more hours later (loading dose) then starting tomorrow take 1 tablet daily thereafter    dofetilide (Tikosyn) 500 mcg capsule 1 capsule, oral, 2 times daily    doxazosin (Cardura) 2 mg tablet 0.5 tab(s) orally once a day    furosemide (LASIX) 20 mg, oral, Daily    loratadine (Claritin) 10 mg tablet 1 tablet, oral, Daily    metFORMIN  mg 24 hr tablet 1 tablet, oral, Daily    metoprolol tartrate (LOPRESSOR) 100 mg, oral, 2 times  daily    PARoxetine (Paxil) 20 mg tablet 1 tablet, oral, Daily    potassium chloride CR (Klor-Con M20) 20 mEq ER tablet Take 1 tablet every evening    spironolactone (Aldactone) 25 mg tablet TAKE 2 TABLETS IN THE MORNING AND 1 TABLET IN THE EVENING    Trulicity 0.75 mg, subcutaneous, Weekly          REVIEW OF SYSTEMS  Review of Systems   All other systems reviewed and are negative.        VITALS  Vitals:    03/07/24 1003   BP: 126/78   Pulse: 58       PHYSICAL EXAM  Constitutional:       Appearance: Healthy appearance. Not in distress.   Neck:      Vascular: No JVR. JVD normal.   Pulmonary:      Effort: Pulmonary effort is normal.      Breath sounds: Normal breath sounds. No wheezing. No rhonchi. No rales.   Chest:      Chest wall: Not tender to palpatation.   Cardiovascular:      PMI at left midclavicular line. Normal rate. Regular rhythm. Normal S1. Normal S2.       Murmurs: There is no murmur.      No gallop.  No click. No rub.      Comments: Device left pectoral area. No hematoma or infection noted.     Pulses:     Intact distal pulses.   Edema:     Peripheral edema absent.   Abdominal:      General: Bowel sounds are normal.      Palpations: Abdomen is soft.      Tenderness: There is no abdominal tenderness.   Musculoskeletal: Normal range of motion.         General: No tenderness. Skin:     General: Skin is warm and dry.   Neurological:      General: No focal deficit present.      Mental Status: Alert and oriented to person, place and time.           ASSESSMENT AND PLAN  Clinical impression        1. Sinus node dysfunction, status post dual-chamber pacemaker, resolved  2. Status post dual-chamber pacemaker will reposition of the right atrial ventricular lead in March 2019. Device interrogation reviewed patient during this office visit  3. persistent atrial fibrillation, burden of atrial fibrillation by pacemaker interrogation 2.9%. Plan discussed with patient during this office visit  4. Coronary artery  disease, stable  5. Normal left ventricular function per echocardiogram showing 60%, stable  6. Hypertension, controlled  7. Sleep apnea  8. High risk medication (dofetilide). Patient failed sotalol therapy. She had recurrence of atrial fibrillation on sotalol therapy.    Plan-recommendations    Will obtain an echocardiogram for left ventricular ejection fraction evaluation.    Patient was instructed to use Lasix 40 mg 1 tablet daily for 7 days and then 20 mg daily.    Get a digoxin level today.    Follow device clinic as scheduled.    Patient is scheduled for pulmonary isolation to prevent atrial fibrillation in the next 2 weeks.    Risk factor modification and lifestyle modification discussed with patient. Diet , exercise and hydration discussed with patient.    I have personally review with patient during this office visit, laboratory data, echocardiogram results, stress test results, Holter-event monitor results prior and after the last electrophysiology visit. All questions has been answered.    Please excuse any errors in grammar or translation related to this dictation.  Voice recognition software was utilized to prepare this document.          Scribe Attestation  By signing my name below, I, Payton Kenyon LPN , Scribe   attest that this documentation has been prepared under the direction and in the presence of Tutu Guo MD.

## 2024-03-07 NOTE — H&P (VIEW-ONLY)
CARDIOLOGY OFFICE VISIT      CHIEF COMPLAINT  Chief Complaint   Patient presents with    Follow-up       HISTORY OF PRESENT ILLNESS  HPI    64-year-old female with a past medical history of atrial fibrillation and sinus node dysfunction with prior pacemaker implanted in March 2018. She presented to emergency department in March 2019 due to feeling a jumping sensation in her right chest that she has not had in the past. X-ray shows a pacemaker with right atrial lead and right ventricular lead around the right atrial and left subclavian area. She underwent a repositioning of both leads back in March 2019 with successful results.     Due to persistent palpitations, we increased the dose of sotalol to 240 mg twice a day.      she has persisting atrial fibrillation. We discussed the option of changing antiarrhythmic therapy. Patient was admitted in February 2023 at [facility] to change sotalol for dofetilide. She did tolerate dofetilide 250 mcg twice a day. Patient underwent cardioversion with successful restoration to sinus rhythm but she had early recurrence of atrial fibrillation.    Due to persistent palpitations during the last visit patient was started on digoxin therapy.    She states that her palpitations are much better but is still having episodes of palpitations occasionally and also shortness of breath especially with moderate activities.  She also has been noticing that she is retaining more fluid in the lower extremities.    EKG performed today shows sinus bradycardia at a rate of 51 bpm QRS duration 86 ms QT corrected 412 ms.  Rhythm strip shows the same pattern.    Patient had a device interrogation today in our office and it shows adequate sensing, capture and impedances of all leads.  [place] of atrial fibrillation 7.5% of the time and rates controlled during atrial fibrillation.        Past Medical History  Past Medical History:   Diagnosis Date    Acute kidney failure, unspecified (CMS/[facility])      Acute kidney injury    Other dorsalgia     Interscapular pain    Personal history of other diseases of the circulatory system     History of sinus bradycardia    Personal history of other diseases of urinary system     History of chronic kidney disease    Personal history of other endocrine, nutritional and metabolic disease     History of hypokalemia    Personal history of other specified conditions     History of fatigue    Personal history of other specified conditions     H/O shortness of breath       Social History  Social History     Tobacco Use    Smoking status: Never     Passive exposure: Never    Smokeless tobacco: Never   Substance Use Topics    Alcohol use: Not on file    Drug use: Never       Family History     Family History   Problem Relation Name Age of Onset    Other (systemic lupus erythematosus) Mother      Heart attack Father      Other (arteriosclerotic cardiovascular disease) Sister      Heart attack Sister      Lung cancer Brother      Other (cardiac disorder) Child          Allergies:  Allergies   Allergen Reactions    Amlodipine Unknown    Losartan Cough    Milk Containing Products (Dairy) Unknown    Sulfa (Sulfonamide Antibiotics) Unknown        Outpatient Medications:  Current Outpatient Medications   Medication Instructions    apixaban (Eliquis) 5 mg tablet 1 tablet, oral, 2 times daily    atorvastatin (LIPITOR) 80 mg, oral, Nightly    digoxin (Lanoxin) 125 MCG tablet Take 4 tablets now , then take 2 tablets 4 hours later, then take 2 additional tablets 4 more hours later (loading dose) then starting tomorrow take 1 tablet daily thereafter    dofetilide (Tikosyn) 500 mcg capsule 1 capsule, oral, 2 times daily    doxazosin (Cardura) 2 mg tablet 0.5 tab(s) orally once a day    furosemide (LASIX) 20 mg, oral, Daily    loratadine (Claritin) 10 mg tablet 1 tablet, oral, Daily    metFORMIN  mg 24 hr tablet 1 tablet, oral, Daily    metoprolol tartrate (LOPRESSOR) 100 mg, oral, 2 times  daily    PARoxetine (Paxil) 20 mg tablet 1 tablet, oral, Daily    potassium chloride CR (Klor-Con M20) 20 mEq ER tablet Take 1 tablet every evening    spironolactone (Aldactone) 25 mg tablet TAKE 2 TABLETS IN THE MORNING AND 1 TABLET IN THE EVENING    Trulicity 0.75 mg, subcutaneous, Weekly          REVIEW OF SYSTEMS  Review of Systems   All other systems reviewed and are negative.        VITALS  Vitals:    03/07/24 1003   BP: 126/78   Pulse: 58       PHYSICAL EXAM  Constitutional:       Appearance: Healthy appearance. Not in distress.   Neck:      Vascular: No JVR. JVD normal.   Pulmonary:      Effort: Pulmonary effort is normal.      Breath sounds: Normal breath sounds. No wheezing. No rhonchi. No rales.   Chest:      Chest wall: Not tender to palpatation.   Cardiovascular:      PMI at left midclavicular line. Normal rate. Regular rhythm. Normal S1. Normal S2.       Murmurs: There is no murmur.      No gallop.  No click. No rub.      Comments: Device left pectoral area. No hematoma or infection noted.     Pulses:     Intact distal pulses.   Edema:     Peripheral edema absent.   Abdominal:      General: Bowel sounds are normal.      Palpations: Abdomen is soft.      Tenderness: There is no abdominal tenderness.   Musculoskeletal: Normal range of motion.         General: No tenderness. Skin:     General: Skin is warm and dry.   Neurological:      General: No focal deficit present.      Mental Status: Alert and oriented to person, place and time.           ASSESSMENT AND PLAN  Clinical impression        1. Sinus node dysfunction, status post dual-chamber pacemaker, resolved  2. Status post dual-chamber pacemaker will reposition of the right atrial ventricular lead in March 2019. Device interrogation reviewed patient during this office visit  3. persistent atrial fibrillation, burden of atrial fibrillation by pacemaker interrogation 2.9%. Plan discussed with patient during this office visit  4. Coronary artery  disease, stable  5. Normal left ventricular function per echocardiogram showing 60%, stable  6. Hypertension, controlled  7. Sleep apnea  8. High risk medication (dofetilide). Patient failed sotalol therapy. She had recurrence of atrial fibrillation on sotalol therapy.    Plan-recommendations    Will obtain an echocardiogram for left ventricular ejection fraction evaluation.    Patient was instructed to use Lasix 40 mg 1 tablet daily for 7 days and then 20 mg daily.    Get a digoxin level today.    Follow device clinic as scheduled.    Patient is scheduled for pulmonary isolation to prevent atrial fibrillation in the next 2 weeks.    Risk factor modification and lifestyle modification discussed with patient. Diet , exercise and hydration discussed with patient.    I have personally review with patient during this office visit, laboratory data, echocardiogram results, stress test results, Holter-event monitor results prior and after the last electrophysiology visit. All questions has been answered.    Please excuse any errors in grammar or translation related to this dictation.  Voice recognition software was utilized to prepare this document.          Scribe Attestation  By signing my name below, I, Payton Kenyon LPN , Scribe   attest that this documentation has been prepared under the direction and in the presence of Tutu Guo MD.

## 2024-03-08 ENCOUNTER — TELEPHONE (OUTPATIENT)
Dept: CARDIOLOGY | Facility: CLINIC | Age: 64
End: 2024-03-08
Payer: MEDICARE

## 2024-03-08 DIAGNOSIS — I48.0 PAROXYSMAL ATRIAL FIBRILLATION (MULTI): ICD-10-CM

## 2024-03-08 DIAGNOSIS — Z79.899 MEDICATION DOSE CHANGED: ICD-10-CM

## 2024-03-08 PROCEDURE — 93294 REM INTERROG EVL PM/LDLS PM: CPT | Performed by: INTERNAL MEDICINE

## 2024-03-08 NOTE — TELEPHONE ENCOUNTER
Called to patient and advised of Dr. Guo message. Pt currently has DIGOXIN 125 mcg tablets at home. Pt advised to take 2 tablets to equal 250 mcg until gone and a new prescription for 250 mcg tablets marlon be sent to pharmacy. Pt agreeable with plan and asking for script to be sent to [facility].     Med list updated and escribed as requested.

## 2024-03-08 NOTE — TELEPHONE ENCOUNTER
----- Message from Tutu Guo MD sent at 3/7/2024  3:41 PM EST -----  Please ask her to increase the digoxin to 250 mcg daily get digoxin level in 1 week  ----- Message -----  From: Lab, Background User  Sent: 3/7/2024   2:36 PM EST  To: Tutu Guo MD

## 2024-03-12 RX ORDER — DIGOXIN 250 MCG
250 TABLET ORAL DAILY
Qty: 90 TABLET | Refills: 0 | Status: SHIPPED | OUTPATIENT
Start: 2024-03-12 | End: 2024-05-20

## 2024-03-14 NOTE — TELEPHONE ENCOUNTER
Called to patient and advised Digoxin level to be done 1 week after dose increased. Pt agreeable with plan and will have done early next week.    Routed to Malka Robbins to sign order.

## 2024-03-16 ENCOUNTER — ANCILLARY PROCEDURE (OUTPATIENT)
Dept: CARDIOLOGY | Facility: CLINIC | Age: 64
End: 2024-03-16
Payer: MEDICARE

## 2024-03-16 ENCOUNTER — APPOINTMENT (OUTPATIENT)
Dept: LAB | Facility: LAB | Age: 64
End: 2024-03-16
Payer: MEDICARE

## 2024-03-16 DIAGNOSIS — I48.19 PERSISTENT ATRIAL FIBRILLATION (MULTI): ICD-10-CM

## 2024-03-16 LAB
AORTIC VALVE MEAN GRADIENT: 5 MMHG
AORTIC VALVE PEAK VELOCITY: 1.57 M/S
AV PEAK GRADIENT: 9.9 MMHG
AVA (PEAK VEL): 2 CM2
AVA (VTI): 1.83 CM2
EJECTION FRACTION APICAL 4 CHAMBER: 66.2
LEFT VENTRICLE INTERNAL DIMENSION DIASTOLE: 4.61 CM (ref 3.5–6)
LEFT VENTRICULAR OUTFLOW TRACT DIAMETER: 2.2 CM
MITRAL VALVE E/E' RATIO: 7.4
RIGHT VENTRICLE PEAK SYSTOLIC PRESSURE: 38 MMHG

## 2024-03-16 PROCEDURE — 93306 TTE W/DOPPLER COMPLETE: CPT

## 2024-03-16 PROCEDURE — 93306 TTE W/DOPPLER COMPLETE: CPT | Performed by: INTERNAL MEDICINE

## 2024-03-18 ENCOUNTER — OFFICE VISIT (OUTPATIENT)
Dept: CARDIOLOGY | Facility: CLINIC | Age: 64
End: 2024-03-18
Payer: MEDICARE

## 2024-03-18 ENCOUNTER — LAB (OUTPATIENT)
Dept: LAB | Facility: LAB | Age: 64
End: 2024-03-18
Payer: MEDICARE

## 2024-03-18 VITALS
WEIGHT: 279.2 LBS | BODY MASS INDEX: 46.46 KG/M2 | DIASTOLIC BLOOD PRESSURE: 74 MMHG | SYSTOLIC BLOOD PRESSURE: 130 MMHG | HEART RATE: 71 BPM

## 2024-03-18 DIAGNOSIS — T82.110D PACEMAKER LEAD FAILURE, SUBSEQUENT ENCOUNTER: ICD-10-CM

## 2024-03-18 DIAGNOSIS — Z79.899 HIGH RISK MEDICATION USE: ICD-10-CM

## 2024-03-18 DIAGNOSIS — I49.5 SINUS NODE DYSFUNCTION (MULTI): ICD-10-CM

## 2024-03-18 DIAGNOSIS — Z79.899 MEDICATION DOSE CHANGED: ICD-10-CM

## 2024-03-18 DIAGNOSIS — R00.2 PALPITATIONS: ICD-10-CM

## 2024-03-18 DIAGNOSIS — Z78.9 NEVER SMOKED TOBACCO: ICD-10-CM

## 2024-03-18 DIAGNOSIS — I48.19 PERSISTENT ATRIAL FIBRILLATION (MULTI): ICD-10-CM

## 2024-03-18 DIAGNOSIS — Z95.0 PACEMAKER: ICD-10-CM

## 2024-03-18 LAB — DIGOXIN SERPL-MCNC: 1.37 NG/ML (ref 0.8–?)

## 2024-03-18 PROCEDURE — 3078F DIAST BP <80 MM HG: CPT | Performed by: INTERNAL MEDICINE

## 2024-03-18 PROCEDURE — 1036F TOBACCO NON-USER: CPT | Performed by: INTERNAL MEDICINE

## 2024-03-18 PROCEDURE — 1159F MED LIST DOCD IN RCRD: CPT | Performed by: INTERNAL MEDICINE

## 2024-03-18 PROCEDURE — 3075F SYST BP GE 130 - 139MM HG: CPT | Performed by: INTERNAL MEDICINE

## 2024-03-18 PROCEDURE — 99215 OFFICE O/P EST HI 40 MIN: CPT | Performed by: INTERNAL MEDICINE

## 2024-03-18 PROCEDURE — 36415 COLL VENOUS BLD VENIPUNCTURE: CPT

## 2024-03-18 PROCEDURE — 80162 ASSAY OF DIGOXIN TOTAL: CPT

## 2024-03-18 PROCEDURE — 3008F BODY MASS INDEX DOCD: CPT | Performed by: INTERNAL MEDICINE

## 2024-03-18 NOTE — PATIENT INSTRUCTIONS
Increase Lasix 20 mg to 2 tablets (40 mg) x 3 days then 1 tablet daily thereafter.     -Please bring all medicines, vitamins, and herbal supplements with you in original bottles to every appointment!!!!    -Prescriptions will not be filled unless you are compliant with your follow up appointments or have a follow up appointment scheduled as per instruction of your physician. Refills should be requested at the time of your visit.

## 2024-03-18 NOTE — PROGRESS NOTES
CARDIOLOGY OFFICE VISIT      CHIEF COMPLAINT  Chief Complaint   Patient presents with    Results     ECHO 3/16/2024       HISTORY OF PRESENT ILLNESS  HPI  64-year-old female with a past medical history of atrial fibrillation and sinus node dysfunction with prior pacemaker implanted in March 2018. She presented to emergency department in March 2019 due to feeling a jumping sensation in her right chest that she has not had in the past. X-ray shows a pacemaker with right atrial lead and right ventricular lead around the right atrial and left subclavian area. She underwent a repositioning of both leads back in March 2019 with successful results.     Due to persistent palpitations, we increased the dose of sotalol to 240 mg twice a day.      she has persisting atrial fibrillation. We discussed the option of changing antiarrhythmic therapy. Patient was admitted in February 2023 at [facility] to change sotalol for dofetilide. She did tolerate dofetilide 250 mcg twice a day. Patient underwent cardioversion with successful restoration to sinus rhythm but she had early recurrence of atrial fibrillation.     Due to persistent palpitations during the last visit patient was started on digoxin therapy.     She states that her palpitations are much better but is still having episodes of palpitations occasionally and also shortness of breath especially with moderate activities.  She also has been noticing that she is retaining more fluid in the lower extremities.    An echocardiac was ordered.    Echocardiogram March 16, 2024  CONCLUSIONS:   1. Left ventricular systolic function is normal with a 60-65% estimated ejection fraction.   2. Severely increased left ventricular septal thickness.   3. The left ventricular posterior wall thickness is mildly increased.   4. Normal right ventricular chamber size and function.   5. There is moderate mitral annular calcification.   6. Mild to moderate mitral valve regurgitation.   7.  2+ tricuspid regurgitation with estimated RVSP of 38 mmHg.   8. Mild to moderate tricuspid regurgitation.   9. Comparison study 10/14/21 showed an LVEF 60-65%. Trivial to 1+ MR and TR. RVSP 32 mmHg    Patient also had adjustment of diuretic therapy.  She states that lately she has been feeling much better.  She denies any symptoms like chest pain or shortness of breath.  Legs are improving regarding fluid status.    Digoxin level 2 weeks ago less than 0.3.  Creatinine 0.58.  Potassium 4.1.      Past Medical History  Past Medical History:   Diagnosis Date    Acute kidney failure, unspecified (CMS/HCC)     Acute kidney injury    Other dorsalgia     Interscapular pain    Personal history of other diseases of the circulatory system     History of sinus bradycardia    Personal history of other diseases of urinary system     History of chronic kidney disease    Personal history of other endocrine, nutritional and metabolic disease     History of hypokalemia    Personal history of other specified conditions     History of fatigue    Personal history of other specified conditions     H/O shortness of breath       Social History  Social History     Tobacco Use    Smoking status: Never     Passive exposure: Never    Smokeless tobacco: Never   Substance Use Topics    Alcohol use: Never    Drug use: Never       Family History     Family History   Problem Relation Name Age of Onset    Other (systemic lupus erythematosus) Mother      Heart attack Father      Other (arteriosclerotic cardiovascular disease) Sister      Heart attack Sister      Lung cancer Brother      Other (cardiac disorder) Child          Allergies:  Allergies   Allergen Reactions    Amlodipine Unknown    Losartan Cough    Milk Containing Products (Dairy) Unknown    Sulfa (Sulfonamide Antibiotics) Unknown        Outpatient Medications:  Current Outpatient Medications   Medication Instructions    apixaban (Eliquis) 5 mg tablet 1 tablet, oral, 2 times daily     atorvastatin (LIPITOR) 80 mg, oral, Nightly    digoxin (LANOXIN) 250 mcg, oral, Daily    dofetilide (Tikosyn) 500 mcg capsule 1 capsule, oral, 2 times daily    doxazosin (Cardura) 2 mg tablet 0.5 tab(s) orally once a day    furosemide (Lasix) 20 mg tablet Take 2 tablets (40 mg) x 1 week then decrease to 1 tablet (20 mg) daily thereafter    loratadine (Claritin) 10 mg tablet 1 tablet, oral, Daily    metFORMIN  mg 24 hr tablet 1 tablet, oral, Daily    metoprolol tartrate (LOPRESSOR) 100 mg, oral, 2 times daily    PARoxetine (Paxil) 20 mg tablet 1 tablet, oral, Daily    potassium chloride CR (Klor-Con M20) 20 mEq ER tablet Take 1 tablet every evening    spironolactone (Aldactone) 25 mg tablet TAKE 2 TABLETS IN THE MORNING AND 1 TABLET IN THE EVENING    Trulicity 0.75 mg, subcutaneous, Weekly          REVIEW OF SYSTEMS  Review of Systems   All other systems reviewed and are negative.        VITALS  Vitals:    03/18/24 1144   BP: 130/74   Pulse: 71       PHYSICAL EXAM  Constitutional:       Appearance: Healthy appearance. Not in distress.   Neck:      Vascular: No JVR. JVD normal.   Pulmonary:      Effort: Pulmonary effort is normal.      Breath sounds: Normal breath sounds. No wheezing. No rhonchi. No rales.   Chest:      Chest wall: Not tender to palpatation.   Cardiovascular:      PMI at left midclavicular line. Normal rate. Regular rhythm. Normal S1. Normal S2.       Murmurs: There is no murmur.      No gallop.  No click. No rub.      Comments: Device left pectoral area. No hematoma or infection noted.     Pulses:     Intact distal pulses.   Edema:     Peripheral edema absent.   Abdominal:      General: Bowel sounds are normal.      Palpations: Abdomen is soft.      Tenderness: There is no abdominal tenderness.   Musculoskeletal: Normal range of motion.         General: No tenderness. Skin:     General: Skin is warm and dry.   Neurological:      General: No focal deficit present.      Mental Status: Alert and  oriented to person, place and time.           ASSESSMENT AND PLAN  Clinical impression        1. Sinus node dysfunction, status post dual-chamber pacemaker, resolved  2. Status post dual-chamber pacemaker will reposition of the right atrial ventricular lead in March 2019. Device interrogation reviewed patient during this office visit  3. persistent atrial fibrillation, burden of atrial fibrillation by pacemaker interrogation 2.9%. Plan discussed with patient during this office visit  4. Coronary artery disease, stable  5. Normal left ventricular function per echocardiogram showing 60%, stable  6. Hypertension, controlled  7. Sleep apnea  8. High risk medication (dofetilide). Patient failed sotalol therapy. She had recurrence of atrial fibrillation on sotalol therapy.    Plan-recommendations    Patient is doing well.  She states that approximately she lost 8 pounds in the last 2 weeks.  We will do another round of 40 mg of Lasix daily for 3 days and then 20 mg daily.    Get digoxin level today.    Patient is scheduled for PVI in the next 2 to 3 weeks.    Follow my office 4 weeks after PVI has been done.    Continue Eliquis therapy.  Do not stop Eliquis for PVI.    Risk factor modification and lifestyle modification discussed with patient. Diet , exercise and hydration discussed with patient.    I have personally review with patient during this office visit, laboratory data, echocardiogram results, stress test results, Holter-event monitor results prior and after the last electrophysiology visit. All questions has been answered.    Please excuse any errors in grammar or translation related to this dictation.  Voice recognition software was utilized to prepare this document.        Scribe Attestation  By signing my name below, I, Payton Kenyon LPN , Scribe   attest that this documentation has been prepared under the direction and in the presence of Tutu Guo MD.

## 2024-03-18 NOTE — H&P (VIEW-ONLY)
CARDIOLOGY OFFICE VISIT      CHIEF COMPLAINT  Chief Complaint   Patient presents with    Results     ECHO 3/16/2024       HISTORY OF PRESENT ILLNESS  HPI  64-year-old female with a past medical history of atrial fibrillation and sinus node dysfunction with prior pacemaker implanted in March 2018. She presented to emergency department in March 2019 due to feeling a jumping sensation in her right chest that she has not had in the past. X-ray shows a pacemaker with right atrial lead and right ventricular lead around the right atrial and left subclavian area. She underwent a repositioning of both leads back in March 2019 with successful results.     Due to persistent palpitations, we increased the dose of sotalol to 240 mg twice a day.      she has persisting atrial fibrillation. We discussed the option of changing antiarrhythmic therapy. Patient was admitted in February 2023 at [facility] to change sotalol for dofetilide. She did tolerate dofetilide 250 mcg twice a day. Patient underwent cardioversion with successful restoration to sinus rhythm but she had early recurrence of atrial fibrillation.     Due to persistent palpitations during the last visit patient was started on digoxin therapy.     She states that her palpitations are much better but is still having episodes of palpitations occasionally and also shortness of breath especially with moderate activities.  She also has been noticing that she is retaining more fluid in the lower extremities.    An echocardiac was ordered.    Echocardiogram March 16, 2024  CONCLUSIONS:   1. Left ventricular systolic function is normal with a 60-65% estimated ejection fraction.   2. Severely increased left ventricular septal thickness.   3. The left ventricular posterior wall thickness is mildly increased.   4. Normal right ventricular chamber size and function.   5. There is moderate mitral annular calcification.   6. Mild to moderate mitral valve regurgitation.   7.  2+ tricuspid regurgitation with estimated RVSP of 38 mmHg.   8. Mild to moderate tricuspid regurgitation.   9. Comparison study 10/14/21 showed an LVEF 60-65%. Trivial to 1+ MR and TR. RVSP 32 mmHg    Patient also had adjustment of diuretic therapy.  She states that lately she has been feeling much better.  She denies any symptoms like chest pain or shortness of breath.  Legs are improving regarding fluid status.    Digoxin level 2 weeks ago less than 0.3.  Creatinine 0.58.  Potassium 4.1.      Past Medical History  Past Medical History:   Diagnosis Date    Acute kidney failure, unspecified (CMS/HCC)     Acute kidney injury    Other dorsalgia     Interscapular pain    Personal history of other diseases of the circulatory system     History of sinus bradycardia    Personal history of other diseases of urinary system     History of chronic kidney disease    Personal history of other endocrine, nutritional and metabolic disease     History of hypokalemia    Personal history of other specified conditions     History of fatigue    Personal history of other specified conditions     H/O shortness of breath       Social History  Social History     Tobacco Use    Smoking status: Never     Passive exposure: Never    Smokeless tobacco: Never   Substance Use Topics    Alcohol use: Never    Drug use: Never       Family History     Family History   Problem Relation Name Age of Onset    Other (systemic lupus erythematosus) Mother      Heart attack Father      Other (arteriosclerotic cardiovascular disease) Sister      Heart attack Sister      Lung cancer Brother      Other (cardiac disorder) Child          Allergies:  Allergies   Allergen Reactions    Amlodipine Unknown    Losartan Cough    Milk Containing Products (Dairy) Unknown    Sulfa (Sulfonamide Antibiotics) Unknown        Outpatient Medications:  Current Outpatient Medications   Medication Instructions    apixaban (Eliquis) 5 mg tablet 1 tablet, oral, 2 times daily     atorvastatin (LIPITOR) 80 mg, oral, Nightly    digoxin (LANOXIN) 250 mcg, oral, Daily    dofetilide (Tikosyn) 500 mcg capsule 1 capsule, oral, 2 times daily    doxazosin (Cardura) 2 mg tablet 0.5 tab(s) orally once a day    furosemide (Lasix) 20 mg tablet Take 2 tablets (40 mg) x 1 week then decrease to 1 tablet (20 mg) daily thereafter    loratadine (Claritin) 10 mg tablet 1 tablet, oral, Daily    metFORMIN  mg 24 hr tablet 1 tablet, oral, Daily    metoprolol tartrate (LOPRESSOR) 100 mg, oral, 2 times daily    PARoxetine (Paxil) 20 mg tablet 1 tablet, oral, Daily    potassium chloride CR (Klor-Con M20) 20 mEq ER tablet Take 1 tablet every evening    spironolactone (Aldactone) 25 mg tablet TAKE 2 TABLETS IN THE MORNING AND 1 TABLET IN THE EVENING    Trulicity 0.75 mg, subcutaneous, Weekly          REVIEW OF SYSTEMS  Review of Systems   All other systems reviewed and are negative.        VITALS  Vitals:    03/18/24 1144   BP: 130/74   Pulse: 71       PHYSICAL EXAM  Constitutional:       Appearance: Healthy appearance. Not in distress.   Neck:      Vascular: No JVR. JVD normal.   Pulmonary:      Effort: Pulmonary effort is normal.      Breath sounds: Normal breath sounds. No wheezing. No rhonchi. No rales.   Chest:      Chest wall: Not tender to palpatation.   Cardiovascular:      PMI at left midclavicular line. Normal rate. Regular rhythm. Normal S1. Normal S2.       Murmurs: There is no murmur.      No gallop.  No click. No rub.      Comments: Device left pectoral area. No hematoma or infection noted.     Pulses:     Intact distal pulses.   Edema:     Peripheral edema absent.   Abdominal:      General: Bowel sounds are normal.      Palpations: Abdomen is soft.      Tenderness: There is no abdominal tenderness.   Musculoskeletal: Normal range of motion.         General: No tenderness. Skin:     General: Skin is warm and dry.   Neurological:      General: No focal deficit present.      Mental Status: Alert and  oriented to person, place and time.           ASSESSMENT AND PLAN  Clinical impression        1. Sinus node dysfunction, status post dual-chamber pacemaker, resolved  2. Status post dual-chamber pacemaker will reposition of the right atrial ventricular lead in March 2019. Device interrogation reviewed patient during this office visit  3. persistent atrial fibrillation, burden of atrial fibrillation by pacemaker interrogation 2.9%. Plan discussed with patient during this office visit  4. Coronary artery disease, stable  5. Normal left ventricular function per echocardiogram showing 60%, stable  6. Hypertension, controlled  7. Sleep apnea  8. High risk medication (dofetilide). Patient failed sotalol therapy. She had recurrence of atrial fibrillation on sotalol therapy.    Plan-recommendations    Patient is doing well.  She states that approximately she lost 8 pounds in the last 2 weeks.  We will do another round of 40 mg of Lasix daily for 3 days and then 20 mg daily.    Get digoxin level today.    Patient is scheduled for PVI in the next 2 to 3 weeks.    Follow my office 4 weeks after PVI has been done.    Continue Eliquis therapy.  Do not stop Eliquis for PVI.    Risk factor modification and lifestyle modification discussed with patient. Diet , exercise and hydration discussed with patient.    I have personally review with patient during this office visit, laboratory data, echocardiogram results, stress test results, Holter-event monitor results prior and after the last electrophysiology visit. All questions has been answered.    Please excuse any errors in grammar or translation related to this dictation.  Voice recognition software was utilized to prepare this document.        Scribe Attestation  By signing my name below, I, Payton Kenyon LPN , Scribe   attest that this documentation has been prepared under the direction and in the presence of Tutu Guo MD.

## 2024-03-19 ENCOUNTER — TELEPHONE (OUTPATIENT)
Dept: CARDIOLOGY | Facility: CLINIC | Age: 64
End: 2024-03-19
Payer: MEDICARE

## 2024-03-19 NOTE — TELEPHONE ENCOUNTER
----- Message from ARIADNA Car-CNP sent at 3/19/2024 11:53 AM EDT -----  Please let her know that Dr. Guo has reviewed her Digoxin level and recommends continuing with same medications.

## 2024-03-26 ENCOUNTER — TELEPHONE (OUTPATIENT)
Dept: CARDIOLOGY | Facility: HOSPITAL | Age: 64
End: 2024-03-26

## 2024-03-27 ENCOUNTER — ANESTHESIA EVENT (OUTPATIENT)
Dept: CARDIOLOGY | Facility: HOSPITAL | Age: 64
End: 2024-03-27
Payer: MEDICARE

## 2024-03-27 ENCOUNTER — HOSPITAL ENCOUNTER (OUTPATIENT)
Dept: CARDIOLOGY | Facility: HOSPITAL | Age: 64
Discharge: HOME | End: 2024-03-27
Payer: MEDICARE

## 2024-03-27 ENCOUNTER — HOSPITAL ENCOUNTER (OUTPATIENT)
Dept: RADIOLOGY | Facility: HOSPITAL | Age: 64
Discharge: HOME | End: 2024-03-27
Payer: MEDICARE

## 2024-03-27 VITALS
DIASTOLIC BLOOD PRESSURE: 52 MMHG | RESPIRATION RATE: 13 BRPM | BODY MASS INDEX: 47.57 KG/M2 | OXYGEN SATURATION: 96 % | SYSTOLIC BLOOD PRESSURE: 121 MMHG | HEIGHT: 65 IN | TEMPERATURE: 97.3 F | WEIGHT: 285.5 LBS | HEART RATE: 54 BPM

## 2024-03-27 DIAGNOSIS — R94.31 ABNORMAL ELECTROCARDIOGRAM (ECG) (EKG): ICD-10-CM

## 2024-03-27 DIAGNOSIS — Z01.818 ENCOUNTER FOR OTHER PREPROCEDURAL EXAMINATION: ICD-10-CM

## 2024-03-27 DIAGNOSIS — I49.5 SINUS NODE DYSFUNCTION (MULTI): ICD-10-CM

## 2024-03-27 DIAGNOSIS — R93.1 ABNORMAL FINDINGS ON DIAGNOSTIC IMAGING OF HEART AND CORONARY CIRCULATION: ICD-10-CM

## 2024-03-27 PROBLEM — E66.813 CLASS 3 SEVERE OBESITY DUE TO EXCESS CALORIES WITH BODY MASS INDEX (BMI) OF 45.0 TO 49.9 IN ADULT: Status: ACTIVE | Noted: 2023-10-27

## 2024-03-27 LAB
ABO GROUP (TYPE) IN BLOOD: NORMAL
ALBUMIN SERPL BCP-MCNC: 3.8 G/DL (ref 3.4–5)
ALP SERPL-CCNC: 92 U/L (ref 33–136)
ALT SERPL W P-5'-P-CCNC: 21 U/L (ref 7–45)
ANION GAP SERPL CALC-SCNC: 13 MMOL/L (ref 10–20)
APTT PPP: 29 SECONDS (ref 27–38)
AST SERPL W P-5'-P-CCNC: 18 U/L (ref 9–39)
ATRIAL RATE: 64 BPM
BILIRUB SERPL-MCNC: 0.8 MG/DL (ref 0–1.2)
BUN SERPL-MCNC: 13 MG/DL (ref 6–23)
CALCIUM SERPL-MCNC: 8.7 MG/DL (ref 8.6–10.3)
CHLORIDE SERPL-SCNC: 102 MMOL/L (ref 98–107)
CO2 SERPL-SCNC: 30 MMOL/L (ref 21–32)
CREAT SERPL-MCNC: 0.83 MG/DL (ref 0.5–1.05)
EGFRCR SERPLBLD CKD-EPI 2021: 78 ML/MIN/1.73M*2
ERYTHROCYTE [DISTWIDTH] IN BLOOD BY AUTOMATED COUNT: 13.9 % (ref 11.5–14.5)
GLUCOSE SERPL-MCNC: 146 MG/DL (ref 74–99)
HCT VFR BLD AUTO: 39.6 % (ref 36–46)
HGB BLD-MCNC: 13.1 G/DL (ref 12–16)
INR PPP: 1.3 (ref 0.9–1.1)
MCH RBC QN AUTO: 29.4 PG (ref 26–34)
MCHC RBC AUTO-ENTMCNC: 33.1 G/DL (ref 32–36)
MCV RBC AUTO: 89 FL (ref 80–100)
NRBC BLD-RTO: 0 /100 WBCS (ref 0–0)
P AXIS: 94 DEGREES
P OFFSET: 192 MS
P ONSET: 116 MS
PLATELET # BLD AUTO: 159 X10*3/UL (ref 150–450)
POTASSIUM SERPL-SCNC: 3.6 MMOL/L (ref 3.5–5.3)
PR INTERVAL: 212 MS
PROT SERPL-MCNC: 6.4 G/DL (ref 6.4–8.2)
PROTHROMBIN TIME: 14.5 SECONDS (ref 9.8–12.8)
Q ONSET: 221 MS
QRS COUNT: 11 BEATS
QRS DURATION: 100 MS
QT INTERVAL: 446 MS
QTC CALCULATION(BAZETT): 460 MS
QTC FREDERICIA: 455 MS
R AXIS: -6 DEGREES
RBC # BLD AUTO: 4.46 X10*6/UL (ref 4–5.2)
RH FACTOR (ANTIGEN D): NORMAL
SODIUM SERPL-SCNC: 141 MMOL/L (ref 136–145)
T AXIS: 56 DEGREES
T OFFSET: 444 MS
VENTRICULAR RATE: 64 BPM
WBC # BLD AUTO: 7.5 X10*3/UL (ref 4.4–11.3)

## 2024-03-27 PROCEDURE — 85027 COMPLETE CBC AUTOMATED: CPT | Performed by: NURSE PRACTITIONER

## 2024-03-27 PROCEDURE — 99152 MOD SED SAME PHYS/QHP 5/>YRS: CPT | Performed by: INTERNAL MEDICINE

## 2024-03-27 PROCEDURE — 2500000004 HC RX 250 GENERAL PHARMACY W/ HCPCS (ALT 636 FOR OP/ED): Performed by: NURSE PRACTITIONER

## 2024-03-27 PROCEDURE — 36415 COLL VENOUS BLD VENIPUNCTURE: CPT | Performed by: NURSE PRACTITIONER

## 2024-03-27 PROCEDURE — 93010 ELECTROCARDIOGRAM REPORT: CPT | Performed by: INTERNAL MEDICINE

## 2024-03-27 PROCEDURE — 85610 PROTHROMBIN TIME: CPT | Performed by: NURSE PRACTITIONER

## 2024-03-27 PROCEDURE — 2500000004 HC RX 250 GENERAL PHARMACY W/ HCPCS (ALT 636 FOR OP/ED): Performed by: INTERNAL MEDICINE

## 2024-03-27 PROCEDURE — 2500000002 HC RX 250 W HCPCS SELF ADMINISTERED DRUGS (ALT 637 FOR MEDICARE OP, ALT 636 FOR OP/ED): Performed by: NURSE PRACTITIONER

## 2024-03-27 PROCEDURE — 71046 X-RAY EXAM CHEST 2 VIEWS: CPT

## 2024-03-27 PROCEDURE — 93005 ELECTROCARDIOGRAM TRACING: CPT | Mod: 59

## 2024-03-27 PROCEDURE — 93320 DOPPLER ECHO COMPLETE: CPT

## 2024-03-27 PROCEDURE — 7100000009 HC PHASE TWO TIME - INITIAL BASE CHARGE: Performed by: INTERNAL MEDICINE

## 2024-03-27 PROCEDURE — 80053 COMPREHEN METABOLIC PANEL: CPT | Performed by: INTERNAL MEDICINE

## 2024-03-27 PROCEDURE — 36415 COLL VENOUS BLD VENIPUNCTURE: CPT | Performed by: INTERNAL MEDICINE

## 2024-03-27 PROCEDURE — 7100000010 HC PHASE TWO TIME - EACH INCREMENTAL 1 MINUTE: Performed by: INTERNAL MEDICINE

## 2024-03-27 PROCEDURE — 93312 ECHO TRANSESOPHAGEAL: CPT | Performed by: INTERNAL MEDICINE

## 2024-03-27 PROCEDURE — 2500000005 HC RX 250 GENERAL PHARMACY W/O HCPCS: Performed by: INTERNAL MEDICINE

## 2024-03-27 RX ORDER — LIDOCAINE HYDROCHLORIDE 20 MG/ML
JELLY TOPICAL AS NEEDED
Status: DISCONTINUED | OUTPATIENT
Start: 2024-03-27 | End: 2024-03-28 | Stop reason: HOSPADM

## 2024-03-27 RX ORDER — SODIUM CHLORIDE 9 MG/ML
20 INJECTION, SOLUTION INTRAVENOUS CONTINUOUS
Status: DISCONTINUED | OUTPATIENT
Start: 2024-03-27 | End: 2024-03-28 | Stop reason: HOSPADM

## 2024-03-27 RX ORDER — FENTANYL CITRATE 50 UG/ML
INJECTION, SOLUTION INTRAMUSCULAR; INTRAVENOUS AS NEEDED
Status: DISCONTINUED | OUTPATIENT
Start: 2024-03-27 | End: 2024-03-28 | Stop reason: HOSPADM

## 2024-03-27 RX ORDER — POTASSIUM CHLORIDE 20 MEQ/1
40 TABLET, EXTENDED RELEASE ORAL ONCE
Status: COMPLETED | OUTPATIENT
Start: 2024-03-27 | End: 2024-03-27

## 2024-03-27 RX ORDER — MIDAZOLAM HYDROCHLORIDE 1 MG/ML
INJECTION, SOLUTION INTRAMUSCULAR; INTRAVENOUS AS NEEDED
Status: DISCONTINUED | OUTPATIENT
Start: 2024-03-27 | End: 2024-03-28 | Stop reason: HOSPADM

## 2024-03-27 RX ADMIN — MIDAZOLAM 1 MG: 1 INJECTION INTRAMUSCULAR; INTRAVENOUS at 13:30

## 2024-03-27 RX ADMIN — FENTANYL CITRATE 50 MCG: 50 INJECTION, SOLUTION INTRAMUSCULAR; INTRAVENOUS at 13:28

## 2024-03-27 RX ADMIN — LIDOCAINE HYDROCHLORIDE 1 APPLICATION: 20 JELLY TOPICAL at 13:24

## 2024-03-27 RX ADMIN — SODIUM CHLORIDE 20 ML/HR: 9 INJECTION, SOLUTION INTRAVENOUS at 10:30

## 2024-03-27 RX ADMIN — MIDAZOLAM 2 MG: 1 INJECTION INTRAMUSCULAR; INTRAVENOUS at 13:28

## 2024-03-27 RX ADMIN — POTASSIUM CHLORIDE 40 MEQ: 1500 TABLET, EXTENDED RELEASE ORAL at 14:00

## 2024-03-27 RX ADMIN — BENZOCAINE, BUTAMBEN, AND TETRACAINE HYDROCHLORIDE 2 SPRAY: .028; .004; .004 AEROSOL, SPRAY TOPICAL at 13:23

## 2024-03-27 SDOH — HEALTH STABILITY: MENTAL HEALTH: CURRENT SMOKER: 0

## 2024-03-27 ASSESSMENT — COLUMBIA-SUICIDE SEVERITY RATING SCALE - C-SSRS
2. HAVE YOU ACTUALLY HAD ANY THOUGHTS OF KILLING YOURSELF?: NO
6. HAVE YOU EVER DONE ANYTHING, STARTED TO DO ANYTHING, OR PREPARED TO DO ANYTHING TO END YOUR LIFE?: NO

## 2024-03-27 ASSESSMENT — PAIN - FUNCTIONAL ASSESSMENT: PAIN_FUNCTIONAL_ASSESSMENT: 0-10

## 2024-03-27 ASSESSMENT — PAIN SCALES - GENERAL: PAINLEVEL_OUTOF10: 0 - NO PAIN

## 2024-03-27 NOTE — NURSING NOTE
Patient arrived back to [facility] from Cath Lab s/p STEPHEN.  On arrival, focused assessment completed and WDL.  Patient to remain NPO until after bedside swallow assessment is completed.  Patient's daughter bedside. Patient denies any needs at this time.

## 2024-03-27 NOTE — DISCHARGE INSTRUCTIONS
**Please return to [facility] tomorrow, 3/28/2024 at 6:00 AM for your ablation procedure under the care of Dr. Guo.  Nothing to eat or drink after midnight tonight in preparation for your procedure.  You may take your medications as previously instructed with small sips of water.**              CARDIAC CATHETERIZATION DISCHARGE INSTRUCTIONS     FOR SUDDEN AND SEVERE CHEST PAIN, SHORTNESS OF BREATH, EXCESSIVE BLEEDING, SIGNS OF STROKE, OR CHANGES IN MENTAL STATUS YOU SHOULD CALL 911 IMMEDIATELY.       FOR NEXT 24 HOURS    - Upon discharge, you should return home and rest for the remainder of the day and evening. You do not have to stay on bed rest but should not be very active.  It is recommended a responsible adult be with you for the first 24 hours after the procedure.    - No driving for 24 hours after procedure. Please arrange for someone to drive you home from the hospital today.     - Do not operate machinery, or use power tools for 24 hours after your procedure.     - Do not make any legal decisions for 24 hours after your procedure.     - Do not drink alcoholic beverages for 24 hours after your procedure.

## 2024-03-27 NOTE — NURSING NOTE
"Patient ambulated in room prior to discharge.  Patient had no complaints of dizziness or light-headedness after ambulation.  Discharge instructions were given via \"teach back\" method.  Instructions included restrictions, discharge medications and follow-up appointments.  Patient was given all pre-procedure information for their procedure tomorrow including what time to report here to the hospital.  Patient verbally states understanding and all follow-up questions were answered correctly.  Patient was discharged to car by wheelchair.   "

## 2024-03-27 NOTE — POST-PROCEDURE NOTE
Physician Transition of Care Summary  Invasive Cardiovascular Lab    Procedure Date: 03/27/24     Pre Procedure Indications:   Preablation STEPHEN    Post Procedure Diagnosis:   Normal size left atrial appendage with no obvious thrombus or masses visualized    Procedure(s):   Transesophageal echocardiogram    Procedure Findings:   Normal ventricular systolic function with EF of 55 to 60%.  Normal size left atrial appendage with no obvious thrombus or masses visualized.  Negative bubble study with no PFO/ASD.  Moderate biatrial enlargement with pacemaker wires visualized in the right atrium  Mild mitral and tricuspid regurgitation.  Mild plaque in descending thoracic aorta.    Description of the Procedure:   Transesophageal echocardiogram    Complications:   None    Estimated Blood Loss:   None    Anesthesia: Conscious sedation     any Specimen(s) Removed: None      Electronically signed by: Michael Andrade MD, 3/27/2024

## 2024-03-27 NOTE — NURSING NOTE
Bedside Swallow Assessment:  Patient's gag reflex has returned.  In addition, patient tolerated small sips of water.  Patient given yogurt and nahun crackers to eat along with water.

## 2024-03-28 ENCOUNTER — APPOINTMENT (OUTPATIENT)
Dept: CARDIOLOGY | Facility: HOSPITAL | Age: 64
End: 2024-03-28
Payer: MEDICARE

## 2024-03-28 ENCOUNTER — ANESTHESIA (OUTPATIENT)
Dept: CARDIOLOGY | Facility: HOSPITAL | Age: 64
End: 2024-03-28
Payer: MEDICARE

## 2024-03-28 ENCOUNTER — HOSPITAL ENCOUNTER (OUTPATIENT)
Facility: HOSPITAL | Age: 64
Discharge: HOME | End: 2024-03-30
Attending: INTERNAL MEDICINE | Admitting: INTERNAL MEDICINE
Payer: MEDICARE

## 2024-03-28 DIAGNOSIS — I48.19 ATRIAL FIBRILLATION, PERSISTENT (MULTI): ICD-10-CM

## 2024-03-28 DIAGNOSIS — I48.91 ATRIAL FIBRILLATION, UNSPECIFIED TYPE (MULTI): Primary | ICD-10-CM

## 2024-03-28 DIAGNOSIS — I48.91 ATRIAL FIBRILLATION (MULTI): ICD-10-CM

## 2024-03-28 DIAGNOSIS — Z79.899 HIGH RISK MEDICATION USE: ICD-10-CM

## 2024-03-28 LAB
ABO GROUP (TYPE) IN BLOOD: NORMAL
ANION GAP SERPL CALC-SCNC: 20 MMOL/L (ref 10–20)
ANTIBODY SCREEN: NORMAL
BUN SERPL-MCNC: 16 MG/DL (ref 6–23)
CALCIUM SERPL-MCNC: 7.8 MG/DL (ref 8.6–10.3)
CHLORIDE SERPL-SCNC: 105 MMOL/L (ref 98–107)
CO2 SERPL-SCNC: 15 MMOL/L (ref 21–32)
CREAT SERPL-MCNC: 0.91 MG/DL (ref 0.5–1.05)
EGFRCR SERPLBLD CKD-EPI 2021: 70 ML/MIN/1.73M*2
GLUCOSE SERPL-MCNC: 254 MG/DL (ref 74–99)
MAGNESIUM SERPL-MCNC: 1.68 MG/DL (ref 1.6–2.4)
POTASSIUM SERPL-SCNC: 5.1 MMOL/L (ref 3.5–5.3)
RH FACTOR (ANTIGEN D): NORMAL
RIGHT VENTRICLE PEAK SYSTOLIC PRESSURE: 36.4 MMHG
SODIUM SERPL-SCNC: 135 MMOL/L (ref 136–145)

## 2024-03-28 PROCEDURE — 85347 COAGULATION TIME ACTIVATED: CPT | Mod: MUE | Performed by: INTERNAL MEDICINE

## 2024-03-28 PROCEDURE — 7100000011 HC EXTENDED STAY RECOVERY HOURLY - NURSING UNIT

## 2024-03-28 PROCEDURE — 3700000002 HC GENERAL ANESTHESIA TIME - EACH INCREMENTAL 1 MINUTE: Performed by: INTERNAL MEDICINE

## 2024-03-28 PROCEDURE — 93005 ELECTROCARDIOGRAM TRACING: CPT | Mod: 59

## 2024-03-28 PROCEDURE — 2700000047 HC OR 270 NO HCPCS: Performed by: INTERNAL MEDICINE

## 2024-03-28 PROCEDURE — 7100000010 HC PHASE TWO TIME - EACH INCREMENTAL 1 MINUTE: Performed by: INTERNAL MEDICINE

## 2024-03-28 PROCEDURE — 93656 COMPRE EP EVAL ABLTJ ATR FIB: CPT | Performed by: INTERNAL MEDICINE

## 2024-03-28 PROCEDURE — C1731 CATH, EP, 20 OR MORE ELEC: HCPCS | Performed by: INTERNAL MEDICINE

## 2024-03-28 PROCEDURE — 93462 L HRT CATH TRNSPTL PUNCTURE: CPT | Performed by: INTERNAL MEDICINE

## 2024-03-28 PROCEDURE — 80048 BASIC METABOLIC PNL TOTAL CA: CPT | Performed by: NURSE PRACTITIONER

## 2024-03-28 PROCEDURE — 86920 COMPATIBILITY TEST SPIN: CPT

## 2024-03-28 PROCEDURE — C1732 CATH, EP, DIAG/ABL, 3D/VECT: HCPCS | Performed by: INTERNAL MEDICINE

## 2024-03-28 PROCEDURE — 85347 COAGULATION TIME ACTIVATED: CPT | Mod: MUE

## 2024-03-28 PROCEDURE — 93623 PRGRMD STIMJ&PACG IV RX NFS: CPT | Performed by: INTERNAL MEDICINE

## 2024-03-28 PROCEDURE — 2500000004 HC RX 250 GENERAL PHARMACY W/ HCPCS (ALT 636 FOR OP/ED): Performed by: NURSE ANESTHETIST, CERTIFIED REGISTERED

## 2024-03-28 PROCEDURE — 2500000005 HC RX 250 GENERAL PHARMACY W/O HCPCS: Performed by: INTERNAL MEDICINE

## 2024-03-28 PROCEDURE — 83735 ASSAY OF MAGNESIUM: CPT | Performed by: NURSE PRACTITIONER

## 2024-03-28 PROCEDURE — 36415 COLL VENOUS BLD VENIPUNCTURE: CPT | Performed by: NURSE PRACTITIONER

## 2024-03-28 PROCEDURE — G0378 HOSPITAL OBSERVATION PER HR: HCPCS

## 2024-03-28 PROCEDURE — C1759 CATH, INTRA ECHOCARDIOGRAPHY: HCPCS | Performed by: INTERNAL MEDICINE

## 2024-03-28 PROCEDURE — 93010 ELECTROCARDIOGRAM REPORT: CPT | Performed by: INTERNAL MEDICINE

## 2024-03-28 PROCEDURE — C1760 CLOSURE DEV, VASC: HCPCS | Performed by: INTERNAL MEDICINE

## 2024-03-28 PROCEDURE — C1769 GUIDE WIRE: HCPCS | Performed by: INTERNAL MEDICINE

## 2024-03-28 PROCEDURE — C1893 INTRO/SHEATH, FIXED,NON-PEEL: HCPCS | Performed by: INTERNAL MEDICINE

## 2024-03-28 PROCEDURE — 2500000001 HC RX 250 WO HCPCS SELF ADMINISTERED DRUGS (ALT 637 FOR MEDICARE OP): Performed by: NURSE PRACTITIONER

## 2024-03-28 PROCEDURE — 7100000009 HC PHASE TWO TIME - INITIAL BASE CHARGE: Performed by: INTERNAL MEDICINE

## 2024-03-28 PROCEDURE — 2500000005 HC RX 250 GENERAL PHARMACY W/O HCPCS: Performed by: NURSE ANESTHETIST, CERTIFIED REGISTERED

## 2024-03-28 PROCEDURE — 2720000007 HC OR 272 NO HCPCS: Performed by: INTERNAL MEDICINE

## 2024-03-28 PROCEDURE — 2500000004 HC RX 250 GENERAL PHARMACY W/ HCPCS (ALT 636 FOR OP/ED): Performed by: NURSE PRACTITIONER

## 2024-03-28 PROCEDURE — 36415 COLL VENOUS BLD VENIPUNCTURE: CPT | Performed by: INTERNAL MEDICINE

## 2024-03-28 PROCEDURE — 2500000004 HC RX 250 GENERAL PHARMACY W/ HCPCS (ALT 636 FOR OP/ED): Performed by: ANESTHESIOLOGY

## 2024-03-28 PROCEDURE — 2500000004 HC RX 250 GENERAL PHARMACY W/ HCPCS (ALT 636 FOR OP/ED): Performed by: INTERNAL MEDICINE

## 2024-03-28 PROCEDURE — 3700000001 HC GENERAL ANESTHESIA TIME - INITIAL BASE CHARGE: Performed by: INTERNAL MEDICINE

## 2024-03-28 PROCEDURE — 2780000003 HC OR 278 NO HCPCS: Performed by: INTERNAL MEDICINE

## 2024-03-28 PROCEDURE — G0269 OCCLUSIVE DEVICE IN VEIN ART: HCPCS | Mod: TC,59,MUE | Performed by: INTERNAL MEDICINE

## 2024-03-28 PROCEDURE — 37799 UNLISTED PX VASCULAR SURGERY: CPT | Performed by: NURSE PRACTITIONER

## 2024-03-28 PROCEDURE — 86901 BLOOD TYPING SEROLOGIC RH(D): CPT | Performed by: INTERNAL MEDICINE

## 2024-03-28 PROCEDURE — 2500000002 HC RX 250 W HCPCS SELF ADMINISTERED DRUGS (ALT 637 FOR MEDICARE OP, ALT 636 FOR OP/ED): Mod: MUE | Performed by: NURSE PRACTITIONER

## 2024-03-28 RX ORDER — PROTAMINE SULFATE 10 MG/ML
INJECTION, SOLUTION INTRAVENOUS AS NEEDED
Status: DISCONTINUED | OUTPATIENT
Start: 2024-03-28 | End: 2024-03-28

## 2024-03-28 RX ORDER — FUROSEMIDE 40 MG/1
20 TABLET ORAL DAILY
Status: DISCONTINUED | OUTPATIENT
Start: 2024-03-29 | End: 2024-03-30 | Stop reason: HOSPADM

## 2024-03-28 RX ORDER — LIDOCAINE HYDROCHLORIDE 20 MG/ML
INJECTION, SOLUTION INFILTRATION; PERINEURAL AS NEEDED
Status: DISCONTINUED | OUTPATIENT
Start: 2024-03-28 | End: 2024-03-28

## 2024-03-28 RX ORDER — TRAMADOL HYDROCHLORIDE 50 MG/1
50 TABLET ORAL EVERY 8 HOURS PRN
Status: DISCONTINUED | OUTPATIENT
Start: 2024-03-28 | End: 2024-03-30 | Stop reason: HOSPADM

## 2024-03-28 RX ORDER — FENTANYL CITRATE 50 UG/ML
50 INJECTION, SOLUTION INTRAMUSCULAR; INTRAVENOUS EVERY 5 MIN PRN
Status: DISCONTINUED | OUTPATIENT
Start: 2024-03-28 | End: 2024-03-28

## 2024-03-28 RX ORDER — ROCURONIUM BROMIDE 10 MG/ML
INJECTION, SOLUTION INTRAVENOUS AS NEEDED
Status: DISCONTINUED | OUTPATIENT
Start: 2024-03-28 | End: 2024-03-28

## 2024-03-28 RX ORDER — FENTANYL CITRATE 50 UG/ML
INJECTION, SOLUTION INTRAMUSCULAR; INTRAVENOUS AS NEEDED
Status: DISCONTINUED | OUTPATIENT
Start: 2024-03-28 | End: 2024-03-28

## 2024-03-28 RX ORDER — SODIUM CHLORIDE 9 MG/ML
INJECTION, SOLUTION INTRAVENOUS CONTINUOUS PRN
Status: DISCONTINUED | OUTPATIENT
Start: 2024-03-28 | End: 2024-03-28

## 2024-03-28 RX ORDER — ONDANSETRON HYDROCHLORIDE 2 MG/ML
4 INJECTION, SOLUTION INTRAVENOUS ONCE AS NEEDED
Status: DISCONTINUED | OUTPATIENT
Start: 2024-03-28 | End: 2024-03-28

## 2024-03-28 RX ORDER — PROPOFOL 10 MG/ML
INJECTION, EMULSION INTRAVENOUS AS NEEDED
Status: DISCONTINUED | OUTPATIENT
Start: 2024-03-28 | End: 2024-03-28

## 2024-03-28 RX ORDER — MEPERIDINE HYDROCHLORIDE 25 MG/ML
12.5 INJECTION INTRAMUSCULAR; INTRAVENOUS; SUBCUTANEOUS EVERY 10 MIN PRN
Status: DISCONTINUED | OUTPATIENT
Start: 2024-03-28 | End: 2024-03-28

## 2024-03-28 RX ORDER — ATORVASTATIN CALCIUM 80 MG/1
80 TABLET, FILM COATED ORAL NIGHTLY
Status: DISCONTINUED | OUTPATIENT
Start: 2024-03-28 | End: 2024-03-30 | Stop reason: HOSPADM

## 2024-03-28 RX ORDER — DIGOXIN 125 MCG
250 TABLET ORAL DAILY
Status: DISCONTINUED | OUTPATIENT
Start: 2024-03-29 | End: 2024-03-30 | Stop reason: HOSPADM

## 2024-03-28 RX ORDER — SODIUM CHLORIDE, SODIUM LACTATE, POTASSIUM CHLORIDE, CALCIUM CHLORIDE 600; 310; 30; 20 MG/100ML; MG/100ML; MG/100ML; MG/100ML
100 INJECTION, SOLUTION INTRAVENOUS CONTINUOUS
Status: DISCONTINUED | OUTPATIENT
Start: 2024-03-28 | End: 2024-03-28

## 2024-03-28 RX ORDER — METFORMIN HYDROCHLORIDE 500 MG/1
500 TABLET, EXTENDED RELEASE ORAL DAILY
Status: DISCONTINUED | OUTPATIENT
Start: 2024-03-29 | End: 2024-03-30 | Stop reason: HOSPADM

## 2024-03-28 RX ORDER — PAROXETINE HYDROCHLORIDE 20 MG/1
20 TABLET, FILM COATED ORAL DAILY
Status: DISCONTINUED | OUTPATIENT
Start: 2024-03-29 | End: 2024-03-30 | Stop reason: HOSPADM

## 2024-03-28 RX ORDER — MORPHINE SULFATE 4 MG/ML
2 INJECTION, SOLUTION INTRAMUSCULAR; INTRAVENOUS EVERY 4 HOURS PRN
Status: DISCONTINUED | OUTPATIENT
Start: 2024-03-28 | End: 2024-03-30 | Stop reason: HOSPADM

## 2024-03-28 RX ORDER — HYDROMORPHONE HYDROCHLORIDE 1 MG/ML
0.5 INJECTION, SOLUTION INTRAMUSCULAR; INTRAVENOUS; SUBCUTANEOUS EVERY 5 MIN PRN
Status: DISCONTINUED | OUTPATIENT
Start: 2024-03-28 | End: 2024-03-28

## 2024-03-28 RX ORDER — DOFETILIDE 0.25 MG/1
500 CAPSULE ORAL 2 TIMES DAILY
Status: DISCONTINUED | OUTPATIENT
Start: 2024-03-28 | End: 2024-03-30 | Stop reason: HOSPADM

## 2024-03-28 RX ORDER — SPIRONOLACTONE 25 MG/1
25 TABLET ORAL 3 TIMES DAILY
Status: DISCONTINUED | OUTPATIENT
Start: 2024-03-28 | End: 2024-03-30 | Stop reason: HOSPADM

## 2024-03-28 RX ORDER — DOXAZOSIN 1 MG/1
1 TABLET ORAL DAILY
Status: DISCONTINUED | OUTPATIENT
Start: 2024-03-29 | End: 2024-03-30 | Stop reason: HOSPADM

## 2024-03-28 RX ORDER — FUROSEMIDE 10 MG/ML
INJECTION INTRAMUSCULAR; INTRAVENOUS AS NEEDED
Status: DISCONTINUED | OUTPATIENT
Start: 2024-03-28 | End: 2024-03-28

## 2024-03-28 RX ORDER — LANOLIN ALCOHOL/MO/W.PET/CERES
400 CREAM (GRAM) TOPICAL 2 TIMES DAILY
Status: DISCONTINUED | OUTPATIENT
Start: 2024-03-28 | End: 2024-03-30 | Stop reason: HOSPADM

## 2024-03-28 RX ORDER — ONDANSETRON HYDROCHLORIDE 2 MG/ML
4 INJECTION, SOLUTION INTRAVENOUS EVERY 8 HOURS PRN
Status: DISCONTINUED | OUTPATIENT
Start: 2024-03-28 | End: 2024-03-30 | Stop reason: HOSPADM

## 2024-03-28 RX ORDER — PANTOPRAZOLE SODIUM 40 MG/1
40 TABLET, DELAYED RELEASE ORAL
Status: DISCONTINUED | OUTPATIENT
Start: 2024-03-28 | End: 2024-03-30 | Stop reason: HOSPADM

## 2024-03-28 RX ORDER — MAGNESIUM SULFATE HEPTAHYDRATE 40 MG/ML
4 INJECTION, SOLUTION INTRAVENOUS ONCE
Status: COMPLETED | OUTPATIENT
Start: 2024-03-28 | End: 2024-03-29

## 2024-03-28 RX ORDER — OXYCODONE HYDROCHLORIDE 5 MG/1
5 TABLET ORAL EVERY 4 HOURS PRN
Status: DISCONTINUED | OUTPATIENT
Start: 2024-03-28 | End: 2024-03-28

## 2024-03-28 RX ORDER — ACETAMINOPHEN 325 MG/1
650 TABLET ORAL EVERY 4 HOURS PRN
Status: DISCONTINUED | OUTPATIENT
Start: 2024-03-28 | End: 2024-03-30 | Stop reason: HOSPADM

## 2024-03-28 RX ORDER — LIDOCAINE HYDROCHLORIDE 10 MG/ML
INJECTION, SOLUTION EPIDURAL; INFILTRATION; INTRACAUDAL; PERINEURAL AS NEEDED
Status: DISCONTINUED | OUTPATIENT
Start: 2024-03-28 | End: 2024-03-28 | Stop reason: HOSPADM

## 2024-03-28 RX ORDER — POTASSIUM CHLORIDE 20 MEQ/1
20 TABLET, EXTENDED RELEASE ORAL EVERY EVENING
Status: DISCONTINUED | OUTPATIENT
Start: 2024-03-28 | End: 2024-03-30 | Stop reason: HOSPADM

## 2024-03-28 RX ORDER — HEPARIN SODIUM 1000 [USP'U]/ML
INJECTION, SOLUTION INTRAVENOUS; SUBCUTANEOUS AS NEEDED
Status: DISCONTINUED | OUTPATIENT
Start: 2024-03-28 | End: 2024-03-28

## 2024-03-28 RX ORDER — LORATADINE 10 MG/1
10 TABLET ORAL DAILY
Status: DISCONTINUED | OUTPATIENT
Start: 2024-03-29 | End: 2024-03-30 | Stop reason: HOSPADM

## 2024-03-28 RX ORDER — ONDANSETRON HYDROCHLORIDE 2 MG/ML
INJECTION, SOLUTION INTRAVENOUS AS NEEDED
Status: DISCONTINUED | OUTPATIENT
Start: 2024-03-28 | End: 2024-03-28

## 2024-03-28 RX ORDER — METOPROLOL TARTRATE 50 MG/1
100 TABLET ORAL 2 TIMES DAILY
Status: DISCONTINUED | OUTPATIENT
Start: 2024-03-28 | End: 2024-03-30 | Stop reason: HOSPADM

## 2024-03-28 RX ORDER — FAMOTIDINE 10 MG/ML
INJECTION INTRAVENOUS AS NEEDED
Status: DISCONTINUED | OUTPATIENT
Start: 2024-03-28 | End: 2024-03-28

## 2024-03-28 RX ORDER — MIDAZOLAM HYDROCHLORIDE 1 MG/ML
INJECTION, SOLUTION INTRAMUSCULAR; INTRAVENOUS CONTINUOUS PRN
Status: DISCONTINUED | OUTPATIENT
Start: 2024-03-28 | End: 2024-03-28

## 2024-03-28 RX ORDER — SUCCINYLCHOLINE CHLORIDE 100 MG/5ML
SYRINGE (ML) INTRAVENOUS AS NEEDED
Status: DISCONTINUED | OUTPATIENT
Start: 2024-03-28 | End: 2024-03-28

## 2024-03-28 RX ADMIN — PROTAMINE SULFATE 25 MG: 10 INJECTION, SOLUTION INTRAVENOUS at 12:44

## 2024-03-28 RX ADMIN — SPIRONOLACTONE 25 MG: 25 TABLET ORAL at 20:31

## 2024-03-28 RX ADMIN — HEPARIN SODIUM 5000 UNITS: 1000 INJECTION INTRAVENOUS; SUBCUTANEOUS at 09:53

## 2024-03-28 RX ADMIN — SODIUM CHLORIDE: 9 INJECTION, SOLUTION INTRAVENOUS at 08:06

## 2024-03-28 RX ADMIN — Medication 120 MG: at 08:24

## 2024-03-28 RX ADMIN — HEPARIN SODIUM 8000 UNITS: 1000 INJECTION INTRAVENOUS; SUBCUTANEOUS at 10:16

## 2024-03-28 RX ADMIN — HEPARIN SODIUM 10000 UNITS: 1000 INJECTION INTRAVENOUS; SUBCUTANEOUS at 09:34

## 2024-03-28 RX ADMIN — FUROSEMIDE 40 MG: 10 INJECTION, SOLUTION INTRAMUSCULAR; INTRAVENOUS at 12:45

## 2024-03-28 RX ADMIN — ATORVASTATIN CALCIUM 80 MG: 80 TABLET, FILM COATED ORAL at 20:32

## 2024-03-28 RX ADMIN — ONDANSETRON 4 MG: 2 INJECTION INTRAMUSCULAR; INTRAVENOUS at 17:55

## 2024-03-28 RX ADMIN — PROTAMINE SULFATE 50 MG: 10 INJECTION, SOLUTION INTRAVENOUS at 12:26

## 2024-03-28 RX ADMIN — TRAMADOL HYDROCHLORIDE 50 MG: 50 TABLET, COATED ORAL at 20:32

## 2024-03-28 RX ADMIN — HEPARIN SODIUM 5000 UNITS: 1000 INJECTION INTRAVENOUS; SUBCUTANEOUS at 11:18

## 2024-03-28 RX ADMIN — PROPOFOL 200 MG: 10 INJECTION, EMULSION INTRAVENOUS at 08:24

## 2024-03-28 RX ADMIN — DEXAMETHASONE SODIUM PHOSPHATE 4 MG: 4 INJECTION, SOLUTION INTRAMUSCULAR; INTRAVENOUS at 08:52

## 2024-03-28 RX ADMIN — ONDANSETRON 4 MG: 2 INJECTION INTRAMUSCULAR; INTRAVENOUS at 12:18

## 2024-03-28 RX ADMIN — METOPROLOL TARTRATE 100 MG: 50 TABLET, FILM COATED ORAL at 20:48

## 2024-03-28 RX ADMIN — APIXABAN 5 MG: 5 TABLET, FILM COATED ORAL at 20:32

## 2024-03-28 RX ADMIN — PROPOFOL 50 MG: 10 INJECTION, EMULSION INTRAVENOUS at 10:06

## 2024-03-28 RX ADMIN — MIDAZOLAM 2 MG: 1 INJECTION INTRAMUSCULAR; INTRAVENOUS at 07:30

## 2024-03-28 RX ADMIN — HEPARIN SODIUM 5000 UNITS: 1000 INJECTION INTRAVENOUS; SUBCUTANEOUS at 10:58

## 2024-03-28 RX ADMIN — FENTANYL CITRATE 50 MCG: 50 INJECTION, SOLUTION INTRAMUSCULAR; INTRAVENOUS at 10:06

## 2024-03-28 RX ADMIN — ROCURONIUM BROMIDE 50 MG: 50 INJECTION, SOLUTION INTRAVENOUS at 08:31

## 2024-03-28 RX ADMIN — MAGNESIUM SULFATE HEPTAHYDRATE 4 G: 40 INJECTION, SOLUTION INTRAVENOUS at 22:09

## 2024-03-28 RX ADMIN — POTASSIUM CHLORIDE 20 MEQ: 1500 TABLET, EXTENDED RELEASE ORAL at 20:48

## 2024-03-28 RX ADMIN — FAMOTIDINE 20 MG: 10 INJECTION, SOLUTION INTRAVENOUS at 08:55

## 2024-03-28 RX ADMIN — HYDROMORPHONE HYDROCHLORIDE 0.5 MG: 1 INJECTION, SOLUTION INTRAMUSCULAR; INTRAVENOUS; SUBCUTANEOUS at 13:52

## 2024-03-28 RX ADMIN — FENTANYL CITRATE 100 MCG: 50 INJECTION, SOLUTION INTRAMUSCULAR; INTRAVENOUS at 08:24

## 2024-03-28 RX ADMIN — LIDOCAINE HYDROCHLORIDE 100 MG: 20 INJECTION, SOLUTION INFILTRATION; PERINEURAL at 08:24

## 2024-03-28 RX ADMIN — DOFETILIDE 500 MCG: 0.25 CAPSULE ORAL at 20:31

## 2024-03-28 RX ADMIN — PROPOFOL 50 MG: 10 INJECTION, EMULSION INTRAVENOUS at 10:56

## 2024-03-28 RX ADMIN — FENTANYL CITRATE 50 MCG: 50 INJECTION, SOLUTION INTRAMUSCULAR; INTRAVENOUS at 09:11

## 2024-03-28 RX ADMIN — MAGNESIUM OXIDE 400 MG (241.3 MG MAGNESIUM) TABLET 400 MG: TABLET at 22:05

## 2024-03-28 ASSESSMENT — PAIN SCALES - GENERAL
PAINLEVEL_OUTOF10: 9
PAINLEVEL_OUTOF10: 9
PAINLEVEL_OUTOF10: 7
PAIN_LEVEL: 0
PAINLEVEL_OUTOF10: 6
PAINLEVEL_OUTOF10: 8
PAINLEVEL_OUTOF10: 0 - NO PAIN
PAINLEVEL_OUTOF10: 0 - NO PAIN
PAINLEVEL_OUTOF10: 7
PAINLEVEL_OUTOF10: 4

## 2024-03-28 ASSESSMENT — PAIN DESCRIPTION - LOCATION
LOCATION: SHOULDER

## 2024-03-28 ASSESSMENT — PAIN - FUNCTIONAL ASSESSMENT
PAIN_FUNCTIONAL_ASSESSMENT: 0-10

## 2024-03-28 ASSESSMENT — PAIN DESCRIPTION - ORIENTATION
ORIENTATION: RIGHT;LEFT
ORIENTATION: RIGHT;LEFT;OTHER (COMMENT)
ORIENTATION: ANTERIOR;RIGHT;UPPER

## 2024-03-28 ASSESSMENT — COLUMBIA-SUICIDE SEVERITY RATING SCALE - C-SSRS
6. HAVE YOU EVER DONE ANYTHING, STARTED TO DO ANYTHING, OR PREPARED TO DO ANYTHING TO END YOUR LIFE?: NO
1. IN THE PAST MONTH, HAVE YOU WISHED YOU WERE DEAD OR WISHED YOU COULD GO TO SLEEP AND NOT WAKE UP?: NO
2. HAVE YOU ACTUALLY HAD ANY THOUGHTS OF KILLING YOURSELF?: NO

## 2024-03-28 ASSESSMENT — PAIN DESCRIPTION - DESCRIPTORS: DESCRIPTORS: STABBING

## 2024-03-28 NOTE — PERIOPERATIVE NURSING NOTE
RIJ CVC acces  discontinued without difficulty. Left radial arterial line discontinue without difficulty. Hemostasis achieved both sites after manual  pressure held.

## 2024-03-28 NOTE — PERIOPERATIVE NURSING NOTE
Patient sleeping and SPO2 down after pain medicine. Increase nasal canula to keep SPO2 92%  or>. Encouraged to breathe deeply. She states pain is a little better. Explained tht IV narcotic pain medicine is causing her to sleep with lower SPO2. She states understanding and falls back to sleep quickly.

## 2024-03-28 NOTE — NURSING NOTE
Patient brought back to [facility] from PACU s/p Ablation.  On arrival, focused assessment completed and WDL.  Right and Left Groin sites are stable; dressing are clean/dry/intact x 2; no oozing or hematomas noted. Patient denies any needs at this time. Patient's family is bedside with patient.

## 2024-03-28 NOTE — ANESTHESIA PROCEDURE NOTES
Airway  Date/Time: 3/28/2024 8:29 AM  Urgency: elective    Airway not difficult    Staffing  Performed: CRNA and attending   Authorized by: Gustavo Nguyen MD    Performed by: ARIADNA Vance-RANDY  Patient location during procedure: OR    Indications and Patient Condition  Indications for airway management: anesthesia  Spontaneous ventilation: present  Sedation level: no sedation  Preoxygenated: yes  Patient position: sniffing  Mask difficulty assessment: 0 - not attempted    Final Airway Details  Final airway type: endotracheal airway      Successful airway: ETT  Cuffed: yes   Successful intubation technique: video laryngoscopy  Facilitating devices/methods: intubating stylet  Endotracheal tube insertion site: oral  Blade: Marce  Blade size: #4  ETT size (mm): 7.0  Cormack-Lehane Classification: grade I - full view of glottis  Placement verified by: chest auscultation and capnometry   Measured from: lips  Number of attempts at approach: 1

## 2024-03-28 NOTE — PERIOPERATIVE NURSING NOTE
Patient complaining that her shoulders hurt. Repositioned nd warm blankets applied but not helping. Dr Nguyen to put in medication orders.

## 2024-03-28 NOTE — Clinical Note
Michaela is a 21 year old year old female here for an OB check.  She is      .  Gestational Age: 36w0d.  Concerns today include: None     She reports fetal movement.  She denies bleeding.  She denies cramping.  She denies nausea.  She denies breast tenderness.    Pediatrician verified YES    Allergy or Sensitivity to PCN? NO   Patient was advised of today's exam.  GBS swab was set out for doctor.   If your visit includes an exam today, would you like an assistant to be present in the room during that time? NO      Sheath was removed in the right femoral vein. Site closed by Vascade.

## 2024-03-28 NOTE — ANESTHESIA PREPROCEDURE EVALUATION
Patient: Suzy Hanson    Procedure Information       Date/Time: 03/28/24 0800    Procedure: Ablation A-Fib    Location: [facility] / [facility]    Providers: Tutu Guo MD            Relevant Problems   Cardiac   (+) Arteriosclerosis of coronary artery   (+) Atrial fibrillation (CMS/HCC)   (+) HTN (hypertension)   (+) Pacemaker   (+) Paroxysmal atrial fibrillation (CMS/HCC)   (+) Persistent atrial fibrillation (CMS/HCC)   (+) Sinus node dysfunction (CMS/HCC)      Pulmonary   (+) Obstructive sleep apnea, adult      Neuro   (+) Anxiety      Endocrine   (+) Class 3 severe obesity due to excess calories with body mass index (BMI) of 45.0 to 49.9 in adult (CMS/HCC)       Clinical information reviewed:   Tobacco  Allergies  Meds   Med Hx  Surg Hx   Fam Hx  Soc Hx        NPO Detail:  NPO/Void Status  Carbohydrate Drink Given Prior to Surgery? : N  Date of Last Liquid: 03/27/24  Time of Last Liquid: 0830  Date of Last Solid: 03/26/24  Time of Last Solid: 2100  Last Intake Type: Clear fluids  Time of Last Void: 0830         Physical Exam    Airway  Mallampati: II  TM distance: >3 FB  Neck ROM: full     Cardiovascular    Dental    Pulmonary    Abdominal            Anesthesia Plan    History of general anesthesia?: yes  History of complications of general anesthesia?: no    ASA 3     general   (A-line)  The patient is not a current smoker.    intravenous induction     Plan discussed with CRNA.

## 2024-03-28 NOTE — NURSING NOTE
Bedrest complete. Bilateral groins remain stable and unchanged. Becerra catheter discontinued per order. Patient siting up in chair. Plan to transfer to 8S for overnight stay.

## 2024-03-28 NOTE — Clinical Note
Sheath was exchanged in the right femoral vein with INTRODUCER, SHEATH, FAST-CATH, 8.5FR X 12CM, C-LOCK.

## 2024-03-28 NOTE — ANESTHESIA POSTPROCEDURE EVALUATION
Patient: Suzy Hanson    Procedure Summary       Date: 03/28/24 Room / Location: [facility] / [facility]    Anesthesia Start: 0806 Anesthesia Stop: 1314    Procedure: Ablation A-Fib Diagnosis:       Atrial fibrillation, unspecified type (CMS/HCC)      (Atrial fibrillation, unspecified type (CMS/HCC) [I48.91])    Providers: Tutu Guo MD Responsible Provider: Gustavo Nguyen MD    Anesthesia Type: general ASA Status: 3            Anesthesia Type: general    Vitals Value Taken Time   /55 03/28/24 1314   Temp 36.2 03/28/24 1314   Pulse 52 03/28/24 1314   Resp 16 03/28/24 1314   SpO2 96 03/28/24 1314       Anesthesia Post Evaluation    Patient location during evaluation: PACU  Patient participation: complete - patient participated  Level of consciousness: awake and awake and alert  Pain score: 0  Pain management: adequate  Airway patency: patent  Cardiovascular status: acceptable, hemodynamically stable, blood pressure returned to baseline and stable  Respiratory status: acceptable, face mask, nonlabored ventilation and spontaneous ventilation  Hydration status: acceptable  Postoperative Nausea and Vomiting: none        There were no known notable events for this encounter.

## 2024-03-29 LAB
ANION GAP SERPL CALC-SCNC: 15 MMOL/L (ref 10–20)
ATRIAL RATE: 50 BPM
ATRIAL RATE: 69 BPM
BUN SERPL-MCNC: 19 MG/DL (ref 6–23)
CALCIUM SERPL-MCNC: 7.9 MG/DL (ref 8.6–10.3)
CHLORIDE SERPL-SCNC: 103 MMOL/L (ref 98–107)
CO2 SERPL-SCNC: 23 MMOL/L (ref 21–32)
CREAT SERPL-MCNC: 0.92 MG/DL (ref 0.5–1.05)
EGFRCR SERPLBLD CKD-EPI 2021: 69 ML/MIN/1.73M*2
ERYTHROCYTE [DISTWIDTH] IN BLOOD BY AUTOMATED COUNT: 14.1 % (ref 11.5–14.5)
GLUCOSE SERPL-MCNC: 216 MG/DL (ref 74–99)
HCT VFR BLD AUTO: 39 % (ref 36–46)
HGB BLD-MCNC: 12.6 G/DL (ref 12–16)
MAGNESIUM SERPL-MCNC: 2.56 MG/DL (ref 1.6–2.4)
MCH RBC QN AUTO: 29.4 PG (ref 26–34)
MCHC RBC AUTO-ENTMCNC: 32.3 G/DL (ref 32–36)
MCV RBC AUTO: 91 FL (ref 80–100)
NRBC BLD-RTO: 0 /100 WBCS (ref 0–0)
P AXIS: 87 DEGREES
P AXIS: 98 DEGREES
P OFFSET: 177 MS
P OFFSET: 193 MS
P ONSET: 130 MS
P ONSET: 160 MS
PLATELET # BLD AUTO: 176 X10*3/UL (ref 150–450)
POTASSIUM SERPL-SCNC: 4.5 MMOL/L (ref 3.5–5.3)
PR INTERVAL: 176 MS
PR INTERVAL: 218 MS
Q ONSET: 224 MS
Q ONSET: 225 MS
QRS COUNT: 11 BEATS
QRS COUNT: 8 BEATS
QRS DURATION: 90 MS
QRS DURATION: 96 MS
QT INTERVAL: 436 MS
QT INTERVAL: 456 MS
QTC CALCULATION(BAZETT): 415 MS
QTC CALCULATION(BAZETT): 470 MS
QTC FREDERICIA: 429 MS
QTC FREDERICIA: 459 MS
R AXIS: -6 DEGREES
R AXIS: 4 DEGREES
RBC # BLD AUTO: 4.29 X10*6/UL (ref 4–5.2)
SODIUM SERPL-SCNC: 136 MMOL/L (ref 136–145)
T AXIS: 12 DEGREES
T AXIS: 28 DEGREES
T OFFSET: 442 MS
T OFFSET: 453 MS
VENTRICULAR RATE: 50 BPM
VENTRICULAR RATE: 70 BPM
WBC # BLD AUTO: 12.3 X10*3/UL (ref 4.4–11.3)

## 2024-03-29 PROCEDURE — 36415 COLL VENOUS BLD VENIPUNCTURE: CPT | Performed by: NURSE PRACTITIONER

## 2024-03-29 PROCEDURE — 2500000004 HC RX 250 GENERAL PHARMACY W/ HCPCS (ALT 636 FOR OP/ED): Performed by: INTERNAL MEDICINE

## 2024-03-29 PROCEDURE — 2500000002 HC RX 250 W HCPCS SELF ADMINISTERED DRUGS (ALT 637 FOR MEDICARE OP, ALT 636 FOR OP/ED): Mod: MUE | Performed by: NURSE PRACTITIONER

## 2024-03-29 PROCEDURE — 7100000011 HC EXTENDED STAY RECOVERY HOURLY - NURSING UNIT

## 2024-03-29 PROCEDURE — 2500000001 HC RX 250 WO HCPCS SELF ADMINISTERED DRUGS (ALT 637 FOR MEDICARE OP): Performed by: NURSE PRACTITIONER

## 2024-03-29 PROCEDURE — 80048 BASIC METABOLIC PNL TOTAL CA: CPT | Performed by: NURSE PRACTITIONER

## 2024-03-29 PROCEDURE — 83735 ASSAY OF MAGNESIUM: CPT | Performed by: NURSE PRACTITIONER

## 2024-03-29 PROCEDURE — 99232 SBSQ HOSP IP/OBS MODERATE 35: CPT | Performed by: NURSE PRACTITIONER

## 2024-03-29 PROCEDURE — 2500000004 HC RX 250 GENERAL PHARMACY W/ HCPCS (ALT 636 FOR OP/ED): Performed by: NURSE PRACTITIONER

## 2024-03-29 PROCEDURE — 85027 COMPLETE CBC AUTOMATED: CPT | Performed by: NURSE PRACTITIONER

## 2024-03-29 RX ORDER — PANTOPRAZOLE SODIUM 40 MG/1
40 TABLET, DELAYED RELEASE ORAL
Qty: 14 TABLET | Refills: 0 | Status: SHIPPED | OUTPATIENT
Start: 2024-03-30 | End: 2024-04-13

## 2024-03-29 RX ORDER — FUROSEMIDE 10 MG/ML
40 INJECTION INTRAMUSCULAR; INTRAVENOUS ONCE
Status: COMPLETED | OUTPATIENT
Start: 2024-03-29 | End: 2024-03-29

## 2024-03-29 RX ORDER — FUROSEMIDE 10 MG/ML
40 INJECTION INTRAMUSCULAR; INTRAVENOUS EVERY 12 HOURS
Status: COMPLETED | OUTPATIENT
Start: 2024-03-29 | End: 2024-03-30

## 2024-03-29 RX ORDER — FUROSEMIDE 20 MG/1
TABLET ORAL
Qty: 100 TABLET | Refills: 0 | Status: SHIPPED | OUTPATIENT
Start: 2024-03-29 | End: 2024-04-29 | Stop reason: SDUPTHER

## 2024-03-29 RX ADMIN — MAGNESIUM OXIDE 400 MG (241.3 MG MAGNESIUM) TABLET 400 MG: TABLET at 08:57

## 2024-03-29 RX ADMIN — FUROSEMIDE 40 MG: 10 INJECTION, SOLUTION INTRAVENOUS at 20:48

## 2024-03-29 RX ADMIN — TRAMADOL HYDROCHLORIDE 50 MG: 50 TABLET, COATED ORAL at 20:59

## 2024-03-29 RX ADMIN — APIXABAN 5 MG: 5 TABLET, FILM COATED ORAL at 20:53

## 2024-03-29 RX ADMIN — DOXAZOSIN 1 MG: 1 TABLET ORAL at 08:55

## 2024-03-29 RX ADMIN — SPIRONOLACTONE 25 MG: 25 TABLET ORAL at 15:08

## 2024-03-29 RX ADMIN — DOFETILIDE 500 MCG: 0.25 CAPSULE ORAL at 20:49

## 2024-03-29 RX ADMIN — DOFETILIDE 500 MCG: 0.25 CAPSULE ORAL at 08:58

## 2024-03-29 RX ADMIN — SPIRONOLACTONE 25 MG: 25 TABLET ORAL at 08:56

## 2024-03-29 RX ADMIN — ATORVASTATIN CALCIUM 80 MG: 80 TABLET, FILM COATED ORAL at 20:50

## 2024-03-29 RX ADMIN — LORATADINE 10 MG: 10 TABLET ORAL at 08:57

## 2024-03-29 RX ADMIN — MAGNESIUM OXIDE 400 MG (241.3 MG MAGNESIUM) TABLET 400 MG: TABLET at 20:50

## 2024-03-29 RX ADMIN — METFORMIN ER 500 MG 500 MG: 500 TABLET ORAL at 08:55

## 2024-03-29 RX ADMIN — METOPROLOL TARTRATE 100 MG: 50 TABLET, FILM COATED ORAL at 08:57

## 2024-03-29 RX ADMIN — PAROXETINE 20 MG: 20 TABLET, FILM COATED ORAL at 08:54

## 2024-03-29 RX ADMIN — PANTOPRAZOLE SODIUM 40 MG: 40 TABLET, DELAYED RELEASE ORAL at 08:06

## 2024-03-29 RX ADMIN — POTASSIUM CHLORIDE 20 MEQ: 1500 TABLET, EXTENDED RELEASE ORAL at 20:50

## 2024-03-29 RX ADMIN — DIGOXIN 250 MCG: 125 TABLET ORAL at 08:56

## 2024-03-29 RX ADMIN — METOPROLOL TARTRATE 100 MG: 50 TABLET, FILM COATED ORAL at 22:12

## 2024-03-29 RX ADMIN — FUROSEMIDE 40 MG: 10 INJECTION, SOLUTION INTRAMUSCULAR; INTRAVENOUS at 12:56

## 2024-03-29 RX ADMIN — ACETAMINOPHEN 650 MG: 325 TABLET ORAL at 18:51

## 2024-03-29 RX ADMIN — FUROSEMIDE 20 MG: 40 TABLET ORAL at 08:54

## 2024-03-29 RX ADMIN — SPIRONOLACTONE 25 MG: 25 TABLET ORAL at 20:54

## 2024-03-29 RX ADMIN — APIXABAN 5 MG: 5 TABLET, FILM COATED ORAL at 08:57

## 2024-03-29 ASSESSMENT — PAIN SCALES - GENERAL
PAINLEVEL_OUTOF10: 3
PAINLEVEL_OUTOF10: 0 - NO PAIN
PAINLEVEL_OUTOF10: 5 - MODERATE PAIN
PAINLEVEL_OUTOF10: 3

## 2024-03-29 ASSESSMENT — PAIN SCALES - PAIN ASSESSMENT IN ADVANCED DEMENTIA (PAINAD)
NEGVOCALIZATION: OCCASIONAL MOAN/GROAN, LOW SPEECH, NEGATIVE/DISAPPROVING QUALITY
CONSOLABILITY: NO NEED TO CONSOLE
BODYLANGUAGE: RELAXED
BREATHING: OCCASIONAL LABORED BREATHING, SHORT PERIOD OF HYPERVENTILATION

## 2024-03-29 ASSESSMENT — COGNITIVE AND FUNCTIONAL STATUS - GENERAL
MOBILITY SCORE: 24
DAILY ACTIVITIY SCORE: 24

## 2024-03-29 ASSESSMENT — PAIN - FUNCTIONAL ASSESSMENT: PAIN_FUNCTIONAL_ASSESSMENT: 0-10

## 2024-03-29 NOTE — NURSING NOTE
Pt slept well through the night.  No acute events overnight.  Pt c/o pain to right shoulder at 2000.  Tramadol given by RN.  RN communicated lab values to attending MD.  IV Mg replacement was ordered, as well as, PO.  Both administered by RN.  Continue to monitor.

## 2024-03-29 NOTE — PROGRESS NOTES
03/29/24 1647   Discharge Planning   Living Arrangements Spouse/significant other   Support Systems Spouse/significant other   Assistance Needed independent prior to admission   Type of Residence Private residence   Home or Post Acute Services None   Patient expects to be discharged to: home   Does the patient need discharge transport arranged? No

## 2024-03-29 NOTE — DISCHARGE SUMMARY
Discharge Diagnosis  Atrial fibrillation (CMS/HCC)    Issues Requiring Follow-Up  PVI    Test Results Pending At Discharge  Pending Labs       No current pending labs.            Hospital Course   64-year-old female admitted to [facility] with paroxysmal atrial fibrillation for elective PVI ablation.  Patient underwent procedure per Dr. Guo see full operative report.  This a.m. she is sinus rhythm a paced V sensed on telemetry with heart rate in the 60s.  Bilateral groin sites dressings removed without hematoma or bleeding at site.  Patient complains of puffiness in her hands, feet, face, and abdominal bloating.  She says her urine output has been low.  She also complains of soreness/numbness left lower lip.  There appears to be a small canker sore at that site.  Patient was given additional IV Lasix 40 mg and instructed to take Lasix 40 mg daily x 3 days at home and then resume her 20 mg daily dose.  She will also be on Protonix 40 mg daily x 2 weeks.  Post procedure potential complications, activity restrictions, medications, and follow-up reviewed with patient.    Pertinent Physical Exam At Time of Discharge  Physical Exam  Constitutional:       Appearance: She is obese.   HENT:      Head: Normocephalic and atraumatic.      Nose: Nose normal.      Mouth/Throat:      Mouth: Mucous membranes are moist.   Eyes:      Conjunctiva/sclera: Conjunctivae normal.   Cardiovascular:      Rate and Rhythm: Normal rate and regular rhythm.      Pulses: Normal pulses.      Heart sounds: Normal heart sounds.      Comments: Bilateral femoral vein site open to air without hematoma or bleeding  Pulmonary:      Effort: Pulmonary effort is normal.   Abdominal:      Palpations: Abdomen is soft.   Musculoskeletal:         General: Swelling present. Normal range of motion.      Right lower leg: Edema present.      Left lower leg: Edema present.   Skin:     General: Skin is warm and dry.   Neurological:       General: No focal deficit present.      Mental Status: She is alert and oriented to person, place, and time.   Psychiatric:         Mood and Affect: Mood normal.         Behavior: Behavior normal.         Home Medications     Medication List      START taking these medications     pantoprazole 40 mg EC tablet; Commonly known as: ProtoNix; Take 1 tablet   (40 mg) by mouth once daily in the morning. Take before meals for 14 days.   Do not crush, chew, or split. Do not start before March 30, 2024.; Start   taking on: March 30, 2024     CHANGE how you take these medications     doxazosin 2 mg tablet; Commonly known as: Cardura; 0.5 tab(s) orally   once a day; What changed: how much to take, how to take this, when to take   this   furosemide 20 mg tablet; Commonly known as: Lasix; Take 2 tablets (40mg)   daily for 3 days then decrease to 1 tablet (20mg) daily thereafter; What   changed: additional instructions   potassium chloride CR 20 mEq ER tablet; Commonly known as: Klor-Con M20;   Take 1 tablet every evening; What changed: how much to take, how to take   this, when to take this   spironolactone 25 mg tablet; Commonly known as: Aldactone; TAKE 2   TABLETS IN THE MORNING AND 1 TABLET IN THE EVENING; What changed: how much   to take, how to take this, when to take this     CONTINUE taking these medications     atorvastatin 80 mg tablet; Commonly known as: Lipitor; Take 1 tablet (80   mg) by mouth once daily at bedtime.   Claritin 10 mg tablet; Generic drug: loratadine   digoxin 250 MCG tab;et; Commonly known as: Lanoxin; Take 1 tablet (250   mcg) by mouth once daily.   dofetilide 500 mcg capsule; Commonly known as: Tikosyn   Eliquis 5 mg tablet; Generic drug: apixaban   metFORMIN  mg 24 hr tablet; Commonly known as: Glucophage-XR   metoprolol tartrate 100 mg tablet; Commonly known as: Lopressor; Take 1   tablet (100 mg) by mouth 2 times a day.   PARoxetine 20 mg tablet; Commonly known as: Paxil   Trulicity 0.75  mg/0.5 mL pen injector; Generic drug: dulaglutide       Outpatient Follow-Up  Future Appointments   Date Time Provider Department Center   4/29/2024 11:30 AM Tutu Guo MD [id number] [facility]   7/15/2024 11:15 AM Tutu Guo MD [id number] None   10/29/2024 12:30 PM Jazmyn Fitch MD [id number] None     Total discharge time 40 minutes  Carmela Barker, APRN-CNP

## 2024-03-29 NOTE — DISCHARGE INSTRUCTIONS
Discharge instructions after an electrophysiology study / ablation procedure    After a procedure using sedation  You should return home and rest for the remainder of the day and evening.   It is recommended a responsible adult be with you for the first 24 hours after the procedure.  Do not make any legal decisions for 24 hours after your procedure.  Do not drink alcoholic beverages for 24 hours after your procedure.    Call 911 or seek medical attention for:  Severe chest pain  Change in mental status or weakness in extremities.  Dizziness, light headedness, or feeling faint.  If there is a large amount of bleeding or spurting of blood.  DO NOT drive yourself to the hospital.    Activity  Avoid heavy lifting (over 10 pounds), strenuous activity/exercise, squatting or excessive bending for 7 days.  Avoid sexual activity for 3 days and until any groin discomfort has ceased.  No driving for 48 hours after procedure.    Groin site care  The transparent dressing should be removed from the site 24 hours after the procedure.    It is ok to shower after transparent dressing is removed  Wash the site gently with soap and water.   Rinse well and pat dry.  You may apply a Band-Aid to the site.   Avoid lotions, ointments, or powders until fully healed.  No submerged bathing, swimming, or hot tubs for the next 7 days, or until fully healed.  You may take acetaminophen (Tylenol) as directed for discomfort.      Notify your provider  If you develop a large area of bruising or a large lump.  It is normal to notice a small bruise around the puncture site and/or a small lump.   If bleeding should occur, lay down and apply pressure to the affected area for 15 minutes.    If groin pain is not relieved with acetaminophen (Tylenol).  If you develop tingling, numbness, pain, or coolness in the leg/arm beyond the site where the procedure was performed.    Follow up appointments  You will be scheduled for a follow up appointment with  your provider in 3-4 months.  A heart monitor may be ordered prior to your provider appointment if indicated.  Any necessary appointments will be scheduled and appear on your After Visit Summary.

## 2024-03-29 NOTE — PROGRESS NOTES
Cardiology Progress Note  Patient: Suzy Hanson  Unit/Bed: 801/801-A  YOB: 1960  MRN: [id number]  Acct: [id number]   Admitting Diagnosis:   Atrial fibrillation, unspecified type (CMS/HCC) [I48.91]  Atrial fibrillation, persistent (CMS/HCC) [I48.19]  Date:  3/28/2024  Hospital Day: 0  Attending: Tutu Guo MD   Rounding REAGAN/Cardiologist:  ARIADNA Sweet-CNP,        Primary Cardiologist: Dr. Tutu Guo      Complaint:  No chief complaint on file.       SUBJECTIVE    Complains of low urine output, shortness of breath with ambulation, and feeling puffy in her legs, feet, hands, and face  Denies palpitations, dizziness, or lightheadedness      VITALS   Visit Vitals  /58   Pulse 50   Temp 35.6 °C (96.1 °F)   Resp 18        I/O:    Intake/Output Summary (Last 24 hours) at 3/29/2024 1234  Last data filed at 3/29/2024 0209  Gross per 24 hour   Intake 2660 ml   Output 2030 ml   Net 630 ml        Allergies:  Allergies   Allergen Reactions    Amlodipine Unknown    Losartan Cough    Milk Containing Products (Dairy) Unknown    Sulfa (Sulfonamide Antibiotics) Unknown        PHYSICAL EXAM   Physical Exam  Constitutional:       Appearance: She is obese.   HENT:      Head: Normocephalic and atraumatic.      Nose: Nose normal.      Mouth/Throat:      Mouth: Mucous membranes are moist.   Eyes:      Conjunctiva/sclera: Conjunctivae normal.   Cardiovascular:      Rate and Rhythm: Normal rate and regular rhythm.      Pulses: Normal pulses.      Heart sounds: Normal heart sounds.      Comments: Bilateral femoral vein sites open to air without hematoma or bleeding at site  Pulmonary:      Effort: Pulmonary effort is normal.   Abdominal:      Palpations: Abdomen is soft.   Musculoskeletal:         General: Swelling present. Normal range of motion.      Right lower leg: Edema present.      Left lower leg: Edema present.      Comments: Generalized swelling   Skin:     General: Skin is warm and dry.    Neurological:      General: No focal deficit present.      Mental Status: She is alert and oriented to person, place, and time.   Psychiatric:         Mood and Affect: Mood normal.         Behavior: Behavior normal.           LABS     Results for orders placed or performed during the hospital encounter of 03/28/24 (from the past 24 hour(s))   ECG 12 Lead   Result Value Ref Range    Ventricular Rate 50 BPM    Atrial Rate 50 BPM    OR Interval 176 ms    QRS Duration 90 ms    QT Interval 456 ms    QTC Calculation(Bazett) 415 ms    P Axis 98 degrees    R Axis 4 degrees    T Axis 12 degrees    QRS Count 8 beats    Q Onset 225 ms    P Onset 160 ms    P Offset 193 ms    T Offset 453 ms    QTC Fredericia 429 ms   Basic Metabolic Panel   Result Value Ref Range    Glucose 254 (H) 74 - 99 mg/dL    Sodium 135 (L) 136 - 145 mmol/L    Potassium 5.1 3.5 - 5.3 mmol/L    Chloride 105 98 - 107 mmol/L    Bicarbonate 15 (L) 21 - 32 mmol/L    Anion Gap 20 10 - 20 mmol/L    Urea Nitrogen 16 6 - 23 mg/dL    Creatinine 0.91 0.50 - 1.05 mg/dL    eGFR 70 >60 mL/min/1.73m*2    Calcium 7.8 (L) 8.6 - 10.3 mg/dL   Magnesium   Result Value Ref Range    Magnesium 1.68 1.60 - 2.40 mg/dL   CBC   Result Value Ref Range    WBC 12.3 (H) 4.4 - 11.3 x10*3/uL    nRBC 0.0 0.0 - 0.0 /100 WBCs    RBC 4.29 4.00 - 5.20 x10*6/uL    Hemoglobin 12.6 12.0 - 16.0 g/dL    Hematocrit 39.0 36.0 - 46.0 %    MCV 91 80 - 100 fL    MCH 29.4 26.0 - 34.0 pg    MCHC 32.3 32.0 - 36.0 g/dL    RDW 14.1 11.5 - 14.5 %    Platelets 176 150 - 450 x10*3/uL   Basic Metabolic Panel   Result Value Ref Range    Glucose 216 (H) 74 - 99 mg/dL    Sodium 136 136 - 145 mmol/L    Potassium 4.5 3.5 - 5.3 mmol/L    Chloride 103 98 - 107 mmol/L    Bicarbonate 23 21 - 32 mmol/L    Anion Gap 15 10 - 20 mmol/L    Urea Nitrogen 19 6 - 23 mg/dL    Creatinine 0.92 0.50 - 1.05 mg/dL    eGFR 69 >60 mL/min/1.73m*2    Calcium 7.9 (L) 8.6 - 10.3 mg/dL   Magnesium   Result Value Ref Range    Magnesium  2.56 (H) 1.60 - 2.40 mg/dL        Scheduled medications  apixaban, 5 mg, oral, BID  atorvastatin, 80 mg, oral, Nightly  digoxin, 250 mcg, oral, Daily  dofetilide, 500 mcg, oral, BID  doxazosin, 1 mg, oral, Daily  furosemide, 40 mg, intravenous, Once  furosemide, 20 mg, oral, Daily  loratadine, 10 mg, oral, Daily  magnesium oxide, 400 mg, oral, BID  metFORMIN XR, 500 mg, oral, Daily  metoprolol tartrate, 100 mg, oral, BID  pantoprazole, 40 mg, oral, Daily before breakfast  PARoxetine, 20 mg, oral, Daily  potassium chloride CR, 20 mEq, oral, q PM  spironolactone, 25 mg, oral, TID      Continuous medications     PRN medications  PRN medications: acetaminophen, morphine, ondansetron, traMADol     ECG 12 Lead    Result Date: 3/28/2024  Atrial-paced rhythm Abnormal ECG When compared with ECG of 28-MAR-2024 06:27, (unconfirmed) QT has shortened    Electrophysiology procedure    Result Date: 3/28/2024  Assessment: Trans-septal catheterization Summary: Successful trans-septal catheterization x 2 Discharge: The patient was in stable condition after trans-septal catheterization completed. Follow up: The patient will remain in the hospital overnight for telemetry monitoring and observation, with anticipated discharge on the following day. The patient should be alert for bleeding, swelling, or signs of infection. The patient should call the electrophysiologist immediately if symptoms recur, or for any problems. The patient has been instructed accordingly.  Procedures: Trans-septal catheterization x 2. LA pressure monitoring. Patient history: Please refer to the detailed history and physical on the patient's medical chart. Diagnosis: Atrial fibrillation Procedure narrative: The risks, benefits, and alternatives to the procedure and sedation were explained to the patient, and informed consent was obtained. The patient was in the fasting state. A baseline ECG was recorded. RF grounding pads were placed. Self-adhesive  anterior-posterior defibrillation pads were applied. A ZOLL defibrillator was used for monitoring and the defibrillator waveform was set to biphasic. The patient was set up for continuous monitoring of surface 12 lead ECG and pulse oximetry. Blood pressure was monitored with automatic cuff measurements. The procedure was performed under IV deep sedation, administered by anesthesiology.  Heparin was administered as per protocol. A long J tipped exchange wire was advanced through the existing sheath. The wire was advanced to the SVC. The existing 8 F sheath was exchanged for a LAMP sheath over the wire. With fluoroscopic and ICE guidance, the Safe Sept system was used to cross the septum. The dilator and sheath were advanced. The sheath was advanced further into the LA, and the dilator and Safe sept system were removed. The sheath was aspirated and connected to record pressure. LA measurements were appropriate. No pericardial effusion was seen by ICE. The second sheath was approached. A long J tipped exchanged wire was advanced through the existing sheath. The wire was advanced to the SVC. The existing 8 F sheath was exchanged for a SLO sheath over the wire. With fluoroscopic and ICE guidance, the Safe Sept system was used to cross the septum. The dilator and sheath were advanced. The sheath was advanced further into the LA, and the Safe sept system and dilator were removed. The sheath was aspirated and connected to record pressure. LA measurements were recorded. No pericardial effusion was seen by ICE. Complications: The patient tolerated the procedure without any complications or incident. See procedural log. Indication Procedure diagnosis atrial fibrillation Postprocedure diagnosis atrial fibrillation Procedure 1.  Electrophysiology study 2.  Electrophysiology study with left atrial recording 3.  3D mapping 4.  Ablation of pulmonary veins (x4) 5.  Ablation of posterior wall 6.  Ablation of mitral annulus 7.   Intracardiac ultrasound 8.  Transseptal approach (please see Dr. Lazar's report) 9.  Pacing with IV drug infusion Estimated blood loss 50 cc Informed consent written consent obtained Procedure details. Patient history Please refer to the detailed history and physical on the patient's medical chart.  History of palpitations stated with atrial fibrillation with recurrence of this arrhythmia.  Patient is a scheduled for atrial fibrillation ablation Procedure narrative The procedure, risk, benefits and post complications were explained to the patient and informed consent was obtained.  The patient was in the fasting state.  A grounding pad was placed.  Self-adhesive anteroposterior defibrillation pads were applied.  A defibrillator waveform was set to biphasic.  The patient was set up for continuous monitoring of twelve-lead EKG and pulse oximetry.  Blood pressure was monitored.  The procedure was performed under general anesthesia by anesthesiology department.  The bilateral groins as well as right side of the neck were prepped and draped in the usual sterile fashion. Using a Seldinger technique, 2 access were obtained in the right femoral vein and 1 access was obtained around the left femoral vein.  Also when access was obtained in the right internal jugular vein.  A wire was advanced to the heart and nephroscopy guidance and the sheath was placed over the wire and the dilator and the wires were removed.  The right groin sheath were used for transseptal approach and also for mapping and ablation catheters.  The left femoral vein groin sheath was used for intracardiac ultrasound.  The right internal jugular vein access was used for mapping of the left atrium and the right atrium with a decapolar catheter.  Transseptal approach was performed x2 using fluoroscopy guidance and intracardiac ultrasound.  Prior to the transseptal approach, IV heparin was given to try to keep the ACT between 350-450. A LASSO catheter and  ablation catheter were introduced into the left atrium.  Ablation around the 4 pulmonary veins were performed to prevent atrial fibrillation as well as left atrial roof as well as in the posterior wall as well as in the left atrial annulus.  Ablation was performed using intracardiac electrograms and also 3D mapping system.  The energy use was 30 to 35 W.  Esophageal temperatures were measured during ablation therapies using esophageal probe. At the end of the procedure, isoproterenol was infused at a dose of 20 mcg/min for 10 minutes.  No evidence of recurrence of atrial fibrillation was seen.  All catheters were removed back to the right atrium.  Protamine was given to reverse IV heparin.  Catheters were removed out of the body under fluoroscopy guidance and the sheath were partially pulled back and they were removed once ACT was below 150.  Closure devices were applied in both groins.  Manual pressure was performed in all entry sites to prevent significant bleeding or hematomas.  No evidence of hematoma or bleeding were seen in all these areas.  The right intrajugular vein access was pulled manually and manual pressure was performed for 20 minutes with no evidence of hematoma or bleeding. At the end of the procedure patient was extubated by anesthesiology department.  The patient went to recovery area after the procedure. Complications Patient tolerated procedure without any complications or incident.  No complications occurred Summary 1.  Successful ablation of 4 pulmonary veins for prevention of atrial fibrillation 2.  Successful revision of the left atrial roof to prevent atrial fibrillation 3.  Successful ablation of the left posterior wall to prevent atrial fibrillation 4.  Successful ablation of the mitral annulus to prevent atrial fibrillation Follow-up The patient will remain in the hospital overnight for telemetry monitoring and observation with anticipated discharge the next day of the procedure The  patient will receive Lasix 40 mg orally for 7 days after the discharge from the hospital. The patient will receive Nexium 40 mg orally for 7 days postprocedure. The patient was instructed for bleeding swelling or signs of infection and entry sites. Patient should continue with current medical therapy including long-term anticoagulant therapy. Results of the procedure were discussed extensively with patient and family members. Tolerance: Good Complications:  None Tutu Butterfield MD    Transesophageal Echo (STEPHEN)    Result Date: 3/28/2024          [place] [id number]  Tel [phone] Fax [phone] TRANSESOPHAGEAL ECHOCARDIOGRAM REPORT  Patient Name:      CANDIDO OSPINA MICK Collins Physician:    [id number] Michael Andrade MD Study Date:        3/27/2024            Ordering Provider:    [id number] TUTU BUTTERFIELD MRN/PID:           [id number]             Fellow: Accession#:        [id number]         Nurse:                Jagruti Arroyo Date of Birth/Age: 1960 / 64 years Sonographer:          Ananya ALCOCER Gender:            F                    Additional Staff: Height:            165.10 cm            Admit Date:           3/27/2024 Weight:            126.55 kg            Admission Status:     Outpatient BSA / BMI:         2.28 m2 / 46.43      Department Location:  [address] [place] [address]                    kg/m2 Blood Pressure: 150 /74 mmHg Study Type:    TRANSESOPHAGEAL ECHO (STEPHEN) Diagnosis/ICD: Encounter for other preprocedural examination-Z01.818 Indication:    PRE-EP CPT Codes:     STEPHEN Complete-[id number]; Moderate Sedation Services initial 15 minutes                patient >5 years-[id number]  Study Detail: The following Echo studies were performed: 2D, Doppler and color               flow. Agitated saline used as a  contrast agent for intraseptal               flow evaluation. The patient was sedated.  PHYSICIAN INTERPRETATION: STEPHEN Details: The STEPHEN probe used was [id number]. Technically adequate omniplane transesophageal echocardiogram performed. Agitated saline contrast and color flow Doppler echo was performed to assess for the presence of a patent foramen ovale. STEPHEN Medication: The pharynx was anesthetized with Cetacaine spray and viscous lidocaine. The patient was sedated using moderate sedation. Total intraservice time for moderate sedation was 18 minutes. Midazolam and Fentanyl was used to sedate the patient for this exam. STEPHEN Procedure: The probe was passed without difficulty. The following complication was encountered during the procedure: Patient tolerated the procedure well without any apparent complications. Left Ventricle: Left ventricular systolic function is normal, with an estimated ejection fraction of 60-65%. There are no regional wall motion abnormalities. The left ventricular cavity size is normal. Spectral Doppler shows a normal pattern of left ventricular diastolic filling. Left Atrium: The left atrium is moderately dilated. There is no definite left atrial thrombus present. A bubble study using agitated saline was performed. Bubble study is negative. There is a normal sized left atrial appendage and there is no thrombus visualized in the left atrial appendage. Right Ventricle: The right ventricle is normal in size. There is normal right ventricular global systolic function. Right Atrium: The right atrium is mildly dilated. Aortic Valve: The aortic valve is trileaflet. There is no evidence of aortic valve regurgitation. Mitral Valve: The mitral valve is mildly thickened. There is moderate mitral valve regurgitation. Tricuspid Valve: The tricuspid valve is structurally normal. There is mild tricuspid regurgitation. Pulmonic Valve: The pulmonic valve is not well visualized. There is no indication of pulmonic  valve regurgitation. Pericardium: There is no pericardial effusion noted. Aorta: The aortic root is normal. There is plaque visualized in the descending aorta which is classified as a Grade 2 [mild (focal or diffuse) intimal thickening of 2-3 mm] atherosclerosis. Pulmonary Veins: The pulmonary veins appear normal and return normally to the left atrium.  CONCLUSIONS:  1. Left ventricular systolic function is normal with a 60-65% estimated ejection fraction.  2. The left atrium is moderately dilated.  3. Moderate mitral valve regurgitation.  4. Mild tricuspid regurgitation is visualized.  5. No left atrial thrombus.  6. There is plaque visualized in the descending aorta. QUANTITATIVE DATA SUMMARY: TRICUSPID VALVE/RVSP:                             Normal Ranges: Peak TR Velocity: 2.89 m/s RV Syst Pressure: 36.4 mmHg (< 30mmHg)  [id number] Michael Andrade MD Electronically signed on 3/28/2024 at 12:34:55 PM  ** Final **     ECG 12 lead STAT    Result Date: 3/28/2024  Atrial-paced rhythm with prolonged AV conduction Minimal voltage criteria for LVH, may be normal variant Abnormal ECG When compared with ECG of 27-MAR-2024 10:16, Nonspecific T wave abnormality no longer evident in Lateral leads    ECG 12 lead    Result Date: 3/27/2024  Atrial-paced rhythm with prolonged AV conduction Minimal voltage criteria for LVH, may be normal variant Nonspecific T wave abnormality Abnormal ECG When compared with ECG of 07-MAR-2023 07:25, Nonspecific T wave abnormality now evident in Lateral leads Confirmed by Tio Guajardo ([id number]) on 3/27/2024 9:15:48 PM    XR chest 2 views    Result Date: 3/27/2024  Interpreted By:  Chayito Leo, STUDY: XR CHEST 2 VIEWS;  3/27/2024 10:53 am   INDICATION: Signs/Symptoms:Pre-Op PVI.   COMPARISON: 02/05/2019   ACCESSION NUMBER(S): [id number]   ORDERING CLINICIAN: SACHA NGUYEN   FINDINGS: ICD in the left upper chest wall and electrode wires in the right atrium and right ventricle appear unchanged in  position.       CARDIOMEDIASTINAL SILHOUETTE: Cardiac silhouette is enlarged but unchanged. Added density is noted in the right cardiophrenic angle region with no abnormality in this area on the recent cardiac CT, with the chest x-ray finding probably related to summation of structures.   LUNGS: Lungs are clear.   ABDOMEN: No remarkable upper abdominal findings.   BONES: No acute osseous changes.       1.  Unchanged cardiomegaly 2. Added density in the right cardiophrenic angle on the PA view has no correlate abnormality on the recent cardiac CT and is most likely due to summation of structures.       MACRO: None   Signed by: Chayito Leo 3/27/2024 12:18 PM Dictation workstation:   [id number]    Transthoracic Echo Complete    Result Date: 3/16/2024   [facility] [address], [place] [address]            Tel [phone] Fax [phone] TRANSTHORACIC ECHOCARDIOGRAM REPORT  Patient Name:      CANDIDOBRANDT BARTON       Reading Physician:    [id number] Liborio Mckee MD, [facility] Study Date:        3/16/2024            Ordering Provider:    [id number] BUTTERFIELD MRN/PID:           [id number]             Fellow: Accession#:        [id number]         Nurse: Date of Birth/Age: 1960 / 64 years Sonographer:          Laura Alexis RDCS, RDMS, RVT Gender:            F                    Additional Staff: Height:            165.10 cm            Admit Date: Weight:            130.64 kg            Admission Status:     Outpatient BSA / BMI:         2.31 m2 / 47.93      Department Location:  [facility]/[address] Study Type:    TRANSTHORACIC ECHO (TTE) COMPLETE Diagnosis/ICD: Other persistent AFib-I48.19 Indication:     Persistent Afib, CAD, HTN, Pacemaker CPT Codes:     Echo Complete w Full Doppler-[id number]  Study Detail: The following Echo studies were performed: 2D, M-Mode, Doppler and               color flow.  PHYSICIAN INTERPRETATION: Left Ventricle: Left ventricular systolic function is normal, with an estimated ejection fraction of 60-65%. There are no regional wall motion abnormalities. The left ventricular cavity size is normal. The left ventricular septal wall thickness is severely increased. There is mildly increased left ventricular posterior wall thickness. Spectral Doppler shows a normal pattern of left ventricular diastolic filling. Left Atrium: The left atrium is upper limits of normal in size. Left atrial volume index 30.2 mL/m2. Right Ventricle: The right ventricle is normal in size. There is normal right ventricular global systolic function. Normal right ventricular chamber size and function. Right Atrium: The right atrium is normal in size. Aortic Valve: The aortic valve is trileaflet. There is mild aortic valve cusp calcification. There is no evidence of aortic valve regurgitation. The peak instantaneous gradient of the aortic valve is 9.9 mmHg. The mean gradient of the aortic valve is 5.0 mmHg. Mitral Valve: The mitral valve is mild to moderately thickened. There is moderate mitral annular calcification. There is mild to moderate mitral valve regurgitation. 2+ mitral regurgitation. Tricuspid Valve: The tricuspid valve is structurally normal. There is mild to moderate tricuspid regurgitation. 2+ tricuspid regurgitation with estimated RVSP of 38 mmHg. Pulmonic Valve: The pulmonic valve is structurally normal. There is trace pulmonic valve regurgitation. Pericardium: There is no pericardial effusion noted. Aorta: The aortic root is normal. Systemic Veins: The inferior vena cava appears mildly dilated.  CONCLUSIONS:  1. Left ventricular systolic function is normal with a 60-65% estimated ejection fraction.  2.  Severely increased left ventricular septal thickness.  3. The left ventricular posterior wall thickness is mildly increased.  4. Normal right ventricular chamber size and function.  5. There is moderate mitral annular calcification.  6. Mild to moderate mitral valve regurgitation.  7. 2+ tricuspid regurgitation with estimated RVSP of 38 mmHg.  8. Mild to moderate tricuspid regurgitation.  9. Comparison study 10/14/21 showed an LVEF 60-65%. Trivial to 1+ MR and TR. RVSP 32 mmHg. QUANTITATIVE DATA SUMMARY: 2D MEASUREMENTS:                           Normal Ranges: Ao Root d:     3.10 cm    (2.0-3.7cm) LAs:           4.60 cm    (2.7-4.0cm) RVIDd:         2.34 cm    (0.9-3.6cm) IVSd:          1.73 cm    (0.6-1.1cm) LVPWd:         1.26 cm    (0.6-1.1cm) LVIDd:         4.61 cm    (3.9-5.9cm) LVIDs:         3.04 cm LV Mass Index: 123.1 g/m2 LV % FS        34.1 % AORTA MEASUREMENTS:                    Normal Ranges: Asc Ao, d: 3.10 cm (2.1-3.4cm) LV SYSTOLIC FUNCTION BY 2D PLANIMETRY (MOD):                     Normal Ranges: EF-A4C View: 66.2 % (>=55%) LV DIASTOLIC FUNCTION:                        Normal Ranges: MV Peak E:    1.31 m/s (0.7-1.2 m/s) MV lateral e' 0.18 m/s MV medial e'  0.20 m/s E/e' Ratio:   7.40     (<8.0) MITRAL VALVE:                       Normal Ranges: MV Vmax:    1.66 m/s  (<=1.3m/s) MV peak P.0 mmHg (<5mmHg) MV mean P.0 mmHg  (<48mmHg) MV DT:      180 msec  (150-240msec) MITRAL INSUFFICIENCY:                      Normal Ranges: MR Vmax: 459.00 cm/s dP/dt:   535 mmHg/s  (>1200mmHg/sec) AORTIC VALVE:                                   Normal Ranges: AoV Vmax:                1.57 m/s (<=1.7m/s) AoV Peak P.9 mmHg (<20mmHg) AoV Mean P.0 mmHg (1.7-11.5mmHg) LVOT Max Keo:            0.83 m/s (<=1.1m/s) AoV VTI:                 39.00 cm (18-25cm) LVOT VTI:                18.80 cm LVOT Diameter:           2.20 cm  (1.8-2.4cm) AoV Area, VTI:           1.83 cm2 (2.5-5.5cm2)  AoV Area,Vmax:           2.00 cm2 (2.5-4.5cm2) AoV Dimensionless Index: 0.48 TRICUSPID VALVE/RVSP:                             Normal Ranges: Peak TR Velocity: 2.96 m/s RV Syst Pressure: 38.0 mmHg (< 30mmHg) PULMONIC VALVE:                      Normal Ranges: PV Max Keo: 0.7 m/s  (0.6-0.9m/s) PV Max P.7 mmHg  [id number] Liborio Mckee MD, FACC Electronically signed on 3/16/2024 at 10:01:39 PM  ** Final **     ECG 12 Lead    Result Date: 3/7/2024  EKG performed today shows sinus bradycardia at a rate of 51 bpm QRS duration 86 ms QT corrected 412 ms. Rhythm strip shows the same pattern.     CT heart structure morphology w IV contrast    Addendum Date: 3/6/2024    Interpreted By:  Bryce Gaviria, ADDENDUM: NON-CARDIOVASCULAR FINDINGS   INCLUDED LUNGS, AIRWAYS AND PLEURA Endotracheal / endobronchial lesion: Negative Nodule: Negative Airspace disease: Negative Pleural effusion: Negative Pneumothorax: Negative   Other: No acute or contributory unanticipated findings   INCLUDED NON-CARDIOVASCULAR BRANDEE AND MEDIASTINUM Adenopathy: One or two mildly enlarged symmetric hilar nodes on each side, nonspecific Included esophagus: Unremarkable   Other: No acute or contributory unanticipated findings   INCLUDED BONES: No acute skeletal findings, noting less sensitivity and specificity without dedicated sagittal and coronal reformatted series.   INCLUDED CHEST WALL No acute or contributory unanticipated findings   INCLUDED UPPER ABDOMEN No acute or contributory unanticipated findings   -------   NON-CARDIOVASCULAR IMPRESSION   NO ACUTE OR CONTRIBUTORY UNEXPECTED FINDINGS OF THE INCLUDED NON-CARDIOVASCULAR STRUCTURES   NOTE THIS ADDENDUM IS SOLELY FOR INTERPRETATION OF ANATOMY OUTSIDE THE CARDIOVASCULAR SYSTEM. INTERPRETATION OF AND REPORTING OF THE CARDIOVASCULAR STRUCTURES ARE THE SOLE RESPONSIBILITY OF THE CARDIOLOGIST SUBMITTING THE ORIGINAL REPORT (NOT THIS ADDENDUM)   Signed by: Bryce Gaviria 3/6/2024 8:48 AM   -------- ORIGINAL  REPORT -------- Dictation workstation:   [id number]    Result Date: 3/6/2024  Interpreted By:  Jensen Briones, STUDY: CT HEART STRUCTURE MORPHOLOGY W IV CONTRAST;  3/5/2024 9:44 am   INDICATION: Signs/Symptoms:Prior to atrial fibrillation ablation.   with history of atrial fibrillation, being evaluated for radiofrequency ablation of pulmonary veins. Cardiac CTA is requested for evaluation of the pulmonary venous anatomy, including ostial measurements.   COMPARISON: None.   ACCESSION NUMBER(S): [id number]   ORDERING CLINICIAN: HITESH BUTTERFIELD   TECHNIQUE: Multi-detector CT technology was employed (Ne[employer]ito iCT 256-slice scanner).  Axial, sequential imaging with prospective ECG-triggering (40 % R-R interval ) was performed of the chest following the intravenous administration of contrast material.  A low-osmolar contrast agent was used  70 mL of Omnipaque 350.   For optimization of anatomic evaluation, multiplanar reconstruction, maximum intensity projections, and advanced 3-D off-line postprocessing were performed on a dedicated stand-alone workstation under the direct supervision of the interpreting physician.   CT Dose-Length Product (DLP):  1121.3 mGy/cm CT Dose Reduction Employed: Yes (Prospective triggering, iterative reconstruction)   FINDINGS: Potential study limitations:  None.   CARDIOVASCULAR INCLUDING MULTIPLANAR REFORMATIONS:   CARDIAC CHAMBERS: Normal atrioventricular and ventriculoarterial concordance.   LEFT ATRIUM: Left atrium is dilated 4.5 cm. Mitral annular calcification.   There is no evidence of thrombus in the left atrium or left atrial appendage.   PULMONARY VEINS: There is normal anatomy of the pulmonary veins, with four ostia, namely the right superior, right inferior, left superior and left inferior.   There is no anomalous pulmonary venous drainage. Mild pulmonary vein stenosis of proximal left inferior pulmonary vein. Proximal diameter 14 x 9 mm. Mid left inferior pulmonary vein diameter  18 x 15 mm.   PULMONARY OSTIAL MEASUREMENTS: Left superior pulmonary vein 20 x 14 mm Left inferior pulmonary vein 14 x 9 mm Right superior pulmonary vein 19 x 14 mm Right inferior pulmonary vein 25 x 22 mm   Device leads present in the right atrium and right ventricle.   PULMONARY ARTERIES: The central pulmonary arteries appear  normal   THORACIC AORTA: The thoracic aorta is normal in course, caliber, and contour. The sinotubular junction is  preserved. There is no evidence for acute aortic pathology, such as dissection, intramural hematoma, or contained rupture. The aortic arch is not included in this study.   SYSTEMIC VEINS: Normal systemic venous and pulmonary venous return. The SVC and IVC are of normal caliber.   AORTIC VALVE: The aortic valve is  trileafletin morphology.  No thickening/calcification.   MITRAL VALVE: No thickening/calcification.   CORONARY ARTERIES: The study is not optimized for the evaluation of coronary arteries. Normal origin of coronary arteries. Mild coronary artery calcification.   CORONARY VEINS: The coronary sinus drains normally into the right atrium.   PERICARDIUM: There is no pericardial effusion or thickening.         1.  Normal variant pulmonary venous anatomy as described above. 2.  Mild pulmonary vein stenosis of ostial left inferior pulmonary vein. Ostial diameter 14 x 9 mm. Mid left inferior pulmonary vein diameter 18 x 15 mm. 3. PULMONARY OSTIAL MEASUREMENTS: Left superior pulmonary vein 20 x 14 mm Left inferior pulmonary vein 14 x 9 mm Right superior pulmonary vein 19 x 14 mm Right inferior pulmonary vein 25 x 22 mm 4. Left atrium is dilated. There is on evidence of left atrial/left atrial appendage thrombus.       Reading Cardiologist: Dr. Jensen Briones, Date:  3/5/2024  7:45 pm   Signed by: Jensen Briones 3/5/2024 7:50 PM Dictation workstation:   [id number]       Encounter Date: 03/28/24   ECG 12 Lead   Result Value    Ventricular Rate 50    Atrial Rate 50    NV  Interval 176    QRS Duration 90    QT Interval 456    QTC Calculation(Bazett) 415    P Axis 98    R Axis 4    T Axis 12    QRS Count 8    Q Onset 225    P Onset 160    P Offset 193    T Offset 453    QTC Fredericia 429    Narrative    Atrial-paced rhythm  Abnormal ECG  When compared with ECG of 28-MAR-2024 06:27, (unconfirmed)  QT has shortened        Tele Monitoring: Sinus rhythm/a paced V sensed with heart rate 60 bpm    Assessment     Patient Active Problem List   Diagnosis    Anxiety    Arteriosclerosis of coronary artery    Heart burn    HTN (hypertension)    Obstructive sleep apnea, adult    Pacemaker    Palpitations    Paroxysmal atrial fibrillation (CMS/HCC)    Persistent atrial fibrillation (CMS/HCC)    Sinus node dysfunction (CMS/HCC)    Chest pressure    Tightness in chest    High risk medication use    Class 3 severe obesity due to excess calories with body mass index (BMI) of 45.0 to 49.9 in adult (CMS/HCC)    Atrial fibrillation (CMS/HCC)    Atrial fibrillation, persistent (CMS/HCC)   Paroxysmal atrial fibrillation status post PVI  Sinus node dysfunction with remote dual-chamber permanent pacemaker  Morbid obesity  Obstructive sleep apnea  Hypertension  Anxiety      Plan:  Will give an additional Lasix IV 40 now and on discharge instruct patient to take Lasix 40 mg p.o. daily x 3 days then resume 20 mg daily thereafter  Post procedure potential complications, activity restrictions, medications, and follow-up reviewed with patient.  Verbalized understanding.  Patient to continue on Protonix 40 mg daily x 2 weeks  Discharge later today if positive urine output          Electronically signed by MELIDA Sweet on 3/29/2024 at 12:34 PM

## 2024-03-30 ENCOUNTER — APPOINTMENT (OUTPATIENT)
Dept: CARDIOLOGY | Facility: HOSPITAL | Age: 64
End: 2024-03-30
Payer: MEDICARE

## 2024-03-30 VITALS
SYSTOLIC BLOOD PRESSURE: 131 MMHG | HEART RATE: 68 BPM | DIASTOLIC BLOOD PRESSURE: 58 MMHG | OXYGEN SATURATION: 94 % | HEIGHT: 65 IN | TEMPERATURE: 96.4 F | BODY MASS INDEX: 47.75 KG/M2 | WEIGHT: 286.6 LBS | RESPIRATION RATE: 20 BRPM

## 2024-03-30 PROCEDURE — 2500000004 HC RX 250 GENERAL PHARMACY W/ HCPCS (ALT 636 FOR OP/ED): Performed by: INTERNAL MEDICINE

## 2024-03-30 PROCEDURE — 93005 ELECTROCARDIOGRAM TRACING: CPT

## 2024-03-30 PROCEDURE — 99233 SBSQ HOSP IP/OBS HIGH 50: CPT | Performed by: INTERNAL MEDICINE

## 2024-03-30 PROCEDURE — 2500000004 HC RX 250 GENERAL PHARMACY W/ HCPCS (ALT 636 FOR OP/ED): Performed by: NURSE PRACTITIONER

## 2024-03-30 PROCEDURE — 2500000002 HC RX 250 W HCPCS SELF ADMINISTERED DRUGS (ALT 637 FOR MEDICARE OP, ALT 636 FOR OP/ED): Mod: MUE | Performed by: NURSE PRACTITIONER

## 2024-03-30 PROCEDURE — 93010 ELECTROCARDIOGRAM REPORT: CPT | Performed by: INTERNAL MEDICINE

## 2024-03-30 PROCEDURE — 2500000001 HC RX 250 WO HCPCS SELF ADMINISTERED DRUGS (ALT 637 FOR MEDICARE OP): Performed by: NURSE PRACTITIONER

## 2024-03-30 PROCEDURE — 7100000011 HC EXTENDED STAY RECOVERY HOURLY - NURSING UNIT

## 2024-03-30 RX ADMIN — DOXAZOSIN 1 MG: 1 TABLET ORAL at 08:33

## 2024-03-30 RX ADMIN — SPIRONOLACTONE 25 MG: 25 TABLET ORAL at 08:33

## 2024-03-30 RX ADMIN — APIXABAN 5 MG: 5 TABLET, FILM COATED ORAL at 08:33

## 2024-03-30 RX ADMIN — MAGNESIUM OXIDE 400 MG (241.3 MG MAGNESIUM) TABLET 400 MG: TABLET at 08:33

## 2024-03-30 RX ADMIN — LORATADINE 10 MG: 10 TABLET ORAL at 08:33

## 2024-03-30 RX ADMIN — TRAMADOL HYDROCHLORIDE 50 MG: 50 TABLET, COATED ORAL at 04:52

## 2024-03-30 RX ADMIN — PAROXETINE 20 MG: 20 TABLET, FILM COATED ORAL at 08:33

## 2024-03-30 RX ADMIN — METFORMIN ER 500 MG 500 MG: 500 TABLET ORAL at 08:33

## 2024-03-30 RX ADMIN — PANTOPRAZOLE SODIUM 40 MG: 40 TABLET, DELAYED RELEASE ORAL at 06:39

## 2024-03-30 RX ADMIN — METOPROLOL TARTRATE 100 MG: 50 TABLET, FILM COATED ORAL at 08:33

## 2024-03-30 RX ADMIN — FUROSEMIDE 20 MG: 40 TABLET ORAL at 08:33

## 2024-03-30 RX ADMIN — DIGOXIN 250 MCG: 125 TABLET ORAL at 08:33

## 2024-03-30 RX ADMIN — DOFETILIDE 500 MCG: 0.25 CAPSULE ORAL at 08:33

## 2024-03-30 RX ADMIN — ACETAMINOPHEN 650 MG: 325 TABLET ORAL at 03:44

## 2024-03-30 RX ADMIN — FUROSEMIDE 40 MG: 10 INJECTION, SOLUTION INTRAVENOUS at 06:38

## 2024-03-30 ASSESSMENT — PAIN - FUNCTIONAL ASSESSMENT
PAIN_FUNCTIONAL_ASSESSMENT: 0-10

## 2024-03-30 ASSESSMENT — PAIN SCALES - WONG BAKER
WONGBAKER_NUMERICALRESPONSE: HURTS LITTLE BIT
WONGBAKER_NUMERICALRESPONSE: HURTS LITTLE MORE

## 2024-03-30 ASSESSMENT — PAIN DESCRIPTION - ORIENTATION: ORIENTATION: ANTERIOR

## 2024-03-30 ASSESSMENT — PAIN SCALES - GENERAL
PAINLEVEL_OUTOF10: 7
PAINLEVEL_OUTOF10: 0 - NO PAIN
PAINLEVEL_OUTOF10: 3

## 2024-03-30 ASSESSMENT — PAIN SCALES - PAIN ASSESSMENT IN ADVANCED DEMENTIA (PAINAD)
BODYLANGUAGE: RELAXED
BODYLANGUAGE: RELAXED
TOTALSCORE: 2
CONSOLABILITY: NO NEED TO CONSOLE
FACIALEXPRESSION: SMILING OR INEXPRESSIVE
NEGVOCALIZATION: OCCASIONAL MOAN/GROAN, LOW SPEECH, NEGATIVE/DISAPPROVING QUALITY
FACIALEXPRESSION: SMILING OR INEXPRESSIVE
BREATHING: OCCASIONAL LABORED BREATHING, SHORT PERIOD OF HYPERVENTILATION
BREATHING: OCCASIONAL LABORED BREATHING, SHORT PERIOD OF HYPERVENTILATION
CONSOLABILITY: NO NEED TO CONSOLE
TOTALSCORE: 1

## 2024-03-30 ASSESSMENT — PAIN DESCRIPTION - LOCATION: LOCATION: SHOULDER

## 2024-03-30 NOTE — CARE PLAN
Problem: Pain  Goal: My pain/discomfort is manageable  Outcome: Progressing     Problem: Safety  Goal: Patient will be injury free during hospitalization  Outcome: Progressing  Goal: I will remain free of falls  Outcome: Progressing     Problem: Daily Care  Goal: Daily care needs are met  Outcome: Progressing     Problem: Psychosocial Needs  Goal: Demonstrates ability to cope with hospitalization/illness  Outcome: Progressing  Goal: Collaborate with me, my family, and caregiver to identify my specific goals  Outcome: Progressing     Problem: Discharge Barriers  Goal: My discharge needs are met  Outcome: Progressing     Problem: Fall/Injury  Goal: Not fall by end of shift  Outcome: Progressing  Goal: Be free from injury by end of the shift  Outcome: Progressing  Goal: Verbalize understanding of personal risk factors for fall in the hospital  Outcome: Progressing  Goal: Verbalize understanding of risk factor reduction measures to prevent injury from fall in the home  Outcome: Progressing  Goal: Use assistive devices by end of the shift  Outcome: Progressing  Goal: Pace activities to prevent fatigue by end of the shift  Outcome: Progressing     Problem: Pain  Goal: Takes deep breaths with improved pain control throughout the shift  Outcome: Progressing  Goal: Turns in bed with improved pain control throughout the shift  Outcome: Progressing  Goal: Walks with improved pain control throughout the shift  Outcome: Progressing  Goal: Performs ADL's with improved pain control throughout shift  Outcome: Progressing  Goal: Participates in PT with improved pain control throughout the shift  Outcome: Progressing  Goal: Free from opioid side effects throughout the shift  Outcome: Progressing  Goal: Free from acute confusion related to pain meds throughout the shift  Outcome: Progressing     Problem: Cardiac catheterization  Goal: Free from dysrhythmias  Outcome: Progressing  Goal: Free from pain  Outcome: Progressing  Goal: No  evidence of post procedure complications  Outcome: Progressing  Goal: Promote self management  Outcome: Progressing  Goal: Verbalize understanding of procedure  Outcome: Progressing  Goal: Care and maintenance of device (specify)  Outcome: Progressing     Problem: Pain - Adult  Goal: Verbalizes/displays adequate comfort level or baseline comfort level  Outcome: Progressing     Problem: Safety - Adult  Goal: Free from fall injury  Outcome: Progressing     Problem: Discharge Planning  Goal: Discharge to home or other facility with appropriate resources  Outcome: Progressing     Problem: Chronic Conditions and Co-morbidities  Goal: Patient's chronic conditions and co-morbidity symptoms are monitored and maintained or improved  Outcome: Progressing

## 2024-03-30 NOTE — PROGRESS NOTES
[facility] Progress Note               Rounding Cardiologist:  Tutu Guo MD, MD   Primary Cardiologist: Dr. Tutu Guo    Date:  3/30/2024  Patient:  Suyz Hanson  YOB: 1960  MRN:  [id number]   Admit Date:  3/28/2024      SUBJECTIVE    Suzy Hanson was seen and examined today at bedside.    Laboratory data today shows sodium 136 potassium 4.5 chloride 103 creatinine 0.92 with GFR 69.  Magnesium 2.56.  WBC 12.3.  Hemoglobin 12.6.  Hematocrit 39.0.  Platelet count 176.  Patient is feeling better  Patient was maintaining sinus rhythm until today in the morning when she changed into atrial fibrillation.  Rates between 60-80 bpm.  Asymptomatic.    VITALS     Vitals:    03/29/24 1921 03/29/24 2353 03/30/24 0638 03/30/24 0834   BP: 126/60 123/56 121/58 131/58   BP Location: Right arm Right arm Right arm    Patient Position: Lying Lying Lying    Pulse: 54 56 67 68   Resp: 20 20     Temp: 36.9 °C (98.4 °F) 37.1 °C (98.8 °F)  35.8 °C (96.4 °F)   TempSrc: Temporal Temporal     SpO2: 94% 93%  92%   Weight:       Height:           Intake/Output Summary (Last 24 hours) at 3/30/2024 1045  Last data filed at 3/30/2024 0838  Gross per 24 hour   Intake 200 ml   Output 2640 ml   Net -2440 ml       [unfilled]    PHYSICAL EXAM   PHYSICAL EXAMINATION:  GENERAL:  Well developed, well nourished, in no acute distress.  CHEST:  Symmetric and nontender.  NEURO/PSYCH:  Alert and oriented times three with approppriate behavior and responses.  NECK:  Supple, no JVD, no bruit.  LUNGS:  Clear to auscultation bilaterally, normal respiratory effort.  HEART: Irregular rate and rhythm.    Device in the left prepectoral healing well.  No signs of hematoma or infection.  EXTREMITIES:  Warm with good color, no clubbing or cyanosis.  There is no edema noted.  PERIPHERAL VASCULAR:  Pulses present and equally palpable; 2+ throughout.      DIAGNOSTIC RESULTS   EKG:     Telemetry: Sinus rhythm converted into atrial fibrillation  "around midnight.      LAB DATA   BMP:  @LABRCNT(Na:3,K:3,CL:3,CO2:3,Bun:3,Creatinine:3,Glu:3, CA:3,LABGLOM)@    Cardiac Enzymes:  @LABRCNT(CKTOTAL:3,CKMB:3,CKMBINDEX:3,TROPONINI:3)@    CBC:   Lab Results   Component Value Date    WBC 12.3 (H) 03/29/2024    RBC 4.29 03/29/2024    HGB 12.6 03/29/2024    HCT 39.0 03/29/2024     03/29/2024       CMP:    Lab Results   Component Value Date     03/29/2024    K 4.5 03/29/2024     03/29/2024    CO2 23 03/29/2024    BUN 19 03/29/2024    CREATININE 0.92 03/29/2024    GLUCOSE 216 (H) 03/29/2024    CALCIUM 7.9 (L) 03/29/2024       Hepatic Function Panel:  No results found for: \"ALKPHOS\", \"ALT\", \"AST\", \"PROT\", \"BILITOT\", \"BILIDIR\"    Magnesium:    Lab Results   Component Value Date    MG 2.56 (H) 03/29/2024       PT/INR:  No results found for: \"PROTIME\", \"INR\"  @LABRCNT(inr:3)@     HgBA1c:  No components found for: \"LABA1C\"    Lipid Profile:  No results found for: \"CHLPL\", \"TRIG\", \"HDL\", \"LDLCALC\", \"LDLDIRECT\"    TSH:  No results found for: \"TSH\"    ABG:  No results found for: \"PH\"    PRO-BNP: No results found for: \"PROBNP\"       RADIOLOGY     Electrophysiology procedure   Final Result      Holter Or Event Cardiac Monitor    (Results Pending)   Holter Or Event Cardiac Monitor    (Results Pending)   Holter Or Event Cardiac Monitor    (Results Pending)       [unfilled]      CURRENT MEDICATIONS    apixaban, 5 mg, oral, BID  atorvastatin, 80 mg, oral, Nightly  digoxin, 250 mcg, oral, Daily  dofetilide, 500 mcg, oral, BID  doxazosin, 1 mg, oral, Daily  furosemide, 20 mg, oral, Daily  loratadine, 10 mg, oral, Daily  magnesium oxide, 400 mg, oral, BID  metFORMIN XR, 500 mg, oral, Daily  metoprolol tartrate, 100 mg, oral, BID  pantoprazole, 40 mg, oral, Daily before breakfast  PARoxetine, 20 mg, oral, Daily  potassium chloride CR, 20 mEq, oral, q PM  spironolactone, 25 mg, oral, TID             ASSESSMENT   Principal Problem:    Atrial fibrillation (CMS/HCC)  Active " Problems:    Atrial fibrillation, persistent (CMS/HCC)    Class 3 severe obesity due to excess calories with body mass index (BMI) of 45.0 to 49.9 in adult (CMS/HCC)        Patient Active Problem List   Diagnosis    Anxiety    Arteriosclerosis of coronary artery    Heart burn    HTN (hypertension)    Obstructive sleep apnea, adult    Pacemaker    Palpitations    Paroxysmal atrial fibrillation (CMS/HCC)    Persistent atrial fibrillation (CMS/HCC)    Sinus node dysfunction (CMS/HCC)    Chest pressure    Tightness in chest    High risk medication use    Class 3 severe obesity due to excess calories with body mass index (BMI) of 45.0 to 49.9 in adult (CMS/HCC)    Atrial fibrillation (CMS/HCC)    Atrial fibrillation, persistent (CMS/HCC)     Paroxysmal atrial fibrillation status post PVI  Sinus node dysfunction with remote dual-chamber permanent pacemaker  Morbid obesity  Obstructive sleep apnea  Hypertension  Anxiety    PLAN     Patient is doing well from the electrophysiology standpoint.  Status post PVI.  Patient was maintaining sinus rhythm.  Now back in atrial fibrillation.  Rate controlled.  Asymptomatic.  Will continue with current medical therapy.    Continue anticoagulant therapy.    Continue with Nexium and also Lasix at the time of the discharge as indicated.    Follow my office in next 2 to 4 weeks or sooner if needed.    From the electrophysiology standpoint patient can be discharged home.    Tutu Guo MD      Please do not hesitate to call with questions.  Electronically signed by Tutu Guo MD, [facility] on 3/30/2024 at 10:45 AM

## 2024-03-31 LAB
ATRIAL RATE: 250 BPM
BLOOD EXPIRATION DATE: NORMAL
BLOOD EXPIRATION DATE: NORMAL
DISPENSE STATUS: NORMAL
DISPENSE STATUS: NORMAL
PRODUCT BLOOD TYPE: 5100
PRODUCT BLOOD TYPE: 5100
PRODUCT CODE: NORMAL
PRODUCT CODE: NORMAL
Q ONSET: 223 MS
QRS COUNT: 9 BEATS
QRS DURATION: 102 MS
QT INTERVAL: 446 MS
QTC CALCULATION(BAZETT): 446 MS
QTC FREDERICIA: 446 MS
R AXIS: 1 DEGREES
T AXIS: 28 DEGREES
T OFFSET: 446 MS
UNIT ABO: NORMAL
UNIT ABO: NORMAL
UNIT NUMBER: NORMAL
UNIT NUMBER: NORMAL
UNIT RH: NORMAL
UNIT RH: NORMAL
UNIT VOLUME: 350
UNIT VOLUME: 350
VENTRICULAR RATE: 60 BPM
XM INTEP: NORMAL
XM INTEP: NORMAL

## 2024-04-01 ENCOUNTER — OFFICE VISIT (OUTPATIENT)
Dept: PULMONOLOGY | Age: 64
End: 2024-04-01
Payer: MEDICARE

## 2024-04-01 VITALS
OXYGEN SATURATION: 91 % | HEIGHT: 65 IN | SYSTOLIC BLOOD PRESSURE: 122 MMHG | TEMPERATURE: 97.6 F | BODY MASS INDEX: 46.98 KG/M2 | HEART RATE: 87 BPM | DIASTOLIC BLOOD PRESSURE: 72 MMHG | WEIGHT: 282 LBS

## 2024-04-01 DIAGNOSIS — R06.02 SHORTNESS OF BREATH: ICD-10-CM

## 2024-04-01 DIAGNOSIS — G47.33 OSA ON CPAP: Primary | ICD-10-CM

## 2024-04-01 DIAGNOSIS — E66.9 OBESITY, UNSPECIFIED CLASSIFICATION, UNSPECIFIED OBESITY TYPE, UNSPECIFIED WHETHER SERIOUS COMORBIDITY PRESENT: ICD-10-CM

## 2024-04-01 LAB
ACT BLD: 153 SEC (ref 89–169)
ACT BLD: 229 SEC (ref 89–169)
ACT BLD: 239 SEC (ref 89–169)
ACT BLD: 274 SEC (ref 89–169)
ACT BLD: 301 SEC (ref 89–169)
ACT BLD: 308 SEC (ref 89–169)
ACT BLD: 339 SEC (ref 89–169)
ACT BLD: 354 SEC (ref 89–169)
ACT BLD: 367 SEC (ref 89–169)
ACT BLD: 376 SEC (ref 89–169)

## 2024-04-01 PROCEDURE — 3078F DIAST BP <80 MM HG: CPT | Performed by: INTERNAL MEDICINE

## 2024-04-01 PROCEDURE — 3074F SYST BP LT 130 MM HG: CPT | Performed by: INTERNAL MEDICINE

## 2024-04-01 PROCEDURE — 99213 OFFICE O/P EST LOW 20 MIN: CPT | Performed by: INTERNAL MEDICINE

## 2024-04-01 PROCEDURE — 1123F ACP DISCUSS/DSCN MKR DOCD: CPT | Performed by: INTERNAL MEDICINE

## 2024-04-01 NOTE — PROGRESS NOTES
VISIT-PULMONARY/SLEEP    4/10/2024     REFERRING PHYSICIAN:  Hosea Harman MD     REASON FOR REFERRAL:  SOB    HPI:     Lisset Bacon is a 64 y.o. female who was referred to pulmonary clinic for evaluation.     She is a patient of Dr. Dotson.  Has been to evaluate hospital recently for elective ablation for A-fib.  This was done by Dr. Aviles.   Has been having some shortness of breath and therefore she was asked to be evaluated by pulmonary.  However, her symptoms gotten better by the time she came to the appointment.  Denies any cough, wheezing or sputum production.  Pliant with her CPAP.    Past Medical History   Past Medical History:   Diagnosis Date    Angina at rest ([facility]) 2018    Anxiety     Atopic rhinitis 2016    Atrial fibrillation, persistent ([facility]) 05/10/2018    Baker's cyst of knee, left 2022    Chronic cholecystitis with calculus 2017    Chronic gastritis 2023    Dyshidrotic eczema 01/15/2020    Essential hypertension 2014    GERD (gastroesophageal reflux disease) 2022    Hyperlipidemia     Morbid obesity ([facility]) 2009    ALE on CPAP 2022    Osteoarthritis     back, knees, spine    Pacemaker 2018    PONV (postoperative nausea and vomiting)     from using inhaled anethesia     PVC's (premature ventricular contractions) 2016    Seasonal allergies     Symptomatic bradycardia 2018    Type 2 diabetes mellitus without complication, without long-term current use of insulin ([facility])     Vitamin D deficiency        Past Surgical History  Past Surgical History:   Procedure Laterality Date     SECTION       SECTION      CHOLECYSTECTOMY, LAPAROSCOPIC N/A 2017    For biliary colic and stones    COLONOSCOPY  2014    Normal (DR VALLES)    HYSTERECTOMY, TOTAL ABDOMINAL (CERVIX REMOVED)      d/t fibroids    LUÍS AND BSO (CERVIX REMOVED) Bilateral     TONSILLECTOMY Bilateral     UPPER

## 2024-04-02 ENCOUNTER — TELEPHONE (OUTPATIENT)
Dept: CARDIOLOGY | Facility: CLINIC | Age: 64
End: 2024-04-02
Payer: MEDICARE

## 2024-04-02 DIAGNOSIS — Z95.0 PACEMAKER: ICD-10-CM

## 2024-04-02 NOTE — TELEPHONE ENCOUNTER
Pt left message wanting to update the office that she has oxygen at home now.  Per pt she was discharged from the hospital on oxygen.    Attempted to contact pt to see how much oxygen is on daily.  Left message for pt to call office back.    Routed to Lisa BRISCOE RN

## 2024-04-03 ENCOUNTER — APPOINTMENT (OUTPATIENT)
Dept: CARDIOLOGY | Facility: CLINIC | Age: 64
End: 2024-04-03
Payer: MEDICARE

## 2024-04-03 NOTE — TELEPHONE ENCOUNTER
Patient called back to advised that she is currently on 2 liters of oxygen on her home machine and 3 liters of oxygen on portable, pt also advised that this is hopefully just temporary? I sent to Lisa to make her aware

## 2024-04-29 ENCOUNTER — APPOINTMENT (OUTPATIENT)
Dept: CARDIOLOGY | Facility: CLINIC | Age: 64
End: 2024-04-29
Payer: MEDICARE

## 2024-04-29 ENCOUNTER — OFFICE VISIT (OUTPATIENT)
Dept: CARDIOLOGY | Facility: CLINIC | Age: 64
End: 2024-04-29
Payer: MEDICARE

## 2024-04-29 VITALS
WEIGHT: 286.9 LBS | DIASTOLIC BLOOD PRESSURE: 80 MMHG | SYSTOLIC BLOOD PRESSURE: 135 MMHG | BODY MASS INDEX: 47.8 KG/M2 | HEIGHT: 65 IN | HEART RATE: 77 BPM

## 2024-04-29 DIAGNOSIS — Z79.899 HIGH RISK MEDICATION USE: ICD-10-CM

## 2024-04-29 DIAGNOSIS — Z95.0 PACEMAKER: ICD-10-CM

## 2024-04-29 DIAGNOSIS — Z78.9 NEVER SMOKED TOBACCO: ICD-10-CM

## 2024-04-29 DIAGNOSIS — I48.19 PERSISTENT ATRIAL FIBRILLATION (MULTI): ICD-10-CM

## 2024-04-29 DIAGNOSIS — R00.2 PALPITATIONS: ICD-10-CM

## 2024-04-29 DIAGNOSIS — Z79.899 MEDICATION DOSE CHANGED: ICD-10-CM

## 2024-04-29 DIAGNOSIS — T82.110D PACEMAKER LEAD FAILURE, SUBSEQUENT ENCOUNTER: ICD-10-CM

## 2024-04-29 DIAGNOSIS — I49.5 SINUS NODE DYSFUNCTION (MULTI): ICD-10-CM

## 2024-04-29 PROCEDURE — 3079F DIAST BP 80-89 MM HG: CPT | Performed by: INTERNAL MEDICINE

## 2024-04-29 PROCEDURE — 3008F BODY MASS INDEX DOCD: CPT | Performed by: INTERNAL MEDICINE

## 2024-04-29 PROCEDURE — 1036F TOBACCO NON-USER: CPT | Performed by: INTERNAL MEDICINE

## 2024-04-29 PROCEDURE — 99215 OFFICE O/P EST HI 40 MIN: CPT | Performed by: INTERNAL MEDICINE

## 2024-04-29 PROCEDURE — 1159F MED LIST DOCD IN RCRD: CPT | Performed by: INTERNAL MEDICINE

## 2024-04-29 PROCEDURE — 3075F SYST BP GE 130 - 139MM HG: CPT | Performed by: INTERNAL MEDICINE

## 2024-04-29 PROCEDURE — 93280 PM DEVICE PROGR EVAL DUAL: CPT | Performed by: INTERNAL MEDICINE

## 2024-04-29 RX ORDER — DOFETILIDE 0.5 MG/1
500 CAPSULE ORAL 2 TIMES DAILY
Qty: 180 CAPSULE | Refills: 1 | Status: SHIPPED | OUTPATIENT
Start: 2024-04-29

## 2024-04-29 RX ORDER — FUROSEMIDE 20 MG/1
20 TABLET ORAL DAILY
Qty: 90 TABLET | Refills: 3 | Status: SHIPPED | OUTPATIENT
Start: 2024-04-29

## 2024-04-29 NOTE — PROGRESS NOTES
CARDIOLOGY OFFICE VISIT      CHIEF COMPLAINT  No chief complaint on file.      HISTORY OF PRESENT ILLNESS  HPI  64-year-old female with a past medical history of atrial fibrillation and sinus node dysfunction with prior pacemaker implanted in March 2018. She presented to emergency department in March 2019 due to feeling a jumping sensation in her right chest that she has not had in the past. X-ray shows a pacemaker with right atrial lead and right ventricular lead around the right atrial and left subclavian area. She underwent a repositioning of both leads back in March 2019 with successful results.     Due to persistent palpitations, we increased the dose of sotalol to 240 mg twice a day.      she has persisting atrial fibrillation. We discussed the option of changing antiarrhythmic therapy. Patient was admitted in February 2023 at [facility] to change sotalol for dofetilide. She did tolerate dofetilide 250 mcg twice a day. Patient underwent cardioversion with successful restoration to sinus rhythm but she had early recurrence of atrial fibrillation.     Due to persistent palpitations patient was started on digoxin therapy.     She states that her palpitations are much better but is still having episodes of palpitations occasionally and also shortness of breath especially with moderate activities.  She also has been noticing that she is retaining more fluid in the lower extremities.        Echocardiogram March 16, 2024  CONCLUSIONS:   1. Left ventricular systolic function is normal with a 60-65% estimated ejection fraction.   2. Severely increased left ventricular septal thickness.   3. The left ventricular posterior wall thickness is mildly increased.   4. Normal right ventricular chamber size and function.   5. There is moderate mitral annular calcification.   6. Mild to moderate mitral valve regurgitation.   7. 2+ tricuspid regurgitation with estimated RVSP of 38 mmHg.   8. Mild to moderate tricuspid  regurgitation.   9. Comparison study 10/14/21 showed an LVEF 60-65%. Trivial to 1+ MR and TR. RVSP 32 mmHg     She was placed on trach therapy with significant improvement of her symptoms of shortness of breath and edema in the lower extremities.    She underwent a pulmonary isolation in March 19, 2024 with successful results.  Lately she has been noticing more palpitations.  Device interrogation today shows that burden of atrial fraction recently has been 39%.  Rates controlled during atrial fibrillation.        Past Medical History  Past Medical History:   Diagnosis Date    Acute kidney failure, unspecified (CMS-HCC)     Acute kidney injury    Arrhythmia     Chronic kidney disease     Coronary artery disease     Hyperlipidemia     Hypertension     Other dorsalgia     Interscapular pain    Personal history of other diseases of the circulatory system     History of sinus bradycardia    Personal history of other diseases of urinary system     History of chronic kidney disease    Personal history of other endocrine, nutritional and metabolic disease     History of hypokalemia    Personal history of other specified conditions     History of fatigue    Personal history of other specified conditions     H/O shortness of breath    Sleep apnea        Social History  Social History     Tobacco Use    Smoking status: Never     Passive exposure: Never    Smokeless tobacco: Never   Vaping Use    Vaping status: Never Used   Substance Use Topics    Alcohol use: Never    Drug use: Never       Family History     Family History   Problem Relation Name Age of Onset    Other (systemic lupus erythematosus) Mother      Heart attack Father      Other (arteriosclerotic cardiovascular disease) Sister      Heart attack Sister      Lung cancer Brother      Other (cardiac disorder) Child          Allergies:  Allergies   Allergen Reactions    Amlodipine Unknown    Losartan Cough    Milk Containing Products (Dairy) Unknown    Sulfa  (Sulfonamide Antibiotics) Unknown        Outpatient Medications:  Current Outpatient Medications   Medication Instructions    apixaban (Eliquis) 5 mg tablet 1 tablet, oral, 2 times daily    atorvastatin (LIPITOR) 80 mg, oral, Nightly    digoxin (LANOXIN) 250 mcg, oral, Daily    dofetilide (Tikosyn) 500 mcg capsule 1 capsule, oral, 2 times daily    doxazosin (Cardura) 2 mg tablet 0.5 tab(s) orally once a day    furosemide (Lasix) 20 mg tablet Take 2 tablets (40mg) daily for 3 days then decrease to 1 tablet (20mg) daily thereafter    loratadine (Claritin) 10 mg tablet 1 tablet, oral, Daily    metFORMIN  mg 24 hr tablet 1 tablet, oral, Daily    metoprolol tartrate (LOPRESSOR) 100 mg, oral, 2 times daily    pantoprazole (PROTONIX) 40 mg, oral, Daily before breakfast, Do not crush, chew, or split.    PARoxetine (Paxil) 20 mg tablet 1 tablet, oral, Daily    potassium chloride CR (Klor-Con M20) 20 mEq ER tablet Take 1 tablet every evening    spironolactone (Aldactone) 25 mg tablet TAKE 2 TABLETS IN THE MORNING AND 1 TABLET IN THE EVENING    Trulicity 0.75 mg, subcutaneous, Once Weekly          REVIEW OF SYSTEMS  Review of Systems   All other systems reviewed and are negative.        VITALS  Vitals:    04/29/24 1148   BP: 135/80   Pulse: 77       PHYSICAL EXAM  Constitutional:       Appearance: Healthy appearance. Not in distress.   Neck:      Vascular: No JVR. JVD normal.   Pulmonary:      Effort: Pulmonary effort is normal.      Breath sounds: Normal breath sounds. No wheezing. No rhonchi. No rales.   Chest:      Chest wall: Not tender to palpatation.   Cardiovascular:      PMI at left midclavicular line. Normal rate. Regular rhythm. Normal S1. Normal S2.       Murmurs: There is no murmur.      No gallop.  No click. No rub.      Comments: Device in the left prepectoral healing well.  No signs of hematoma or infection.     Pulses:     Intact distal pulses.   Edema:     Peripheral edema absent.   Abdominal:       General: Bowel sounds are normal.      Palpations: Abdomen is soft.      Tenderness: There is no abdominal tenderness.   Musculoskeletal: Normal range of motion.         General: No tenderness. Skin:     General: Skin is warm and dry.   Neurological:      General: No focal deficit present.      Mental Status: Alert and oriented to person, place and time.           ASSESSMENT AND PLAN  Clinical impression        1. Sinus node dysfunction, status post dual-chamber pacemaker, resolved  2. Status post dual-chamber pacemaker will reposition of the right atrial ventricular lead in March 2019. Device interrogation reviewed patient during this office visit  3. persistent atrial fibrillation, burden of atrial fibrillation by pacemaker interrogation 39%.  Status post PVI March 19, 2024  4. Coronary artery disease, stable  5. Normal left ventricular function per echocardiogram showing 60%, stable  6. Hypertension, controlled  7. Sleep apnea  8. High risk medication (dofetilide). Patient failed sotalol therapy. She had recurrence of atrial fibrillation on sotalol therapy.    Plan-recommendations    I had a lengthy discussion with patient regarding findings of the device interrogation.  After pulm isolation patient is having increased burden of atrial fibrillation but this may be related with inflammation after radiofrequency therapy was delivered.  Patient will need to be reevaluated very closely in 4 to 6 weeks.  We may have to adjust medical therapy if there is persistent atrial fibrillation we will redo pulmonary isolation.    Patient was instructed to use an extra dose of metoprolol to tartrate 50 mg x 1 as needed palpitations.    Continue with digoxin therapy.    Continue with high risk medication (dofetilide).    Continue with Eliquis therapy.    Follow device clinic as scheduled.    Risk factor modification and lifestyle modification discussed with patient. Diet , exercise and hydration discussed with patient.    I have  personally review with patient during this office visit, laboratory data, echocardiogram results, stress test results, Holter-event monitor results prior and after the last electrophysiology visit. All questions has been answered.    Please excuse any errors in grammar or translation related to this dictation.  Voice recognition software was utilized to prepare this document.        Scribe Attestation  By signing my name below, I, Teri Lake MA  , Scribe   attest that this documentation has been prepared under the direction and in the presence of Tutu Guo MD.

## 2024-05-06 ENCOUNTER — APPOINTMENT (OUTPATIENT)
Dept: CARDIOLOGY | Facility: CLINIC | Age: 64
End: 2024-05-06
Payer: MEDICARE

## 2024-05-12 DIAGNOSIS — F41.9 ANXIETY: ICD-10-CM

## 2024-05-12 NOTE — TELEPHONE ENCOUNTER
Comments:     Last Office Visit (last PCP visit):   Visit date not found    Next Visit Date:  Future Appointments   Date Time Provider Department Center   8/5/2024 10:30 AM Hosea Harman MD [facility]   8/8/2024 10:00 AM Paul Dotson MD [facility]   2/6/2025 10:00 AM Hosea Harman MD [facility]         Rx requested:  Requested Prescriptions     Pending Prescriptions Disp Refills    PARoxetine (PAXIL) 20 MG tablet [Pharmacy Med Name: PARoxetine HCl 20 MG Oral Tablet] 90 tablet 3     Sig: TAKE 1 TABLET BY MOUTH IN THE  MORNING

## 2024-05-13 RX ORDER — PAROXETINE HYDROCHLORIDE 20 MG/1
20 TABLET, FILM COATED ORAL EVERY MORNING
Qty: 90 TABLET | Refills: 1 | Status: SHIPPED | OUTPATIENT
Start: 2024-05-13

## 2024-06-01 DIAGNOSIS — E11.9 TYPE 2 DIABETES MELLITUS WITHOUT COMPLICATION, WITHOUT LONG-TERM CURRENT USE OF INSULIN (HCC): ICD-10-CM

## 2024-06-01 DIAGNOSIS — E66.01 MORBID OBESITY (HCC): ICD-10-CM

## 2024-06-02 NOTE — TELEPHONE ENCOUNTER
Comments:     Last Office Visit (last PCP visit):   2/5/2024    Next Visit Date:  Future Appointments   Date Time Provider Department Center   8/5/2024 10:30 AM Hosea Harman MD [facility]   8/8/2024 10:00 AM Paul Dotson MD [facility]   2/6/2025 10:00 AM Hosea Harman MD [facility]         Rx requested:  Requested Prescriptions     Pending Prescriptions Disp Refills    TRULICITY 0.75 MG/0.5ML SOPN SC injection [Pharmacy Med Name: Trulicity 0.75 MG/0.5ML Subcutaneous Solution Pen-injector] 6 mL 3     Sig: INJECT THE CONTENTS OF ONE PEN  SUBCUTANEOUSLY WEEKLY AS  DIRECTED                   
Performed

## 2024-06-03 RX ORDER — DULAGLUTIDE 0.75 MG/.5ML
0.75 INJECTION, SOLUTION SUBCUTANEOUS WEEKLY
Qty: 6 ML | Refills: 1 | Status: SHIPPED | OUTPATIENT
Start: 2024-06-03

## 2024-06-06 ENCOUNTER — APPOINTMENT (OUTPATIENT)
Dept: CARDIOLOGY | Facility: CLINIC | Age: 64
End: 2024-06-06
Payer: MEDICARE

## 2024-06-10 ENCOUNTER — HOSPITAL ENCOUNTER (OUTPATIENT)
Dept: CARDIOLOGY | Age: 64
Discharge: HOME | End: 2024-06-10
Payer: MEDICARE

## 2024-06-10 PROCEDURE — 93296 REM INTERROG EVL PM/IDS: CPT

## 2024-06-10 PROCEDURE — 93294 REM INTERROG EVL PM/LDLS PM: CPT | Performed by: INTERNAL MEDICINE

## 2024-06-13 ENCOUNTER — OFFICE VISIT (OUTPATIENT)
Dept: CARDIOLOGY | Facility: CLINIC | Age: 64
End: 2024-06-13
Payer: MEDICARE

## 2024-06-13 VITALS
SYSTOLIC BLOOD PRESSURE: 140 MMHG | HEIGHT: 65 IN | BODY MASS INDEX: 46.45 KG/M2 | WEIGHT: 278.8 LBS | HEART RATE: 73 BPM | DIASTOLIC BLOOD PRESSURE: 76 MMHG

## 2024-06-13 DIAGNOSIS — Z51.81 ANTICOAGULATION MANAGEMENT ENCOUNTER: ICD-10-CM

## 2024-06-13 DIAGNOSIS — R06.02 SHORTNESS OF BREATH: ICD-10-CM

## 2024-06-13 DIAGNOSIS — Z79.01 ANTICOAGULATION MANAGEMENT ENCOUNTER: ICD-10-CM

## 2024-06-13 DIAGNOSIS — Z79.899 HIGH RISK MEDICATION USE: Primary | ICD-10-CM

## 2024-06-13 DIAGNOSIS — R00.2 PALPITATIONS: ICD-10-CM

## 2024-06-13 DIAGNOSIS — Z78.9 NEVER SMOKED TOBACCO: ICD-10-CM

## 2024-06-13 DIAGNOSIS — R07.89 CHEST DISCOMFORT: ICD-10-CM

## 2024-06-13 DIAGNOSIS — I48.11 LONGSTANDING PERSISTENT ATRIAL FIBRILLATION (MULTI): ICD-10-CM

## 2024-06-13 PROCEDURE — 1036F TOBACCO NON-USER: CPT | Performed by: INTERNAL MEDICINE

## 2024-06-13 PROCEDURE — 3008F BODY MASS INDEX DOCD: CPT | Performed by: INTERNAL MEDICINE

## 2024-06-13 PROCEDURE — 3078F DIAST BP <80 MM HG: CPT | Performed by: INTERNAL MEDICINE

## 2024-06-13 PROCEDURE — 3077F SYST BP >= 140 MM HG: CPT | Performed by: INTERNAL MEDICINE

## 2024-06-13 PROCEDURE — 1159F MED LIST DOCD IN RCRD: CPT | Performed by: INTERNAL MEDICINE

## 2024-06-13 PROCEDURE — 99215 OFFICE O/P EST HI 40 MIN: CPT | Performed by: INTERNAL MEDICINE

## 2024-06-13 RX ORDER — OMEPRAZOLE 40 MG/1
40 CAPSULE, DELAYED RELEASE ORAL
COMMUNITY
Start: 2024-05-21

## 2024-06-13 NOTE — H&P (VIEW-ONLY)
CARDIOLOGY OFFICE VISIT      CHIEF COMPLAINT  Chief Complaint   Patient presents with    Follow-up       HISTORY OF PRESENT ILLNESS  HPI  64-year-old female with a past medical history of atrial fibrillation and sinus node dysfunction with prior pacemaker implanted in March 2018. She presented to emergency department in March 2019 due to feeling a jumping sensation in her right chest that she has not had in the past. X-ray shows a pacemaker with right atrial lead and right ventricular lead around the right atrial and left subclavian area. She underwent a repositioning of both leads back in March 2019 with successful results.     Due to persistent palpitations, we increased the dose of sotalol to 240 mg twice a day.      she has persisting atrial fibrillation. We discussed the option of changing antiarrhythmic therapy. Patient was admitted in February 2023 at [facility] to change sotalol for dofetilide. She did tolerate dofetilide 250 mcg twice a day. Patient underwent cardioversion with successful restoration to sinus rhythm but she had early recurrence of atrial fibrillation.     Due to persistent palpitations patient was started on digoxin therapy.    She underwent a pulmonary isolation in March 19, 2024 with successful results.       After ablation therapy, patient has been noticing palpitations that he would subside with the dosing and adjustment of medical therapy with beta-blockers.    Patient states that lately she has been noticing some chest discomfort.  Sometimes with several subclinical activities and she feels chest pressure in the retrosternal area.    Looking at her record she had a cardiac catheterization in 2018 that shows a significant lesion in the LAD but the FFR was unremarkable.  No intervention was performed.     Past Medical History  Past Medical History:   Diagnosis Date    Acute kidney failure, unspecified (CMS-HCC)     Acute kidney injury    Arrhythmia     Chronic kidney disease      Coronary artery disease     Hyperlipidemia     Hypertension     Other dorsalgia     Interscapular pain    Personal history of other diseases of the circulatory system     History of sinus bradycardia    Personal history of other diseases of urinary system     History of chronic kidney disease    Personal history of other endocrine, nutritional and metabolic disease     History of hypokalemia    Personal history of other specified conditions     History of fatigue    Personal history of other specified conditions     H/O shortness of breath    Sleep apnea        Social History  Social History     Tobacco Use    Smoking status: Never     Passive exposure: Never    Smokeless tobacco: Never   Vaping Use    Vaping status: Never Used   Substance Use Topics    Alcohol use: Never    Drug use: Never       Family History     Family History   Problem Relation Name Age of Onset    Other (systemic lupus erythematosus) Mother      Heart attack Father      Other (arteriosclerotic cardiovascular disease) Sister      Heart attack Sister      Lung cancer Brother      Other (cardiac disorder) Child          Allergies:  Allergies   Allergen Reactions    Amlodipine Unknown    Losartan Cough    Milk Containing Products (Dairy) Unknown    Sulfa (Sulfonamide Antibiotics) Unknown        Outpatient Medications:  Current Outpatient Medications   Medication Instructions    apixaban (Eliquis) 5 mg tablet 1 tablet, oral, 2 times daily    atorvastatin (LIPITOR) 80 mg, oral, Nightly    digoxin (LANOXIN) 250 mcg, oral, Daily    dofetilide (TIKOSYN) 500 mcg, oral, 2 times daily    doxazosin (Cardura) 2 mg tablet 0.5 tab(s) orally once a day    furosemide (LASIX) 20 mg, oral, Daily    loratadine (Claritin) 10 mg tablet 1 tablet, oral, Daily    metFORMIN  mg 24 hr tablet 1 tablet, oral, Daily    metoprolol tartrate (LOPRESSOR) 100 mg, oral, 2 times daily    omeprazole (PRILOSEC) 40 mg, oral, Daily before breakfast    pantoprazole (PROTONIX)  40 mg, oral, Daily before breakfast, Do not crush, chew, or split.    PARoxetine (Paxil) 20 mg tablet 1 tablet, oral, Daily    potassium chloride CR (Klor-Con M20) 20 mEq ER tablet Take 1 tablet every evening    spironolactone (Aldactone) 25 mg tablet TAKE 2 TABLETS IN THE MORNING AND 1 TABLET IN THE EVENING    Trulicity 0.75 mg, subcutaneous, Once Weekly          REVIEW OF SYSTEMS  Review of Systems   All other systems reviewed and are negative.        VITALS  Vitals:    06/13/24 1422   BP: 140/76   Pulse: 73       PHYSICAL EXAM  Constitutional:       Appearance: Healthy appearance. Not in distress.   Neck:      Vascular: No JVR. JVD normal.   Pulmonary:      Effort: Pulmonary effort is normal.      Breath sounds: Normal breath sounds. No wheezing. No rhonchi. No rales.   Chest:      Chest wall: Not tender to palpatation.   Cardiovascular:      PMI at left midclavicular line. Normal rate. Regular rhythm. Normal S1. Normal S2.       Murmurs: There is no murmur.      No gallop.  No click. No rub.   Pulses:     Intact distal pulses.   Edema:     Peripheral edema absent.   Abdominal:      General: Bowel sounds are normal.      Palpations: Abdomen is soft.      Tenderness: There is no abdominal tenderness.   Musculoskeletal: Normal range of motion.         General: No tenderness. Skin:     General: Skin is warm and dry.   Neurological:      General: No focal deficit present.      Mental Status: Alert and oriented to person, place and time.           ASSESSMENT AND PLAN  Clinical impression        1. Sinus node dysfunction, status post dual-chamber pacemaker, resolved  2. Status post dual-chamber pacemaker will reposition of the right atrial ventricular lead in March 2019. Device interrogation reviewed patient during this office visit  3. persistent atrial fibrillation, burden of atrial fibrillation by pacemaker interrogation 39%.  Status post PVI March 19, 2024  4. Coronary artery disease, stable  5. Normal left  ventricular function per echocardiogram showing 60%, stable  6. Hypertension, controlled  7. Sleep apnea  8. High risk medication (dofetilide). Patient failed sotalol therapy. She had recurrence of atrial fibrillation on sotalol therapy.    Plan-recommendations.    Patient will be scheduled for a cardiac catheterization in the next few days.  Procedure, risk, benefits and possible complications were explained to patient.  All questions were answered.  Patient agrees with plan.  Informed consent was signed.    Eliquis 48 hours prior to procedure.    Hold Trulicity 7 days prior to procedure.    Follow my office in the next 4 to 6 weeks or sooner if needed    Get echocardiogram for left ventricular ejection fraction evaluation.    If symptoms persist after cardiac catheterization has been performed, patient will be scheduled for a redo pulmonary vein isolation.    Risk factor modification and lifestyle modification discussed with patient. Diet , exercise and hydration discussed with patient.    I have personally review with patient during this office visit, laboratory data, echocardiogram results, stress test results, Holter-event monitor results prior and after the last electrophysiology visit. All questions has been answered.      Please excuse any errors in grammar or translation related to this dictation.  Voice recognition software was utilized to prepare this document.        Scribe Attestation  By signing my name below, I, Payton Kenyon LPN , Scribe   attest that this documentation has been prepared under the direction and in the presence of Tutu Guo MD.

## 2024-06-13 NOTE — PROGRESS NOTES
CARDIOLOGY OFFICE VISIT      CHIEF COMPLAINT  Chief Complaint   Patient presents with    Follow-up       HISTORY OF PRESENT ILLNESS  HPI  64-year-old female with a past medical history of atrial fibrillation and sinus node dysfunction with prior pacemaker implanted in March 2018. She presented to emergency department in March 2019 due to feeling a jumping sensation in her right chest that she has not had in the past. X-ray shows a pacemaker with right atrial lead and right ventricular lead around the right atrial and left subclavian area. She underwent a repositioning of both leads back in March 2019 with successful results.     Due to persistent palpitations, we increased the dose of sotalol to 240 mg twice a day.      she has persisting atrial fibrillation. We discussed the option of changing antiarrhythmic therapy. Patient was admitted in February 2023 at [facility] to change sotalol for dofetilide. She did tolerate dofetilide 250 mcg twice a day. Patient underwent cardioversion with successful restoration to sinus rhythm but she had early recurrence of atrial fibrillation.     Due to persistent palpitations patient was started on digoxin therapy.    She underwent a pulmonary isolation in March 19, 2024 with successful results.       After ablation therapy, patient has been noticing palpitations that he would subside with the dosing and adjustment of medical therapy with beta-blockers.    Patient states that lately she has been noticing some chest discomfort.  Sometimes with several subclinical activities and she feels chest pressure in the retrosternal area.    Looking at her record she had a cardiac catheterization in 2018 that shows a significant lesion in the LAD but the FFR was unremarkable.  No intervention was performed.     Past Medical History  Past Medical History:   Diagnosis Date    Acute kidney failure, unspecified (CMS-HCC)     Acute kidney injury    Arrhythmia     Chronic kidney disease      Coronary artery disease     Hyperlipidemia     Hypertension     Other dorsalgia     Interscapular pain    Personal history of other diseases of the circulatory system     History of sinus bradycardia    Personal history of other diseases of urinary system     History of chronic kidney disease    Personal history of other endocrine, nutritional and metabolic disease     History of hypokalemia    Personal history of other specified conditions     History of fatigue    Personal history of other specified conditions     H/O shortness of breath    Sleep apnea        Social History  Social History     Tobacco Use    Smoking status: Never     Passive exposure: Never    Smokeless tobacco: Never   Vaping Use    Vaping status: Never Used   Substance Use Topics    Alcohol use: Never    Drug use: Never       Family History     Family History   Problem Relation Name Age of Onset    Other (systemic lupus erythematosus) Mother      Heart attack Father      Other (arteriosclerotic cardiovascular disease) Sister      Heart attack Sister      Lung cancer Brother      Other (cardiac disorder) Child          Allergies:  Allergies   Allergen Reactions    Amlodipine Unknown    Losartan Cough    Milk Containing Products (Dairy) Unknown    Sulfa (Sulfonamide Antibiotics) Unknown        Outpatient Medications:  Current Outpatient Medications   Medication Instructions    apixaban (Eliquis) 5 mg tablet 1 tablet, oral, 2 times daily    atorvastatin (LIPITOR) 80 mg, oral, Nightly    digoxin (LANOXIN) 250 mcg, oral, Daily    dofetilide (TIKOSYN) 500 mcg, oral, 2 times daily    doxazosin (Cardura) 2 mg tablet 0.5 tab(s) orally once a day    furosemide (LASIX) 20 mg, oral, Daily    loratadine (Claritin) 10 mg tablet 1 tablet, oral, Daily    metFORMIN  mg 24 hr tablet 1 tablet, oral, Daily    metoprolol tartrate (LOPRESSOR) 100 mg, oral, 2 times daily    omeprazole (PRILOSEC) 40 mg, oral, Daily before breakfast    pantoprazole (PROTONIX)  40 mg, oral, Daily before breakfast, Do not crush, chew, or split.    PARoxetine (Paxil) 20 mg tablet 1 tablet, oral, Daily    potassium chloride CR (Klor-Con M20) 20 mEq ER tablet Take 1 tablet every evening    spironolactone (Aldactone) 25 mg tablet TAKE 2 TABLETS IN THE MORNING AND 1 TABLET IN THE EVENING    Trulicity 0.75 mg, subcutaneous, Once Weekly          REVIEW OF SYSTEMS  Review of Systems   All other systems reviewed and are negative.        VITALS  Vitals:    06/13/24 1422   BP: 140/76   Pulse: 73       PHYSICAL EXAM  Constitutional:       Appearance: Healthy appearance. Not in distress.   Neck:      Vascular: No JVR. JVD normal.   Pulmonary:      Effort: Pulmonary effort is normal.      Breath sounds: Normal breath sounds. No wheezing. No rhonchi. No rales.   Chest:      Chest wall: Not tender to palpatation.   Cardiovascular:      PMI at left midclavicular line. Normal rate. Regular rhythm. Normal S1. Normal S2.       Murmurs: There is no murmur.      No gallop.  No click. No rub.   Pulses:     Intact distal pulses.   Edema:     Peripheral edema absent.   Abdominal:      General: Bowel sounds are normal.      Palpations: Abdomen is soft.      Tenderness: There is no abdominal tenderness.   Musculoskeletal: Normal range of motion.         General: No tenderness. Skin:     General: Skin is warm and dry.   Neurological:      General: No focal deficit present.      Mental Status: Alert and oriented to person, place and time.           ASSESSMENT AND PLAN  Clinical impression        1. Sinus node dysfunction, status post dual-chamber pacemaker, resolved  2. Status post dual-chamber pacemaker will reposition of the right atrial ventricular lead in March 2019. Device interrogation reviewed patient during this office visit  3. persistent atrial fibrillation, burden of atrial fibrillation by pacemaker interrogation 39%.  Status post PVI March 19, 2024  4. Coronary artery disease, stable  5. Normal left  ventricular function per echocardiogram showing 60%, stable  6. Hypertension, controlled  7. Sleep apnea  8. High risk medication (dofetilide). Patient failed sotalol therapy. She had recurrence of atrial fibrillation on sotalol therapy.    Plan-recommendations.    Patient will be scheduled for a cardiac catheterization in the next few days.  Procedure, risk, benefits and possible complications were explained to patient.  All questions were answered.  Patient agrees with plan.  Informed consent was signed.    Eliquis 48 hours prior to procedure.    Hold Trulicity 7 days prior to procedure.    Follow my office in the next 4 to 6 weeks or sooner if needed    Get echocardiogram for left ventricular ejection fraction evaluation.    If symptoms persist after cardiac catheterization has been performed, patient will be scheduled for a redo pulmonary vein isolation.    Risk factor modification and lifestyle modification discussed with patient. Diet , exercise and hydration discussed with patient.    I have personally review with patient during this office visit, laboratory data, echocardiogram results, stress test results, Holter-event monitor results prior and after the last electrophysiology visit. All questions has been answered.      Please excuse any errors in grammar or translation related to this dictation.  Voice recognition software was utilized to prepare this document.        Scribe Attestation  By signing my name below, I, Payton Kenyon LPN , Scribe   attest that this documentation has been prepared under the direction and in the presence of Tutu Guo MD.

## 2024-06-13 NOTE — PATIENT INSTRUCTIONS
Dr. Guo would like you to have a cath /poss stent if needed in [place] within next week  Hold Eliquis 48 hours prior and Trulicity 7 days prior No labs needed prior.     Dr. Guo is ordering an echo to be done near future    Dr. Guo is ordering a PVI ablation to be done in [place] in August. You will need to have a STEPHEN prior.       -Please bring all medicines, vitamins, and herbal supplements with you in original bottles to every appointment!!!!    -Prescriptions will not be filled unless you are compliant with your follow up appointments or have a follow up appointment scheduled as per instruction of your physician. Refills should be requested at the time of your visit.

## 2024-06-17 ENCOUNTER — HOSPITAL ENCOUNTER (OUTPATIENT)
Dept: LAB | Age: 64
Discharge: HOME | End: 2024-06-17
Payer: MEDICARE

## 2024-06-17 LAB
ANION GAP SERPL CALCULATED.3IONS-SCNC: 13 MEQ/L (ref 9–15)
BUN SERPL-MCNC: 12 MG/DL (ref 8–23)
CALCIUM SERPL-MCNC: 8.8 MG/DL (ref 8.5–9.9)
CHLORIDE SERPL-SCNC: 104 MEQ/L (ref 95–107)
CO2 SERPL-SCNC: 26 MEQ/L (ref 20–31)
GLUCOSE SERPL-MCNC: 142 MG/DL (ref 70–99)
INR PPP: 1.1
POTASSIUM SERPL-SCNC: 4.2 MEQ/L (ref 3.4–4.9)
PROTHROMBIN TIME: 14.7 SEC (ref 12.3–14.9)
SODIUM SERPL-SCNC: 143 MEQ/L (ref 135–144)

## 2024-06-17 PROCEDURE — 85610 PROTHROMBIN TIME: CPT

## 2024-06-17 PROCEDURE — 80048 BASIC METABOLIC PNL TOTAL CA: CPT

## 2024-06-17 PROCEDURE — 36415 COLL VENOUS BLD VENIPUNCTURE: CPT

## 2024-06-24 ENCOUNTER — APPOINTMENT (OUTPATIENT)
Dept: CARDIOLOGY | Facility: CLINIC | Age: 64
End: 2024-06-24
Payer: MEDICARE

## 2024-06-26 ENCOUNTER — APPOINTMENT (OUTPATIENT)
Dept: CARDIOLOGY | Facility: HOSPITAL | Age: 64
End: 2024-06-26
Payer: MEDICARE

## 2024-06-26 ENCOUNTER — HOSPITAL ENCOUNTER (OUTPATIENT)
Facility: HOSPITAL | Age: 64
Setting detail: OUTPATIENT SURGERY
Discharge: HOME | End: 2024-06-26
Attending: INTERNAL MEDICINE | Admitting: INTERNAL MEDICINE
Payer: MEDICARE

## 2024-06-26 VITALS
RESPIRATION RATE: 19 BRPM | SYSTOLIC BLOOD PRESSURE: 146 MMHG | TEMPERATURE: 36.4 F | OXYGEN SATURATION: 98 % | DIASTOLIC BLOOD PRESSURE: 62 MMHG | HEART RATE: 50 BPM

## 2024-06-26 DIAGNOSIS — R07.89 CHEST DISCOMFORT: ICD-10-CM

## 2024-06-26 DIAGNOSIS — I20.9 ANGINA PECTORIS, UNSPECIFIED (CMS-HCC): ICD-10-CM

## 2024-06-26 DIAGNOSIS — R06.02 SHORTNESS OF BREATH: Primary | ICD-10-CM

## 2024-06-26 LAB
ANION GAP SERPL CALC-SCNC: 11 MMOL/L (ref 10–20)
APTT PPP: 30 SECONDS (ref 27–38)
BUN SERPL-MCNC: 11 MG/DL (ref 6–23)
CALCIUM SERPL-MCNC: 9.1 MG/DL (ref 8.6–10.3)
CHLORIDE SERPL-SCNC: 103 MMOL/L (ref 98–107)
CO2 SERPL-SCNC: 28 MMOL/L (ref 21–32)
CREAT SERPL-MCNC: 0.72 MG/DL (ref 0.5–1.05)
EGFRCR SERPLBLD CKD-EPI 2021: >90 ML/MIN/1.73M*2
ERYTHROCYTE [DISTWIDTH] IN BLOOD BY AUTOMATED COUNT: 13.8 % (ref 11.5–14.5)
GLUCOSE SERPL-MCNC: 170 MG/DL (ref 74–99)
HCT VFR BLD AUTO: 40.7 % (ref 36–46)
HGB BLD-MCNC: 13.3 G/DL (ref 12–16)
INR PPP: 1.1 (ref 0.9–1.1)
MCH RBC QN AUTO: 28.7 PG (ref 26–34)
MCHC RBC AUTO-ENTMCNC: 32.7 G/DL (ref 32–36)
MCV RBC AUTO: 88 FL (ref 80–100)
NRBC BLD-RTO: 0 /100 WBCS (ref 0–0)
PLATELET # BLD AUTO: 179 X10*3/UL (ref 150–450)
POTASSIUM SERPL-SCNC: 4 MMOL/L (ref 3.5–5.3)
PROTHROMBIN TIME: 11.9 SECONDS (ref 9.8–12.8)
RBC # BLD AUTO: 4.64 X10*6/UL (ref 4–5.2)
SODIUM SERPL-SCNC: 138 MMOL/L (ref 136–145)
WBC # BLD AUTO: 7.2 X10*3/UL (ref 4.4–11.3)

## 2024-06-26 PROCEDURE — 2500000004 HC RX 250 GENERAL PHARMACY W/ HCPCS (ALT 636 FOR OP/ED): Performed by: NURSE PRACTITIONER

## 2024-06-26 PROCEDURE — 2720000007 HC OR 272 NO HCPCS: Performed by: INTERNAL MEDICINE

## 2024-06-26 PROCEDURE — 93010 ELECTROCARDIOGRAM REPORT: CPT | Performed by: INTERNAL MEDICINE

## 2024-06-26 PROCEDURE — 93005 ELECTROCARDIOGRAM TRACING: CPT | Mod: 59

## 2024-06-26 PROCEDURE — 85610 PROTHROMBIN TIME: CPT | Performed by: NURSE PRACTITIONER

## 2024-06-26 PROCEDURE — 99152 MOD SED SAME PHYS/QHP 5/>YRS: CPT | Performed by: INTERNAL MEDICINE

## 2024-06-26 PROCEDURE — 93458 L HRT ARTERY/VENTRICLE ANGIO: CPT | Performed by: INTERNAL MEDICINE

## 2024-06-26 PROCEDURE — 96373 THER/PROPH/DIAG INJ IA: CPT | Performed by: INTERNAL MEDICINE

## 2024-06-26 PROCEDURE — 7100000010 HC PHASE TWO TIME - EACH INCREMENTAL 1 MINUTE: Performed by: INTERNAL MEDICINE

## 2024-06-26 PROCEDURE — 36415 COLL VENOUS BLD VENIPUNCTURE: CPT | Performed by: NURSE PRACTITIONER

## 2024-06-26 PROCEDURE — 7100000009 HC PHASE TWO TIME - INITIAL BASE CHARGE: Performed by: INTERNAL MEDICINE

## 2024-06-26 PROCEDURE — 2500000001 HC RX 250 WO HCPCS SELF ADMINISTERED DRUGS (ALT 637 FOR MEDICARE OP): Performed by: NURSE PRACTITIONER

## 2024-06-26 PROCEDURE — 2500000005 HC RX 250 GENERAL PHARMACY W/O HCPCS: Performed by: INTERNAL MEDICINE

## 2024-06-26 PROCEDURE — 2550000001 HC RX 255 CONTRASTS: Performed by: INTERNAL MEDICINE

## 2024-06-26 PROCEDURE — 2500000004 HC RX 250 GENERAL PHARMACY W/ HCPCS (ALT 636 FOR OP/ED): Performed by: INTERNAL MEDICINE

## 2024-06-26 PROCEDURE — 85027 COMPLETE CBC AUTOMATED: CPT | Performed by: NURSE PRACTITIONER

## 2024-06-26 PROCEDURE — 80048 BASIC METABOLIC PNL TOTAL CA: CPT | Performed by: NURSE PRACTITIONER

## 2024-06-26 PROCEDURE — C1887 CATHETER, GUIDING: HCPCS | Performed by: INTERNAL MEDICINE

## 2024-06-26 PROCEDURE — C1894 INTRO/SHEATH, NON-LASER: HCPCS | Performed by: INTERNAL MEDICINE

## 2024-06-26 RX ORDER — NITROGLYCERIN 40 MG/100ML
INJECTION INTRAVENOUS AS NEEDED
Status: DISCONTINUED | OUTPATIENT
Start: 2024-06-26 | End: 2024-06-26 | Stop reason: HOSPADM

## 2024-06-26 RX ORDER — ISOSORBIDE MONONITRATE 30 MG/1
30 TABLET, EXTENDED RELEASE ORAL ONCE
Status: COMPLETED | OUTPATIENT
Start: 2024-06-26 | End: 2024-06-26

## 2024-06-26 RX ORDER — ONDANSETRON HYDROCHLORIDE 2 MG/ML
4 INJECTION, SOLUTION INTRAVENOUS EVERY 4 HOURS PRN
Status: DISCONTINUED | OUTPATIENT
Start: 2024-06-26 | End: 2024-06-26 | Stop reason: HOSPADM

## 2024-06-26 RX ORDER — SODIUM CHLORIDE 9 MG/ML
100 INJECTION, SOLUTION INTRAVENOUS CONTINUOUS
Status: DISCONTINUED | OUTPATIENT
Start: 2024-06-26 | End: 2024-06-26

## 2024-06-26 RX ORDER — ONDANSETRON HYDROCHLORIDE 2 MG/ML
INJECTION, SOLUTION INTRAVENOUS AS NEEDED
Status: DISCONTINUED | OUTPATIENT
Start: 2024-06-26 | End: 2024-06-26 | Stop reason: HOSPADM

## 2024-06-26 RX ORDER — LIDOCAINE HYDROCHLORIDE 20 MG/ML
INJECTION, SOLUTION INFILTRATION; PERINEURAL AS NEEDED
Status: DISCONTINUED | OUTPATIENT
Start: 2024-06-26 | End: 2024-06-26 | Stop reason: HOSPADM

## 2024-06-26 RX ORDER — FENTANYL CITRATE 50 UG/ML
INJECTION, SOLUTION INTRAMUSCULAR; INTRAVENOUS AS NEEDED
Status: DISCONTINUED | OUTPATIENT
Start: 2024-06-26 | End: 2024-06-26 | Stop reason: HOSPADM

## 2024-06-26 RX ORDER — HEPARIN SODIUM 1000 [USP'U]/ML
INJECTION, SOLUTION INTRAVENOUS; SUBCUTANEOUS AS NEEDED
Status: DISCONTINUED | OUTPATIENT
Start: 2024-06-26 | End: 2024-06-26 | Stop reason: HOSPADM

## 2024-06-26 RX ORDER — ASPIRIN 325 MG
325 TABLET ORAL ONCE
Status: COMPLETED | OUTPATIENT
Start: 2024-06-26 | End: 2024-06-26

## 2024-06-26 RX ORDER — MIDAZOLAM HYDROCHLORIDE 1 MG/ML
INJECTION, SOLUTION INTRAMUSCULAR; INTRAVENOUS AS NEEDED
Status: DISCONTINUED | OUTPATIENT
Start: 2024-06-26 | End: 2024-06-26 | Stop reason: HOSPADM

## 2024-06-26 ASSESSMENT — COLUMBIA-SUICIDE SEVERITY RATING SCALE - C-SSRS
1. IN THE PAST MONTH, HAVE YOU WISHED YOU WERE DEAD OR WISHED YOU COULD GO TO SLEEP AND NOT WAKE UP?: NO
6. HAVE YOU EVER DONE ANYTHING, STARTED TO DO ANYTHING, OR PREPARED TO DO ANYTHING TO END YOUR LIFE?: NO
2. HAVE YOU ACTUALLY HAD ANY THOUGHTS OF KILLING YOURSELF?: NO

## 2024-06-26 NOTE — SIGNIFICANT EVENT
Discharge instructions given including medications, follow up and radial site care, pt verbalized understanding

## 2024-06-26 NOTE — SIGNIFICANT EVENT
Right radial D-stat device removed, elastic D-stat bandage applied, post removal teaching finished, verbalized understanding

## 2024-06-26 NOTE — PROGRESS NOTES
Patient is stable status post LHC under the care of Dr. Oliveira.  Discussed results of procedure with patient and her family members.  Pictures provided.  Findings of the LHC revealed normal coronary arteries and a normal LVEF.  Please see procedural report for complete details.  Patient has routine cardiology follow-up arranged with Dr. Fitch in the future or sooner as needed.  She is scheduled for an echocardiogram in the near future and eventual follow-up with Dr. Guo as well.  All questions answered.  All verbalized understanding.

## 2024-06-26 NOTE — SIGNIFICANT EVENT
Pt ambulated to bathroom, gait steady, no complaints of lightheadedness or pain, voided in bathroom

## 2024-06-26 NOTE — SIGNIFICANT EVENT
Pt back from cath lab, right radial site stable, no bleeding or hematoma noted, D-stat intact, 2+ radial pulse, family ar bedside, small snack offered

## 2024-06-26 NOTE — POST-PROCEDURE NOTE
Physician Transition of Care Summary  Invasive Cardiovascular Lab    Procedure Date: 6/26/2024  Attending:    * Shree Oliveira - Primary  Resident/Fellow/Other Assistant: Surgeons and Role:  * No surgeons found with a matching role *    Indications:   Pre-op Diagnosis     * Shortness of breath [R06.02]     * Chest discomfort [R07.89]    Post-procedure diagnosis:   Post-op Diagnosis     * Shortness of breath [R06.02]     * Chest discomfort [R07.89]    Procedure(s):     * Left Heart Cath, With LV      Procedure Findings:   Normal coronaries, normal left ventricular function    Description of the Procedure:   Left heart catheterization coronary angiography left ventriculogram    Complications:   None    Stents/Implants:   Implants       No implant documentation for this case.            Anticoagulation/Antiplatelet Plan:   Intravenous heparin    Estimated Blood Loss:   0 mL    Anesthesia: Moderate Sedation Anesthesia Staff: No anesthesia staff entered.    Any Specimen(s) Removed:   Order Name Source Comment Collection Info Order Time   BASIC METABOLIC PANEL Blood, Venous  Collected By: Shital Morataya RN 6/26/2024  9:45 AM     Release result to [facility] Inchart   Immediate        CBC Blood, Venous  Collected By: Shital Morataya RN 6/26/2024  9:45 AM     Release result to MyChart   Immediate        COAGULATION SCREEN Blood, Venous  Collected By: Shital Morataya RN 6/26/2024  9:45 AM     Release result to [facility] Profile Inchart   Immediate            Disposition:   Medical therapy      Electronically signed by: Shree Oliveira MD, 6/26/2024 12:48 PM

## 2024-06-27 LAB
ATRIAL RATE: 51 BPM
P OFFSET: 210 MS
P ONSET: 158 MS
PR INTERVAL: 184 MS
Q ONSET: 225 MS
QRS COUNT: 8 BEATS
QRS DURATION: 90 MS
QT INTERVAL: 478 MS
QTC CALCULATION(BAZETT): 440 MS
QTC FREDERICIA: 453 MS
R AXIS: 0 DEGREES
T AXIS: 38 DEGREES
T OFFSET: 464 MS
VENTRICULAR RATE: 51 BPM

## 2024-06-27 NOTE — SIGNIFICANT EVENT
Patient recovering without complications. Rated care 10/10 and recognized Simran WINTER for the wonderful care.

## 2024-07-08 ENCOUNTER — OFFICE VISIT (OUTPATIENT)
Dept: FAMILY MEDICINE CLINIC | Age: 64
End: 2024-07-08
Payer: MEDICARE

## 2024-07-08 VITALS
WEIGHT: 276.2 LBS | HEIGHT: 65 IN | HEART RATE: 78 BPM | BODY MASS INDEX: 46.02 KG/M2 | OXYGEN SATURATION: 98 % | SYSTOLIC BLOOD PRESSURE: 130 MMHG | DIASTOLIC BLOOD PRESSURE: 78 MMHG | TEMPERATURE: 98.2 F

## 2024-07-08 DIAGNOSIS — M54.42 BILATERAL LOW BACK PAIN WITH BILATERAL SCIATICA, UNSPECIFIED CHRONICITY: Primary | ICD-10-CM

## 2024-07-08 DIAGNOSIS — M54.41 BILATERAL LOW BACK PAIN WITH BILATERAL SCIATICA, UNSPECIFIED CHRONICITY: Primary | ICD-10-CM

## 2024-07-08 PROCEDURE — 3075F SYST BP GE 130 - 139MM HG: CPT | Performed by: FAMILY MEDICINE

## 2024-07-08 PROCEDURE — 99213 OFFICE O/P EST LOW 20 MIN: CPT | Performed by: FAMILY MEDICINE

## 2024-07-08 PROCEDURE — 1123F ACP DISCUSS/DSCN MKR DOCD: CPT | Performed by: FAMILY MEDICINE

## 2024-07-08 PROCEDURE — 3078F DIAST BP <80 MM HG: CPT | Performed by: FAMILY MEDICINE

## 2024-07-08 RX ORDER — DIGOXIN 250 MCG
250 TABLET ORAL DAILY
COMMUNITY
Start: 2024-05-20

## 2024-07-08 RX ORDER — TRAMADOL HYDROCHLORIDE 50 MG/1
50 TABLET ORAL
COMMUNITY
Start: 2024-03-28

## 2024-07-08 RX ORDER — METHYLPREDNISOLONE 4 MG/1
TABLET ORAL
Qty: 1 KIT | Refills: 0 | Status: SHIPPED | OUTPATIENT
Start: 2024-07-08

## 2024-07-08 ASSESSMENT — ENCOUNTER SYMPTOMS
CHEST TIGHTNESS: 0
VOMITING: 0
BACK PAIN: 1
ABDOMINAL PAIN: 0
NAUSEA: 0
SHORTNESS OF BREATH: 0
BOWEL INCONTINENCE: 0

## 2024-07-08 NOTE — ASSESSMENT & PLAN NOTE
Pt with low back pain and no red flag sx of fevers, chills, weight loss, weakness of legs, or bladder/bowel incontinence. Will treat conservatively with heat/ice, medrol dosepack, and formal physical therapy due to severity and duration/hx. If no better in the next 2-4 weeks then would refer to ortho for further management.

## 2024-07-08 NOTE — PROGRESS NOTES
Lisset Bacon (: 1960) is a 64 y.o. female, Established patient, who presents today for:    Chief Complaint   Patient presents with    Other     Pt is here due to some nerve pain x1 week , it has been making both of her legs hurts, left one more than right. Along with lower back pain          Assessment & Plan     1. Bilateral low back pain with bilateral sciatica, unspecified chronicity  Assessment & Plan:  Pt with low back pain and no red flag sx of fevers, chills, weight loss, weakness of legs, or bladder/bowel incontinence. Will treat conservatively with heat/ice, medrol dosepack, and formal physical therapy due to severity and duration/hx. If no better in the next 2-4 weeks then would refer to ortho for further management.   Orders:  -     Ambulatory referral to Physical Therapy  -     methylPREDNISolone (MEDROL, CLINT,) 4 MG tablet; Take by mouth., Disp-1 kit, R-0Normal           Return for KEEP NEXT SCHEDULED VISIT AUG 2024.       Subjective     Patient presents for acute visit regarding one week history of bilateral low back pain L>R described as aching and burning with radiation to bilateral buttocks, left thigh, and left lower leg. There was no preceding injury or preceding change to activity. She reports similar symptoms a year ago that self-resolved without treatment. Tylenol at home has provided no significant improvement.     Back Pain  This is a recurrent problem. The current episode started in the past 7 days. The problem occurs constantly. The problem is unchanged. The pain is present in the lumbar spine and gluteal. The quality of the pain is described as aching and burning. The pain radiates to the left thigh, left knee and left foot (also bilateral buttocks). The pain is at a severity of 8/10. The pain is severe. The pain is Worse during the night. The symptoms are aggravated by bending, standing and twisting. Pertinent negatives include no abdominal pain, bladder incontinence, bowel

## 2024-07-10 ENCOUNTER — TELEPHONE (OUTPATIENT)
Dept: FAMILY MEDICINE CLINIC | Age: 64
End: 2024-07-10

## 2024-07-10 DIAGNOSIS — E11.9 TYPE 2 DIABETES MELLITUS WITHOUT COMPLICATION, WITHOUT LONG-TERM CURRENT USE OF INSULIN (HCC): Primary | ICD-10-CM

## 2024-07-10 NOTE — TELEPHONE ENCOUNTER
Patient should double down on efforts to eat a lower carbohydrate diet, especially now. We expect higher BG values and I would reassure her for values in the 200s. If despite adhering to a lower carbohydrate diet she is still >300 then she should stop the steroid. I want her to go to the lab for A1c value (90 day average), I suspect at baseline her A1c is elevated if she is going this high with the medrol dosepack. Instruct her to go to the lab for testing. She should continue with her current doses of metformin and Trulicity. She should NOT take insulin on her own without instructions from a doctor.

## 2024-07-10 NOTE — TELEPHONE ENCOUNTER
Pt called again and stated she ate breakfast and it was still at 225. She is still unsure of hwa she should do.

## 2024-07-10 NOTE — TELEPHONE ENCOUNTER
Pt called bc she started her steroids and her blood sugar has been 350,209,240 and it is high today and she hasn't had breakfast yet. Pt is wondering if she should continue the steroid dose pack or start her metformin. She also wants to use her husbands emergency insulin pen. Please advise.

## 2024-07-11 ENCOUNTER — HOSPITAL ENCOUNTER (OUTPATIENT)
Dept: LAB | Age: 64
Discharge: HOME | End: 2024-07-11
Payer: MEDICARE

## 2024-07-11 DIAGNOSIS — E11.9 TYPE 2 DIABETES MELLITUS WITHOUT COMPLICATION, WITHOUT LONG-TERM CURRENT USE OF INSULIN (HCC): ICD-10-CM

## 2024-07-11 DIAGNOSIS — I48.0 PAROXYSMAL ATRIAL FIBRILLATION (MULTI): ICD-10-CM

## 2024-07-11 LAB
ALBUMIN SERPL-MCNC: 4 G/DL (ref 3.5–4.6)
ALP SERPL-CCNC: 103 U/L (ref 40–130)
ALT SERPL-CCNC: 23 U/L (ref 0–33)
ANION GAP SERPL CALCULATED.3IONS-SCNC: 13 MEQ/L (ref 9–15)
AST SERPL-CCNC: 21 U/L (ref 0–35)
BILIRUB SERPL-MCNC: 0.3 MG/DL (ref 0.2–0.7)
BUN SERPL-MCNC: 20 MG/DL (ref 8–23)
CALCIUM SERPL-MCNC: 9.5 MG/DL (ref 8.5–9.9)
CHLORIDE SERPL-SCNC: 98 MEQ/L (ref 95–107)
CO2 SERPL-SCNC: 26 MEQ/L (ref 20–31)
CREAT SERPL-MCNC: 0.75 MG/DL (ref 0.5–0.9)
ESTIMATED AVERAGE GLUCOSE: 166 MG/DL
GLOBULIN SER CALC-MCNC: 2.8 G/DL (ref 2.3–3.5)
GLUCOSE SERPL-MCNC: 191 MG/DL (ref 70–99)
HBA1C MFR BLD: 7.4 % (ref 4–6)
POTASSIUM SERPL-SCNC: 4.4 MEQ/L (ref 3.4–4.9)
PROT SERPL-MCNC: 6.8 G/DL (ref 6.3–8)
SODIUM SERPL-SCNC: 137 MEQ/L (ref 135–144)

## 2024-07-11 PROCEDURE — 80053 COMPREHEN METABOLIC PANEL: CPT

## 2024-07-11 PROCEDURE — 36415 COLL VENOUS BLD VENIPUNCTURE: CPT

## 2024-07-11 PROCEDURE — 83036 HEMOGLOBIN GLYCOSYLATED A1C: CPT

## 2024-07-11 NOTE — TELEPHONE ENCOUNTER
Received rx fax from [email or web address] requesting refills for Eliquis 5 mg - bid. Per chart, medication haven't been filled since 4/8/22.     Called pt to clarify if medication was still in use. Pt stated yes and read off bottle it last filled by Dr. Guo 5/4/24 with no refills. Pt requesting additional refills until next OV.     Medication pending and routed to Dr. Guo for authorization.

## 2024-07-15 ENCOUNTER — APPOINTMENT (OUTPATIENT)
Dept: CARDIOLOGY | Facility: CLINIC | Age: 64
End: 2024-07-15
Payer: MEDICARE

## 2024-07-16 ENCOUNTER — ANCILLARY PROCEDURE (OUTPATIENT)
Dept: CARDIOLOGY | Facility: HOSPITAL | Age: 64
End: 2024-07-16
Payer: MEDICARE

## 2024-07-16 DIAGNOSIS — R06.02 SHORTNESS OF BREATH: ICD-10-CM

## 2024-07-16 DIAGNOSIS — R07.89 CHEST DISCOMFORT: ICD-10-CM

## 2024-07-16 DIAGNOSIS — I48.11 LONGSTANDING PERSISTENT ATRIAL FIBRILLATION (MULTI): ICD-10-CM

## 2024-07-16 PROCEDURE — 93306 TTE W/DOPPLER COMPLETE: CPT

## 2024-07-16 PROCEDURE — 93306 TTE W/DOPPLER COMPLETE: CPT | Performed by: INTERNAL MEDICINE

## 2024-07-19 LAB
AORTIC VALVE MEAN GRADIENT: 8 MMHG
AORTIC VALVE PEAK VELOCITY: 1.95 M/S
AV PEAK GRADIENT: 15.2 MMHG
AVA (PEAK VEL): 2.4 CM2
AVA (VTI): 2.45 CM2
EJECTION FRACTION APICAL 4 CHAMBER: 68.1
EJECTION FRACTION: 65 %
LEFT VENTRICLE INTERNAL DIMENSION DIASTOLE: 5.38 CM (ref 3.5–6)
LEFT VENTRICULAR OUTFLOW TRACT DIAMETER: 2 CM
LV EJECTION FRACTION BIPLANE: 68 %
MITRAL VALVE E/A RATIO: 2.86
RIGHT VENTRICLE PEAK SYSTOLIC PRESSURE: 42.2 MMHG

## 2024-07-26 ENCOUNTER — HOSPITAL ENCOUNTER (OUTPATIENT)
Dept: LAB | Age: 64
Discharge: HOME | End: 2024-07-26
Payer: MEDICARE

## 2024-07-26 DIAGNOSIS — E55.9 VITAMIN D DEFICIENCY: ICD-10-CM

## 2024-07-26 DIAGNOSIS — M85.852 OSTEOPENIA OF BOTH HIPS: ICD-10-CM

## 2024-07-26 DIAGNOSIS — E66.01 MORBID OBESITY (HCC): ICD-10-CM

## 2024-07-26 DIAGNOSIS — E11.9 TYPE 2 DIABETES MELLITUS WITHOUT COMPLICATION, WITHOUT LONG-TERM CURRENT USE OF INSULIN (HCC): ICD-10-CM

## 2024-07-26 DIAGNOSIS — I48.19 ATRIAL FIBRILLATION, PERSISTENT (HCC): ICD-10-CM

## 2024-07-26 DIAGNOSIS — I10 ESSENTIAL HYPERTENSION: Chronic | ICD-10-CM

## 2024-07-26 DIAGNOSIS — E78.5 HYPERLIPIDEMIA, UNSPECIFIED HYPERLIPIDEMIA TYPE: ICD-10-CM

## 2024-07-26 DIAGNOSIS — M85.851 OSTEOPENIA OF BOTH HIPS: ICD-10-CM

## 2024-07-26 LAB
ALBUMIN SERPL-MCNC: 3.6 G/DL (ref 3.5–4.6)
ALP SERPL-CCNC: 116 U/L (ref 40–130)
ALT SERPL-CCNC: 30 U/L (ref 0–33)
ANION GAP SERPL CALCULATED.3IONS-SCNC: 10 MEQ/L (ref 9–15)
AST SERPL-CCNC: 26 U/L (ref 0–35)
BASOPHILS # BLD: 0 K/UL (ref 0–0.2)
BASOPHILS NFR BLD: 0.1 %
BILIRUB SERPL-MCNC: 0.4 MG/DL (ref 0.2–0.7)
BUN SERPL-MCNC: 14 MG/DL (ref 8–23)
CALCIUM SERPL-MCNC: 9.5 MG/DL (ref 8.5–9.9)
CHLORIDE SERPL-SCNC: 102 MEQ/L (ref 95–107)
CHOLEST SERPL-MCNC: 125 MG/DL (ref 0–199)
CO2 SERPL-SCNC: 24 MEQ/L (ref 20–31)
CREAT SERPL-MCNC: 0.72 MG/DL (ref 0.5–0.9)
EOSINOPHIL # BLD: 0.1 K/UL (ref 0–0.7)
EOSINOPHIL NFR BLD: 1.3 %
ERYTHROCYTE [DISTWIDTH] IN BLOOD BY AUTOMATED COUNT: 14.6 % (ref 11.5–14.5)
GLOBULIN SER CALC-MCNC: 3.1 G/DL (ref 2.3–3.5)
GLUCOSE SERPL-MCNC: 224 MG/DL (ref 70–99)
HCT VFR BLD AUTO: 42.7 % (ref 37–47)
HDLC SERPL-MCNC: 32 MG/DL (ref 40–59)
HGB BLD-MCNC: 13.6 G/DL (ref 12–16)
LDLC SERPL CALC-MCNC: 67 MG/DL (ref 0–129)
LYMPHOCYTES # BLD: 1 K/UL (ref 1–4.8)
LYMPHOCYTES NFR BLD: 11.4 %
MCH RBC QN AUTO: 28.7 PG (ref 27–31.3)
MCHC RBC AUTO-ENTMCNC: 31.9 % (ref 33–37)
MCV RBC AUTO: 90.1 FL (ref 79.4–94.8)
MONOCYTES # BLD: 0.7 K/UL (ref 0.2–0.8)
MONOCYTES NFR BLD: 7.6 %
NEUTROPHILS # BLD: 6.8 K/UL (ref 1.4–6.5)
NEUTS SEG NFR BLD: 79.2 %
PLATELET # BLD AUTO: 195 K/UL (ref 130–400)
POTASSIUM SERPL-SCNC: 4.9 MEQ/L (ref 3.4–4.9)
PROT SERPL-MCNC: 6.7 G/DL (ref 6.3–8)
RBC # BLD AUTO: 4.74 M/UL (ref 4.2–5.4)
SODIUM SERPL-SCNC: 136 MEQ/L (ref 135–144)
TRIGL SERPL-MCNC: 128 MG/DL (ref 0–150)
TSH SERPL-MCNC: 3.49 UIU/ML (ref 0.44–3.86)
WBC # BLD AUTO: 8.6 K/UL (ref 4.8–10.8)

## 2024-07-26 PROCEDURE — 84443 ASSAY THYROID STIM HORMONE: CPT

## 2024-07-26 PROCEDURE — 80061 LIPID PANEL: CPT

## 2024-07-26 PROCEDURE — 85025 COMPLETE CBC W/AUTO DIFF WBC: CPT

## 2024-07-26 PROCEDURE — 82306 VITAMIN D 25 HYDROXY: CPT

## 2024-07-26 PROCEDURE — 80053 COMPREHEN METABOLIC PANEL: CPT

## 2024-07-26 PROCEDURE — 36415 COLL VENOUS BLD VENIPUNCTURE: CPT

## 2024-07-26 PROCEDURE — 83036 HEMOGLOBIN GLYCOSYLATED A1C: CPT

## 2024-07-27 DIAGNOSIS — K21.9 GASTROESOPHAGEAL REFLUX DISEASE, UNSPECIFIED WHETHER ESOPHAGITIS PRESENT: ICD-10-CM

## 2024-07-27 DIAGNOSIS — I48.0 PAROXYSMAL ATRIAL FIBRILLATION (MULTI): ICD-10-CM

## 2024-07-27 LAB
ESTIMATED AVERAGE GLUCOSE: 177 MG/DL
HBA1C MFR BLD: 7.8 % (ref 4–6)
VITAMIN D 25-HYDROXY: 25.5 NG/ML (ref 30–100)

## 2024-07-29 RX ORDER — DIGOXIN 250 MCG
250 TABLET ORAL DAILY
Qty: 90 TABLET | Refills: 0 | Status: SHIPPED | OUTPATIENT
Start: 2024-07-29

## 2024-07-29 RX ORDER — OMEPRAZOLE 40 MG/1
40 CAPSULE, DELAYED RELEASE ORAL
Qty: 90 CAPSULE | Refills: 1 | Status: SHIPPED | OUTPATIENT
Start: 2024-07-29

## 2024-07-29 NOTE — TELEPHONE ENCOUNTER
Comments:     Last Office Visit (last PCP visit):   7/8/2024    Next Visit Date:  Future Appointments   Date Time Provider Department Center   8/5/2024 10:30 AM Hosea Harman MD [facility]   8/8/2024 10:00 AM Paul Dotson MD [facility]   2/6/2025 10:00 AM Hosea Harman MD [facility]       **If hasn't been seen in over a year OR hasn't followed up according to last diabetes/ADHD visit, make appointment for patient before sending refill to provider.    Rx requested:  Requested Prescriptions     Pending Prescriptions Disp Refills    omeprazole (PRILOSEC) 40 MG delayed release capsule [Pharmacy Med Name: Omeprazole 40 MG Oral Capsule Delayed Release] 90 capsule 3     Sig: TAKE 1 CAPSULE BY MOUTH IN THE  MORNING BEFORE BREAKFAST

## 2024-07-30 ENCOUNTER — TELEPHONE (OUTPATIENT)
Dept: FAMILY MEDICINE CLINIC | Age: 64
End: 2024-07-30

## 2024-07-30 DIAGNOSIS — B37.31 VAGINAL CANDIDIASIS: Primary | ICD-10-CM

## 2024-07-30 RX ORDER — FLUCONAZOLE 150 MG/1
150 TABLET ORAL ONCE
Qty: 1 TABLET | Refills: 0 | Status: SHIPPED | OUTPATIENT
Start: 2024-07-30 | End: 2024-07-30

## 2024-07-30 NOTE — TELEPHONE ENCOUNTER
RX for diflucan x 1 sent to local pharmacy ([email or web address]) to empirically treat vaginal candidiasis associated with recent antibiotic use. If vaginal discharge persists she should be seen by her gynecologist.

## 2024-07-30 NOTE — TELEPHONE ENCOUNTER
Pt called bc she has had a yeast infection since she started a round of antibiotics for a tooth infection. She has used monistat OTC for a few days and it hasn't worked. She stated that she has taken something for her yeast infections from the dr in the past. She was last seen 7/8 for something else and doesn't want another OV. Please advise

## 2024-08-02 DIAGNOSIS — E11.9 TYPE 2 DIABETES MELLITUS WITHOUT COMPLICATION, WITHOUT LONG-TERM CURRENT USE OF INSULIN (HCC): Primary | ICD-10-CM

## 2024-08-02 NOTE — PATIENT INSTRUCTIONS
Patient Education        COVID-19 Viral Test: About This Test  What is it? A COVID-19 viral test is a way to find out if you have COVID-19. The test looks for the virus in your breathing passages. There are different types of viraltests. One type looks for genetic material from the virus. This is usually calledpolymerase chain reaction (PCR). Another type looks for proteins on the virus. This is usually called an antigentest. It may not be as accurate as PCR. Some test results come back in a few minutes. Others may take a few days. If you have questions about COVID-19 testing, ask your doctor or go to cdc.govto use the COVID-19 Viral Testing Tool. Why is it done? This test is used to diagnose a current infection with SARS-CoV-2, the virus that causes COVID-19. Knowing that you have the virus means that you can take steps to protect others from getting infected. This can help limit the spreadof the virus. If you have the virus, your doctor might have you take medicine to help preventserious illness. Knowing who has COVID-19 is also important for experts who track the virus. How do you prepare for the test?  You don't need to do anything to prepare for this test. But be sure to followany instructions your health care provider gives you. How is it done? The test is most often done on a sample from your nose or throat. It's sometimes done on a sample of saliva. One way a sample is collected is by putting a long swab into the back of your nose. Samples can be tested indifferent ways to look for an infection. What should you do while you wait for your test results? What to do while you wait for your results depends on why you got the test.   If you have COVID-19 symptoms : Wear a mask around other people, avoid travel and stay away from people who are at high risk of serious COVID illness for at least 10 days. Also, stay in the place where you live, and separate yourself from others.    If you were exposed to someone with COVID-19 AND you don't have symptoms AND you are fully vaccinated and boosted or you tested positive for the COVID virus in the last 90 days and have recovered: Wear a mask around other people, avoid travel, and stay away from people who are at high risk of serious COVID illness for at least 10 days.  If you were exposed to someone with COVID-19 AND are NOT fully vaccinated and boosted AND have NOT tested positive for and recovered from the COVID virus in the last 90 days: Wear a mask around other people, avoid travel and stay away from people who are at high risk of serious COVID illness for at least 10 days. Also, stay in the place where you live, and separate yourself from others.  If you are currently self-isolating because you have COVID-19 OR if you were exposed and are testing to see if you can leave self-isolation before 10 days: Wear a mask around other people, avoid travel, and stay away from people who are at high risk of serious COVID illness for at least 10 days. Also, stay in the place where you live, and separate yourself from others.  If you are testing for a reason like work, school, or travel, or before visiting indoors with people you don't live with: You don't need to do anything special. Follow the cdc.gov and local health guidelines about things like wearing a mask and physical distancing. What do your results mean? The result is either positive or negative. A positive result means that the antigen or the genetic material of the viruswas found in your sample. You have COVID-19 now. A negative result means that the antigen or the genetic material was not found. This may mean that you don't have COVID-19. But it's possible to get a \"false-negative\" result. This means that the test shows that you don't have COVID-19 when in fact you do.  This may happen because you were tested too soon after you were infected, before the virus started to spread in your nose and throat. Or it could happenbecause the swab missed the infection. If you get a negative result for an antigen test, your doctor may recommend that you get another test, such as polymerase chain reaction (PCR), to [facility] you don't have the virus. Some test results come back in a few minutes. Others may take a few days. If your test is negative, follow your doctor's advice for when you can go back to activities. If your test is positive, talk to your doctor or a public healthofficial about what you need to do. Where can you learn more? Go to https://[email or web address] org and sign in to your [facility] account. Enter A129 in the Kudo box to learn more about \"COVID-19 Viral Test: About This Test.\"     If you do not have an account, please click on the \"Sign Up Now\" link. Current as of: March 26, 2021               Content Version: 13.2  © 2006-2022 Healthwise, Incorporated. Care instructions adapted under license by [facility] ([facility]). If you have questions about a medical condition or this instruction, always ask your healthcare professional. [address] any warranty or liability for your use of this information. Medications

## 2024-08-05 ENCOUNTER — HOSPITAL ENCOUNTER (OUTPATIENT)
Age: 64
Setting detail: SPECIMEN
Discharge: HOME | End: 2024-08-05
Payer: MEDICARE

## 2024-08-05 ENCOUNTER — OFFICE VISIT (OUTPATIENT)
Dept: FAMILY MEDICINE CLINIC | Age: 64
End: 2024-08-05
Payer: MEDICARE

## 2024-08-05 VITALS
SYSTOLIC BLOOD PRESSURE: 138 MMHG | HEIGHT: 65 IN | TEMPERATURE: 97 F | DIASTOLIC BLOOD PRESSURE: 70 MMHG | WEIGHT: 274.4 LBS | HEART RATE: 65 BPM | OXYGEN SATURATION: 96 % | BODY MASS INDEX: 45.72 KG/M2

## 2024-08-05 DIAGNOSIS — E78.5 HYPERLIPIDEMIA, UNSPECIFIED HYPERLIPIDEMIA TYPE: ICD-10-CM

## 2024-08-05 DIAGNOSIS — I10 ESSENTIAL HYPERTENSION: Chronic | ICD-10-CM

## 2024-08-05 DIAGNOSIS — Z23 NEED FOR VACCINATION: ICD-10-CM

## 2024-08-05 DIAGNOSIS — E11.65 TYPE 2 DIABETES MELLITUS WITH HYPERGLYCEMIA, WITHOUT LONG-TERM CURRENT USE OF INSULIN (HCC): Primary | ICD-10-CM

## 2024-08-05 DIAGNOSIS — E55.9 VITAMIN D DEFICIENCY: ICD-10-CM

## 2024-08-05 DIAGNOSIS — G47.33 OSA ON CPAP: ICD-10-CM

## 2024-08-05 DIAGNOSIS — E11.65 TYPE 2 DIABETES MELLITUS WITH HYPERGLYCEMIA, WITHOUT LONG-TERM CURRENT USE OF INSULIN (HCC): ICD-10-CM

## 2024-08-05 DIAGNOSIS — I48.19 ATRIAL FIBRILLATION, PERSISTENT (HCC): ICD-10-CM

## 2024-08-05 DIAGNOSIS — E66.01 MORBID OBESITY (HCC): ICD-10-CM

## 2024-08-05 LAB
CREAT UR-MCNC: 121.7 MG/DL
MICROALBUMIN UR-MCNC: 1.8 MG/DL
MICROALBUMIN/CREAT UR-RTO: 14.8 MG/G (ref 0–30)

## 2024-08-05 PROCEDURE — 3075F SYST BP GE 130 - 139MM HG: CPT | Performed by: FAMILY MEDICINE

## 2024-08-05 PROCEDURE — 82043 UR ALBUMIN QUANTITATIVE: CPT

## 2024-08-05 PROCEDURE — 1123F ACP DISCUSS/DSCN MKR DOCD: CPT | Performed by: FAMILY MEDICINE

## 2024-08-05 PROCEDURE — 99214 OFFICE O/P EST MOD 30 MIN: CPT | Performed by: FAMILY MEDICINE

## 2024-08-05 PROCEDURE — 82570 ASSAY OF URINE CREATININE: CPT

## 2024-08-05 PROCEDURE — 3051F HG A1C>EQUAL 7.0%<8.0%: CPT | Performed by: FAMILY MEDICINE

## 2024-08-05 PROCEDURE — 3078F DIAST BP <80 MM HG: CPT | Performed by: FAMILY MEDICINE

## 2024-08-05 RX ORDER — GLUCOSAMINE HCL/CHONDROITIN SU 500-400 MG
CAPSULE ORAL
Qty: 100 STRIP | Refills: 5 | Status: SHIPPED | OUTPATIENT
Start: 2024-08-05

## 2024-08-05 RX ORDER — BLOOD PRESSURE TEST KIT
KIT MISCELLANEOUS
Qty: 100 EACH | Refills: 5 | Status: SHIPPED | OUTPATIENT
Start: 2024-08-05

## 2024-08-05 RX ORDER — LANCETS 30 GAUGE
EACH MISCELLANEOUS
Qty: 100 EACH | Refills: 5 | Status: SHIPPED | OUTPATIENT
Start: 2024-08-05

## 2024-08-05 ASSESSMENT — ENCOUNTER SYMPTOMS
CHEST TIGHTNESS: 0
BLOOD IN STOOL: 0
CONSTIPATION: 0
VOMITING: 0
WHEEZING: 0
SHORTNESS OF BREATH: 0
DIARRHEA: 0
ANAL BLEEDING: 0
ABDOMINAL PAIN: 0
NAUSEA: 0
COUGH: 0

## 2024-08-05 NOTE — ASSESSMENT & PLAN NOTE
Asymptomatic and rate controlled today in the office.  Patient remains on anticoagulation without signs of bleeding.  Instructions were given to continue with current doses of Eliquis and metoprolol.  Chronic follow-up remains in place with cardiology for management of antiarrhythmic medication.  Patient reports having upcoming ablation scheduled for further management.

## 2024-08-05 NOTE — ASSESSMENT & PLAN NOTE
Managed well with nightly use of CPAP machine.  Patient reporting restful sleep with nightly use of machine.  Patient instructed to continue with CPAP machine at current settings.

## 2024-08-05 NOTE — PROGRESS NOTES
Palpations: Abdomen is soft.      Tenderness: There is no abdominal tenderness. There is no right CVA tenderness, left CVA tenderness, guarding or rebound.   Musculoskeletal:      Cervical back: Neck supple.      Right lower leg: Edema (trace to 1+ to ankle) present.      Left lower leg: Edema (trace to 1+ to ankle) present.   Feet:      Right foot:      Protective Sensation: 10 sites tested.  10 sites sensed.      Skin integrity: Callus and dry skin present. No ulcer, blister, skin breakdown, erythema, warmth or fissure.      Left foot:      Protective Sensation: 10 sites tested.  10 sites sensed.      Skin integrity: Callus and dry skin present. No ulcer, blister, skin breakdown, erythema, warmth or fissure.   Lymphadenopathy:      Cervical: No cervical adenopathy.   Skin:     Findings: No rash.   Neurological:      Mental Status: She is alert and oriented to person, place, and time.      Sensory: Sensation is intact. No sensory deficit.      Comments: Foot monofilament testing negative for neuropathy bilaterally   Psychiatric:         Mood and Affect: Mood normal.         Behavior: Behavior normal.         Thought Content: Thought content normal.         Ortho Exam (If Applicable)                   An electronic signature was used to authenticate this note.     IVETTE BUSH MD

## 2024-08-05 NOTE — ASSESSMENT & PLAN NOTE
Most recent A1c above goal control and rising over time.  Patient agrees to increase dose of Trulicity and start Jardiance for further management.  Prescriptions have been sent to the pharmacy.  Patient agrees to keep close follow-up in the next 3 months for reassessment.

## 2024-08-08 ENCOUNTER — OFFICE VISIT (OUTPATIENT)
Dept: PULMONOLOGY | Age: 64
End: 2024-08-08
Payer: MEDICARE

## 2024-08-08 VITALS
OXYGEN SATURATION: 93 % | HEART RATE: 73 BPM | BODY MASS INDEX: 45.65 KG/M2 | RESPIRATION RATE: 16 BRPM | HEIGHT: 65 IN | DIASTOLIC BLOOD PRESSURE: 72 MMHG | SYSTOLIC BLOOD PRESSURE: 138 MMHG | WEIGHT: 274 LBS | TEMPERATURE: 97.6 F

## 2024-08-08 DIAGNOSIS — I27.20 PULMONARY HYPERTENSION (HCC): ICD-10-CM

## 2024-08-08 DIAGNOSIS — G47.33 OSA ON CPAP: Primary | ICD-10-CM

## 2024-08-08 DIAGNOSIS — E66.01 CLASS 3 SEVERE OBESITY DUE TO EXCESS CALORIES WITH SERIOUS COMORBIDITY AND BODY MASS INDEX (BMI) OF 40.0 TO 44.9 IN ADULT (HCC): ICD-10-CM

## 2024-08-08 PROCEDURE — 99213 OFFICE O/P EST LOW 20 MIN: CPT | Performed by: INTERNAL MEDICINE

## 2024-08-08 PROCEDURE — 3075F SYST BP GE 130 - 139MM HG: CPT | Performed by: INTERNAL MEDICINE

## 2024-08-08 PROCEDURE — 3078F DIAST BP <80 MM HG: CPT | Performed by: INTERNAL MEDICINE

## 2024-08-08 PROCEDURE — 1123F ACP DISCUSS/DSCN MKR DOCD: CPT | Performed by: INTERNAL MEDICINE

## 2024-08-08 NOTE — PROGRESS NOTES
Subjective:     Lisset Bacon is a 64 y.o. female who complains today of:     Chief Complaint   Patient presents with    Follow-up     1 YR F/U for ALE on CPAP       HPI  Patient presents for ALE    8/8/2024  Doing good, no complaint, no chest pain, no shortness of breath, she uses CPAP every night with no issues, no daytime sleepiness or tiredness, no heartburn, no nasal congestion or postnasal drip.  No lower extremity edema.      8/9/2023  Doing good, has no complaint, no chest pain, no shortness of breath, uses CPAP every night with no issues, no daytime sleepiness or tiredness, no lower extremity edema, trying to lose weight, no nasal congestion,      8/1/2022  Doing good, symptoms controlled, no chest pain, weight is stable, no nasal congestion or postnasal drip, no heartburn, compliant with CPAP, no fever no chills, no shortness of breath.  No excessive daytime sleepiness or tiredness, no issues with CPAP.            Allergies:  Sulfa antibiotics, Lisinopril, Lisinopril, Losartan, Milk (cow), Sulfa antibiotics, Codeine, and Percocet [oxycodone-acetaminophen]  Past Medical History:   Diagnosis Date    Angina at rest ([facility]) 05/14/2018    Anxiety     Atopic rhinitis 01/19/2016    Atrial fibrillation, persistent ([facility]) 05/10/2018    Baker's cyst of knee, left 6/23/2022    Chronic cholecystitis with calculus 08/01/2017    Chronic gastritis 6/19/2023    Dyshidrotic eczema 01/15/2020    Essential hypertension 03/24/2014    GERD (gastroesophageal reflux disease) 6/30/2022    Hyperlipidemia     Morbid obesity ([facility]) 04/30/2009    ALE on CPAP 06/20/2022    Osteoarthritis     back, knees, spine    Pacemaker 03/09/2018    PONV (postoperative nausea and vomiting)     from using inhaled anethesia     PVC's (premature ventricular contractions) 01/06/2016    Seasonal allergies     Symptomatic bradycardia 03/09/2018    Type 2 diabetes mellitus without complication, without long-term current use of insulin ([facility])     Vitamin D

## 2024-08-21 ENCOUNTER — TELEPHONE (OUTPATIENT)
Dept: CARDIOLOGY | Facility: CLINIC | Age: 64
End: 2024-08-21
Payer: MEDICARE

## 2024-08-21 NOTE — TELEPHONE ENCOUNTER
Rec'd fax from [facility]. Pt will need a colonosocpy not yet scheduled and requesting pt hold Eliqus and how long prior. Form placed on Dr. Fitch desk.     Last seen by Dr. Fitch 11/22/23. Pending appt 10/29/24  Last seen Dr. Guo 6/13/24

## 2024-09-02 DIAGNOSIS — E11.9 TYPE 2 DIABETES MELLITUS WITHOUT COMPLICATION, WITHOUT LONG-TERM CURRENT USE OF INSULIN (HCC): ICD-10-CM

## 2024-09-03 RX ORDER — METFORMIN HCL 500 MG
1000 TABLET, EXTENDED RELEASE 24 HR ORAL
Qty: 180 TABLET | Refills: 1 | Status: SHIPPED | OUTPATIENT
Start: 2024-09-03

## 2024-09-03 NOTE — TELEPHONE ENCOUNTER
Comments:     Last Office Visit (last PCP visit):   8/5/2024    Next Visit Date:  Future Appointments   Date Time Provider Department Center   11/5/2024 10:30 AM Hosea Harman MD [facility]   2/6/2025 10:00 AM Hosea Harmna MD [facility]   8/11/2025 10:30 AM Paul Dotson MD [facility]       **If hasn't been seen in over a year OR hasn't followed up according to last diabetes/ADHD visit, make appointment for patient before sending refill to provider.    Rx requested:  Requested Prescriptions     Pending Prescriptions Disp Refills    metFORMIN (GLUCOPHAGE-XR) 500 MG extended release tablet [Pharmacy Med Name: metFORMIN HCl  MG Oral Tablet Extended Release 24 Hour] 180 tablet 3     Sig: TAKE 2 TABLETS BY MOUTH DAILY  WITH BREAKFAST

## 2024-09-13 ENCOUNTER — HOSPITAL ENCOUNTER (OUTPATIENT)
Dept: CARDIOLOGY | Age: 64
Discharge: HOME | End: 2024-09-13
Payer: MEDICARE

## 2024-09-13 PROCEDURE — 93294 REM INTERROG EVL PM/LDLS PM: CPT | Performed by: INTERNAL MEDICINE

## 2024-09-13 PROCEDURE — 93296 REM INTERROG EVL PM/IDS: CPT

## 2024-09-17 ENCOUNTER — TELEPHONE (OUTPATIENT)
Dept: CARDIOLOGY | Facility: CLINIC | Age: 64
End: 2024-09-17
Payer: MEDICARE

## 2024-09-17 NOTE — TELEPHONE ENCOUNTER
Returned call to patient and informed her that she does not need to hold her blood thinner prior to ablation scheduled with Dr. Shore on 09/26. She will need to get labs done within a week prior to procedure. Patient verbalized understanding.

## 2024-09-17 NOTE — TELEPHONE ENCOUNTER
Patient called and LM stating she would like to know if she needs to stop her blood thinners and if she has any labs before hand. Routed to Lisa Hill RN

## 2024-09-23 ENCOUNTER — PREP FOR PROCEDURE (OUTPATIENT)
Dept: GASTROENTEROLOGY | Age: 64
End: 2024-09-23

## 2024-09-23 RX ORDER — SODIUM CHLORIDE 9 MG/ML
INJECTION, SOLUTION INTRAVENOUS PRN
Status: CANCELLED | OUTPATIENT
Start: 2024-09-23

## 2024-09-23 RX ORDER — SODIUM CHLORIDE 0.9 % (FLUSH) 0.9 %
5-40 SYRINGE (ML) INJECTION PRN
Status: CANCELLED | OUTPATIENT
Start: 2024-09-23

## 2024-09-23 RX ORDER — SODIUM CHLORIDE 9 MG/ML
INJECTION, SOLUTION INTRAVENOUS CONTINUOUS
Status: CANCELLED | OUTPATIENT
Start: 2024-09-23

## 2024-09-23 RX ORDER — SODIUM CHLORIDE 0.9 % (FLUSH) 0.9 %
5-40 SYRINGE (ML) INJECTION EVERY 12 HOURS SCHEDULED
Status: CANCELLED | OUTPATIENT
Start: 2024-09-23

## 2024-09-24 ENCOUNTER — TELEPHONE (OUTPATIENT)
Dept: CARDIOLOGY | Facility: HOSPITAL | Age: 64
End: 2024-09-24

## 2024-09-24 RX ORDER — POLYETHYLENE GLYCOL 3350, SODIUM CHLORIDE, SODIUM BICARBONATE, POTASSIUM CHLORIDE 420; 11.2; 5.72; 1.48 G/4L; G/4L; G/4L; G/4L
4000 POWDER, FOR SOLUTION ORAL ONCE
Qty: 4000 ML | Refills: 0 | Status: SHIPPED | OUTPATIENT
Start: 2024-09-24 | End: 2024-09-24

## 2024-09-25 ENCOUNTER — HOSPITAL ENCOUNTER (OUTPATIENT)
Dept: CARDIOLOGY | Facility: HOSPITAL | Age: 64
Discharge: HOME | End: 2024-09-25
Payer: MEDICARE

## 2024-09-25 DIAGNOSIS — R06.02 SHORTNESS OF BREATH: ICD-10-CM

## 2024-09-25 DIAGNOSIS — I48.11 LONGSTANDING PERSISTENT ATRIAL FIBRILLATION (MULTI): ICD-10-CM

## 2024-09-25 DIAGNOSIS — R07.89 CHEST DISCOMFORT: ICD-10-CM

## 2024-09-25 PROCEDURE — 99222 1ST HOSP IP/OBS MODERATE 55: CPT | Performed by: NURSE PRACTITIONER

## 2024-09-25 PROCEDURE — 93005 ELECTROCARDIOGRAM TRACING: CPT

## 2024-09-25 RX ORDER — SODIUM CHLORIDE 9 MG/ML
20 INJECTION, SOLUTION INTRAVENOUS CONTINUOUS
Status: DISCONTINUED | OUTPATIENT
Start: 2024-09-25 | End: 2024-09-26 | Stop reason: HOSPADM

## 2024-09-25 ASSESSMENT — ENCOUNTER SYMPTOMS
NEUROLOGICAL NEGATIVE: 1
LIGHT-HEADEDNESS: 0
EYES NEGATIVE: 1
CHEST TIGHTNESS: 1
WEAKNESS: 0
DIZZINESS: 0
SHORTNESS OF BREATH: 1
GASTROINTESTINAL NEGATIVE: 1
HEMATOLOGIC/LYMPHATIC NEGATIVE: 1
FATIGUE: 1
ALLERGIC/IMMUNOLOGIC NEGATIVE: 1
MUSCULOSKELETAL NEGATIVE: 1
PALPITATIONS: 1
ENDOCRINE NEGATIVE: 1
PSYCHIATRIC NEGATIVE: 1

## 2024-09-25 NOTE — H&P
History Of Present Illness  Suzy Hanson is a 64 y.o. female with past medical history significant for SND, s/p remote PPM (March, 2018), atrial fibrillation, currently maintained on dofetilide 500 mcg twice daily, digoxin 250 mcg daily, metoprolol tartrate 100 mg twice daily and anticoagulated with Eliquis 5 mg twice daily, s/p PVI (March, 2024), CAD, HTN, HLD, CKD 3, HAKEEM, morbid obesity with recurrence of palpitations and most recent device interrogation revealed a 39% burden of atrial fibrillation.  It was recommended patient undergo redo PVI.  She is at [facility] today for STEPHEN under the care of Dr. Andrade to rule out thrombus in preparation for PVI scheduled tomorrow under the care of Dr. Guo.    Patient denies recent c/o illness, fevers, chills, sweats or exposure to Covid-19.  Denies c/o light headedness, dizziness, chest pain, SOB or palpitations at the present time.  Denies c/o N/V/D/C.  Denies c/o lower extremity weakness.     Past Medical History  Past Medical History:   Diagnosis Date    Acute kidney failure, unspecified (CMS-HCC)     Acute kidney injury    Arrhythmia     Chronic kidney disease     Coronary artery disease     Hyperlipidemia     Hypertension     Other dorsalgia     Interscapular pain    Personal history of other diseases of the circulatory system     History of sinus bradycardia    Personal history of other diseases of urinary system     History of chronic kidney disease    Personal history of other endocrine, nutritional and metabolic disease     History of hypokalemia    Personal history of other specified conditions     History of fatigue    Personal history of other specified conditions     H/O shortness of breath    Sleep apnea      Surgical History  Past Surgical History:   Procedure Laterality Date    CARDIAC CATHETERIZATION N/A 6/26/2024    Procedure: Left Heart Cath, With LV;  Surgeon: Shree Oliveira MD;  Location: [facility];  Service: Cardiovascular;   Laterality: N/A;  cath/possibel ptca at [place] with Dr. Oliveira or Beau within next week.Pt to hold Eliquis 48 hours prior and Trulicity 7 days prior.Labs have been done within last 30 days. Holding Metformin am of procedure    CARDIAC ELECTROPHYSIOLOGY PROCEDURE N/A 3/28/2024    Procedure: Ablation A-Fib;  Surgeon: Tutu Guo MD;  Location: [facility];  Service: Electrophysiology;  Laterality: N/A;    OTHER SURGICAL HISTORY  10/27/2021    Cholecystectomy    OTHER SURGICAL HISTORY  10/27/2021    Colonoscopy    OTHER SURGICAL HISTORY  10/27/2021    Pacemaker insertion    OTHER SURGICAL HISTORY  10/27/2021    Hysterectomy    OTHER SURGICAL HISTORY  10/27/2021     section    OTHER SURGICAL HISTORY  2022    Gallbladder surgery     Social History  Never a smoker  Denies alcohol use  Denies drug use    Family History  Family History   Problem Relation Name Age of Onset    Other (systemic lupus erythematosus) Mother      Heart attack Father      Other (arteriosclerotic cardiovascular disease) Sister      Heart attack Sister      Lung cancer Brother      Other (cardiac disorder) Child       Allergies  Amlodipine, Losartan, Milk containing products (dairy), and Sulfa (sulfonamide antibiotics)    Review of Systems   Constitutional:  Positive for fatigue.   HENT: Negative.     Eyes: Negative.    Respiratory:  Positive for chest tightness and shortness of breath.    Cardiovascular:  Positive for palpitations and leg swelling. Negative for chest pain.   Gastrointestinal: Negative.    Endocrine: Negative.    Genitourinary: Negative.    Musculoskeletal: Negative.    Skin: Negative.    Allergic/Immunologic: Negative.    Neurological: Negative.  Negative for dizziness, weakness and light-headedness.   Hematological: Negative.    Psychiatric/Behavioral: Negative.       Physical Exam  Vitals reviewed.   Constitutional:       Appearance: Normal appearance.   HENT:      Head: Normocephalic and  atraumatic.      Nose: Nose normal.      Mouth/Throat:      Mouth: Mucous membranes are moist.      Pharynx: Oropharynx is clear.   Eyes:      Pupils: Pupils are equal, round, and reactive to light.   Cardiovascular:      Rate and Rhythm: Normal rate and regular rhythm.      Pulses: Normal pulses.      Heart sounds: Normal heart sounds.   Pulmonary:      Effort: Pulmonary effort is normal.      Breath sounds: Normal breath sounds.   Abdominal:      General: Bowel sounds are normal.      Palpations: Abdomen is soft.   Musculoskeletal:         General: Normal range of motion.      Cervical back: Normal range of motion and neck supple.   Skin:     General: Skin is warm and dry.   Neurological:      General: No focal deficit present.      Mental Status: She is alert and oriented to person, place, and time.   Psychiatric:         Mood and Affect: Mood normal.         Behavior: Behavior normal.       Assessment/Plan   Persistent atrial fibrillation   -S/p PVI March, 2024   -Most recent device interrogation revealed a 39% burden of atrial fibrillation   -Currently maintained on dofetilide 500 mcg twice daily, digoxin 250 mcg daily, metoprolol tartrate 100 mg twice daily and anticoagulated with Eliquis 5 mg twice daily.   -Plan for STEPHEN today to rule out thrombus in preparation for redo PVI scheduled tomorrow, however patient was not told to HOLD Jardiance 3 days before procedure.  She was not on Jardiance at her last follow up appointment with Dr. Guo and therefore was not instructed at that time to HOLD.    -Procedures will need to be canceled and rescheduled.    -Patient will be discharged to home.  2.    Sinus node dysfunction   -S/p remote DC PPM  3.    Coronary artery disease  4.    Benign essential hypertension   -Stable  5.    Obstructive sleep apnea  6.    Morbid obesity   -BMI 46.4    I spent 55 minutes in the professional and overall care of this patient.      Carmela Huber, APRN-CNP

## 2024-09-25 NOTE — NURSING NOTE
STEPHEN for today (9/25) and Ablation for tomorrow (9/26) need to be rescheduled for a future date.  EP was not aware that patient was started on Jardiance and therefore was not instructed to hold this medication.

## 2024-09-25 NOTE — H&P (VIEW-ONLY)
History Of Present Illness  Suzy Hanson is a 64 y.o. female with past medical history significant for SND, s/p remote PPM (March, 2018), atrial fibrillation, currently maintained on dofetilide 500 mcg twice daily, digoxin 250 mcg daily, metoprolol tartrate 100 mg twice daily and anticoagulated with Eliquis 5 mg twice daily, s/p PVI (March, 2024), CAD, HTN, HLD, CKD 3, HAKEEM, morbid obesity with recurrence of palpitations and most recent device interrogation revealed a 39% burden of atrial fibrillation.  It was recommended patient undergo redo PVI.  She is at [facility] today for STEPHEN under the care of Dr. Andrade to rule out thrombus in preparation for PVI scheduled tomorrow under the care of Dr. Guo.    Patient denies recent c/o illness, fevers, chills, sweats or exposure to Covid-19.  Denies c/o light headedness, dizziness, chest pain, SOB or palpitations at the present time.  Denies c/o N/V/D/C.  Denies c/o lower extremity weakness.     Past Medical History  Past Medical History:   Diagnosis Date    Acute kidney failure, unspecified (CMS-HCC)     Acute kidney injury    Arrhythmia     Chronic kidney disease     Coronary artery disease     Hyperlipidemia     Hypertension     Other dorsalgia     Interscapular pain    Personal history of other diseases of the circulatory system     History of sinus bradycardia    Personal history of other diseases of urinary system     History of chronic kidney disease    Personal history of other endocrine, nutritional and metabolic disease     History of hypokalemia    Personal history of other specified conditions     History of fatigue    Personal history of other specified conditions     H/O shortness of breath    Sleep apnea      Surgical History  Past Surgical History:   Procedure Laterality Date    CARDIAC CATHETERIZATION N/A 6/26/2024    Procedure: Left Heart Cath, With LV;  Surgeon: Shree Oliveira MD;  Location: [facility];  Service: Cardiovascular;   Laterality: N/A;  cath/possibel ptca at [place] with Dr. Oliveira or Beau within next week.Pt to hold Eliquis 48 hours prior and Trulicity 7 days prior.Labs have been done within last 30 days. Holding Metformin am of procedure    CARDIAC ELECTROPHYSIOLOGY PROCEDURE N/A 3/28/2024    Procedure: Ablation A-Fib;  Surgeon: Tutu Guo MD;  Location: [facility];  Service: Electrophysiology;  Laterality: N/A;    OTHER SURGICAL HISTORY  10/27/2021    Cholecystectomy    OTHER SURGICAL HISTORY  10/27/2021    Colonoscopy    OTHER SURGICAL HISTORY  10/27/2021    Pacemaker insertion    OTHER SURGICAL HISTORY  10/27/2021    Hysterectomy    OTHER SURGICAL HISTORY  10/27/2021     section    OTHER SURGICAL HISTORY  2022    Gallbladder surgery     Social History  Never a smoker  Denies alcohol use  Denies drug use    Family History  Family History   Problem Relation Name Age of Onset    Other (systemic lupus erythematosus) Mother      Heart attack Father      Other (arteriosclerotic cardiovascular disease) Sister      Heart attack Sister      Lung cancer Brother      Other (cardiac disorder) Child       Allergies  Amlodipine, Losartan, Milk containing products (dairy), and Sulfa (sulfonamide antibiotics)    Review of Systems   Constitutional:  Positive for fatigue.   HENT: Negative.     Eyes: Negative.    Respiratory:  Positive for chest tightness and shortness of breath.    Cardiovascular:  Positive for palpitations and leg swelling. Negative for chest pain.   Gastrointestinal: Negative.    Endocrine: Negative.    Genitourinary: Negative.    Musculoskeletal: Negative.    Skin: Negative.    Allergic/Immunologic: Negative.    Neurological: Negative.  Negative for dizziness, weakness and light-headedness.   Hematological: Negative.    Psychiatric/Behavioral: Negative.       Physical Exam  Vitals reviewed.   Constitutional:       Appearance: Normal appearance.   HENT:      Head: Normocephalic and  atraumatic.      Nose: Nose normal.      Mouth/Throat:      Mouth: Mucous membranes are moist.      Pharynx: Oropharynx is clear.   Eyes:      Pupils: Pupils are equal, round, and reactive to light.   Cardiovascular:      Rate and Rhythm: Normal rate and regular rhythm.      Pulses: Normal pulses.      Heart sounds: Normal heart sounds.   Pulmonary:      Effort: Pulmonary effort is normal.      Breath sounds: Normal breath sounds.   Abdominal:      General: Bowel sounds are normal.      Palpations: Abdomen is soft.   Musculoskeletal:         General: Normal range of motion.      Cervical back: Normal range of motion and neck supple.   Skin:     General: Skin is warm and dry.   Neurological:      General: No focal deficit present.      Mental Status: She is alert and oriented to person, place, and time.   Psychiatric:         Mood and Affect: Mood normal.         Behavior: Behavior normal.       Assessment/Plan   Persistent atrial fibrillation   -S/p PVI March, 2024   -Most recent device interrogation revealed a 39% burden of atrial fibrillation   -Currently maintained on dofetilide 500 mcg twice daily, digoxin 250 mcg daily, metoprolol tartrate 100 mg twice daily and anticoagulated with Eliquis 5 mg twice daily.   -Plan for STEPHEN today to rule out thrombus in preparation for redo PVI scheduled tomorrow, however patient was not told to HOLD Jardiance 3 days before procedure.  She was not on Jardiance at her last follow up appointment with Dr. Guo and therefore was not instructed at that time to HOLD.    -Procedures will need to be canceled and rescheduled.    -Patient will be discharged to home.  2.    Sinus node dysfunction   -S/p remote DC PPM  3.    Coronary artery disease  4.    Benign essential hypertension   -Stable  5.    Obstructive sleep apnea  6.    Morbid obesity   -BMI 46.4    I spent 55 minutes in the professional and overall care of this patient.      Carmela Huber, APRN-CNP

## 2024-09-29 LAB
ATRIAL RATE: 73 BPM
P OFFSET: 202 MS
P ONSET: 157 MS
PR INTERVAL: 228 MS
Q ONSET: 223 MS
QRS COUNT: 12 BEATS
QRS DURATION: 96 MS
QT INTERVAL: 384 MS
QTC CALCULATION(BAZETT): 423 MS
QTC FREDERICIA: 410 MS
R AXIS: -4 DEGREES
T AXIS: 51 DEGREES
T OFFSET: 415 MS
VENTRICULAR RATE: 73 BPM

## 2024-09-30 ENCOUNTER — APPOINTMENT (OUTPATIENT)
Dept: CARDIOLOGY | Facility: CLINIC | Age: 64
End: 2024-09-30
Payer: MEDICARE

## 2024-10-03 DIAGNOSIS — F41.9 ANXIETY: ICD-10-CM

## 2024-10-03 RX ORDER — PAROXETINE 20 MG/1
20 TABLET, FILM COATED ORAL EVERY MORNING
Qty: 90 TABLET | Refills: 3 | Status: SHIPPED | OUTPATIENT
Start: 2024-10-03

## 2024-10-03 NOTE — TELEPHONE ENCOUNTER
Pharmacy is requesting medication refill. Please approve or deny this request.    Rx requested:  Requested Prescriptions     Pending Prescriptions Disp Refills    PARoxetine (PAXIL) 20 MG tablet [Pharmacy Med Name: PARoxetine HCl 20 MG Oral Tablet] 90 tablet 3     Sig: TAKE 1 TABLET BY MOUTH EVERY  MORNING         Last Office Visit:   8/5/2024      Next Visit Date:  Future Appointments   Date Time Provider Department Center   11/5/2024 10:30 AM Hosea Harman MD [facility] DE[facility]   2/6/2025 10:00 AM Hosea Harman MD [facility] DEP

## 2024-10-09 ENCOUNTER — TELEPHONE (OUTPATIENT)
Dept: CARDIOLOGY | Facility: HOSPITAL | Age: 64
End: 2024-10-09

## 2024-10-09 ENCOUNTER — APPOINTMENT (OUTPATIENT)
Dept: CARDIOLOGY | Facility: HOSPITAL | Age: 64
End: 2024-10-09
Payer: MEDICARE

## 2024-10-10 ENCOUNTER — HOSPITAL ENCOUNTER (OUTPATIENT)
Dept: CARDIOLOGY | Facility: HOSPITAL | Age: 64
Discharge: HOME | End: 2024-10-10
Payer: MEDICARE

## 2024-10-10 ENCOUNTER — APPOINTMENT (OUTPATIENT)
Dept: CARDIOLOGY | Facility: HOSPITAL | Age: 64
End: 2024-10-10
Payer: MEDICARE

## 2024-10-10 ENCOUNTER — ANESTHESIA EVENT (OUTPATIENT)
Dept: CARDIOLOGY | Facility: HOSPITAL | Age: 64
End: 2024-10-10
Payer: MEDICARE

## 2024-10-10 ENCOUNTER — HOSPITAL ENCOUNTER (OUTPATIENT)
Dept: RADIOLOGY | Facility: HOSPITAL | Age: 64
Discharge: HOME | End: 2024-10-10
Payer: MEDICARE

## 2024-10-10 LAB
ABO GROUP (TYPE) IN BLOOD: NORMAL
ALBUMIN SERPL BCP-MCNC: 3.9 G/DL (ref 3.4–5)
ALP SERPL-CCNC: 85 U/L (ref 33–136)
ALT SERPL W P-5'-P-CCNC: 20 U/L (ref 7–45)
ANION GAP SERPL CALC-SCNC: 11 MMOL/L (ref 10–20)
APTT PPP: 35 SECONDS (ref 27–38)
AST SERPL W P-5'-P-CCNC: 18 U/L (ref 9–39)
BASOPHILS # BLD AUTO: 0.02 X10*3/UL (ref 0–0.1)
BASOPHILS NFR BLD AUTO: 0.2 %
BILIRUB SERPL-MCNC: 0.6 MG/DL (ref 0–1.2)
BUN SERPL-MCNC: 13 MG/DL (ref 6–23)
CALCIUM SERPL-MCNC: 8.6 MG/DL (ref 8.6–10.3)
CHLORIDE SERPL-SCNC: 104 MMOL/L (ref 98–107)
CO2 SERPL-SCNC: 28 MMOL/L (ref 21–32)
CREAT SERPL-MCNC: 0.75 MG/DL (ref 0.5–1.05)
EGFRCR SERPLBLD CKD-EPI 2021: 88 ML/MIN/1.73M*2
EOSINOPHIL # BLD AUTO: 0.24 X10*3/UL (ref 0–0.7)
EOSINOPHIL NFR BLD AUTO: 2.6 %
ERYTHROCYTE [DISTWIDTH] IN BLOOD BY AUTOMATED COUNT: 14.2 % (ref 11.5–14.5)
GLUCOSE SERPL-MCNC: 154 MG/DL (ref 74–99)
HCT VFR BLD AUTO: 39.5 % (ref 36–46)
HGB BLD-MCNC: 12.8 G/DL (ref 12–16)
HOLD SPECIMEN: NORMAL
IMM GRANULOCYTES # BLD AUTO: 0.02 X10*3/UL (ref 0–0.7)
IMM GRANULOCYTES NFR BLD AUTO: 0.2 % (ref 0–0.9)
INR PPP: 1.4 (ref 0.9–1.1)
LYMPHOCYTES # BLD AUTO: 1.2 X10*3/UL (ref 1.2–4.8)
LYMPHOCYTES NFR BLD AUTO: 13.2 %
MCH RBC QN AUTO: 28.6 PG (ref 26–34)
MCHC RBC AUTO-ENTMCNC: 32.4 G/DL (ref 32–36)
MCV RBC AUTO: 88 FL (ref 80–100)
MONOCYTES # BLD AUTO: 0.78 X10*3/UL (ref 0.1–1)
MONOCYTES NFR BLD AUTO: 8.6 %
NEUTROPHILS # BLD AUTO: 6.81 X10*3/UL (ref 1.2–7.7)
NEUTROPHILS NFR BLD AUTO: 75.2 %
NRBC BLD-RTO: 0 /100 WBCS (ref 0–0)
PLATELET # BLD AUTO: 199 X10*3/UL (ref 150–450)
POTASSIUM SERPL-SCNC: 3.8 MMOL/L (ref 3.5–5.3)
PROT SERPL-MCNC: 6.5 G/DL (ref 6.4–8.2)
PROTHROMBIN TIME: 15.8 SECONDS (ref 9.8–12.8)
RBC # BLD AUTO: 4.48 X10*6/UL (ref 4–5.2)
RH FACTOR (ANTIGEN D): NORMAL
SODIUM SERPL-SCNC: 139 MMOL/L (ref 136–145)
WBC # BLD AUTO: 9.1 X10*3/UL (ref 4.4–11.3)

## 2024-10-10 PROCEDURE — 85730 THROMBOPLASTIN TIME PARTIAL: CPT | Performed by: NURSE PRACTITIONER

## 2024-10-10 PROCEDURE — 93320 DOPPLER ECHO COMPLETE: CPT

## 2024-10-10 PROCEDURE — 85025 COMPLETE CBC W/AUTO DIFF WBC: CPT | Performed by: NURSE PRACTITIONER

## 2024-10-10 PROCEDURE — 93312 ECHO TRANSESOPHAGEAL: CPT | Performed by: INTERNAL MEDICINE

## 2024-10-10 PROCEDURE — 7100000002 HC RECOVERY ROOM TIME - EACH INCREMENTAL 1 MINUTE: Performed by: INTERNAL MEDICINE

## 2024-10-10 PROCEDURE — 93005 ELECTROCARDIOGRAM TRACING: CPT

## 2024-10-10 PROCEDURE — 99152 MOD SED SAME PHYS/QHP 5/>YRS: CPT | Performed by: INTERNAL MEDICINE

## 2024-10-10 PROCEDURE — 2500000002 HC RX 250 W HCPCS SELF ADMINISTERED DRUGS (ALT 637 FOR MEDICARE OP, ALT 636 FOR OP/ED): Mod: MUE | Performed by: NURSE PRACTITIONER

## 2024-10-10 PROCEDURE — 71046 X-RAY EXAM CHEST 2 VIEWS: CPT

## 2024-10-10 PROCEDURE — 93325 DOPPLER ECHO COLOR FLOW MAPG: CPT | Performed by: INTERNAL MEDICINE

## 2024-10-10 PROCEDURE — 80053 COMPREHEN METABOLIC PANEL: CPT | Performed by: NURSE PRACTITIONER

## 2024-10-10 PROCEDURE — 36415 COLL VENOUS BLD VENIPUNCTURE: CPT | Performed by: NURSE PRACTITIONER

## 2024-10-10 PROCEDURE — 93010 ELECTROCARDIOGRAM REPORT: CPT | Performed by: INTERNAL MEDICINE

## 2024-10-10 PROCEDURE — 2500000004 HC RX 250 GENERAL PHARMACY W/ HCPCS (ALT 636 FOR OP/ED): Performed by: INTERNAL MEDICINE

## 2024-10-10 PROCEDURE — 7100000001 HC RECOVERY ROOM TIME - INITIAL BASE CHARGE: Performed by: INTERNAL MEDICINE

## 2024-10-10 PROCEDURE — 7100000010 HC PHASE TWO TIME - EACH INCREMENTAL 1 MINUTE: Performed by: INTERNAL MEDICINE

## 2024-10-10 PROCEDURE — 93320 DOPPLER ECHO COMPLETE: CPT | Performed by: INTERNAL MEDICINE

## 2024-10-10 PROCEDURE — 7100000009 HC PHASE TWO TIME - INITIAL BASE CHARGE: Performed by: INTERNAL MEDICINE

## 2024-10-10 RX ORDER — MIDAZOLAM HYDROCHLORIDE 1 MG/ML
INJECTION, SOLUTION INTRAMUSCULAR; INTRAVENOUS AS NEEDED
Status: DISCONTINUED | OUTPATIENT
Start: 2024-10-10 | End: 2024-10-12 | Stop reason: HOSPADM

## 2024-10-10 RX ORDER — POTASSIUM CHLORIDE 20 MEQ/1
40 TABLET, EXTENDED RELEASE ORAL ONCE
Status: COMPLETED | OUTPATIENT
Start: 2024-10-10 | End: 2024-10-10

## 2024-10-10 RX ORDER — FENTANYL CITRATE 50 UG/ML
INJECTION, SOLUTION INTRAMUSCULAR; INTRAVENOUS AS NEEDED
Status: DISCONTINUED | OUTPATIENT
Start: 2024-10-10 | End: 2024-10-12 | Stop reason: HOSPADM

## 2024-10-10 SDOH — HEALTH STABILITY: MENTAL HEALTH: CURRENT SMOKER: 0

## 2024-10-10 ASSESSMENT — COLUMBIA-SUICIDE SEVERITY RATING SCALE - C-SSRS
6. HAVE YOU EVER DONE ANYTHING, STARTED TO DO ANYTHING, OR PREPARED TO DO ANYTHING TO END YOUR LIFE?: NO
2. HAVE YOU ACTUALLY HAD ANY THOUGHTS OF KILLING YOURSELF?: NO
6. HAVE YOU EVER DONE ANYTHING, STARTED TO DO ANYTHING, OR PREPARED TO DO ANYTHING TO END YOUR LIFE?: NO
2. HAVE YOU ACTUALLY HAD ANY THOUGHTS OF KILLING YOURSELF?: NO
1. IN THE PAST MONTH, HAVE YOU WISHED YOU WERE DEAD OR WISHED YOU COULD GO TO SLEEP AND NOT WAKE UP?: NO
1. IN THE PAST MONTH, HAVE YOU WISHED YOU WERE DEAD OR WISHED YOU COULD GO TO SLEEP AND NOT WAKE UP?: NO

## 2024-10-10 NOTE — POST-PROCEDURE NOTE
Physician Transition of Care Summary  Invasive Cardiovascular Lab    Procedure Date: 10/10/2024  Attending: Tej Gordon MD  Resident/Fellow/Other Assistant: None   Indications:   Patient with persistent atrial fibrillation with plan of PVI ablation later today need to exclude atrial thrombi    Post-procedure diagnosis:   Normal LV/RV systolic function without evidence of intracardiac thrombi; mild MR    Procedure(s):   Transesophageal echo      Procedure Findings:   Trivial clinically insignificant pericardial effusion  LVEF 60%  Normal RV function  No evidence of PFO/ASD  Mild left atrial enlargement with no evidence of thrombi.  Slightly enlarged left atrial appendage which is multilobed no evidence of thrombi  Pacemaker leads in place without evidence of vegetation or thrombi  Mitral valve with 2+ MR  Normal function aortic/tricuspid/pulmonary valves      Description of the Procedure:   Patient in the fasting state continuous oximetry recorded.  Sedation achieved with 3 mg of Versed 75 mcg of fentanyl.  O2 saturation and vital signs remained stable throughout.  STEPHEN probe advanced the posterior pharynx without difficulty.  Multiple views of the myocardium were obtained.  Color and Doppler analysis performed.  Agitated saline contrast performed.  Probe was withdrawn as aorta is visualized there were no complications    Complications:   None  Anticoagulation/Antiplatelet Plan:   She will continue Eliquis therapy    Estimated Blood Loss:   0 mL    Anesthesia: * No anesthesia type entered * Anesthesia Staff: No anesthesia staff entered.    Any Specimen(s) Removed:   Order Name Source Comment Collection Info Order Time   COAGULATION SCREEN Blood, Venous  Collected By: Shital Morataya RN 10/10/2024  8:02 AM     Release result to [employer]   Immediate        COMPREHENSIVE METABOLIC PANEL Blood, Venous  Collected By: Shital Morataya RN 10/10/2024  8:02 AM     Release result to [employer]   Immediate        VERAB/VERIFY ABORH  Blood, Venous **This is for confirming/verifying history of ABORh on file for transfusion of blood products. If this is not for transfusion, please order an ABO/RH [[id number]]. If you have any questions or unsure what to order, please call the blood bank.** Collected By: Shital Morataya RN 10/10/2024  8:02 AM     Release result to [facility]   Immediate        CBC WITH AUTO DIFFERENTIAL Blood, Venous  Collected By: Shital Morataya RN 10/10/2024  8:02 AM     Release result to [facility]   Immediate            Disposition:   To electrophysiology lab for PVI ablation      Electronically signed by: Tej Gordon MD, 10/10/2024 9:38 AM

## 2024-10-10 NOTE — NURSING NOTE
Patient able to eat and drink without difficulty.  No complaints.  VSS.  Patient instructed to return home and take it easy for the next 24 hours.  She will be called  with a return time for tomorrow after surgery schedule comes out.  Patient discharged per wheelchair with volunteer to spouse in car.

## 2024-10-10 NOTE — PRE-SEDATION DOCUMENTATION
Sedation Plan    ASA 3     Mallampati class: IV.    Risks, benefits, and alternatives discussed with patient.    Reviewed with her that transesophageal echo to be pursued to assure no intracavitary thrombi prior to attempted PVI ablation later today

## 2024-10-10 NOTE — DISCHARGE INSTRUCTIONS
**Please return to [facility] on Friday, 10/11/2024 for your ablation procedure under the care of Dr. Guo.  A nurse will notify you shortly of the time to arrive on 10/11.  Nothing to eat or drink after midnight tonight in preparation for your procedure tomorrow.  You may take your medications as previously instructed with small sips of water.**   Goal Outcome Evaluation:    Shift: [id number]  Surgery/POD#: POD 8 from an I&D of LLE. POD 15 from a left BKA.  Behavior & Aggression: Green  Fall Risk: Yes  Orientation: A&O x4, forgetful  ABNL Labs: INR lab check sent down, , 122  Pain Management: scheduled methadone, PRN dilaudid for dressing changes  Bowel/Bladder: cont BB, requests purewick PRN for voiding  Drains: NA  Lines: triple lumen PICC SL  Skin: LLE daily dressing change, buttocks daily wound care, HAYDER backside, pt refused repositioning and turning  Diet: mod carb  Activity Level: x2 lift, pt refused repositioning, but self shifts weight in bed  Tests/Procedures: NA  Anticipated  DC Date: pending, LTACH week of 10/23  Significant Information:

## 2024-10-10 NOTE — ANESTHESIA PREPROCEDURE EVALUATION
Suzy Hanson is a 64 y.o. female here for:    Ablation A-Fib  With Tutu Guo MD  Pre-Op Diagnosis Codes:      * Longstanding persistent atrial fibrillation [I48.11]     * Shortness of breath [R06.02]     * Chest discomfort [R07.89]    Relevant Problems   Cardiac   (+) Arteriosclerosis of coronary artery   (+) Atrial fibrillation (Multi)   (+) Atrial fibrillation, persistent (Multi)   (+) HTN (hypertension)   (+) Pacemaker   (+) Paroxysmal atrial fibrillation (Multi)   (+) Persistent atrial fibrillation (Multi)   (+) Sinus node dysfunction (Multi)      Pulmonary   (+) Obstructive sleep apnea, adult      Neuro   (+) Anxiety      Endocrine   (+) Class 3 severe obesity due to excess calories with body mass index (BMI) of 45.0 to 49.9 in adult       Lab Results   Component Value Date    HGB 12.8 10/10/2024    HCT 39.5 10/10/2024    WBC 9.1 10/10/2024     10/10/2024     10/10/2024    K 3.8 10/10/2024     10/10/2024    CREATININE 0.75 10/10/2024    BUN 13 10/10/2024       Social History     Tobacco Use   Smoking Status Never   • Passive exposure: Never   Smokeless Tobacco Never       Allergies   Allergen Reactions   • Amlodipine Unknown   • Losartan Cough   • Milk Containing Products (Dairy) Unknown   • Sulfa (Sulfonamide Antibiotics) Unknown       Current Outpatient Medications   Medication Instructions   • apixaban (ELIQUIS) 5 mg, oral, 2 times daily   • atorvastatin (LIPITOR) 80 mg, oral, Nightly   • digoxin (LANOXIN) 250 mcg, oral, Daily   • dofetilide (TIKOSYN) 500 mcg, oral, 2 times daily   • doxazosin (Cardura) 2 mg tablet 0.5 tab(s) orally once a day   • empagliflozin (JARDIANCE) 10 mg, oral, Daily   • furosemide (LASIX) 20 mg, oral, Daily   • loratadine (Claritin) 10 mg tablet 1 tablet, oral, Daily   • metFORMIN  mg 24 hr tablet 1 tablet, oral, Daily   • metoprolol tartrate (LOPRESSOR) 100 mg, oral, 2 times daily   • omeprazole (PRILOSEC) 40 mg, oral, Daily before breakfast   •  pantoprazole (PROTONIX) 40 mg, oral, Daily before breakfast, Do not crush, chew, or split.   • PARoxetine (Paxil) 20 mg tablet 1 tablet, oral, Daily   • potassium chloride CR (Klor-Con M20) 20 mEq ER tablet Take 1 tablet every evening   • spironolactone (Aldactone) 25 mg tablet TAKE 2 TABLETS BY MOUTH IN THE  MORNING AND 1 TABLET BY MOUTH IN THE EVENING   • Trulicity 0.75 mg, subcutaneous, Once Weekly       Past Surgical History:   Procedure Laterality Date   • CARDIAC CATHETERIZATION N/A 2024    Procedure: Left Heart Cath, With LV;  Surgeon: Shree Oliveira MD;  Location: [facility];  Service: Cardiovascular;  Laterality: N/A;  cath/possibel ptca at [facility] with Dr. Oliveira or Beau within next week.Pt to hold Eliquis 48 hours prior and Trulicity 7 days prior.Labs have been done within last 30 days. Holding Metformin am of procedure   • CARDIAC ELECTROPHYSIOLOGY PROCEDURE N/A 3/28/2024    Procedure: Ablation A-Fib;  Surgeon: Tutu Guo MD;  Location: [facility];  Service: Electrophysiology;  Laterality: N/A;   • OTHER SURGICAL HISTORY  10/27/2021    Cholecystectomy   • OTHER SURGICAL HISTORY  10/27/2021    Colonoscopy   • OTHER SURGICAL HISTORY  10/27/2021    Pacemaker insertion   • OTHER SURGICAL HISTORY  10/27/2021    Hysterectomy   • OTHER SURGICAL HISTORY  10/27/2021     section   • OTHER SURGICAL HISTORY  2022    Gallbladder surgery       Family History   Problem Relation Name Age of Onset   • Other (systemic lupus erythematosus) Mother     • Heart attack Father     • Other (arteriosclerotic cardiovascular disease) Sister     • Heart attack Sister     • Lung cancer Brother     • Other (cardiac disorder) Child         NPO Details:  NPO/Void Status  Date of Last Liquid: 10/10/24  Time of Last Liquid: 0700  Date of Last Solid: 10/09/24  Time of Last Solid:   Time of Last Void: 0836        Physical Exam    Airway  Mallampati: II  TM distance: >3 FB  Neck ROM:  full     Cardiovascular    Dental    Pulmonary    Abdominal        Anesthesia Plan    History of general anesthesia?: yes  History of complications of general anesthesia?: no    ASA 3     general   (A-line)  The patient is not a current smoker.    intravenous induction   Anesthetic plan and risks discussed with patient.    Plan discussed with CRNA.

## 2024-10-10 NOTE — PRE-PROCEDURE ASSESSMENT
[facility] CathPCI V5 Collection Form    Pre-Procedure Information  - Electrocardiac assessment method: ECG     - The assessment result was normal.     Indications and Presentation  - Indication(s) for cath lab visit: cardiac arrhythmia    - Ventricular support was not required.     STEPHEN performed secondary to need to absolutely exclude thrombi prior to PVI ablation.  Patient has been on Eliquis and describes compliance.

## 2024-10-11 ENCOUNTER — HOSPITAL ENCOUNTER (OUTPATIENT)
Facility: HOSPITAL | Age: 64
Discharge: HOME | End: 2024-10-12
Attending: INTERNAL MEDICINE | Admitting: INTERNAL MEDICINE
Payer: MEDICARE

## 2024-10-11 ENCOUNTER — ANESTHESIA (OUTPATIENT)
Dept: CARDIOLOGY | Facility: HOSPITAL | Age: 64
End: 2024-10-11
Payer: MEDICARE

## 2024-10-11 ENCOUNTER — HOSPITAL ENCOUNTER (OUTPATIENT)
Dept: CARDIOLOGY | Facility: HOSPITAL | Age: 64
Setting detail: OUTPATIENT SURGERY
Discharge: HOME | End: 2024-10-11
Payer: MEDICARE

## 2024-10-11 DIAGNOSIS — I48.0 PAROXYSMAL ATRIAL FIBRILLATION (MULTI): ICD-10-CM

## 2024-10-11 DIAGNOSIS — I48.19 ATRIAL FIBRILLATION, PERSISTENT (MULTI): ICD-10-CM

## 2024-10-11 DIAGNOSIS — R07.89 CHEST DISCOMFORT: ICD-10-CM

## 2024-10-11 DIAGNOSIS — I48.91 UNSPECIFIED ATRIAL FIBRILLATION (MULTI): ICD-10-CM

## 2024-10-11 DIAGNOSIS — R06.02 SHORTNESS OF BREATH: ICD-10-CM

## 2024-10-11 DIAGNOSIS — I48.11 LONGSTANDING PERSISTENT ATRIAL FIBRILLATION (MULTI): Primary | ICD-10-CM

## 2024-10-11 LAB
ABO GROUP (TYPE) IN BLOOD: NORMAL
ACT BLD: 145 SEC (ref 83–199)
ACT BLD: 155 SEC (ref 83–199)
ACT BLD: 203 SEC (ref 83–199)
ACT BLD: 250 SEC (ref 83–199)
ACT BLD: 334 SEC (ref 83–199)
ACT BLD: 337 SEC (ref 83–199)
ACT BLD: 366 SEC (ref 83–199)
ACT BLD: 392 SEC (ref 83–199)
ANTIBODY SCREEN: NORMAL
ATRIAL RATE: 51 BPM
ATRIAL RATE: 54 BPM
BODY SURFACE AREA: 2.36 M2
EJECTION FRACTION: 63 %
GLUCOSE BLD MANUAL STRIP-MCNC: 304 MG/DL (ref 74–99)
P OFFSET: 200 MS
P ONSET: 145 MS
PR INTERVAL: 190 MS
Q ONSET: 224 MS
Q ONSET: 225 MS
QRS COUNT: 8 BEATS
QRS COUNT: 9 BEATS
QRS DURATION: 88 MS
QRS DURATION: 98 MS
QT INTERVAL: 436 MS
QT INTERVAL: 454 MS
QTC CALCULATION(BAZETT): 401 MS
QTC CALCULATION(BAZETT): 430 MS
QTC FREDERICIA: 413 MS
QTC FREDERICIA: 438 MS
R AXIS: -2 DEGREES
R AXIS: -6 DEGREES
RH FACTOR (ANTIGEN D): NORMAL
RIGHT VENTRICLE PEAK SYSTOLIC PRESSURE: 34.4 MMHG
T AXIS: -55 DEGREES
T AXIS: -60 DEGREES
T OFFSET: 443 MS
T OFFSET: 451 MS
VENTRICULAR RATE: 51 BPM
VENTRICULAR RATE: 54 BPM

## 2024-10-11 PROCEDURE — 93623 PRGRMD STIMJ&PACG IV RX NFS: CPT | Performed by: INTERNAL MEDICINE

## 2024-10-11 PROCEDURE — C1732 CATH, EP, DIAG/ABL, 3D/VECT: HCPCS | Performed by: INTERNAL MEDICINE

## 2024-10-11 PROCEDURE — 2500000004 HC RX 250 GENERAL PHARMACY W/ HCPCS (ALT 636 FOR OP/ED): Performed by: INTERNAL MEDICINE

## 2024-10-11 PROCEDURE — 7100000011 HC EXTENDED STAY RECOVERY HOURLY - NURSING UNIT

## 2024-10-11 PROCEDURE — C1759 CATH, INTRA ECHOCARDIOGRAPHY: HCPCS | Performed by: INTERNAL MEDICINE

## 2024-10-11 PROCEDURE — 2780000003 HC OR 278 NO HCPCS: Performed by: INTERNAL MEDICINE

## 2024-10-11 PROCEDURE — 2500000002 HC RX 250 W HCPCS SELF ADMINISTERED DRUGS (ALT 637 FOR MEDICARE OP, ALT 636 FOR OP/ED): Mod: MUE | Performed by: NURSE PRACTITIONER

## 2024-10-11 PROCEDURE — 7100000002 HC RECOVERY ROOM TIME - EACH INCREMENTAL 1 MINUTE: Performed by: INTERNAL MEDICINE

## 2024-10-11 PROCEDURE — 3700000002 HC GENERAL ANESTHESIA TIME - EACH INCREMENTAL 1 MINUTE: Performed by: INTERNAL MEDICINE

## 2024-10-11 PROCEDURE — 82947 ASSAY GLUCOSE BLOOD QUANT: CPT

## 2024-10-11 PROCEDURE — 85347 COAGULATION TIME ACTIVATED: CPT

## 2024-10-11 PROCEDURE — 86900 BLOOD TYPING SEROLOGIC ABO: CPT | Performed by: NURSE PRACTITIONER

## 2024-10-11 PROCEDURE — 2500000004 HC RX 250 GENERAL PHARMACY W/ HCPCS (ALT 636 FOR OP/ED): Performed by: NURSE ANESTHETIST, CERTIFIED REGISTERED

## 2024-10-11 PROCEDURE — 93462 L HRT CATH TRNSPTL PUNCTURE: CPT | Mod: 59 | Performed by: INTERNAL MEDICINE

## 2024-10-11 PROCEDURE — 2700000047 HC OR 270 NO HCPCS: Performed by: INTERNAL MEDICINE

## 2024-10-11 PROCEDURE — G0269 OCCLUSIVE DEVICE IN VEIN ART: HCPCS | Mod: 59,MUE | Performed by: INTERNAL MEDICINE

## 2024-10-11 PROCEDURE — 85347 COAGULATION TIME ACTIVATED: CPT | Mod: MUE | Performed by: INTERNAL MEDICINE

## 2024-10-11 PROCEDURE — 7100000009 HC PHASE TWO TIME - INITIAL BASE CHARGE: Performed by: INTERNAL MEDICINE

## 2024-10-11 PROCEDURE — 7100000001 HC RECOVERY ROOM TIME - INITIAL BASE CHARGE: Performed by: INTERNAL MEDICINE

## 2024-10-11 PROCEDURE — 2500000001 HC RX 250 WO HCPCS SELF ADMINISTERED DRUGS (ALT 637 FOR MEDICARE OP): Performed by: NURSE PRACTITIONER

## 2024-10-11 PROCEDURE — 93005 ELECTROCARDIOGRAM TRACING: CPT

## 2024-10-11 PROCEDURE — 2720000007 HC OR 272 NO HCPCS: Performed by: INTERNAL MEDICINE

## 2024-10-11 PROCEDURE — 93010 ELECTROCARDIOGRAM REPORT: CPT | Performed by: INTERNAL MEDICINE

## 2024-10-11 PROCEDURE — C1893 INTRO/SHEATH, FIXED,NON-PEEL: HCPCS | Performed by: INTERNAL MEDICINE

## 2024-10-11 PROCEDURE — 93656 COMPRE EP EVAL ABLTJ ATR FIB: CPT | Performed by: INTERNAL MEDICINE

## 2024-10-11 PROCEDURE — 36415 COLL VENOUS BLD VENIPUNCTURE: CPT | Performed by: NURSE PRACTITIONER

## 2024-10-11 PROCEDURE — 93280 PM DEVICE PROGR EVAL DUAL: CPT | Mod: 59 | Performed by: INTERNAL MEDICINE

## 2024-10-11 PROCEDURE — 93657 TX L/R ATRIAL FIB ADDL: CPT | Performed by: INTERNAL MEDICINE

## 2024-10-11 PROCEDURE — C1769 GUIDE WIRE: HCPCS | Performed by: INTERNAL MEDICINE

## 2024-10-11 PROCEDURE — C1760 CLOSURE DEV, VASC: HCPCS | Performed by: INTERNAL MEDICINE

## 2024-10-11 PROCEDURE — C1731 CATH, EP, 20 OR MORE ELEC: HCPCS | Performed by: INTERNAL MEDICINE

## 2024-10-11 PROCEDURE — 3700000001 HC GENERAL ANESTHESIA TIME - INITIAL BASE CHARGE: Performed by: INTERNAL MEDICINE

## 2024-10-11 PROCEDURE — 7100000010 HC PHASE TWO TIME - EACH INCREMENTAL 1 MINUTE: Performed by: INTERNAL MEDICINE

## 2024-10-11 PROCEDURE — 86923 COMPATIBILITY TEST ELECTRIC: CPT

## 2024-10-11 PROCEDURE — 2500000005 HC RX 250 GENERAL PHARMACY W/O HCPCS: Performed by: NURSE ANESTHETIST, CERTIFIED REGISTERED

## 2024-10-11 RX ORDER — PROTAMINE SULFATE 10 MG/ML
INJECTION, SOLUTION INTRAVENOUS AS NEEDED
Status: DISCONTINUED | OUTPATIENT
Start: 2024-10-11 | End: 2024-10-11

## 2024-10-11 RX ORDER — DIGOXIN 125 MCG
250 TABLET ORAL DAILY
Status: DISCONTINUED | OUTPATIENT
Start: 2024-10-11 | End: 2024-10-12 | Stop reason: HOSPADM

## 2024-10-11 RX ORDER — TRAMADOL HYDROCHLORIDE 50 MG/1
50 TABLET ORAL EVERY 8 HOURS PRN
Status: DISCONTINUED | OUTPATIENT
Start: 2024-10-11 | End: 2024-10-12 | Stop reason: HOSPADM

## 2024-10-11 RX ORDER — FUROSEMIDE 10 MG/ML
INJECTION INTRAMUSCULAR; INTRAVENOUS AS NEEDED
Status: DISCONTINUED | OUTPATIENT
Start: 2024-10-11 | End: 2024-10-11

## 2024-10-11 RX ORDER — DOXAZOSIN 1 MG/1
2 TABLET ORAL DAILY
Status: DISCONTINUED | OUTPATIENT
Start: 2024-10-11 | End: 2024-10-12 | Stop reason: HOSPADM

## 2024-10-11 RX ORDER — ATORVASTATIN CALCIUM 20 MG/1
80 TABLET, FILM COATED ORAL NIGHTLY
Status: DISCONTINUED | OUTPATIENT
Start: 2024-10-11 | End: 2024-10-12 | Stop reason: HOSPADM

## 2024-10-11 RX ORDER — ACETAMINOPHEN 325 MG/1
650 TABLET ORAL EVERY 4 HOURS PRN
Status: DISCONTINUED | OUTPATIENT
Start: 2024-10-11 | End: 2024-10-12 | Stop reason: HOSPADM

## 2024-10-11 RX ORDER — APIXABAN 5 MG/1
5 TABLET, FILM COATED ORAL 2 TIMES DAILY
Qty: 180 TABLET | Refills: 3 | Status: SHIPPED | OUTPATIENT
Start: 2024-10-11

## 2024-10-11 RX ORDER — HEPARIN SODIUM 1000 [USP'U]/ML
INJECTION, SOLUTION INTRAVENOUS; SUBCUTANEOUS AS NEEDED
Status: DISCONTINUED | OUTPATIENT
Start: 2024-10-11 | End: 2024-10-11

## 2024-10-11 RX ORDER — METFORMIN HYDROCHLORIDE 500 MG/1
500 TABLET, EXTENDED RELEASE ORAL DAILY
Status: DISCONTINUED | OUTPATIENT
Start: 2024-10-12 | End: 2024-10-12 | Stop reason: HOSPADM

## 2024-10-11 RX ORDER — ONDANSETRON HYDROCHLORIDE 2 MG/ML
INJECTION, SOLUTION INTRAVENOUS AS NEEDED
Status: DISCONTINUED | OUTPATIENT
Start: 2024-10-11 | End: 2024-10-11

## 2024-10-11 RX ORDER — PANTOPRAZOLE SODIUM 40 MG/1
40 TABLET, DELAYED RELEASE ORAL
Status: DISCONTINUED | OUTPATIENT
Start: 2024-10-12 | End: 2024-10-12 | Stop reason: HOSPADM

## 2024-10-11 RX ORDER — SODIUM CHLORIDE 9 MG/ML
INJECTION, SOLUTION INTRAVENOUS CONTINUOUS PRN
Status: DISCONTINUED | OUTPATIENT
Start: 2024-10-11 | End: 2024-10-11

## 2024-10-11 RX ORDER — POTASSIUM CHLORIDE 20 MEQ/1
20 TABLET, EXTENDED RELEASE ORAL DAILY
Status: DISCONTINUED | OUTPATIENT
Start: 2024-10-11 | End: 2024-10-12 | Stop reason: HOSPADM

## 2024-10-11 RX ORDER — ALUMINUM HYDROXIDE, MAGNESIUM HYDROXIDE, AND SIMETHICONE 1200; 120; 1200 MG/30ML; MG/30ML; MG/30ML
30 SUSPENSION ORAL 4 TIMES DAILY PRN
Status: DISCONTINUED | OUTPATIENT
Start: 2024-10-11 | End: 2024-10-12 | Stop reason: HOSPADM

## 2024-10-11 RX ORDER — SPIRONOLACTONE 25 MG/1
50 TABLET ORAL DAILY
Status: DISCONTINUED | OUTPATIENT
Start: 2024-10-11 | End: 2024-10-12 | Stop reason: HOSPADM

## 2024-10-11 RX ORDER — LIDOCAINE HYDROCHLORIDE 10 MG/ML
INJECTION, SOLUTION EPIDURAL; INFILTRATION; INTRACAUDAL; PERINEURAL AS NEEDED
Status: DISCONTINUED | OUTPATIENT
Start: 2024-10-11 | End: 2024-10-11 | Stop reason: HOSPADM

## 2024-10-11 RX ORDER — SODIUM CHLORIDE, SODIUM LACTATE, POTASSIUM CHLORIDE, CALCIUM CHLORIDE 600; 310; 30; 20 MG/100ML; MG/100ML; MG/100ML; MG/100ML
INJECTION, SOLUTION INTRAVENOUS CONTINUOUS PRN
Status: DISCONTINUED | OUTPATIENT
Start: 2024-10-11 | End: 2024-10-11

## 2024-10-11 RX ORDER — ONDANSETRON 4 MG/1
4 TABLET, FILM COATED ORAL EVERY 8 HOURS PRN
Status: DISCONTINUED | OUTPATIENT
Start: 2024-10-11 | End: 2024-10-12 | Stop reason: HOSPADM

## 2024-10-11 RX ORDER — TALC
3 POWDER (GRAM) TOPICAL NIGHTLY PRN
Status: DISCONTINUED | OUTPATIENT
Start: 2024-10-11 | End: 2024-10-12 | Stop reason: HOSPADM

## 2024-10-11 RX ORDER — SUCCINYLCHOLINE CHLORIDE 100 MG/5ML
SYRINGE (ML) INTRAVENOUS AS NEEDED
Status: DISCONTINUED | OUTPATIENT
Start: 2024-10-11 | End: 2024-10-11

## 2024-10-11 RX ORDER — METOPROLOL TARTRATE 50 MG/1
100 TABLET ORAL 2 TIMES DAILY
Status: DISCONTINUED | OUTPATIENT
Start: 2024-10-11 | End: 2024-10-12 | Stop reason: HOSPADM

## 2024-10-11 RX ORDER — PROPOFOL 10 MG/ML
INJECTION, EMULSION INTRAVENOUS AS NEEDED
Status: DISCONTINUED | OUTPATIENT
Start: 2024-10-11 | End: 2024-10-11

## 2024-10-11 RX ORDER — ONDANSETRON HYDROCHLORIDE 2 MG/ML
4 INJECTION, SOLUTION INTRAVENOUS EVERY 8 HOURS PRN
Status: DISCONTINUED | OUTPATIENT
Start: 2024-10-11 | End: 2024-10-12 | Stop reason: HOSPADM

## 2024-10-11 RX ORDER — KETOROLAC TROMETHAMINE 30 MG/ML
15 INJECTION, SOLUTION INTRAMUSCULAR; INTRAVENOUS ONCE
Status: COMPLETED | OUTPATIENT
Start: 2024-10-11 | End: 2024-10-11

## 2024-10-11 RX ORDER — LIDOCAINE HYDROCHLORIDE 20 MG/ML
INJECTION, SOLUTION INFILTRATION; PERINEURAL AS NEEDED
Status: DISCONTINUED | OUTPATIENT
Start: 2024-10-11 | End: 2024-10-11

## 2024-10-11 RX ORDER — FUROSEMIDE 40 MG/1
20 TABLET ORAL DAILY
Status: DISCONTINUED | OUTPATIENT
Start: 2024-10-11 | End: 2024-10-12 | Stop reason: HOSPADM

## 2024-10-11 RX ORDER — DOFETILIDE 0.25 MG/1
500 CAPSULE ORAL 2 TIMES DAILY
Status: DISCONTINUED | OUTPATIENT
Start: 2024-10-11 | End: 2024-10-12 | Stop reason: HOSPADM

## 2024-10-11 RX ORDER — PAROXETINE HYDROCHLORIDE 20 MG/1
20 TABLET, FILM COATED ORAL DAILY
Status: DISCONTINUED | OUTPATIENT
Start: 2024-10-11 | End: 2024-10-12 | Stop reason: HOSPADM

## 2024-10-11 RX ORDER — CETIRIZINE HYDROCHLORIDE 10 MG/1
10 TABLET ORAL DAILY
Status: DISCONTINUED | OUTPATIENT
Start: 2024-10-12 | End: 2024-10-12 | Stop reason: HOSPADM

## 2024-10-11 RX ADMIN — DEXAMETHASONE SODIUM PHOSPHATE 4 MG: 4 INJECTION, SOLUTION INTRAMUSCULAR; INTRAVENOUS at 10:57

## 2024-10-11 RX ADMIN — PROPOFOL 50 MG: 10 INJECTION, EMULSION INTRAVENOUS at 10:57

## 2024-10-11 RX ADMIN — FENTANYL CITRATE 50 MCG: 50 INJECTION, SOLUTION INTRAMUSCULAR; INTRAVENOUS at 13:22

## 2024-10-11 RX ADMIN — SODIUM CHLORIDE: 9 INJECTION, SOLUTION INTRAVENOUS at 12:04

## 2024-10-11 RX ADMIN — FENTANYL CITRATE 50 MCG: 50 INJECTION, SOLUTION INTRAMUSCULAR; INTRAVENOUS at 10:48

## 2024-10-11 RX ADMIN — SODIUM CHLORIDE: 9 INJECTION, SOLUTION INTRAVENOUS at 10:32

## 2024-10-11 RX ADMIN — MIDAZOLAM HYDROCHLORIDE 2 MG: 1 INJECTION, SOLUTION INTRAMUSCULAR; INTRAVENOUS at 10:30

## 2024-10-11 RX ADMIN — SODIUM CHLORIDE, SODIUM LACTATE, POTASSIUM CHLORIDE, AND CALCIUM CHLORIDE: 600; 310; 30; 20 INJECTION, SOLUTION INTRAVENOUS at 10:32

## 2024-10-11 RX ADMIN — HEPARIN SODIUM 5000 UNITS: 1000 INJECTION INTRAVENOUS; SUBCUTANEOUS at 12:13

## 2024-10-11 RX ADMIN — HEPARIN SODIUM 8000 UNITS: 1000 INJECTION INTRAVENOUS; SUBCUTANEOUS at 11:39

## 2024-10-11 RX ADMIN — PROTAMINE SULFATE 60 MG: 10 INJECTION, SOLUTION INTRAVENOUS at 13:28

## 2024-10-11 RX ADMIN — Medication 180 MG: at 10:48

## 2024-10-11 RX ADMIN — HEPARIN SODIUM 5000 UNITS: 1000 INJECTION INTRAVENOUS; SUBCUTANEOUS at 12:49

## 2024-10-11 RX ADMIN — HEPARIN SODIUM 5000 UNITS: 1000 INJECTION INTRAVENOUS; SUBCUTANEOUS at 11:56

## 2024-10-11 RX ADMIN — PROPOFOL 200 MG: 10 INJECTION, EMULSION INTRAVENOUS at 10:48

## 2024-10-11 RX ADMIN — LIDOCAINE HYDROCHLORIDE 60 MG: 20 INJECTION, SOLUTION INFILTRATION; PERINEURAL at 10:48

## 2024-10-11 RX ADMIN — HEPARIN SODIUM 8000 UNITS: 1000 INJECTION INTRAVENOUS; SUBCUTANEOUS at 11:48

## 2024-10-11 RX ADMIN — MIDAZOLAM HYDROCHLORIDE 2 MG: 1 INJECTION, SOLUTION INTRAMUSCULAR; INTRAVENOUS at 08:15

## 2024-10-11 RX ADMIN — PROTAMINE SULFATE 20 MG: 10 INJECTION, SOLUTION INTRAVENOUS at 13:42

## 2024-10-11 RX ADMIN — ONDANSETRON 4 MG: 2 INJECTION, SOLUTION INTRAMUSCULAR; INTRAVENOUS at 10:48

## 2024-10-11 RX ADMIN — FUROSEMIDE 40 MG: 10 INJECTION, SOLUTION INTRAMUSCULAR; INTRAVENOUS at 13:28

## 2024-10-11 RX ADMIN — FENTANYL CITRATE 50 MCG: 50 INJECTION, SOLUTION INTRAMUSCULAR; INTRAVENOUS at 10:57

## 2024-10-11 RX ADMIN — FENTANYL CITRATE 50 MCG: 50 INJECTION, SOLUTION INTRAMUSCULAR; INTRAVENOUS at 12:20

## 2024-10-11 SDOH — SOCIAL STABILITY: SOCIAL INSECURITY
WITHIN THE LAST YEAR, HAVE TO BEEN RAPED OR FORCED TO HAVE ANY KIND OF SEXUAL ACTIVITY BY YOUR PARTNER OR EX-PARTNER?: NO

## 2024-10-11 SDOH — ECONOMIC STABILITY: FOOD INSECURITY: WITHIN THE PAST 12 MONTHS, YOU WORRIED THAT YOUR FOOD WOULD RUN OUT BEFORE YOU GOT THE MONEY TO BUY MORE.: NEVER TRUE

## 2024-10-11 SDOH — SOCIAL STABILITY: SOCIAL INSECURITY
WITHIN THE LAST YEAR, HAVE YOU BEEN RAPED OR FORCED TO HAVE ANY KIND OF SEXUAL ACTIVITY BY YOUR PARTNER OR EX-PARTNER?: NO

## 2024-10-11 SDOH — ECONOMIC STABILITY: INCOME INSECURITY: IN THE PAST 12 MONTHS HAS THE ELECTRIC, GAS, OIL, OR WATER COMPANY THREATENED TO SHUT OFF SERVICES IN YOUR HOME?: NO

## 2024-10-11 SDOH — SOCIAL STABILITY: SOCIAL INSECURITY: WITHIN THE LAST YEAR, HAVE YOU BEEN HUMILIATED OR EMOTIONALLY ABUSED IN OTHER WAYS BY YOUR PARTNER OR EX-PARTNER?: NO

## 2024-10-11 SDOH — SOCIAL STABILITY: SOCIAL INSECURITY: WITHIN THE LAST YEAR, HAVE YOU BEEN AFRAID OF YOUR PARTNER OR EX-PARTNER?: NO

## 2024-10-11 SDOH — ECONOMIC STABILITY: HOUSING INSECURITY: IN THE PAST 12 MONTHS, HOW MANY TIMES HAVE YOU MOVED WHERE YOU WERE LIVING?: 1

## 2024-10-11 SDOH — ECONOMIC STABILITY: INCOME INSECURITY: IN THE PAST 12 MONTHS, HAS THE ELECTRIC, GAS, OIL, OR WATER COMPANY THREATENED TO SHUT OFF SERVICE IN YOUR HOME?: NO

## 2024-10-11 SDOH — ECONOMIC STABILITY: FOOD INSECURITY: WITHIN THE PAST 12 MONTHS, THE FOOD YOU BOUGHT JUST DIDN'T LAST AND YOU DIDN'T HAVE MONEY TO GET MORE.: NEVER TRUE

## 2024-10-11 SDOH — ECONOMIC STABILITY: HOUSING INSECURITY: IN THE LAST 12 MONTHS, WAS THERE A TIME WHEN YOU WERE NOT ABLE TO PAY THE MORTGAGE OR RENT ON TIME?: NO

## 2024-10-11 SDOH — ECONOMIC STABILITY: INCOME INSECURITY: IN THE LAST 12 MONTHS, WAS THERE A TIME WHEN YOU WERE NOT ABLE TO PAY THE MORTGAGE OR RENT ON TIME?: NO

## 2024-10-11 SDOH — SOCIAL STABILITY: SOCIAL INSECURITY
WITHIN THE LAST YEAR, HAVE YOU BEEN KICKED, HIT, SLAPPED, OR OTHERWISE PHYSICALLY HURT BY YOUR PARTNER OR EX-PARTNER?: NO

## 2024-10-11 SDOH — ECONOMIC STABILITY: HOUSING INSECURITY: AT ANY TIME IN THE PAST 12 MONTHS, WERE YOU HOMELESS OR LIVING IN A SHELTER (INCLUDING NOW)?: NO

## 2024-10-11 SDOH — SOCIAL STABILITY: SOCIAL INSECURITY: HAVE YOU HAD THOUGHTS OF HARMING ANYONE ELSE?: NO

## 2024-10-11 SDOH — SOCIAL STABILITY: SOCIAL INSECURITY: HAVE YOU HAD ANY THOUGHTS OF HARMING ANYONE ELSE?: NO

## 2024-10-11 SDOH — SOCIAL STABILITY: SOCIAL INSECURITY: DO YOU FEEL UNSAFE GOING BACK TO THE PLACE WHERE YOU ARE LIVING?: NO

## 2024-10-11 SDOH — ECONOMIC STABILITY: FOOD INSECURITY: WITHIN THE PAST 12 MONTHS, YOU WORRIED THAT YOUR FOOD WOULD RUN OUT BEFORE YOU GOT MONEY TO BUY MORE.: NEVER TRUE

## 2024-10-11 SDOH — SOCIAL STABILITY: SOCIAL INSECURITY: HAS ANYONE EVER THREATENED TO HURT YOUR FAMILY OR YOUR PETS?: NO

## 2024-10-11 SDOH — SOCIAL STABILITY: SOCIAL INSECURITY: DO YOU FEEL ANYONE HAS EXPLOITED OR TAKEN ADVANTAGE OF YOU FINANCIALLY OR OF YOUR PERSONAL PROPERTY?: NO

## 2024-10-11 SDOH — ECONOMIC STABILITY: TRANSPORTATION INSECURITY
IN THE PAST 12 MONTHS, HAS THE LACK OF TRANSPORTATION KEPT YOU FROM MEDICAL APPOINTMENTS OR FROM GETTING MEDICATIONS?: NO

## 2024-10-11 SDOH — SOCIAL STABILITY: SOCIAL INSECURITY: ARE THERE ANY APPARENT SIGNS OF INJURIES/BEHAVIORS THAT COULD BE RELATED TO ABUSE/NEGLECT?: NO

## 2024-10-11 SDOH — ECONOMIC STABILITY: FOOD INSECURITY: HOW HARD IS IT FOR YOU TO PAY FOR THE VERY BASICS LIKE FOOD, HOUSING, MEDICAL CARE, AND HEATING?: NOT HARD AT ALL

## 2024-10-11 SDOH — ECONOMIC STABILITY: TRANSPORTATION INSECURITY: IN THE PAST 12 MONTHS, HAS LACK OF TRANSPORTATION KEPT YOU FROM MEDICAL APPOINTMENTS OR FROM GETTING MEDICATIONS?: NO

## 2024-10-11 SDOH — SOCIAL STABILITY: SOCIAL INSECURITY: WERE YOU ABLE TO COMPLETE ALL THE BEHAVIORAL HEALTH SCREENINGS?: YES

## 2024-10-11 SDOH — SOCIAL STABILITY: SOCIAL INSECURITY: ABUSE: ADULT

## 2024-10-11 SDOH — SOCIAL STABILITY: SOCIAL INSECURITY: DOES ANYONE TRY TO KEEP YOU FROM HAVING/CONTACTING OTHER FRIENDS OR DOING THINGS OUTSIDE YOUR HOME?: NO

## 2024-10-11 SDOH — ECONOMIC STABILITY: INCOME INSECURITY: HOW HARD IS IT FOR YOU TO PAY FOR THE VERY BASICS LIKE FOOD, HOUSING, MEDICAL CARE, AND HEATING?: NOT HARD AT ALL

## 2024-10-11 SDOH — ECONOMIC STABILITY: TRANSPORTATION INSECURITY
IN THE PAST 12 MONTHS, HAS LACK OF TRANSPORTATION KEPT YOU FROM MEETINGS, WORK, OR FROM GETTING THINGS NEEDED FOR DAILY LIVING?: NO

## 2024-10-11 SDOH — SOCIAL STABILITY: SOCIAL INSECURITY: ARE YOU OR HAVE YOU BEEN THREATENED OR ABUSED PHYSICALLY, EMOTIONALLY, OR SEXUALLY BY ANYONE?: NO

## 2024-10-11 ASSESSMENT — ACTIVITIES OF DAILY LIVING (ADL)
ADEQUATE_TO_COMPLETE_ADL: YES
FEEDING YOURSELF: INDEPENDENT
LACK_OF_TRANSPORTATION: NO
TOILETING: INDEPENDENT
GROOMING: INDEPENDENT
DRESSING YOURSELF: INDEPENDENT
PATIENT'S MEMORY ADEQUATE TO SAFELY COMPLETE DAILY ACTIVITIES?: YES
BATHING: INDEPENDENT
HEARING - LEFT EAR: FUNCTIONAL
LACK_OF_TRANSPORTATION: NO
WALKS IN HOME: INDEPENDENT
HEARING - RIGHT EAR: FUNCTIONAL
JUDGMENT_ADEQUATE_SAFELY_COMPLETE_DAILY_ACTIVITIES: YES

## 2024-10-11 ASSESSMENT — PAIN SCALES - GENERAL
PAINLEVEL_OUTOF10: 5 - MODERATE PAIN
PAINLEVEL_OUTOF10: 8
PAINLEVEL_OUTOF10: 7
PAINLEVEL_OUTOF10: 7
PAINLEVEL_OUTOF10: 5 - MODERATE PAIN
PAINLEVEL_OUTOF10: 6
PAINLEVEL_OUTOF10: 0 - NO PAIN
PAINLEVEL_OUTOF10: 7
PAINLEVEL_OUTOF10: 0 - NO PAIN
PAIN_LEVEL: 1
PAINLEVEL_OUTOF10: 0 - NO PAIN
PAINLEVEL_OUTOF10: 5 - MODERATE PAIN
PAINLEVEL_OUTOF10: 7
PAINLEVEL_OUTOF10: 5 - MODERATE PAIN
PAINLEVEL_OUTOF10: 0 - NO PAIN

## 2024-10-11 ASSESSMENT — COLUMBIA-SUICIDE SEVERITY RATING SCALE - C-SSRS
1. IN THE PAST MONTH, HAVE YOU WISHED YOU WERE DEAD OR WISHED YOU COULD GO TO SLEEP AND NOT WAKE UP?: NO
2. HAVE YOU ACTUALLY HAD ANY THOUGHTS OF KILLING YOURSELF?: NO
6. HAVE YOU EVER DONE ANYTHING, STARTED TO DO ANYTHING, OR PREPARED TO DO ANYTHING TO END YOUR LIFE?: NO

## 2024-10-11 ASSESSMENT — COGNITIVE AND FUNCTIONAL STATUS - GENERAL
DAILY ACTIVITIY SCORE: 24
MOBILITY SCORE: 24
PATIENT BASELINE BEDBOUND: NO

## 2024-10-11 ASSESSMENT — LIFESTYLE VARIABLES
HOW OFTEN DO YOU HAVE 6 OR MORE DRINKS ON ONE OCCASION: NEVER
SKIP TO QUESTIONS 9-10: 1
HOW MANY STANDARD DRINKS CONTAINING ALCOHOL DO YOU HAVE ON A TYPICAL DAY: PATIENT DOES NOT DRINK
HOW OFTEN DO YOU HAVE A DRINK CONTAINING ALCOHOL: NEVER
AUDIT-C TOTAL SCORE: 0
AUDIT-C TOTAL SCORE: 0

## 2024-10-11 ASSESSMENT — PAIN - FUNCTIONAL ASSESSMENT

## 2024-10-11 ASSESSMENT — PAIN DESCRIPTION - DESCRIPTORS: DESCRIPTORS: ACHING

## 2024-10-11 ASSESSMENT — PAIN DESCRIPTION - LOCATION: LOCATION: GENERALIZED

## 2024-10-11 ASSESSMENT — PATIENT HEALTH QUESTIONNAIRE - PHQ9
1. LITTLE INTEREST OR PLEASURE IN DOING THINGS: NOT AT ALL
2. FEELING DOWN, DEPRESSED OR HOPELESS: NOT AT ALL
SUM OF ALL RESPONSES TO PHQ9 QUESTIONS 1 & 2: 0

## 2024-10-11 NOTE — ANESTHESIA PROCEDURE NOTES
Arterial Line:    Date/Time: 10/11/2024 8:15 AM    Staffing  Performed: attending   Authorized by: Gustavo Nguyen MD    Performed by: Gustavo Nguyen MD    An arterial line was placed. Procedure performed using surface landmarks.in the pre-op for the following indication(s): continuous blood pressure monitoring and blood sampling needed.    A 20 gauge (size), 1 and 1/4 inch (length), Arrow (type) catheter was placed into the Left radial artery, secured by Tegadedeuce   Seldinger technique used.  Events:  patient tolerated procedure well with no complications.      Additional notes:  Cloroprep, 1% lidocaine 0.5cc subcutaneous.  Cath easy x1 attempt.  Positive aspiration and waveform.  No complications noted

## 2024-10-11 NOTE — NURSING NOTE
Report called to MOY Davis on 8s. Aware patient isS/P PVI ablation today, with bilat groin sites which remain stable and had vascade closure device. Pedal pulses palpable. Bedrest  at 1800 but patient diclined to get off cart at 1830 due to generalized aches/pains. Will remove external purewick prior to discharge as patient will have bathroom privileges. Also informed Rt neck dressingand rt radial dressing remain dry/intact, RASS=0 since arrival to [facility] at 1600 and patient remains on O2 at 5L/NC, sats 92-95%. Ate box lunch. Will place transfer request to transport team.

## 2024-10-11 NOTE — PERIOPERATIVE NURSING NOTE
Patient cleaned and linens changed for large amount urine incontinence.New external female catheter placed.

## 2024-10-11 NOTE — NURSING NOTE
Bedside  handoff report received from MOY Lees as she and this RN received patient from PACU and transferred to [facility] Room 203, received at 1600. Patient is S/P PVI Ablation, was in PACU for 2 hours as patient had been on CPAP initially, she is presently on O2 at 5L/NC, Tele shows SB. Patient A/O x4, states generalized aches/pains rated as 7/10, will medicate patient with Tylenol. Patient lying on cart, head of bed less than 20 degrees. Rt neck with dressing dry/intact, scant amount blood noted at distal corner. Lt radial site with biocclusive dressing dry/intact S/P removal of radial a-line prior to leaving PACU. Patient has bilat groin sites with biocclusive drssings dry/intact, just shadow of blood on lt groin. Bilat pedal pulses plapable. Bilat saline locksRFA and Lt FA. Patient also has purewick attached to low suction, had been incontinent x 2 large amounts of urine while in PACUU, then once purewick placed, had 350 ml clear yellow urine. Patient had received Lasix 40 mg IV in EP lab and 2379 ml IV fluid. Vital signs stable. Call bell within reach.

## 2024-10-11 NOTE — NURSING NOTE
"Patient resting comfortably, but informs nurse that Tylenol was not sufficient for pain control, now rates pain 8/10, generalized pain,stating, \"I feel like I've been hit by a truck.\" JEOVANNY [place]ron[place] notified and order for Tramadol received and administered to patient at this time.  "

## 2024-10-11 NOTE — Clinical Note
Site closed by VASCADE. Vascade X 2 deployed to right femoral veins, X 1 to left femoral vein by Dr Guo

## 2024-10-11 NOTE — PERIOPERATIVE NURSING NOTE
RIJ central line discontinued per policy. Patient tolerated well. Vasoline gauze,4x4 and tegaderm at site

## 2024-10-11 NOTE — ANESTHESIA PROCEDURE NOTES
Airway  Date/Time: 10/11/2024 10:46 AM  Urgency: elective      Staffing  Performed: CRNA   Authorized by: Gustavo Nguyen MD    Performed by: ARIADNA English-CRNA  Patient location during procedure: OR    Indications and Patient Condition  Indications for airway management: anesthesia  Spontaneous ventilation: present  Sedation level: deep  Preoxygenated: yes  Patient position: sniffing  MILS maintained throughout  Mask difficulty assessment: 0 - not attempted  Planned trial extubation    Final Airway Details  Final airway type: endotracheal airway      Successful airway: ETT  Cuffed: yes   Successful intubation technique: video laryngoscopy  Endotracheal tube insertion site: oral  Blade size: #4  ETT size (mm): 7.0  Cormack-Lehane Classification: grade IIa - partial view of glottis  Placement verified by: capnometry   Measured from: lips  ETT to lips (cm): 21  Number of attempts at approach: 1  Ventilation between attempts: none  Number of other approaches attempted: 0

## 2024-10-11 NOTE — ANESTHESIA POSTPROCEDURE EVALUATION
Patient: Suzy Hanson    Procedure Summary       Date: 10/11/24 Room / Location: [facility] / [facility]    Anesthesia Start: 1032 Anesthesia Stop: 1414    Procedure: Ablation A-Fib (Bilateral: Groin) Diagnosis:       Longstanding persistent atrial fibrillation (Multi)      (Same)    Providers: Tutu Guo MD Responsible Provider: Gustavo Nguyen MD    Anesthesia Type: general ASA Status: 3            Anesthesia Type: general    Vitals Value Taken Time   /73 10/11/24 1410   Temp 36 10/11/24 1414   Pulse 52 10/11/24 1413   Resp 7 10/11/24 1413   SpO2 95 % 10/11/24 1413   Vitals shown include unfiled device data.    Anesthesia Post Evaluation    Patient location during evaluation: bedside  Patient participation: complete - patient participated  Level of consciousness: awake  Pain score: 1  Pain management: adequate  Airway patency: patent  Cardiovascular status: acceptable  Respiratory status: acceptable  Hydration status: acceptable  Postoperative Nausea and Vomiting: none        There were no known notable events for this encounter.

## 2024-10-11 NOTE — Clinical Note
Patient ID band present and verified. Patient contact is in the lobby. Contact(s) present: spouse. Contact name: Neal

## 2024-10-11 NOTE — Clinical Note
Sheath was exchanged in the right femoral vein with INTRODUCER, TRANSSEPTAL, DAYNE, 8.5 X 67 CM, W/VALVE, 160CM SS GW 3MM JEmiliano

## 2024-10-12 ENCOUNTER — APPOINTMENT (OUTPATIENT)
Dept: CARDIOLOGY | Facility: HOSPITAL | Age: 64
End: 2024-10-12
Payer: MEDICARE

## 2024-10-12 VITALS
SYSTOLIC BLOOD PRESSURE: 136 MMHG | TEMPERATURE: 95.2 F | OXYGEN SATURATION: 96 % | HEART RATE: 49 BPM | DIASTOLIC BLOOD PRESSURE: 61 MMHG | RESPIRATION RATE: 22 BRPM

## 2024-10-12 VITALS
HEIGHT: 65 IN | OXYGEN SATURATION: 91 % | SYSTOLIC BLOOD PRESSURE: 135 MMHG | WEIGHT: 266.98 LBS | HEART RATE: 52 BPM | DIASTOLIC BLOOD PRESSURE: 65 MMHG | TEMPERATURE: 97.7 F | RESPIRATION RATE: 18 BRPM | BODY MASS INDEX: 44.48 KG/M2

## 2024-10-12 PROCEDURE — 2500000002 HC RX 250 W HCPCS SELF ADMINISTERED DRUGS (ALT 637 FOR MEDICARE OP, ALT 636 FOR OP/ED): Mod: MUE | Performed by: NURSE PRACTITIONER

## 2024-10-12 PROCEDURE — 99239 HOSP IP/OBS DSCHRG MGMT >30: CPT | Performed by: INTERNAL MEDICINE

## 2024-10-12 PROCEDURE — 7100000011 HC EXTENDED STAY RECOVERY HOURLY - NURSING UNIT

## 2024-10-12 PROCEDURE — 2500000001 HC RX 250 WO HCPCS SELF ADMINISTERED DRUGS (ALT 637 FOR MEDICARE OP): Performed by: NURSE PRACTITIONER

## 2024-10-12 PROCEDURE — 93005 ELECTROCARDIOGRAM TRACING: CPT

## 2024-10-12 PROCEDURE — 93010 ELECTROCARDIOGRAM REPORT: CPT | Performed by: INTERNAL MEDICINE

## 2024-10-12 ASSESSMENT — PAIN - FUNCTIONAL ASSESSMENT
PAIN_FUNCTIONAL_ASSESSMENT: 0-10
PAIN_FUNCTIONAL_ASSESSMENT: 0-10

## 2024-10-12 ASSESSMENT — COGNITIVE AND FUNCTIONAL STATUS - GENERAL
MOBILITY SCORE: 24
DAILY ACTIVITIY SCORE: 24

## 2024-10-12 ASSESSMENT — PAIN SCALES - GENERAL
PAINLEVEL_OUTOF10: 0 - NO PAIN
PAINLEVEL_OUTOF10: 0 - NO PAIN
PAINLEVEL_OUTOF10: 6

## 2024-10-12 ASSESSMENT — PAIN SCALES - WONG BAKER: WONGBAKER_NUMERICALRESPONSE: NO HURT

## 2024-10-12 NOTE — PROGRESS NOTES
[facility] Progress Note               Rounding Cardiologist:  Tutu Guo MD, MD   Primary Cardiologist: Dr. Tutu Guo    Date:  10/12/2024  Patient:  Suzy Hanson  YOB: 1960  MRN:  [id number]   Admit Date:  10/11/2024      SUBJECTIVE    Suzy Hanson was seen and examined today at bedside.  Patient is doing well  Status post ablation yesterday with no complications  Telemetry shows sinus rhythm  EKG performed today shows atrial paced rhythm at a rate of 53 bpm QRS ration 80 ms QT corrected 375 ms.    Status post PVI redo October 11, 2004 with no complications    VITALS     Vitals:    10/12/24 0408 10/12/24 0746 10/12/24 0747 10/12/24 1057   BP: 140/63 (!) 183/83 168/78 135/65   BP Location: Right arm  Other (Comment)    Patient Position: Lying  Sitting    Pulse: 52 58 54 52   Resp: 18  18    Temp: 36.4 °C (97.5 °F) 36 °C (96.8 °F) 36.8 °C (98.2 °F) 36.5 °C (97.7 °F)   TempSrc: Temporal  Temporal    SpO2: 92% 95% 94% 91%   Weight:       Height:           Intake/Output Summary (Last 24 hours) at 10/12/2024 1136  Last data filed at 10/12/2024 1048  Gross per 24 hour   Intake 2627.92 ml   Output 1110 ml   Net 1517.92 ml       [unfilled]    PHYSICAL EXAM   Constitutional:       Appearance: Normal appearance.   HENT:      Head: Normocephalic and atraumatic.      Nose: Nose normal.      Mouth/Throat:      Mouth: Mucous membranes are moist.      Pharynx: Oropharynx is clear.   Eyes:      Pupils: Pupils are equal, round, and reactive to light.   Cardiovascular:      Rate and Rhythm: Normal rate and regular rhythm.      Pulses: Normal pulses.      Heart sounds: Normal heart sounds.   Pulmonary:      Effort: Pulmonary effort is normal.      Breath sounds: Normal breath sounds.   Abdominal:      General: Bowel sounds are normal.      Palpations: Abdomen is soft.   Musculoskeletal:         General: Normal range of motion.      Cervical back: Normal range of motion and neck supple.   Skin:     General:  "Skin is warm and dry.   Neurological:      General: No focal deficit present.      Mental Status: She is alert and oriented to person, place, and time.   Psychiatric:         Mood and Affect: Mood normal.         Behavior: Behavior normal.          DIAGNOSTIC RESULTS   EKG:     Telemetry: Sinus rhythm.      LAB DATA   BMP:  @LABRCNT(Na:3,K:3,CL:3,CO2:3,Bun:3,Creatinine:3,Glu:3, CA:3,LABGLOM)@    Cardiac Enzymes:  @LABRCNT(CKTOTAL:3,CKMB:3,CKMBINDEX:3,TROPONINI:3)@    CBC:   Lab Results   Component Value Date    WBC 9.1 10/10/2024    RBC 4.48 10/10/2024    HGB 12.8 10/10/2024    HCT 39.5 10/10/2024     10/10/2024       CMP:    Lab Results   Component Value Date     10/10/2024    K 3.8 10/10/2024     10/10/2024    CO2 28 10/10/2024    BUN 13 10/10/2024    CREATININE 0.75 10/10/2024    GLUCOSE 154 (H) 10/10/2024    CALCIUM 8.6 10/10/2024       Hepatic Function Panel:    Lab Results   Component Value Date    ALKPHOS 85 10/10/2024    ALT 20 10/10/2024    AST 18 10/10/2024    PROT 6.5 10/10/2024    BILITOT 0.6 10/10/2024       Magnesium:  No results found for: \"MG\"    PT/INR:    Lab Results   Component Value Date    PROTIME 15.8 (H) 10/10/2024    INR 1.4 (H) 10/10/2024     @LABRCNT(inr:3)@     HgBA1c:  No components found for: \"LABA1C\"    Lipid Profile:  No results found for: \"CHLPL\", \"TRIG\", \"HDL\", \"LDLCALC\", \"LDLDIRECT\"    TSH:  No results found for: \"TSH\"    ABG:  No results found for: \"PH\"    PRO-BNP: No results found for: \"PROBNP\"       RADIOLOGY     Electrophysiology procedure   Final Result      Transesophageal Echo (STEPHEN)   Final Result      XR chest 2 views   Final Result   Mild to moderate cardiomegaly.  Left-sided cardiac pacemaker.  No   acute infiltrates or effusions.   Signed by Mandeep Blevins MD      Holter Or Event Cardiac Monitor    (Results Pending)   Holter Or Event Cardiac Monitor    (Results Pending)   Holter Or Event Cardiac Monitor    (Results Pending) "       @[facility]@      CURRENT MEDICATIONS    apixaban, 5 mg, oral, BID  atorvastatin, 80 mg, oral, Nightly  cetirizine, 10 mg, oral, Daily  digoxin, 250 mcg, oral, Daily  dofetilide, 500 mcg, oral, BID  doxazosin, 2 mg, oral, Daily  empagliflozin, 10 mg, oral, Daily  furosemide, 20 mg, oral, Daily  metFORMIN XR, 500 mg, oral, Daily  metoprolol tartrate, 100 mg, oral, BID  pantoprazole, 40 mg, oral, Daily before breakfast  PARoxetine, 20 mg, oral, Daily  potassium chloride CR, 20 mEq, oral, Daily  spironolactone, 50 mg, oral, Daily             ASSESSMENT   Principal Problem:    PAF (paroxysmal atrial fibrillation) (Multi)  Active Problems:    Atrial fibrillation (Multi)    Shortness of breath    Chest discomfort        Patient Active Problem List   Diagnosis    Anxiety    Arteriosclerosis of coronary artery    Heart burn    HTN (hypertension)    Obstructive sleep apnea, adult    Pacemaker    Palpitations    Paroxysmal atrial fibrillation (Multi)    Persistent atrial fibrillation (Multi)    Sinus node dysfunction (Multi)    Chest pressure    Tightness in chest    High risk medication use    Class 3 severe obesity due to excess calories with body mass index (BMI) of 45.0 to 49.9 in adult    Atrial fibrillation (Multi)    Atrial fibrillation, persistent (Multi)    Never smoked tobacco    BMI 45.0-49.9, adult (Multi)    Medication dose changed    Shortness of breath    Chest discomfort    PAF (paroxysmal atrial fibrillation) (Multi)   Clinical impression    1.  Persistent atrial fibrillation status post PVI March 2024 and redo October 11, 2024 with no complications  2.  High risk medication (dofetilide)  3.  Sinus node dysfunction post pacemaker implantation  4.  Coronary artery disease  5.  Hypertension  6.  Struct of sleep apnea          PLAN     Patient is doing well post PVI.  Maintaining sinus rhythm  Patient can be discharged home and follow-up with electrophysiology service in next 2 to 4 weeks  Follow device  clinic as scheduled  Continue with high risk medication (dofetilide)  Continue anticoagulant therapy    Tutu Guo MD      Please do not hesitate to call with questions.  Electronically signed by Tutu Guo MD, FACC on 10/12/2024 at 11:36 AM

## 2024-10-12 NOTE — DISCHARGE SUMMARY
Discharge Diagnosis  PAF (paroxysmal atrial fibrillation) (Multi)    Issues Requiring Follow-Up  Post pulmonary isolation October 11, 2024    Test Results Pending At Discharge  Pending Labs       No current pending labs.            Hospital Course   Patient was admitted for PVI that was performed 2/11/2024 with no complication  Overnight she did well.  Denies any symptoms like chest pain or shortness breath or palpitations  EKG performed today shows sinus rhythm  Patient will be seen my office in next 2 to 4 weeks  Follow device clinic as scheduled  Continue with current medical therapy including dofetilide and Eliquis therapy    Discharge more than 30 minutes    Pertinent Physical Exam At Time of Discharge  Physical Exam  PECOMP    PHYSICAL EXAMINATION:  GENERAL APPEARANCE: Well developed, well nourished, in no acute distress.  CHEST: Symmetric and non-tender.  INTEGUMENT: Skin warm and dry, without gross excoriationis or lesions.  HEENT: No gross abnormalities of conjunctiva, teeth, gums, oral mucosa  NECK: Supple, no JVD, no bruit. Thyroid not palpable. Carotid upstrokes normal.  NEURO/PSHCY: Alert and oriented x3; appropriate behavior and responses and responses, grossly normal cerebellar function with normal balance and coordination  LUNGS: Clear to auscultation bilaterally; normal respiratory effort.  HEART: Rate and rhythm regular with no evident murmur; no gallop appreciated. There are no rubs, clicks or heaves. PMI nondisplaced.  ABDOMEN: Soft, nontender, no palpable hepatosplenomegaly, no mases, no bruits. Abdominal aorta not noted to be enlarged.  MUSCULOSKELETAL: Ambulatory with normal tandem gait.  EXTREMITIES: Warm with good color, no clubbing or cyanois. There is no edema noted.  PERIPHERAL VASCULAR: Pulses present and equally palpable; 2+ throughout. No femoral bruits.    PEDETAILED    PHYSICAL EXAMINATION:  GENERAL:  Well developed, well nourished, in no acute distress.  CHEST:  Symmetric and  nontender.  NEURO/PSYCH:  Alert and oriented times three with approppriate behavior and responses.  NECK:  Supple, no JVD, no bruit.  LUNGS:  Clear to auscultation bilaterally, normal respiratory effort.  HEART:  Rate and rhythm regular with no evident murmur, no gallop appreciated.        There are no rubs, clicks or heaves. Device in the left prepectoral healing well.  No signs of hematoma or infection.    EXTREMITIES:  Warm with good color, no clubbing or cyanosis.  There is no edema noted.  PERIPHERAL VASCULAR:  Pulses present and equally palpable; 2+ throughout.    Home Medications     Medication List      CONTINUE taking these medications     atorvastatin 80 mg tablet; Commonly known as: Lipitor; Take 1 tablet (80   mg) by mouth once daily at bedtime.   Claritin 10 mg tablet; Generic drug: loratadine   digoxin 250 MCG tab;et; Commonly known as: Lanoxin; TAKE 1 TABLET BY   MOUTH ONCE  DAILY   dofetilide 500 mcg capsule; Commonly known as: Tikosyn; Take 1 capsule   (500 mcg) by mouth 2 times a day.   doxazosin 2 mg tablet; Commonly known as: Cardura; 0.5 tab(s) orally   once a day   furosemide 20 mg tablet; Commonly known as: Lasix; Take 1 tablet (20 mg)   by mouth once daily.   Jardiance 10 mg; Generic drug: empagliflozin   metFORMIN  mg 24 hr tablet; Commonly known as: Glucophage-XR   metoprolol tartrate 100 mg tablet; Commonly known as: Lopressor; Take 1   tablet (100 mg) by mouth 2 times a day.   omeprazole 40 mg DR capsule; Commonly known as: PriLOSEC   pantoprazole 40 mg EC tablet; Commonly known as: ProtoNix; Take 1 tablet   (40 mg) by mouth once daily in the morning. Take before meals for 14 days.   Do not crush, chew, or split. Do not start before March 30, 2024.   PARoxetine 20 mg tablet; Commonly known as: Paxil   potassium chloride CR 20 mEq ER tablet; Commonly known as: Klor-Con M20;   Take 1 tablet every evening   spironolactone 25 mg tablet; Commonly known as: Aldactone; TAKE 2   TABLETS BY  MOUTH IN THE  MORNING AND 1 TABLET BY MOUTH IN THE EVENING   Trulicity 0.75 mg/0.5 mL pen injector; Generic drug: dulaglutide     ASK your doctor about these medications     Eliquis 5 mg tablet; Generic drug: apixaban; TAKE 1 TABLET BY MOUTH   TWICE  DAILY; Ask about: Which instructions should I use?       Outpatient Follow-Up  Future Appointments   Date Time Provider Department [place]   10/14/2024  2:30 PM HOLLI LOPEZ ECG/HOLTER RESOURCE [id number] [place]   10/29/2024 12:30 PM Jazmyn iFtch MD [id number] [place]   11/7/2024  1:30 PM ARIADNA Car-CNP [id number] [place]   11/11/2024  1:00 PM HOLLI LOPEZ ECG/HOLTER RESOURCE [id number] [place]   12/11/2024  1:00 PM HOLLI LOPEZ ECG/HOLTER RESOURCE [id number] [place]   1/13/2025 10:00 AM Tutu Guo MD [id number] [place]       Tutu Guo MD

## 2024-10-12 NOTE — NURSING NOTE
At 1322, this nurse reached out to Doctor Guo in regards to patient needed the prescription for Eliquis. Doctor Guo confirmed that patient can call his office on Monday to get a prescription for the Eliquis. Patient is also to get her  heart Holter monitor on Monday at his office per Doctor Guo.

## 2024-10-12 NOTE — CARE PLAN
The patient's goals for the shift include patient oxygen level above 93 percent    The clinical goals for the shift include pt to remain free from falls until end of shift

## 2024-10-13 LAB
ATRIAL RATE: 53 BPM
BLOOD EXPIRATION DATE: NORMAL
BLOOD EXPIRATION DATE: NORMAL
DISPENSE STATUS: NORMAL
DISPENSE STATUS: NORMAL
P AXIS: 94 DEGREES
P OFFSET: 184 MS
P ONSET: 153 MS
PR INTERVAL: 196 MS
PRODUCT BLOOD TYPE: 5100
PRODUCT BLOOD TYPE: 5100
PRODUCT CODE: NORMAL
PRODUCT CODE: NORMAL
Q ONSET: 226 MS
QRS COUNT: 8 BEATS
QRS DURATION: 88 MS
QT INTERVAL: 400 MS
QTC CALCULATION(BAZETT): 375 MS
QTC FREDERICIA: 384 MS
R AXIS: 7 DEGREES
T AXIS: 14 DEGREES
T OFFSET: 426 MS
UNIT ABO: NORMAL
UNIT ABO: NORMAL
UNIT NUMBER: NORMAL
UNIT NUMBER: NORMAL
UNIT RH: NORMAL
UNIT RH: NORMAL
UNIT VOLUME: 350
UNIT VOLUME: 350
VENTRICULAR RATE: 53 BPM
XM INTEP: NORMAL
XM INTEP: NORMAL

## 2024-10-14 ENCOUNTER — APPOINTMENT (OUTPATIENT)
Dept: CARDIOLOGY | Facility: CLINIC | Age: 64
End: 2024-10-14
Payer: MEDICARE

## 2024-10-14 VITALS
HEART RATE: 52 BPM | HEIGHT: 65 IN | OXYGEN SATURATION: 91 % | WEIGHT: 266.98 LBS | RESPIRATION RATE: 18 BRPM | TEMPERATURE: 97.7 F | DIASTOLIC BLOOD PRESSURE: 65 MMHG | SYSTOLIC BLOOD PRESSURE: 135 MMHG | BODY MASS INDEX: 44.48 KG/M2

## 2024-10-14 DIAGNOSIS — I48.19 ATRIAL FIBRILLATION, PERSISTENT (MULTI): ICD-10-CM

## 2024-10-14 PROCEDURE — 93272 ECG/REVIEW INTERPRET ONLY: CPT | Performed by: INTERNAL MEDICINE

## 2024-10-17 ENCOUNTER — DOCUMENTATION (OUTPATIENT)
Dept: CARDIOLOGY | Facility: CLINIC | Age: 64
End: 2024-10-17
Payer: MEDICARE

## 2024-10-17 NOTE — PROGRESS NOTES
Call received from [employer] with abnormal tracings for patient.  The strips showed pauses of 5.1 seconds and 6.9 seconds. Dr. Guo was sent a secure chat regarding this for his review.

## 2024-10-24 ENCOUNTER — TELEPHONE (OUTPATIENT)
Dept: CARDIOLOGY | Facility: CLINIC | Age: 64
End: 2024-10-24
Payer: MEDICARE

## 2024-10-24 NOTE — TELEPHONE ENCOUNTER
The patient called into the office and stated that she recently had some blood work done and is wondering if the blood work she had done last week would be okay for her appointment on 10/29. Please advise.

## 2024-10-29 ENCOUNTER — APPOINTMENT (OUTPATIENT)
Dept: CARDIOLOGY | Facility: CLINIC | Age: 64
End: 2024-10-29
Payer: MEDICARE

## 2024-10-29 VITALS
HEART RATE: 75 BPM | HEIGHT: 65 IN | SYSTOLIC BLOOD PRESSURE: 110 MMHG | WEIGHT: 257.8 LBS | BODY MASS INDEX: 42.95 KG/M2 | DIASTOLIC BLOOD PRESSURE: 68 MMHG

## 2024-10-29 DIAGNOSIS — R00.2 PALPITATIONS: ICD-10-CM

## 2024-10-29 DIAGNOSIS — I10 HYPERTENSION, UNSPECIFIED TYPE: ICD-10-CM

## 2024-10-29 DIAGNOSIS — I48.0 PAROXYSMAL ATRIAL FIBRILLATION (MULTI): ICD-10-CM

## 2024-10-29 DIAGNOSIS — I10 PRIMARY HYPERTENSION: ICD-10-CM

## 2024-10-29 DIAGNOSIS — I25.10 ARTERIOSCLEROSIS OF CORONARY ARTERY: ICD-10-CM

## 2024-10-29 PROCEDURE — 3008F BODY MASS INDEX DOCD: CPT | Performed by: INTERNAL MEDICINE

## 2024-10-29 PROCEDURE — 3078F DIAST BP <80 MM HG: CPT | Performed by: INTERNAL MEDICINE

## 2024-10-29 PROCEDURE — 3074F SYST BP LT 130 MM HG: CPT | Performed by: INTERNAL MEDICINE

## 2024-10-29 PROCEDURE — 1159F MED LIST DOCD IN RCRD: CPT | Performed by: INTERNAL MEDICINE

## 2024-10-29 PROCEDURE — 99214 OFFICE O/P EST MOD 30 MIN: CPT | Performed by: INTERNAL MEDICINE

## 2024-10-29 PROCEDURE — 1036F TOBACCO NON-USER: CPT | Performed by: INTERNAL MEDICINE

## 2024-10-29 PROCEDURE — G2211 COMPLEX E/M VISIT ADD ON: HCPCS | Performed by: INTERNAL MEDICINE

## 2024-10-29 RX ORDER — SPIRONOLACTONE 25 MG/1
TABLET ORAL
Qty: 270 TABLET | Refills: 3 | Status: SHIPPED | OUTPATIENT
Start: 2024-10-29

## 2024-10-29 RX ORDER — DOXAZOSIN 2 MG/1
TABLET ORAL
Qty: 45 TABLET | Refills: 3 | Status: SHIPPED | OUTPATIENT
Start: 2024-10-29

## 2024-10-29 RX ORDER — POTASSIUM CHLORIDE 20 MEQ/1
TABLET, EXTENDED RELEASE ORAL
Qty: 90 TABLET | Refills: 3 | Status: SHIPPED | OUTPATIENT
Start: 2024-10-29

## 2024-10-29 RX ORDER — DIGOXIN 250 MCG
250 TABLET ORAL DAILY
Qty: 90 TABLET | Refills: 3 | Status: SHIPPED | OUTPATIENT
Start: 2024-10-29 | End: 2025-10-29

## 2024-10-29 RX ORDER — ATORVASTATIN CALCIUM 80 MG/1
80 TABLET, FILM COATED ORAL NIGHTLY
Qty: 90 TABLET | Refills: 3 | Status: SHIPPED | OUTPATIENT
Start: 2024-10-29

## 2024-10-31 ENCOUNTER — HOSPITAL ENCOUNTER (OUTPATIENT)
Dept: LAB | Age: 64
Discharge: HOME | End: 2024-10-31
Payer: MEDICARE

## 2024-10-31 DIAGNOSIS — E78.5 HYPERLIPIDEMIA, UNSPECIFIED HYPERLIPIDEMIA TYPE: ICD-10-CM

## 2024-10-31 DIAGNOSIS — E11.65 TYPE 2 DIABETES MELLITUS WITH HYPERGLYCEMIA, WITHOUT LONG-TERM CURRENT USE OF INSULIN (HCC): ICD-10-CM

## 2024-10-31 DIAGNOSIS — G47.33 OSA ON CPAP: ICD-10-CM

## 2024-10-31 DIAGNOSIS — E66.01 MORBID OBESITY: ICD-10-CM

## 2024-10-31 DIAGNOSIS — E55.9 VITAMIN D DEFICIENCY: ICD-10-CM

## 2024-10-31 DIAGNOSIS — I10 ESSENTIAL HYPERTENSION: Chronic | ICD-10-CM

## 2024-10-31 DIAGNOSIS — I48.19 ATRIAL FIBRILLATION, PERSISTENT (HCC): ICD-10-CM

## 2024-10-31 LAB
ALBUMIN SERPL-MCNC: 4 G/DL (ref 3.5–4.6)
ALP SERPL-CCNC: 109 U/L (ref 40–130)
ALT SERPL-CCNC: 23 U/L (ref 0–33)
ANION GAP SERPL CALCULATED.3IONS-SCNC: 14 MEQ/L (ref 9–15)
AST SERPL-CCNC: 21 U/L (ref 0–35)
BASOPHILS # BLD: 0 K/UL (ref 0–0.2)
BASOPHILS NFR BLD: 0.1 %
BILIRUB SERPL-MCNC: 0.5 MG/DL (ref 0.2–0.7)
BUN SERPL-MCNC: 12 MG/DL (ref 8–23)
CALCIUM SERPL-MCNC: 9.7 MG/DL (ref 8.5–9.9)
CHLORIDE SERPL-SCNC: 104 MEQ/L (ref 95–107)
CHOLEST SERPL-MCNC: 116 MG/DL (ref 0–199)
CO2 SERPL-SCNC: 25 MEQ/L (ref 20–31)
CREAT SERPL-MCNC: 0.74 MG/DL (ref 0.5–0.9)
EOSINOPHIL # BLD: 0.3 K/UL (ref 0–0.7)
EOSINOPHIL NFR BLD: 3.5 %
ERYTHROCYTE [DISTWIDTH] IN BLOOD BY AUTOMATED COUNT: 14 % (ref 11.5–14.5)
GLOBULIN SER CALC-MCNC: 2.9 G/DL (ref 2.3–3.5)
GLUCOSE SERPL-MCNC: 168 MG/DL (ref 70–99)
HCT VFR BLD AUTO: 44.2 % (ref 37–47)
HDLC SERPL-MCNC: 34 MG/DL (ref 40–59)
HGB BLD-MCNC: 14.1 G/DL (ref 12–16)
LDLC SERPL CALC-MCNC: 61 MG/DL (ref 0–129)
LYMPHOCYTES # BLD: 1.3 K/UL (ref 1–4.8)
LYMPHOCYTES NFR BLD: 13.5 %
MCH RBC QN AUTO: 28.4 PG (ref 27–31.3)
MCHC RBC AUTO-ENTMCNC: 31.9 % (ref 33–37)
MCV RBC AUTO: 88.9 FL (ref 79.4–94.8)
MONOCYTES # BLD: 0.6 K/UL (ref 0.2–0.8)
MONOCYTES NFR BLD: 6.5 %
NEUTROPHILS # BLD: 7 K/UL (ref 1.4–6.5)
NEUTS SEG NFR BLD: 76 %
PLATELET # BLD AUTO: 236 K/UL (ref 130–400)
POTASSIUM SERPL-SCNC: 4.1 MEQ/L (ref 3.4–4.9)
PROT SERPL-MCNC: 6.9 G/DL (ref 6.3–8)
RBC # BLD AUTO: 4.97 M/UL (ref 4.2–5.4)
SODIUM SERPL-SCNC: 143 MEQ/L (ref 135–144)
TRIGL SERPL-MCNC: 107 MG/DL (ref 0–150)
WBC # BLD AUTO: 9.2 K/UL (ref 4.8–10.8)

## 2024-10-31 PROCEDURE — 80053 COMPREHEN METABOLIC PANEL: CPT

## 2024-10-31 PROCEDURE — 85025 COMPLETE CBC W/AUTO DIFF WBC: CPT

## 2024-10-31 PROCEDURE — 80061 LIPID PANEL: CPT

## 2024-10-31 PROCEDURE — 83036 HEMOGLOBIN GLYCOSYLATED A1C: CPT

## 2024-10-31 PROCEDURE — 36415 COLL VENOUS BLD VENIPUNCTURE: CPT

## 2024-10-31 PROCEDURE — 82306 VITAMIN D 25 HYDROXY: CPT

## 2024-11-01 LAB
ESTIMATED AVERAGE GLUCOSE: 166 MG/DL
HBA1C MFR BLD: 7.4 % (ref 4–6)
VITAMIN D 25-HYDROXY: 34.1 NG/ML (ref 30–100)

## 2024-11-05 ENCOUNTER — OFFICE VISIT (OUTPATIENT)
Dept: FAMILY MEDICINE CLINIC | Age: 64
End: 2024-11-05

## 2024-11-05 VITALS
HEART RATE: 80 BPM | OXYGEN SATURATION: 95 % | WEIGHT: 256 LBS | HEIGHT: 65 IN | BODY MASS INDEX: 42.65 KG/M2 | DIASTOLIC BLOOD PRESSURE: 80 MMHG | SYSTOLIC BLOOD PRESSURE: 126 MMHG | TEMPERATURE: 97.5 F

## 2024-11-05 DIAGNOSIS — I48.19 PERSISTENT ATRIAL FIBRILLATION (MULTI): ICD-10-CM

## 2024-11-05 DIAGNOSIS — E11.9 TYPE 2 DIABETES MELLITUS WITHOUT COMPLICATION, WITHOUT LONG-TERM CURRENT USE OF INSULIN (HCC): Primary | ICD-10-CM

## 2024-11-05 DIAGNOSIS — Z23 NEED FOR VACCINATION: ICD-10-CM

## 2024-11-05 DIAGNOSIS — I48.19 ATRIAL FIBRILLATION, PERSISTENT (HCC): ICD-10-CM

## 2024-11-05 DIAGNOSIS — I10 ESSENTIAL HYPERTENSION: Chronic | ICD-10-CM

## 2024-11-05 DIAGNOSIS — E66.01 MORBID OBESITY: ICD-10-CM

## 2024-11-05 DIAGNOSIS — K21.9 GASTROESOPHAGEAL REFLUX DISEASE, UNSPECIFIED WHETHER ESOPHAGITIS PRESENT: ICD-10-CM

## 2024-11-05 DIAGNOSIS — E55.9 VITAMIN D DEFICIENCY: ICD-10-CM

## 2024-11-05 DIAGNOSIS — Z12.11 SCREENING FOR COLON CANCER: ICD-10-CM

## 2024-11-05 DIAGNOSIS — I25.10 CORONARY ARTERY DISEASE INVOLVING NATIVE CORONARY ARTERY OF NATIVE HEART WITHOUT ANGINA PECTORIS: ICD-10-CM

## 2024-11-05 DIAGNOSIS — E78.5 HYPERLIPIDEMIA, UNSPECIFIED HYPERLIPIDEMIA TYPE: ICD-10-CM

## 2024-11-05 PROBLEM — I20.89 ANGINA AT REST (HCC): Status: RESOLVED | Noted: 2018-05-14 | Resolved: 2024-11-05

## 2024-11-05 ASSESSMENT — ENCOUNTER SYMPTOMS
DIARRHEA: 0
WHEEZING: 0
CHEST TIGHTNESS: 0
ANAL BLEEDING: 0
BLOOD IN STOOL: 0
COUGH: 0
ABDOMINAL PAIN: 0
SHORTNESS OF BREATH: 0
CONSTIPATION: 0
NAUSEA: 0
VOMITING: 0

## 2024-11-05 NOTE — ASSESSMENT & PLAN NOTE
Most recent vitamin D level within normal range.  Patient instructed to continue with current daily vitamin D3 supplement.

## 2024-11-05 NOTE — ASSESSMENT & PLAN NOTE
Asymptomatic and rate controlled today in the office.  Patient remains on anticoagulation without signs of bleeding.  Instructions were given to continue with current doses of Eliquis and metoprolol.  Chronic follow-up remains in place with cardiology for management of antiarrhythmic medication.

## 2024-11-05 NOTE — PROGRESS NOTES
Lisset Bacon (: 1960) is a 64 y.o. female, Established patient, who presents today for:    Chief Complaint   Patient presents with    Follow-up Chronic Condition         Assessment & Plan     1. Type 2 diabetes mellitus without complication, without long-term current use of insulin (HCC)  Assessment & Plan:  Most recent A1c improved and at borderline control based on age.  Patient was instructed to continue with current doses of metformin and Trulicity.  She will increase Jardiance dosing to 25 mg daily and continue making best efforts to eat a lower carbohydrate diet.  We will continue to follow lab work over time and make further recommendations based on updated results in the next 3 to 4 months.    Orders:  -     empagliflozin (JARDIANCE) 25 MG tablet; Take 1 tablet by mouth daily, Disp-90 tablet, R-1Normal  2. Atrial fibrillation, persistent (HCC)  Assessment & Plan:  Asymptomatic and rate controlled today in the office.  Patient remains on anticoagulation without signs of bleeding.  Instructions were given to continue with current doses of Eliquis and metoprolol.  Chronic follow-up remains in place with cardiology for management of antiarrhythmic medication.    3. Essential hypertension  Assessment & Plan:  Blood pressure within normal limits today in the office.  Patient instructed to continue with current doses of metoprolol, spironolactone, and doxazosin  4. Hyperlipidemia, unspecified hyperlipidemia type  Assessment & Plan:  Most recent lipid panel at goal control.  Patient instructed to continue with current dose of atorvastatin.   5. Coronary artery disease involving native coronary artery of native heart without angina pectoris  Assessment & Plan:  Currently asymptomatic.  Most recent LDL at goal control.  Patient instructed to continue with current dose of atorvastatin   6. Morbid obesity  Assessment & Plan:  Patient counseled on healthy dietary choices and the benefits of a lower salt

## 2024-11-05 NOTE — ASSESSMENT & PLAN NOTE
Currently asymptomatic.  Most recent LDL at goal control.  Patient instructed to continue with current dose of atorvastatin

## 2024-11-05 NOTE — ASSESSMENT & PLAN NOTE
Blood pressure within normal limits today in the office.  Patient instructed to continue with current doses of metoprolol, spironolactone, and doxazosin

## 2024-11-05 NOTE — ASSESSMENT & PLAN NOTE
Most recent A1c improved and at borderline control based on age.  Patient was instructed to continue with current doses of metformin and Trulicity.  She will increase Jardiance dosing to 25 mg daily and continue making best efforts to eat a lower carbohydrate diet.  We will continue to follow lab work over time and make further recommendations based on updated results in the next 3 to 4 months.

## 2024-11-05 NOTE — ASSESSMENT & PLAN NOTE
Managed well with use of medication and avoidance of dietary triggers.  Patient instructed to continue with current dose of omeprazole and lifestyle measures.

## 2024-11-06 RX ORDER — DOFETILIDE 0.5 MG/1
CAPSULE ORAL 2 TIMES DAILY
Qty: 180 CAPSULE | Refills: 3 | Status: SHIPPED | OUTPATIENT
Start: 2024-11-06

## 2024-11-07 ENCOUNTER — APPOINTMENT (OUTPATIENT)
Dept: CARDIOLOGY | Facility: CLINIC | Age: 64
End: 2024-11-07
Payer: MEDICARE

## 2024-11-11 ENCOUNTER — APPOINTMENT (OUTPATIENT)
Dept: CARDIOLOGY | Facility: CLINIC | Age: 64
End: 2024-11-11
Payer: MEDICARE

## 2024-11-12 ENCOUNTER — APPOINTMENT (OUTPATIENT)
Dept: CARDIOLOGY | Facility: CLINIC | Age: 64
End: 2024-11-12
Payer: MEDICARE

## 2024-11-12 VITALS
WEIGHT: 257 LBS | DIASTOLIC BLOOD PRESSURE: 70 MMHG | BODY MASS INDEX: 42.82 KG/M2 | HEART RATE: 51 BPM | SYSTOLIC BLOOD PRESSURE: 122 MMHG | HEIGHT: 65 IN

## 2024-11-12 DIAGNOSIS — I10 PRIMARY HYPERTENSION: ICD-10-CM

## 2024-11-12 DIAGNOSIS — Z95.0 PACEMAKER: ICD-10-CM

## 2024-11-12 DIAGNOSIS — I48.19 ATRIAL FIBRILLATION, PERSISTENT (MULTI): Primary | ICD-10-CM

## 2024-11-12 DIAGNOSIS — Z79.01 CHRONIC ANTICOAGULATION: ICD-10-CM

## 2024-11-12 DIAGNOSIS — Z79.899 HIGH RISK MEDICATION USE: ICD-10-CM

## 2024-11-12 PROCEDURE — 93000 ELECTROCARDIOGRAM COMPLETE: CPT | Performed by: NURSE PRACTITIONER

## 2024-11-12 PROCEDURE — 99214 OFFICE O/P EST MOD 30 MIN: CPT | Performed by: NURSE PRACTITIONER

## 2024-11-12 PROCEDURE — 3078F DIAST BP <80 MM HG: CPT | Performed by: NURSE PRACTITIONER

## 2024-11-12 PROCEDURE — 1159F MED LIST DOCD IN RCRD: CPT | Performed by: NURSE PRACTITIONER

## 2024-11-12 PROCEDURE — 1160F RVW MEDS BY RX/DR IN RCRD: CPT | Performed by: NURSE PRACTITIONER

## 2024-11-12 PROCEDURE — 3008F BODY MASS INDEX DOCD: CPT | Performed by: NURSE PRACTITIONER

## 2024-11-12 PROCEDURE — 3074F SYST BP LT 130 MM HG: CPT | Performed by: NURSE PRACTITIONER

## 2024-11-12 NOTE — PROGRESS NOTES
Suzy Hanson is a 64 y.o. female that presents to the office today with her   for cardiac follow up after undergoing a PVI ablation.  She  follows with her  primary cardiologist Dr. Fitch and Dr. Guo. She was  added to my schedule today as a follow up for Dr. Guo.     Per Dr. Guo office notes, she has a PMH of atrial fibrillation and sinus node dysfunction with prior pacemaker implanted in March 2018. She presented to emergency department in March 2019 due to feeling a jumping sensation in her right chest that she has not had in the past. X-ray shows a pacemaker with right atrial lead and right ventricular lead around the right atrial and left subclavian area. She underwent a repositioning of both leads back in March 2019 with successful results.     Due to persistent palpitations, we increased the dose of sotalol to 240 mg twice a day.     She has persisting atrial fibrillation. We discussed the option of changing antiarrhythmic therapy. Patient was admitted in February 2023 at [facility] to change sotalol for dofetilide. She did tolerate dofetilide 250 mcg twice a day. Patient underwent cardioversion with successful restoration to sinus rhythm but she had early recurrence of atrial fibrillation.     Due to persistent palpitations patient was started on digoxin therapy.    She underwent a pulmonary isolation in March 19, 2024 with successful results.   After ablation therapy, patient has been noticing palpitations that he would subside with the dosing and adjustment of medical therapy with beta-blockers.     Underwent Left heart catheterization with Dr. Oliveira 6/26/2024 which showed normal coronaries and normal LVEF.     10/11/2024 underwent successful PVI ablation with Dr. Guo at [facility].      She states that she has been doing well and feels that she has only had one short occurrence of atrial fibrillation post ablation.  Denies cardiac symptoms.     Testing Reviewed  EKG obtained in the  office today and verified with Dr. Gordon shows A paced  HR  51 bpm, QT/Qtc 446/411    Assessment/Plan  Atrial fibrillation: EKG as noted above  High risk medication: Dofetilide  Anticoagulation: Eliquis.  Denies bleeding  Pacemaker: Follow-up pacemaker clinic as scheduled  Continue current medications  Follow-up follow-up in the office with Dr. Guo and Dr. Fitch as previously scheduled or sooner if needed.    Patient Active Problem List   Diagnosis    Anxiety    Arteriosclerosis of coronary artery    Heart burn    HTN (hypertension)    Obstructive sleep apnea, adult    Pacemaker    Palpitations    Paroxysmal atrial fibrillation (Multi)    Persistent atrial fibrillation (Multi)    Sinus node dysfunction (Multi)    Chest pressure    Tightness in chest    High risk medication use    Class 3 severe obesity due to excess calories with body mass index (BMI) of 45.0 to 49.9 in adult    Atrial fibrillation (Multi)    Atrial fibrillation, persistent (Multi)    Never smoked tobacco    BMI 40.0-44.9, adult (Multi)    Medication dose changed    Shortness of breath    Chest discomfort    PAF (paroxysmal atrial fibrillation) (Multi)    Chronic anticoagulation       Social History     Tobacco Use    Smoking status: Never     Passive exposure: Never    Smokeless tobacco: Never   Vaping Use    Vaping status: Never Used   Substance Use Topics    Alcohol use: Never    Drug use: Never       Past Medical History:   Diagnosis Date    Acute kidney failure, unspecified (CMS-HCC)     Acute kidney injury    Arrhythmia     Chronic kidney disease     Coronary artery disease     Hyperlipidemia     Hypertension     Other dorsalgia     Interscapular pain    Personal history of other diseases of the circulatory system     History of sinus bradycardia    Personal history of other diseases of urinary system     History of chronic kidney disease    Personal history of other endocrine, nutritional and metabolic disease     History of hypokalemia     Personal history of other specified conditions     History of fatigue    Personal history of other specified conditions     H/O shortness of breath    Sleep apnea          Current Outpatient Medications:     atorvastatin (Lipitor) 80 mg tablet, Take 1 tablet (80 mg) by mouth once daily at bedtime., Disp: 90 tablet, Rfl: 3    digoxin (Lanoxin) 250 MCG tab;et, Take 1 tablet (250 mcg) by mouth once daily., Disp: 90 tablet, Rfl: 3    dofetilide (Tikosyn) 500 mcg capsule, TAKE 1 CAPSULE BY MOUTH TWICE  DAILY, Disp: 180 capsule, Rfl: 3    doxazosin (Cardura) 2 mg tablet, 0.5 tab(s) orally once a day, Disp: 45 tablet, Rfl: 3    Eliquis 5 mg tablet, TAKE 1 TABLET BY MOUTH TWICE  DAILY, Disp: 180 tablet, Rfl: 3    empagliflozin (Jardiance) 10 mg, Take 1 tablet (10 mg) by mouth once daily. (Patient taking differently: Take 2.5 tablets (25 mg) by mouth once daily.), Disp: , Rfl:     furosemide (Lasix) 20 mg tablet, Take 1 tablet (20 mg) by mouth once daily., Disp: 90 tablet, Rfl: 3    loratadine (Claritin) 10 mg tablet, Take 1 tablet (10 mg) by mouth once daily., Disp: , Rfl:     metFORMIN  mg 24 hr tablet, Take 1 tablet (500 mg) by mouth once daily., Disp: , Rfl:     metoprolol tartrate (Lopressor) 100 mg tablet, Take 1 tablet (100 mg) by mouth 2 times a day., Disp: 180 tablet, Rfl: 3    PARoxetine (Paxil) 20 mg tablet, Take 1 tablet (20 mg) by mouth once daily., Disp: , Rfl:     potassium chloride CR (Klor-Con M20) 20 mEq ER tablet, Take 1 tablet every evening, Disp: 90 tablet, Rfl: 3    spironolactone (Aldactone) 25 mg tablet, TAKE 2 TABLETS BY MOUTH IN THE  MORNING AND 1 TABLET BY MOUTH IN THE EVENING, Disp: 270 tablet, Rfl: 3    Trulicity 0.75 mg/0.5 mL pen injector, Inject 0.75 mg under the skin 1 (one) time per week., Disp: , Rfl:     pantoprazole (ProtoNix) 40 mg EC tablet, Take 1 tablet (40 mg) by mouth once daily in the morning. Take before meals for 14 days. Do not crush, chew, or split. Do not start before  2024., Disp: 14 tablet, Rfl: 0    Amlodipine, Codeine, Losartan, Milk containing products (dairy), and Sulfa (sulfonamide antibiotics)    Family History   Problem Relation Name Age of Onset    Other (systemic lupus erythematosus) Mother      Heart attack Father      Other (arteriosclerotic cardiovascular disease) Sister      Heart attack Sister      Lung cancer Brother      Other (cardiac disorder) Child         Past Surgical History:   Procedure Laterality Date    CARDIAC CATHETERIZATION N/A 2024    Procedure: Left Heart Cath, With LV;  Surgeon: Shree Oliveira MD;  Location: [facility];  Service: Cardiovascular;  Laterality: N/A;  cath/possibel ptca at [place] with Dr. Oliveira or Beau within next week.Pt to hold Eliquis 48 hours prior and Trulicity 7 days prior.Labs have been done within last 30 days. Holding Metformin am of procedure    CARDIAC ELECTROPHYSIOLOGY PROCEDURE N/A 3/28/2024    Procedure: Ablation A-Fib;  Surgeon: Tutu Guo MD;  Location: [facility];  Service: Electrophysiology;  Laterality: N/A;    CARDIAC ELECTROPHYSIOLOGY PROCEDURE Bilateral 10/11/2024    Procedure: Ablation A-Fib;  Surgeon: Tutu Guo MD;  Location: [facility];  Service: Electrophysiology;  Laterality: Bilateral;  PVI ABLATION TO BE DONE IN [place] IN AUGUST WITH . STEPHEN ORDERED AND TO BE DONE PRIOR. No CTA is needed    OTHER SURGICAL HISTORY  10/27/2021    Cholecystectomy    OTHER SURGICAL HISTORY  10/27/2021    Colonoscopy    OTHER SURGICAL HISTORY  10/27/2021    Pacemaker insertion    OTHER SURGICAL HISTORY  10/27/2021    Hysterectomy    OTHER SURGICAL HISTORY  10/27/2021     section    OTHER SURGICAL HISTORY  2022    Gallbladder surgery          Review of systems  Constitutional: No weight loss, fever, chills, weakness or fatigue  HEENT: No visual loss, blurred vision, double vision or yellow sclerae  Skin: No rash or itching  Cardiovascular: No  "chest pain, pressure or discomfort, No palpitations or edema.  Respiratory: No shortness of breath, cough or sputum  Gastrointestinal: No nausea, vomiting or diarrhea. No bloody or dark tarry stools.  Neurological: No headache, lightheadedness, dizziness, syncope.   Musculoskeletal: No muscle, back pain, joint pain or stiffness.  Hematologic: No anemia, bleeding or bruising.    /70 (BP Location: Left arm, Patient Position: Sitting)   Pulse 51   Ht 1.651 m (5' 5\")   Wt 117 kg (257 lb)   BMI 42.77 kg/m²     Patient Active Problem List   Diagnosis    Anxiety    Arteriosclerosis of coronary artery    Heart burn    HTN (hypertension)    Obstructive sleep apnea, adult    Pacemaker    Palpitations    Paroxysmal atrial fibrillation (Multi)    Persistent atrial fibrillation (Multi)    Sinus node dysfunction (Multi)    Chest pressure    Tightness in chest    High risk medication use    Class 3 severe obesity due to excess calories with body mass index (BMI) of 45.0 to 49.9 in adult    Atrial fibrillation (Multi)    Atrial fibrillation, persistent (Multi)    Never smoked tobacco    BMI 40.0-44.9, adult (Multi)    Medication dose changed    Shortness of breath    Chest discomfort    PAF (paroxysmal atrial fibrillation) (Multi)    Chronic anticoagulation         Physical Exam  Constitutional: Well developed, awake/alert x 3, no distress.  Respiratory/Thorax: patent airways, CTAB, normal breath sounds with good expansion.  Cardiovascular: Regular rate and rhythm, no murmurs, normal S1 and S2,   Gastrointestinal: Non distended, soft, non-tender, no rebound tenderness or guarding.  Extremities: No cyanosis, edema.     Neurological: Alert and oriented x 3. Moves extremities spontaneous with purpose.  Psychological: Appropriate mood and behavior  Skin: Warm and Dry. No lesions or rashes.         Please excuse any errors in grammar or translation related to dictation, voice recognition software was used to prepare this " document.

## 2024-11-13 ENCOUNTER — HOSPITAL ENCOUNTER (OUTPATIENT)
Age: 64
Setting detail: OUTPATIENT SURGERY
Discharge: HOME | End: 2024-11-13
Attending: SPECIALIST | Admitting: SPECIALIST
Payer: MEDICARE

## 2024-11-13 ENCOUNTER — ANESTHESIA EVENT (OUTPATIENT)
Dept: OPERATING ROOM | Age: 64
End: 2024-11-13
Payer: MEDICARE

## 2024-11-13 ENCOUNTER — ANESTHESIA (OUTPATIENT)
Dept: OPERATING ROOM | Age: 64
End: 2024-11-13
Payer: MEDICARE

## 2024-11-13 VITALS
RESPIRATION RATE: 16 BRPM | DIASTOLIC BLOOD PRESSURE: 56 MMHG | HEIGHT: 65 IN | SYSTOLIC BLOOD PRESSURE: 125 MMHG | BODY MASS INDEX: 42.49 KG/M2 | WEIGHT: 255 LBS | OXYGEN SATURATION: 95 % | HEART RATE: 53 BPM | TEMPERATURE: 96.5 F

## 2024-11-13 PROBLEM — Z12.11 SCREENING FOR MALIGNANT NEOPLASM OF COLON: Status: ACTIVE | Noted: 2024-11-13

## 2024-11-13 LAB
GLUCOSE BLD-MCNC: 111 MG/DL (ref 70–99)
PERFORMED ON: ABNORMAL

## 2024-11-13 PROCEDURE — 6360000002 HC RX W HCPCS: Performed by: ANESTHESIOLOGY

## 2024-11-13 PROCEDURE — 3609027000 HC COLONOSCOPY: Performed by: SPECIALIST

## 2024-11-13 PROCEDURE — 2580000003 HC RX 258: Performed by: SPECIALIST

## 2024-11-13 PROCEDURE — G0121 COLON CA SCRN NOT HI RSK IND: HCPCS | Performed by: SPECIALIST

## 2024-11-13 PROCEDURE — 3700000000 HC ANESTHESIA ATTENDED CARE: Performed by: SPECIALIST

## 2024-11-13 PROCEDURE — 7100000011 HC PHASE II RECOVERY - ADDTL 15 MIN: Performed by: SPECIALIST

## 2024-11-13 PROCEDURE — 3700000001 HC ADD 15 MINUTES (ANESTHESIA): Performed by: SPECIALIST

## 2024-11-13 PROCEDURE — 2500000003 HC RX 250 WO HCPCS: Performed by: ANESTHESIOLOGY

## 2024-11-13 PROCEDURE — 2709999900 HC NON-CHARGEABLE SUPPLY: Performed by: SPECIALIST

## 2024-11-13 PROCEDURE — 7100000010 HC PHASE II RECOVERY - FIRST 15 MIN: Performed by: SPECIALIST

## 2024-11-13 RX ORDER — SODIUM CHLORIDE 0.9 % (FLUSH) 0.9 %
5-40 SYRINGE (ML) INJECTION EVERY 12 HOURS SCHEDULED
Status: DISCONTINUED | OUTPATIENT
Start: 2024-11-13 | End: 2024-11-13 | Stop reason: HOSPADM

## 2024-11-13 RX ORDER — SODIUM CHLORIDE 0.9 % (FLUSH) 0.9 %
5-40 SYRINGE (ML) INJECTION PRN
Status: CANCELLED | OUTPATIENT
Start: 2024-11-13

## 2024-11-13 RX ORDER — SODIUM CHLORIDE 9 MG/ML
INJECTION, SOLUTION INTRAVENOUS CONTINUOUS
Status: DISCONTINUED | OUTPATIENT
Start: 2024-11-13 | End: 2024-11-13 | Stop reason: RX

## 2024-11-13 RX ORDER — IPRATROPIUM BROMIDE AND ALBUTEROL SULFATE 2.5; .5 MG/3ML; MG/3ML
1 SOLUTION RESPIRATORY (INHALATION)
Status: CANCELLED | OUTPATIENT
Start: 2024-11-13 | End: 2024-11-14

## 2024-11-13 RX ORDER — SODIUM CHLORIDE 9 MG/ML
INJECTION, SOLUTION INTRAVENOUS PRN
Status: CANCELLED | OUTPATIENT
Start: 2024-11-13

## 2024-11-13 RX ORDER — SODIUM CHLORIDE 0.9 % (FLUSH) 0.9 %
5-40 SYRINGE (ML) INJECTION PRN
Status: DISCONTINUED | OUTPATIENT
Start: 2024-11-13 | End: 2024-11-13 | Stop reason: HOSPADM

## 2024-11-13 RX ORDER — PROPOFOL 10 MG/ML
INJECTION, EMULSION INTRAVENOUS
Status: DISCONTINUED | OUTPATIENT
Start: 2024-11-13 | End: 2024-11-13 | Stop reason: SDUPTHER

## 2024-11-13 RX ORDER — SODIUM CHLORIDE, SODIUM LACTATE, POTASSIUM CHLORIDE, CALCIUM CHLORIDE 600; 310; 30; 20 MG/100ML; MG/100ML; MG/100ML; MG/100ML
INJECTION, SOLUTION INTRAVENOUS CONTINUOUS
Status: DISCONTINUED | OUTPATIENT
Start: 2024-11-13 | End: 2024-11-13 | Stop reason: HOSPADM

## 2024-11-13 RX ORDER — SODIUM CHLORIDE 0.9 % (FLUSH) 0.9 %
5-40 SYRINGE (ML) INJECTION EVERY 12 HOURS SCHEDULED
Status: CANCELLED | OUTPATIENT
Start: 2024-11-13

## 2024-11-13 RX ORDER — DEXTROSE MONOHYDRATE 100 MG/ML
INJECTION, SOLUTION INTRAVENOUS CONTINUOUS PRN
Status: CANCELLED | OUTPATIENT
Start: 2024-11-13

## 2024-11-13 RX ORDER — ONDANSETRON 2 MG/ML
4 INJECTION INTRAMUSCULAR; INTRAVENOUS
Status: CANCELLED | OUTPATIENT
Start: 2024-11-13 | End: 2024-11-14

## 2024-11-13 RX ORDER — HYDRALAZINE HYDROCHLORIDE 20 MG/ML
10 INJECTION INTRAMUSCULAR; INTRAVENOUS
Status: CANCELLED | OUTPATIENT
Start: 2024-11-13

## 2024-11-13 RX ORDER — LABETALOL HYDROCHLORIDE 5 MG/ML
10 INJECTION, SOLUTION INTRAVENOUS
Status: CANCELLED | OUTPATIENT
Start: 2024-11-13

## 2024-11-13 RX ORDER — METOCLOPRAMIDE HYDROCHLORIDE 5 MG/ML
10 INJECTION INTRAMUSCULAR; INTRAVENOUS
Status: CANCELLED | OUTPATIENT
Start: 2024-11-13 | End: 2024-11-14

## 2024-11-13 RX ORDER — LIDOCAINE HYDROCHLORIDE 20 MG/ML
INJECTION, SOLUTION EPIDURAL; INFILTRATION; INTRACAUDAL; PERINEURAL
Status: DISCONTINUED | OUTPATIENT
Start: 2024-11-13 | End: 2024-11-13 | Stop reason: SDUPTHER

## 2024-11-13 RX ORDER — SODIUM CHLORIDE 9 MG/ML
INJECTION, SOLUTION INTRAVENOUS PRN
Status: DISCONTINUED | OUTPATIENT
Start: 2024-11-13 | End: 2024-11-13 | Stop reason: HOSPADM

## 2024-11-13 RX ADMIN — PROPOFOL 100 MCG/KG/MIN: 10 INJECTION, EMULSION INTRAVENOUS at 07:33

## 2024-11-13 RX ADMIN — SODIUM CHLORIDE, POTASSIUM CHLORIDE, SODIUM LACTATE AND CALCIUM CHLORIDE: 600; 310; 30; 20 INJECTION, SOLUTION INTRAVENOUS at 07:00

## 2024-11-13 RX ADMIN — LIDOCAINE HYDROCHLORIDE 2 ML: 20 INJECTION, SOLUTION EPIDURAL; INFILTRATION; INTRACAUDAL; PERINEURAL at 07:33

## 2024-11-13 ASSESSMENT — PAIN - FUNCTIONAL ASSESSMENT: PAIN_FUNCTIONAL_ASSESSMENT: 0-10

## 2024-11-13 ASSESSMENT — PAIN SCALES - GENERAL: PAINLEVEL_OUTOF10: 0

## 2024-11-13 NOTE — PROGRESS NOTES
Patient ID:  Lisset Bacon  [id number]  64 y.o.  1960  Patient received in PACU BED 2 Report received from Surgical Nurse.   Attached to Monitor, VSS, See Nursing Assessment and Flowsheets for detailed Information  Awake, following commands, answering questions appropriately. O2 Sats>92 on Room air  Taking po fluids well, VSS. Passing flatus.   IV Discontinued per protocol, catheter intact, patient tolerated well.  Discharge Instructions given, patient verbalizes and understanding.  Pt to be discharged to home with responsible adult     Electronically signed by Cherise Louis RN on 11/13/24

## 2024-11-13 NOTE — ANESTHESIA POSTPROCEDURE EVALUATION
Department of Anesthesiology  Postprocedure Note    Patient: Lisset Bacon  MRN: [id number]  YOB: 1960  Date of evaluation: 11/13/2024    Procedure Summary       Date: 11/13/24 Room / Location: [address]    Anesthesia Start: 0730 Anesthesia Stop: 0754    Procedure: COLORECTAL CANCER SCREENING, NOT HIGH RISK Diagnosis:       Colon cancer screening      (Colon cancer screening [Z12.11])    Surgeons: Chris Clancy MD Responsible Provider: Nghia Williamson MD    Anesthesia Type: MAC ASA Status: 4            Anesthesia Type: MAC    Zoraida Phase I: Zoraida Score: 10    Zoraida Phase II:      Anesthesia Post Evaluation    Patient location during evaluation: bedside  Patient participation: complete - patient participated  Level of consciousness: awake and awake and alert  Airway patency: patent  Nausea & Vomiting: no nausea and no vomiting  Cardiovascular status: blood pressure returned to baseline and hemodynamically stable  Respiratory status: acceptable  Hydration status: euvolemic  Pain management: adequate    No notable events documented.

## 2024-11-13 NOTE — H&P
2016    Atrial fibrillation, persistent (HCC) 05/10/2018    Baker's cyst of knee, left 2022    Chronic cholecystitis with calculus 2017    Chronic gastritis 2023    Coronary artery disease involving native coronary artery of native heart 2024    Coronary artery disease involving native coronary artery of native heart 2024    Dyshidrotic eczema 01/15/2020    Essential hypertension 2014    GERD (gastroesophageal reflux disease) 2022    Hyperlipidemia     Morbid obesity 2009    ALE on CPAP 2022    Osteoarthritis     back, knees, spine    Pacemaker 2018    PONV (postoperative nausea and vomiting)     from using inhaled anethesia     PVC's (premature ventricular contractions) 2016    Seasonal allergies     Symptomatic bradycardia 2018    Type 2 diabetes mellitus without complication, without long-term current use of insulin ([facility])     Vitamin D deficiency        Past Surgical History:  Past Surgical History:   Procedure Laterality Date     SECTION       SECTION      CHOLECYSTECTOMY, LAPAROSCOPIC N/A 2017    For biliary colic and stones    COLONOSCOPY  2014    Normal (DR VALLES)    HYSTERECTOMY, TOTAL ABDOMINAL (CERVIX REMOVED)      d/t fibroids    LUÍS AND BSO (CERVIX REMOVED) Bilateral     TONSILLECTOMY Bilateral     UPPER GASTROINTESTINAL ENDOSCOPY N/A 06/15/2023    mild gastritis (DR CLANCY)  EGD DIAGNOSTIC ONLY performed by Chris Clancy MD at [facility] [place] [facility]       Social History:  Social History     Tobacco Use    Smoking status: Never     Passive exposure: Never    Smokeless tobacco: Never   Vaping Use    Vaping status: Never Used   Substance Use Topics    Alcohol use: No    Drug use: No       Vital Signs:   Vitals:    24 0656   BP: (!) 155/57   Pulse: 74   Resp: 16   Temp: (!) 96.5 °F (35.8 °C)   SpO2: 97%        Physical Exam:  Cardiac:  [x]WNL  []Comments:  Pulmonary:  [x]WNL

## 2024-11-13 NOTE — ANESTHESIA PRE PROCEDURE
discussed with surgical team.    Attending anesthesiologist reviewed and agrees with Preprocedure content              Nghia Williamson MD   11/13/2024

## 2024-11-17 DIAGNOSIS — I49.5 SINUS NODE DYSFUNCTION (MULTI): ICD-10-CM

## 2024-11-18 NOTE — TELEPHONE ENCOUNTER
Received request for prescription refills for patient.   Patient follows with Dr. Tutu Guo      Request is for Metoprolol  Is patient currently on medication yes    Last OV 11/11/24  Next OV 1/13/25    Pended for signing and sent to provider

## 2024-11-21 RX ORDER — METOPROLOL TARTRATE 100 MG/1
100 TABLET ORAL 2 TIMES DAILY
Qty: 180 TABLET | Refills: 3 | Status: SHIPPED | OUTPATIENT
Start: 2024-11-21

## 2024-11-26 ENCOUNTER — TELEPHONE (OUTPATIENT)
Dept: FAMILY MEDICINE CLINIC | Age: 64
End: 2024-11-26

## 2024-11-26 DIAGNOSIS — N89.8 VAGINAL DISCHARGE: Primary | ICD-10-CM

## 2024-11-26 NOTE — TELEPHONE ENCOUNTER
Pt stated she has spoken to PCP regarding yeast infections and would like a referral for Dr Wilmer Acharya or Kate Reis .    Please advise . Pt asked that office calls cell phone once referral has been placed

## 2024-12-08 DIAGNOSIS — E11.9 TYPE 2 DIABETES MELLITUS WITHOUT COMPLICATION, WITHOUT LONG-TERM CURRENT USE OF INSULIN (HCC): ICD-10-CM

## 2024-12-09 ENCOUNTER — OFFICE VISIT (OUTPATIENT)
Dept: OBGYN CLINIC | Age: 64
End: 2024-12-09
Payer: MEDICARE

## 2024-12-09 ENCOUNTER — HOSPITAL ENCOUNTER (OUTPATIENT)
Age: 64
Setting detail: SPECIMEN
Discharge: HOME | End: 2024-12-09
Payer: MEDICARE

## 2024-12-09 VITALS
HEIGHT: 65 IN | WEIGHT: 252 LBS | DIASTOLIC BLOOD PRESSURE: 70 MMHG | HEART RATE: 74 BPM | SYSTOLIC BLOOD PRESSURE: 134 MMHG | BODY MASS INDEX: 41.99 KG/M2

## 2024-12-09 DIAGNOSIS — N89.8 VAGINAL DISCHARGE: ICD-10-CM

## 2024-12-09 DIAGNOSIS — N89.8 VAGINAL DISCHARGE: Primary | ICD-10-CM

## 2024-12-09 PROCEDURE — 3075F SYST BP GE 130 - 139MM HG: CPT | Performed by: OBSTETRICS & GYNECOLOGY

## 2024-12-09 PROCEDURE — G8417 CALC BMI ABV UP PARAM F/U: HCPCS | Performed by: OBSTETRICS & GYNECOLOGY

## 2024-12-09 PROCEDURE — 87808 TRICHOMONAS ASSAY W/OPTIC: CPT

## 2024-12-09 PROCEDURE — 1036F TOBACCO NON-USER: CPT | Performed by: OBSTETRICS & GYNECOLOGY

## 2024-12-09 PROCEDURE — G8427 DOCREV CUR MEDS BY ELIG CLIN: HCPCS | Performed by: OBSTETRICS & GYNECOLOGY

## 2024-12-09 PROCEDURE — 3078F DIAST BP <80 MM HG: CPT | Performed by: OBSTETRICS & GYNECOLOGY

## 2024-12-09 PROCEDURE — 3017F COLORECTAL CA SCREEN DOC REV: CPT | Performed by: OBSTETRICS & GYNECOLOGY

## 2024-12-09 PROCEDURE — 1123F ACP DISCUSS/DSCN MKR DOCD: CPT | Performed by: OBSTETRICS & GYNECOLOGY

## 2024-12-09 PROCEDURE — G8484 FLU IMMUNIZE NO ADMIN: HCPCS | Performed by: OBSTETRICS & GYNECOLOGY

## 2024-12-09 PROCEDURE — 1090F PRES/ABSN URINE INCON ASSESS: CPT | Performed by: OBSTETRICS & GYNECOLOGY

## 2024-12-09 PROCEDURE — 99203 OFFICE O/P NEW LOW 30 MIN: CPT | Performed by: OBSTETRICS & GYNECOLOGY

## 2024-12-09 PROCEDURE — 1159F MED LIST DOCD IN RCRD: CPT | Performed by: OBSTETRICS & GYNECOLOGY

## 2024-12-09 PROCEDURE — 87210 SMEAR WET MOUNT SALINE/INK: CPT

## 2024-12-09 PROCEDURE — G8399 PT W/DXA RESULTS DOCUMENT: HCPCS | Performed by: OBSTETRICS & GYNECOLOGY

## 2024-12-09 RX ORDER — DULAGLUTIDE 1.5 MG/.5ML
1.5 INJECTION, SOLUTION SUBCUTANEOUS WEEKLY
Qty: 6 ML | Refills: 0 | Status: SHIPPED | OUTPATIENT
Start: 2024-12-09

## 2024-12-09 RX ORDER — FLUCONAZOLE 200 MG/1
200 TABLET ORAL DAILY
Qty: 7 TABLET | Refills: 0 | Status: SHIPPED | OUTPATIENT
Start: 2024-12-09 | End: 2024-12-16

## 2024-12-09 ASSESSMENT — ENCOUNTER SYMPTOMS
SHORTNESS OF BREATH: 0
APNEA: 0
ABDOMINAL PAIN: 0

## 2024-12-09 NOTE — TELEPHONE ENCOUNTER
Pharmacy is requesting medication refill. Please approve or deny this request.    Rx requested:  Requested Prescriptions     Pending Prescriptions Disp Refills    TRULICITY 1.5 MG/0.5ML SC injection [Pharmacy Med Name: Trulicity 1.5 MG/0.5ML Subcutaneous Solution Pen-injector] 6 mL 3     Sig: INJECT THE CONTENTS OF ONE PEN  SUBCUTANEOUSLY WEEKLY AS  DIRECTED         Last Office Visit:   11/5/2024      Next Visit Date:  Future Appointments   Date Time Provider Department Center   12/9/2024 10:45 AM Whitney Guillen DO OB/GYN [facility]   2/7/2025 10:00 AM Farhad Reese MD [facility] DE[facility]   2/7/2025 10:30 AM Farhad Reese MD [facility] DE[facility]   9/16/2025 10:30 AM Paul Dotson MD [facility]

## 2024-12-10 LAB
CLUE CELLS VAG QL WET PREP: ABNORMAL
T VAGINALIS VAG QL WET PREP: ABNORMAL
TRICHOMONAS VAGINALIS SCREEN: NEGATIVE
YEAST VAG QL WET PREP: ABNORMAL

## 2024-12-10 NOTE — PROGRESS NOTES
Subjective:      Patient ID:  Lisset Bacon is a 64 y.o. female with chief complaint of:  No chief complaint on file.      Patient is post menopausal with history of diabetes and intermittent irritation of the vagina, currently taking jarviance which can lead to increased risk for infection        Past Medical History:   Diagnosis Date    Angina at rest ([facility]) 2018    Anxiety     Atopic rhinitis 2016    Atrial fibrillation, persistent ([facility]) 05/10/2018    Baker's cyst of knee, left 2022    Chronic cholecystitis with calculus 2017    Chronic gastritis 2023    Coronary artery disease involving native coronary artery of native heart 2024    Coronary artery disease involving native coronary artery of native heart 2024    Dyshidrotic eczema 01/15/2020    Essential hypertension 2014    GERD (gastroesophageal reflux disease) 2022    Hyperlipidemia     Morbid obesity 2009    ALE on CPAP 2022    Osteoarthritis     back, knees, spine    Pacemaker 2018    PONV (postoperative nausea and vomiting)     from using inhaled anethesia     PVC's (premature ventricular contractions) 2016    Seasonal allergies     Symptomatic bradycardia 2018    Type 2 diabetes mellitus without complication, without long-term current use of insulin ([facility])     Vitamin D deficiency      Past Surgical History:   Procedure Laterality Date     SECTION       SECTION      CHOLECYSTECTOMY, LAPAROSCOPIC N/A 2017    For biliary colic and stones    COLONOSCOPY  2014    Normal (DR VALLES)    COLONOSCOPY N/A 2024    diverticulosis, 10y repeat (DR STUART)  COLORECTAL CANCER SCREENING, NOT HIGH RISK performed by Chris Stuart MD at [facility] OR    HYSTERECTOMY, TOTAL ABDOMINAL (CERVIX REMOVED)      d/t fibroids    LUÍS AND BSO (CERVIX REMOVED) Bilateral     TONSILLECTOMY Bilateral     UPPER GASTROINTESTINAL ENDOSCOPY N/A 06/15/2023    mild

## 2024-12-11 ENCOUNTER — APPOINTMENT (OUTPATIENT)
Dept: CARDIOLOGY | Facility: CLINIC | Age: 64
End: 2024-12-11
Payer: MEDICARE

## 2024-12-11 DIAGNOSIS — I48.19 ATRIAL FIBRILLATION, PERSISTENT (MULTI): ICD-10-CM

## 2024-12-13 PROBLEM — Z12.11 SCREENING FOR MALIGNANT NEOPLASM OF COLON: Status: RESOLVED | Noted: 2024-11-13 | Resolved: 2024-12-13

## 2024-12-19 ENCOUNTER — HOSPITAL ENCOUNTER (OUTPATIENT)
Dept: CARDIOLOGY | Age: 64
Discharge: HOME | End: 2024-12-19
Payer: MEDICARE

## 2024-12-19 PROCEDURE — 93296 REM INTERROG EVL PM/IDS: CPT

## 2024-12-19 PROCEDURE — 93294 REM INTERROG EVL PM/LDLS PM: CPT | Performed by: INTERNAL MEDICINE

## 2025-01-13 ENCOUNTER — APPOINTMENT (OUTPATIENT)
Dept: CARDIOLOGY | Facility: CLINIC | Age: 65
End: 2025-01-13
Payer: MEDICARE

## 2025-01-13 VITALS
HEART RATE: 76 BPM | DIASTOLIC BLOOD PRESSURE: 82 MMHG | BODY MASS INDEX: 40.98 KG/M2 | WEIGHT: 246 LBS | HEIGHT: 65 IN | SYSTOLIC BLOOD PRESSURE: 124 MMHG

## 2025-01-13 DIAGNOSIS — R00.2 PALPITATIONS: ICD-10-CM

## 2025-01-13 DIAGNOSIS — I48.11 LONGSTANDING PERSISTENT ATRIAL FIBRILLATION (MULTI): ICD-10-CM

## 2025-01-13 DIAGNOSIS — Z79.899 HIGH RISK MEDICATION USE: Primary | ICD-10-CM

## 2025-01-13 DIAGNOSIS — Z79.01 ANTICOAGULATION MANAGEMENT ENCOUNTER: ICD-10-CM

## 2025-01-13 DIAGNOSIS — R94.31 ABNORMAL EKG: ICD-10-CM

## 2025-01-13 DIAGNOSIS — Z51.81 ANTICOAGULATION MANAGEMENT ENCOUNTER: ICD-10-CM

## 2025-01-13 DIAGNOSIS — Z78.9 NEVER SMOKED TOBACCO: ICD-10-CM

## 2025-01-13 DIAGNOSIS — I10 PRIMARY HYPERTENSION: ICD-10-CM

## 2025-01-13 DIAGNOSIS — Z95.0 PACEMAKER: ICD-10-CM

## 2025-01-13 PROCEDURE — 99215 OFFICE O/P EST HI 40 MIN: CPT | Performed by: INTERNAL MEDICINE

## 2025-01-13 PROCEDURE — 3008F BODY MASS INDEX DOCD: CPT | Performed by: INTERNAL MEDICINE

## 2025-01-13 PROCEDURE — 3079F DIAST BP 80-89 MM HG: CPT | Performed by: INTERNAL MEDICINE

## 2025-01-13 PROCEDURE — 1159F MED LIST DOCD IN RCRD: CPT | Performed by: INTERNAL MEDICINE

## 2025-01-13 PROCEDURE — 3074F SYST BP LT 130 MM HG: CPT | Performed by: INTERNAL MEDICINE

## 2025-01-13 NOTE — PATIENT INSTRUCTIONS
6 month follow up appointment     Keep your device appointments at [facility] as scheduled      DID YOU KNOW  We have a pharmacy here in the [facility].  They can fill all prescriptions, not just cardiac medications.  Prescriptions from other pharmacies can easily be transferred to the  pharmacy by the  pharmacist on site.   pharmacies offer FREE HOME DELIVERY on medications to anywhere in [place]. They can sync your medications. Typically prescriptions can be ready in 10 - 15 minutes. If pharmacy is unable to fill your  prescription or if cost is more than your paying now the Pharmacist can easily transfer back to your Pharmacy of choice. Pharmacy phone # [phone].     Please bring all medicines, vitamins, and herbal supplements with you in original bottles to every appointment  Prescriptions will not be filled unless you are compliant with your follow up appointments or have a follow up appointment scheduled as per instruction of your physician. Refills should be requested at the time of your visit.

## 2025-01-13 NOTE — PROGRESS NOTES
CARDIOLOGY OFFICE VISIT      CHIEF COMPLAINT  No chief complaint on file.      HISTORY OF PRESENT ILLNESS  HPI  64-year-old female with a past medical history of atrial fibrillation and sinus node dysfunction with prior pacemaker implanted in March 2018. She presented to emergency department in March 2019 due to feeling a jumping sensation in her right chest that she has not had in the past. X-ray shows a pacemaker with right atrial lead and right ventricular lead around the right atrial and left subclavian area. She underwent a repositioning of both leads back in March 2019 with successful results.     Due to persistent palpitations, we increased the dose of sotalol to 240 mg twice a day.      she has persisting atrial fibrillation. We discussed the option of changing antiarrhythmic therapy. Patient was admitted in February 2023 at [facility] to change sotalol for dofetilide. She did tolerate dofetilide 250 mcg twice a day. Patient underwent cardioversion with successful restoration to sinus rhythm but she had early recurrence of atrial fibrillation.     Due to persistent palpitations patient was started on digoxin therapy.    She underwent a pulmonary isolation in March 19, 2024 with successful results.        After ablation therapy, patient has been noticing palpitations that he would subside with the dosing and adjustment of medical therapy with beta-blockers. Looking at her record she had a cardiac catheterization in 2018 that shows a significant lesion in the LAD but the FFR was unremarkable.  No intervention was performed.       Cardiac catheterization June 2024  CONCLUSIONS:   1. Left Main Coronary Artery: This artery is normal.   2. Left Anterior Descending Artery: This artery is normal.   3. Circumflex Coronary Artery: normal.   4. Right Coronary Artery: is normal.   5. The Left Ventricular Ejection Fraction is 55%.    Echocardiogram October 2024     CONCLUSIONS:   1. The left ventricular  systolic function is normal, with a visually estimated ejection fraction of 60-65%.   2. Left ventricular diastolic filling was indeterminate.   3. There is normal right ventricular global systolic function.   4. RVSP 34.   5. The left atrium is moderately dilated.   6. Left atrial appendage free of thrombi.   7. 2+ MR.   8. Mild to moderate mitral valve regurgitation.   9. Slightly elevated right ventricular systolic pressure.  10. The main pulmonary artery is normal in size, and position, with normal bifurcation into the left and right pulmonary arteries.  11. The pulmonary veins appear normal and return normally to the left atrium.  12. No left atrial thrombus.  13. There is no evidence of a patent foramen ovale.  Patient underwent a redo pulmonary isolation October 2024 with no complications.    Patient states that after her last PVI, she feels great.  She has more energy to activities.  She denies any symptoms of chest pain or shortness breath or palpitations.    Device interrogation in December 2024 shows dual-chamber pacemaker [employer] with battery longevity 6.6 years.  Sims of atrial fibrillation 6.1% of the time.  No high ventricular events noted      Past Medical History  Past Medical History:   Diagnosis Date    Acute kidney failure, unspecified (CMS-HCC)     Acute kidney injury    Arrhythmia     Chronic kidney disease     Coronary artery disease     Hyperlipidemia     Hypertension     Other dorsalgia     Interscapular pain    Personal history of other diseases of the circulatory system     History of sinus bradycardia    Personal history of other diseases of urinary system     History of chronic kidney disease    Personal history of other endocrine, nutritional and metabolic disease     History of hypokalemia    Personal history of other specified conditions     History of fatigue    Personal history of other specified conditions     H/O shortness of breath    Sleep apnea        Social History  Social  History     Tobacco Use    Smoking status: Never     Passive exposure: Never    Smokeless tobacco: Never   Vaping Use    Vaping status: Never Used   Substance Use Topics    Alcohol use: Never    Drug use: Never       Family History     Family History   Problem Relation Name Age of Onset    Other (systemic lupus erythematosus) Mother      Heart attack Father      Other (arteriosclerotic cardiovascular disease) Sister      Heart attack Sister      Lung cancer Brother      Other (cardiac disorder) Child          Allergies:  Allergies   Allergen Reactions    Amlodipine Unknown    Codeine Other     Per pt she gets very sick    Losartan Cough    Milk Containing Products (Dairy) Unknown    Sulfa (Sulfonamide Antibiotics) Unknown        Outpatient Medications:  Current Outpatient Medications   Medication Instructions    atorvastatin (LIPITOR) 80 mg, oral, Nightly    digoxin (LANOXIN) 250 mcg, oral, Daily    dofetilide (Tikosyn) 500 mcg capsule oral, 2 times daily    doxazosin (Cardura) 2 mg tablet 0.5 tab(s) orally once a day    Eliquis 5 mg, oral, 2 times daily    empagliflozin (JARDIANCE) 25 mg, Daily    furosemide (LASIX) 20 mg, oral, Daily    loratadine (Claritin) 10 mg tablet 1 tablet, Daily    metFORMIN  mg 24 hr tablet 1 tablet, Daily    metoprolol tartrate (LOPRESSOR) 100 mg, oral, 2 times daily    pantoprazole (PROTONIX) 40 mg, oral, Daily before breakfast, Do not crush, chew, or split.    PARoxetine (Paxil) 20 mg tablet 1 tablet, Daily    potassium chloride CR (Klor-Con M20) 20 mEq ER tablet Take 1 tablet every evening    spironolactone (Aldactone) 25 mg tablet TAKE 2 TABLETS BY MOUTH IN THE  MORNING AND 1 TABLET BY MOUTH IN THE EVENING    Trulicity 0.75 mg, Once Weekly          REVIEW OF SYSTEMS  Review of Systems   All other systems reviewed and are negative.        VITALS  Vitals:    01/13/25 1023   BP: 124/82   Pulse: 76       PHYSICAL EXAM  Constitutional:       Appearance: Healthy appearance. Not in  distress.   Neck:      Vascular: No JVR. JVD normal.   Pulmonary:      Effort: Pulmonary effort is normal.      Breath sounds: Normal breath sounds. No wheezing. No rhonchi. No rales.   Chest:      Chest wall: Not tender to palpatation.   Cardiovascular:      PMI at left midclavicular line. Normal rate. Regular rhythm. Normal S1. Normal S2.       Murmurs: There is no murmur.      No gallop.  No click. No rub.      Comments: Device left pectoral area. No hematoma or infection noted.    Pulses:     Intact distal pulses.   Edema:     Peripheral edema absent.   Abdominal:      General: Bowel sounds are normal.      Palpations: Abdomen is soft.      Tenderness: There is no abdominal tenderness.   Musculoskeletal: Normal range of motion.         General: No tenderness. Skin:     General: Skin is warm and dry.   Neurological:      General: No focal deficit present.      Mental Status: Alert and oriented to person, place and time.           ASSESSMENT AND PLAN  Clinical impression        1. Sinus node dysfunction, status post dual-chamber pacemaker, resolved  2. Status post dual-chamber pacemaker will reposition of the right atrial ventricular lead in March 2019. Device interrogation reviewed patient during this office visit  3. persistent atrial fibrillation, burden of atrial fibrillation by pacemaker interrogation 39%.  Status post PVI March 19, 2024. Status post redo PVI October 2024 no complications  4. Coronary artery disease, stable  5. Normal left ventricular function per echocardiogram showing 60%, stable  6. Hypertension, controlled  7. Sleep apnea  8. High risk medication (dofetilide). Patient failed sotalol therapy. She had recurrence of atrial fibrillation on sotalol therapy..    Plan recommendations    Patient is doing well from the electrophysiology standpoint.  [place] of atrial fusion has decreased from 40% to 6% after last PVI.  Will continue with observation.  She feels great.    Continue with high risk  medication (dofetilide).    Continue with digoxin therapy.    Continue Eliquis therapy.    Follow device clinic as scheduled    Follow my office every 6 months or sooner if needed.    Risk factor modification and lifestyle modification discussed with patient. Diet , exercise and hydration discussed with patient.    I have personally review with patient during this office visit, laboratory data, echocardiogram results, stress test results, Holter-event monitor results prior and after the last electrophysiology visit. All questions has been answered.    Please excuse any errors in grammar or translation related to this dictation.  Voice recognition software was utilized to prepare this document.      Scribe Attestation  By signing my name below, I, KORINA ESQUIVEL RN  , Scribe   attest that this documentation has been prepared under the direction and in the presence of Tutu Guo MD.

## 2025-01-14 ENCOUNTER — OFFICE VISIT (OUTPATIENT)
Dept: FAMILY MEDICINE CLINIC | Age: 65
End: 2025-01-14
Payer: MEDICARE

## 2025-01-14 VITALS
OXYGEN SATURATION: 98 % | DIASTOLIC BLOOD PRESSURE: 78 MMHG | HEIGHT: 65 IN | HEART RATE: 76 BPM | SYSTOLIC BLOOD PRESSURE: 132 MMHG | BODY MASS INDEX: 40.98 KG/M2 | WEIGHT: 246 LBS

## 2025-01-14 DIAGNOSIS — I48.19 ATRIAL FIBRILLATION, PERSISTENT (HCC): ICD-10-CM

## 2025-01-14 DIAGNOSIS — N30.01 ACUTE CYSTITIS WITH HEMATURIA: ICD-10-CM

## 2025-01-14 DIAGNOSIS — I27.20 PULMONARY HYPERTENSION (HCC): Primary | ICD-10-CM

## 2025-01-14 DIAGNOSIS — E11.9 TYPE 2 DIABETES MELLITUS WITHOUT COMPLICATION, WITHOUT LONG-TERM CURRENT USE OF INSULIN (HCC): ICD-10-CM

## 2025-01-14 PROBLEM — Z79.01 CHRONIC ANTICOAGULATION: Status: ACTIVE | Noted: 2024-11-12

## 2025-01-14 LAB
BILIRUBIN, POC: NORMAL
BLOOD URINE, POC: NORMAL
CLARITY, POC: NORMAL
COLOR, POC: YELLOW
GLUCOSE URINE, POC: NORMAL MG/DL
KETONES, POC: NORMAL MG/DL
LEUKOCYTE EST, POC: NORMAL
NITRITE, POC: NORMAL
PH, POC: 5.5
PROTEIN, POC: NORMAL MG/DL
SPECIFIC GRAVITY, POC: 1.01
UROBILINOGEN, POC: NORMAL MG/DL

## 2025-01-14 PROCEDURE — 99213 OFFICE O/P EST LOW 20 MIN: CPT | Performed by: NURSE PRACTITIONER

## 2025-01-14 PROCEDURE — 1123F ACP DISCUSS/DSCN MKR DOCD: CPT | Performed by: NURSE PRACTITIONER

## 2025-01-14 PROCEDURE — 3017F COLORECTAL CA SCREEN DOC REV: CPT | Performed by: NURSE PRACTITIONER

## 2025-01-14 PROCEDURE — 81003 URINALYSIS AUTO W/O SCOPE: CPT | Performed by: NURSE PRACTITIONER

## 2025-01-14 PROCEDURE — G8417 CALC BMI ABV UP PARAM F/U: HCPCS | Performed by: NURSE PRACTITIONER

## 2025-01-14 PROCEDURE — 1159F MED LIST DOCD IN RCRD: CPT | Performed by: NURSE PRACTITIONER

## 2025-01-14 PROCEDURE — 3075F SYST BP GE 130 - 139MM HG: CPT | Performed by: NURSE PRACTITIONER

## 2025-01-14 PROCEDURE — 1036F TOBACCO NON-USER: CPT | Performed by: NURSE PRACTITIONER

## 2025-01-14 PROCEDURE — 3046F HEMOGLOBIN A1C LEVEL >9.0%: CPT | Performed by: NURSE PRACTITIONER

## 2025-01-14 PROCEDURE — 1160F RVW MEDS BY RX/DR IN RCRD: CPT | Performed by: NURSE PRACTITIONER

## 2025-01-14 PROCEDURE — 3078F DIAST BP <80 MM HG: CPT | Performed by: NURSE PRACTITIONER

## 2025-01-14 PROCEDURE — G8427 DOCREV CUR MEDS BY ELIG CLIN: HCPCS | Performed by: NURSE PRACTITIONER

## 2025-01-14 PROCEDURE — G8399 PT W/DXA RESULTS DOCUMENT: HCPCS | Performed by: NURSE PRACTITIONER

## 2025-01-14 PROCEDURE — 2022F DILAT RTA XM EVC RTNOPTHY: CPT | Performed by: NURSE PRACTITIONER

## 2025-01-14 PROCEDURE — 1090F PRES/ABSN URINE INCON ASSESS: CPT | Performed by: NURSE PRACTITIONER

## 2025-01-14 RX ORDER — FLUCONAZOLE 150 MG/1
150 TABLET ORAL ONCE
Qty: 1 TABLET | Refills: 0 | Status: SHIPPED | OUTPATIENT
Start: 2025-01-14 | End: 2025-01-14

## 2025-01-14 RX ORDER — CEPHALEXIN 500 MG/1
500 CAPSULE ORAL 3 TIMES DAILY
Qty: 21 CAPSULE | Refills: 0 | Status: SHIPPED | OUTPATIENT
Start: 2025-01-14 | End: 2025-01-21

## 2025-01-14 SDOH — ECONOMIC STABILITY: FOOD INSECURITY: WITHIN THE PAST 12 MONTHS, THE FOOD YOU BOUGHT JUST DIDN'T LAST AND YOU DIDN'T HAVE MONEY TO GET MORE.: NEVER TRUE

## 2025-01-14 SDOH — ECONOMIC STABILITY: FOOD INSECURITY: WITHIN THE PAST 12 MONTHS, YOU WORRIED THAT YOUR FOOD WOULD RUN OUT BEFORE YOU GOT MONEY TO BUY MORE.: NEVER TRUE

## 2025-01-14 ASSESSMENT — PATIENT HEALTH QUESTIONNAIRE - PHQ9
SUM OF ALL RESPONSES TO PHQ QUESTIONS 1-9: 0
SUM OF ALL RESPONSES TO PHQ QUESTIONS 1-9: 0
1. LITTLE INTEREST OR PLEASURE IN DOING THINGS: NOT AT ALL
SUM OF ALL RESPONSES TO PHQ QUESTIONS 1-9: 0
SUM OF ALL RESPONSES TO PHQ QUESTIONS 1-9: 0
2. FEELING DOWN, DEPRESSED OR HOPELESS: NOT AT ALL
SUM OF ALL RESPONSES TO PHQ9 QUESTIONS 1 & 2: 0

## 2025-01-14 NOTE — PROGRESS NOTES
Lisset Bacon (: 1960) is a 64 y.o. female, Established patient, who presents today for:    Chief Complaint   Patient presents with    UTI     UTI  Dysuria - yes   Urgency - yes   Frequency- yes   Hesitancy- yes   Abdominal Discomfort- yes   Low Back Pain- yes   Foul Smelling Urine- no     How long have you had  these symptoms?  Friday              Subjective     History of Present Illness  The patient is a 64-year-old female presenting today for an acute visit with concerns of a urinary tract infection (UTI). Her previous primary care physician was Dr. Carreno, who is no longer with the organization.    She reports experiencing symptoms consistent with a UTI or kidney infection, including dysuria, urgency, frequency, hesitancy, pelvic discomfort, and increased lower back pain. These symptoms began on Friday. She does not report any systemic symptoms such as fever, chills, nausea, vomiting, or changes in appetite. She has a known allergy to SULFA antibiotics but tolerates penicillin well. She expresses concern about the potential development of a yeast infection secondary to antibiotic therapy. She recalls a previous consultation with a gynecologist who prescribed a 7-day course of medication, which she found beneficial. She has been maintaining hydration with one diet beverage daily and has achieved significant weight loss, from 282 pounds to 246 pounds, through dietary modifications, including carbohydrate restriction and portion control. She has a past medical history of UTIs, although the last episode occurred several years ago.    ALLERGIES  She is allergic to SULFA ANTIBIOTICS, LISINOPRIL, LOSARTAN, CODEINE, and PERCOCET.    MEDICATIONS  spironolactone, furosemide, Eliquis, metoprolol, Tikosyn, digoxin, Lipitor               Results  Laboratory Studies  Last A1c was 7.4. Renal function at the end of October was normal with creatinine 0.74, GFR 88. Urinalysis in the office today showed positive  Anesthesia Type: 1% lidocaine with epinephrine and a 1:10 solution of 8.4% sodium bicarbonate

## 2025-01-15 PROCEDURE — 93272 ECG/REVIEW INTERPRET ONLY: CPT | Performed by: INTERNAL MEDICINE

## 2025-01-27 ENCOUNTER — OFFICE VISIT (OUTPATIENT)
Dept: FAMILY MEDICINE CLINIC | Age: 65
End: 2025-01-27
Payer: MEDICARE

## 2025-01-27 VITALS
DIASTOLIC BLOOD PRESSURE: 76 MMHG | TEMPERATURE: 97.8 F | HEART RATE: 68 BPM | OXYGEN SATURATION: 96 % | SYSTOLIC BLOOD PRESSURE: 126 MMHG | BODY MASS INDEX: 41.15 KG/M2 | WEIGHT: 247 LBS | HEIGHT: 65 IN

## 2025-01-27 DIAGNOSIS — J01.10 ACUTE NON-RECURRENT FRONTAL SINUSITIS: Primary | ICD-10-CM

## 2025-01-27 PROCEDURE — G8417 CALC BMI ABV UP PARAM F/U: HCPCS | Performed by: NURSE PRACTITIONER

## 2025-01-27 PROCEDURE — 3074F SYST BP LT 130 MM HG: CPT | Performed by: NURSE PRACTITIONER

## 2025-01-27 PROCEDURE — G8427 DOCREV CUR MEDS BY ELIG CLIN: HCPCS | Performed by: NURSE PRACTITIONER

## 2025-01-27 PROCEDURE — 1159F MED LIST DOCD IN RCRD: CPT | Performed by: NURSE PRACTITIONER

## 2025-01-27 PROCEDURE — 1036F TOBACCO NON-USER: CPT | Performed by: NURSE PRACTITIONER

## 2025-01-27 PROCEDURE — 3017F COLORECTAL CA SCREEN DOC REV: CPT | Performed by: NURSE PRACTITIONER

## 2025-01-27 PROCEDURE — 3078F DIAST BP <80 MM HG: CPT | Performed by: NURSE PRACTITIONER

## 2025-01-27 PROCEDURE — 1123F ACP DISCUSS/DSCN MKR DOCD: CPT | Performed by: NURSE PRACTITIONER

## 2025-01-27 PROCEDURE — 99213 OFFICE O/P EST LOW 20 MIN: CPT | Performed by: NURSE PRACTITIONER

## 2025-01-27 PROCEDURE — 1090F PRES/ABSN URINE INCON ASSESS: CPT | Performed by: NURSE PRACTITIONER

## 2025-01-27 PROCEDURE — G8399 PT W/DXA RESULTS DOCUMENT: HCPCS | Performed by: NURSE PRACTITIONER

## 2025-01-27 PROCEDURE — 1160F RVW MEDS BY RX/DR IN RCRD: CPT | Performed by: NURSE PRACTITIONER

## 2025-01-27 RX ORDER — FLUCONAZOLE 150 MG/1
150 TABLET ORAL
Qty: 2 TABLET | Refills: 1 | Status: SHIPPED | OUTPATIENT
Start: 2025-01-27 | End: 2025-02-08

## 2025-01-27 RX ORDER — FLUTICASONE PROPIONATE 50 MCG
2 SPRAY, SUSPENSION (ML) NASAL DAILY
Qty: 1 EACH | Refills: 5 | Status: SHIPPED | OUTPATIENT
Start: 2025-01-27

## 2025-01-27 RX ORDER — CEFDINIR 300 MG/1
300 CAPSULE ORAL 2 TIMES DAILY
Qty: 20 CAPSULE | Refills: 0 | Status: SHIPPED | OUTPATIENT
Start: 2025-01-27 | End: 2025-02-06

## 2025-01-27 ASSESSMENT — ENCOUNTER SYMPTOMS
COLOR CHANGE: 0
SINUS PRESSURE: 1
WHEEZING: 0
EYE REDNESS: 0
CHEST TIGHTNESS: 0
SINUS PAIN: 1
COUGH: 0
SORE THROAT: 0
EYE DISCHARGE: 0
SHORTNESS OF BREATH: 0

## 2025-01-27 NOTE — PROGRESS NOTES
Lisset Bacon (: 1960) is a 64 y.o. female, Established patient, who presents today for:    Chief Complaint   Patient presents with    Sinus Problem     X5 days, sinus pressure, sneezing, thick mucus, no discoloration, headache, pt states she just feels under the weather          Subjective     HPI:  Patient reports 4-5 days of sinus pressure and ear pressure and headache  No fever or cough  Pt is sneezing, thick sinus congestion, color has been clear  She has used Flonase in the past and needs a refill today            Review of Systems   Constitutional:  Negative for chills, fever and unexpected weight change.   HENT:  Positive for congestion, ear pain (Pressure), sinus pressure and sinus pain. Negative for sore throat.    Eyes:  Negative for discharge and redness.   Respiratory:  Negative for cough, chest tightness, shortness of breath and wheezing.    Cardiovascular:  Negative for chest pain, palpitations and leg swelling.   Skin:  Negative for color change and rash.   Allergic/Immunologic: Negative for environmental allergies.   Neurological:  Negative for dizziness, weakness, light-headedness, numbness and headaches.   Hematological:  Negative for adenopathy.   Psychiatric/Behavioral:  Negative for confusion. The patient is not nervous/anxious.         Past Medical History:   Diagnosis Date    Angina at rest ([facility]) 2018    Anxiety     Atopic rhinitis 2016    Atrial fibrillation, persistent ([facility]) 05/10/2018    Baker's cyst of knee, left 2022    Chronic cholecystitis with calculus 2017    Chronic gastritis 2023    Coronary artery disease involving native coronary artery of native heart 2024    Coronary artery disease involving native coronary artery of native heart 2024    Dyshidrotic eczema 01/15/2020    Essential hypertension 2014    GERD (gastroesophageal reflux disease) 2022    Hyperlipidemia     Morbid obesity 2009    ALE on CPAP

## 2025-02-07 ENCOUNTER — OFFICE VISIT (OUTPATIENT)
Dept: FAMILY MEDICINE CLINIC | Age: 65
End: 2025-02-07

## 2025-02-07 VITALS
WEIGHT: 247.2 LBS | OXYGEN SATURATION: 98 % | HEART RATE: 77 BPM | HEIGHT: 65 IN | BODY MASS INDEX: 41.19 KG/M2 | DIASTOLIC BLOOD PRESSURE: 74 MMHG | SYSTOLIC BLOOD PRESSURE: 122 MMHG

## 2025-02-07 DIAGNOSIS — Z13.6 SCREENING FOR CARDIOVASCULAR CONDITION: ICD-10-CM

## 2025-02-07 DIAGNOSIS — E11.59 HYPERTENSION ASSOCIATED WITH DIABETES (HCC): ICD-10-CM

## 2025-02-07 DIAGNOSIS — I15.2 HYPERTENSION ASSOCIATED WITH DIABETES (HCC): ICD-10-CM

## 2025-02-07 DIAGNOSIS — E11.9 TYPE 2 DIABETES MELLITUS WITHOUT COMPLICATION, WITHOUT LONG-TERM CURRENT USE OF INSULIN (HCC): ICD-10-CM

## 2025-02-07 DIAGNOSIS — Z13.29 SCREENING FOR THYROID DISORDER: ICD-10-CM

## 2025-02-07 DIAGNOSIS — E78.5 HYPERLIPIDEMIA ASSOCIATED WITH TYPE 2 DIABETES MELLITUS (HCC): ICD-10-CM

## 2025-02-07 DIAGNOSIS — I27.20 PULMONARY HYPERTENSION (HCC): ICD-10-CM

## 2025-02-07 DIAGNOSIS — Z13.0 SCREENING, ANEMIA, DEFICIENCY, IRON: ICD-10-CM

## 2025-02-07 DIAGNOSIS — E11.69 HYPERLIPIDEMIA ASSOCIATED WITH TYPE 2 DIABETES MELLITUS (HCC): ICD-10-CM

## 2025-02-07 DIAGNOSIS — Z00.00 MEDICARE ANNUAL WELLNESS VISIT, SUBSEQUENT: Primary | ICD-10-CM

## 2025-02-07 DIAGNOSIS — I48.19 ATRIAL FIBRILLATION, PERSISTENT (HCC): ICD-10-CM

## 2025-02-07 DIAGNOSIS — Z71.89 ACP (ADVANCE CARE PLANNING): ICD-10-CM

## 2025-02-07 DIAGNOSIS — Z12.31 ENCOUNTER FOR SCREENING MAMMOGRAM FOR MALIGNANT NEOPLASM OF BREAST: ICD-10-CM

## 2025-02-07 PROBLEM — M71.22 BAKER'S CYST OF KNEE, LEFT: Status: RESOLVED | Noted: 2022-06-23 | Resolved: 2025-02-07

## 2025-02-07 PROBLEM — L30.1 DYSHIDROTIC ECZEMA: Status: RESOLVED | Noted: 2020-01-15 | Resolved: 2025-02-07

## 2025-02-07 PROBLEM — M54.41 BILATERAL LOW BACK PAIN WITH BILATERAL SCIATICA: Status: RESOLVED | Noted: 2024-07-08 | Resolved: 2025-02-07

## 2025-02-07 PROBLEM — R10.13 DYSPEPSIA: Status: RESOLVED | Noted: 2023-05-17 | Resolved: 2025-02-07

## 2025-02-07 PROBLEM — K80.10 CHRONIC CHOLECYSTITIS WITH CALCULUS: Status: RESOLVED | Noted: 2017-08-01 | Resolved: 2025-02-07

## 2025-02-07 PROBLEM — J30.2 SEASONAL ALLERGIES: Status: RESOLVED | Noted: 2022-09-19 | Resolved: 2025-02-07

## 2025-02-07 PROBLEM — K29.50 CHRONIC GASTRITIS: Status: RESOLVED | Noted: 2023-06-19 | Resolved: 2025-02-07

## 2025-02-07 PROBLEM — M17.10 PATELLOFEMORAL ARTHRITIS: Status: RESOLVED | Noted: 2022-07-07 | Resolved: 2025-02-07

## 2025-02-07 PROBLEM — M54.42 BILATERAL LOW BACK PAIN WITH BILATERAL SCIATICA: Status: RESOLVED | Noted: 2024-07-08 | Resolved: 2025-02-07

## 2025-02-07 PROBLEM — R00.1 SYMPTOMATIC BRADYCARDIA: Status: RESOLVED | Noted: 2018-03-09 | Resolved: 2025-02-07

## 2025-02-07 PROBLEM — M19.90 OSTEOARTHRITIS: Status: RESOLVED | Noted: 2022-06-20 | Resolved: 2025-02-07

## 2025-02-07 ASSESSMENT — ENCOUNTER SYMPTOMS
SHORTNESS OF BREATH: 0
CHEST TIGHTNESS: 0
VOICE CHANGE: 0
EYE PAIN: 0
BACK PAIN: 0
DIARRHEA: 0
NAUSEA: 0
TROUBLE SWALLOWING: 0
COLOR CHANGE: 0
BLOOD IN STOOL: 0
ABDOMINAL PAIN: 0
CONSTIPATION: 0

## 2025-02-07 ASSESSMENT — LIFESTYLE VARIABLES
HOW OFTEN DO YOU HAVE A DRINK CONTAINING ALCOHOL: NEVER
HOW MANY STANDARD DRINKS CONTAINING ALCOHOL DO YOU HAVE ON A TYPICAL DAY: PATIENT DOES NOT DRINK

## 2025-02-07 ASSESSMENT — PATIENT HEALTH QUESTIONNAIRE - PHQ9
SUM OF ALL RESPONSES TO PHQ QUESTIONS 1-9: 0
SUM OF ALL RESPONSES TO PHQ9 QUESTIONS 1 & 2: 0
2. FEELING DOWN, DEPRESSED OR HOPELESS: NOT AT ALL
SUM OF ALL RESPONSES TO PHQ QUESTIONS 1-9: 0
1. LITTLE INTEREST OR PLEASURE IN DOING THINGS: NOT AT ALL

## 2025-02-07 NOTE — PROGRESS NOTES
[facility] PRIMARY CARE  [address]  [address]  [place] [facility] [place] [address]  Dept: [phone]  Dept Fax: [phone]  Loc: [phone]     2/7/2025    Visit type: Follow up    The patient (or guardian, if applicable) and other individuals in attendance with the patient were advised that Artificial Intelligence will be utilized during this visit to record, process the conversation to generate a clinical note, and support improvement of the AI technology. The patient (or guardian, if applicable) and other individuals in attendance at the appointment consented to the use of AI, including the recording.      Reason for Visit: Medicare AWV       ASSESSMENT/PLAN     Assessment & Plan  1. Diabetes Mellitus.  Her A1c level, recorded in October 2024, was 7, indicating good control of her diabetes. She has lost 32 pounds on Trulicity. She will continue her current regimen of metformin, Jardiance, and Trulicity. She is advised to maintain a healthy diet and regular exercise routine.    2. Atrial Fibrillation.  Her atrial fibrillation is currently stable, with a significant reduction in events from 36 to 6 following her second ablation. She will continue taking Eliquis.    3. Hyperlipidemia.  Her cholesterol levels were within the normal range during her last evaluation in October 2024. She will continue her current medication regimen and dietary modifications.    4. Hypertension.  Her blood pressure readings are within the normal range. She will continue her current medication regimen.    5. Sleep Apnea.  She will continue using her CPAP machine.    6. Osteoarthritis.  She reports having osteoarthritis in multiple joints. No new treatment is required at this time.    7. Health Maintenance.  She is due for her yearly mammogram in February 2025. A PSA test has been ordered. Blood work has been ordered to be completed before her next visit in 6 months.    Follow-up  The patient will follow

## 2025-02-10 DIAGNOSIS — E11.9 TYPE 2 DIABETES MELLITUS WITHOUT COMPLICATION, WITHOUT LONG-TERM CURRENT USE OF INSULIN (HCC): ICD-10-CM

## 2025-02-10 RX ORDER — DULAGLUTIDE 1.5 MG/.5ML
1.5 INJECTION, SOLUTION SUBCUTANEOUS WEEKLY
Qty: 6 ML | Refills: 0 | Status: SHIPPED | OUTPATIENT
Start: 2025-02-10

## 2025-02-10 NOTE — TELEPHONE ENCOUNTER
Pharmacy is requesting medication refill. Please approve or deny this request.    Rx requested:  Requested Prescriptions     Pending Prescriptions Disp Refills    dulaglutide (TRULICITY) 1.5 MG/0.5ML SC injection 6 mL 0     Sig: Inject 0.5 mLs into the skin once a week         Last Office Visit:   2/7/2025      Next Visit Date:  Future Appointments   Date Time Provider Department Center   3/27/2025  9:00 AM [facility] PACEMAKER IN CLINIC [facility]   8/7/2025 10:15 AM Farhad Reese MD [facility]   9/16/2025 10:30 AM Paul Dotson MD [place] [facility]

## 2025-02-18 NOTE — TELEPHONE ENCOUNTER
[facility] follow up  Is this an oncology patient? N/A  Imaging completed prior to appointment?           PFT: 9/21/2021          Spirometry:  No results found for this or any previous visit.         Overnight Oximetry:  No results found for this or any previous visit.         Chest CT:  9/29/2024         Chest Xray: 6/3/2024                                 Echo: TTE 10/13/2023          Swallow Study:         Sleep Study: 12/1/2008                                                                        Pet Scan:  No results found for this or any previous visit.           CPAP and supplies through [employer].      download airview via Wi-Fi, 5G          Were external records obtained NA    Appt. instructions given:    Remind pt to bring a CPAP equipment for downloads.   Obtain CME information if needed.          Sent to Dr Guo to approve.

## 2025-02-20 ENCOUNTER — HOSPITAL ENCOUNTER (OUTPATIENT)
Dept: WOMENS IMAGING | Age: 65
Discharge: HOME | End: 2025-02-22
Attending: INTERNAL MEDICINE
Payer: MEDICARE

## 2025-02-20 DIAGNOSIS — Z12.31 ENCOUNTER FOR SCREENING MAMMOGRAM FOR MALIGNANT NEOPLASM OF BREAST: ICD-10-CM

## 2025-02-20 PROCEDURE — 77063 BREAST TOMOSYNTHESIS BI: CPT

## 2025-03-09 PROBLEM — Z13.6 SCREENING FOR CARDIOVASCULAR CONDITION: Status: RESOLVED | Noted: 2025-02-07 | Resolved: 2025-03-09

## 2025-03-09 PROBLEM — Z13.29 SCREENING FOR THYROID DISORDER: Status: RESOLVED | Noted: 2025-02-07 | Resolved: 2025-03-09

## 2025-03-09 PROBLEM — Z00.00 MEDICARE ANNUAL WELLNESS VISIT, SUBSEQUENT: Status: RESOLVED | Noted: 2025-02-07 | Resolved: 2025-03-09

## 2025-03-09 PROBLEM — Z12.31 ENCOUNTER FOR SCREENING MAMMOGRAM FOR MALIGNANT NEOPLASM OF BREAST: Status: RESOLVED | Noted: 2025-02-07 | Resolved: 2025-03-09

## 2025-03-09 PROBLEM — Z13.0 SCREENING, ANEMIA, DEFICIENCY, IRON: Status: RESOLVED | Noted: 2025-02-07 | Resolved: 2025-03-09

## 2025-03-27 ENCOUNTER — HOSPITAL ENCOUNTER (OUTPATIENT)
Dept: CARDIOLOGY | Age: 65
Discharge: HOME | End: 2025-03-27

## 2025-03-28 ENCOUNTER — HOSPITAL ENCOUNTER (OUTPATIENT)
Dept: CARDIOLOGY | Age: 65
Discharge: HOME | End: 2025-03-28
Payer: MEDICARE

## 2025-03-28 PROCEDURE — 93280 PM DEVICE PROGR EVAL DUAL: CPT | Performed by: INTERNAL MEDICINE

## 2025-03-28 PROCEDURE — 93280 PM DEVICE PROGR EVAL DUAL: CPT

## 2025-04-03 DIAGNOSIS — I10 PRIMARY HYPERTENSION: ICD-10-CM

## 2025-04-03 DIAGNOSIS — Z79.899 HIGH RISK MEDICATION USE: ICD-10-CM

## 2025-04-03 RX ORDER — FUROSEMIDE 20 MG/1
20 TABLET ORAL DAILY
Qty: 90 TABLET | Refills: 3 | Status: SHIPPED | OUTPATIENT
Start: 2025-04-03 | End: 2026-04-03

## 2025-04-03 NOTE — TELEPHONE ENCOUNTER
Pt returned call states to send rx to local [facility] pharmacy. Jakub CHUNG

## 2025-04-03 NOTE — TELEPHONE ENCOUNTER
Rec'd rx refill from [facility] for Furosemide to be sent to [facility]. Last script was sent to [facility].     Vmm left for patient to call office back to advise if script to go to [facility] or [facility].    Pending appt with Dr. Fitch 10/29/25.

## 2025-04-05 DIAGNOSIS — E11.9 TYPE 2 DIABETES MELLITUS WITHOUT COMPLICATION, WITHOUT LONG-TERM CURRENT USE OF INSULIN: ICD-10-CM

## 2025-04-07 DIAGNOSIS — K21.9 GASTROESOPHAGEAL REFLUX DISEASE, UNSPECIFIED WHETHER ESOPHAGITIS PRESENT: ICD-10-CM

## 2025-04-07 DIAGNOSIS — E11.9 TYPE 2 DIABETES MELLITUS WITHOUT COMPLICATION, WITHOUT LONG-TERM CURRENT USE OF INSULIN: ICD-10-CM

## 2025-04-07 RX ORDER — OMEPRAZOLE 40 MG/1
40 CAPSULE, DELAYED RELEASE ORAL
Qty: 90 CAPSULE | Refills: 1 | Status: SHIPPED | OUTPATIENT
Start: 2025-04-07

## 2025-04-07 RX ORDER — DULAGLUTIDE 1.5 MG/.5ML
INJECTION, SOLUTION SUBCUTANEOUS
Qty: 6 ML | Refills: 3 | Status: SHIPPED | OUTPATIENT
Start: 2025-04-07

## 2025-04-07 NOTE — TELEPHONE ENCOUNTER
Pharmacy is requesting medication refill. Please approve or deny this request.    Rx requested:  Requested Prescriptions     Pending Prescriptions Disp Refills    TRULICITY 1.5 MG/0.5ML SC injection [Pharmacy Med Name: Trulicity 1.5 MG/0.5ML Subcutaneous Solution Pen-injector] 6 mL 3     Sig: INJECT THE CONTENTS OF ONE PEN  SUBCUTANEOUSLY WEEKLY AS  DIRECTED         Last Office Visit:   2/7/2025      Next Visit Date:  Future Appointments   Date Time Provider Department Center   8/7/2025 10:15 AM Farhad Reese MD [facility]   9/16/2025 10:30 AM Paul Dotson MD [place] [facility] [place]   1/16/2026  9:30 AM [facility] PACEMAKER IN CLINIC [facility]  [facility]

## 2025-04-07 NOTE — TELEPHONE ENCOUNTER
Comments:     Last Office Visit (last PCP visit):   2/7/2025    Next Visit Date:  Future Appointments   Date Time Provider Department Center   8/7/2025 10:15 AM Farhad Reese MD [facility]   9/16/2025 10:30 AM Paul Dotson MD [place] [facility] [place]   1/16/2026  9:30 AM [facility] PACEMAKER IN CLINIC [facility]       **If hasn't been seen in over a year OR hasn't followed up according to last diabetes/ADHD visit, make appointment for patient before sending refill to provider.    Rx requested:  Requested Prescriptions     Pending Prescriptions Disp Refills    empagliflozin (JARDIANCE) 25 MG tablet 90 tablet 1     Sig: Take 1 tablet by mouth daily    omeprazole (PRILOSEC) 40 MG delayed release capsule 90 capsule 1     Sig: Take 1 capsule by mouth every morning (before breakfast)

## 2025-04-07 NOTE — TELEPHONE ENCOUNTER
Pt requesting refills     omeprazole (PRILOSEC) 40 MG delayed release capsule     empagliflozin (JARDIANCE) 25 MG tablet     Pharmacy changed to [facility] delivery [facility].     LOV 2/7/25  FOV 8/7/25

## 2025-05-15 ENCOUNTER — OFFICE VISIT (OUTPATIENT)
Dept: FAMILY MEDICINE CLINIC | Age: 65
End: 2025-05-15
Payer: MEDICARE

## 2025-05-15 VITALS
BODY MASS INDEX: 40.27 KG/M2 | DIASTOLIC BLOOD PRESSURE: 76 MMHG | OXYGEN SATURATION: 95 % | TEMPERATURE: 97.4 F | HEART RATE: 75 BPM | SYSTOLIC BLOOD PRESSURE: 124 MMHG | WEIGHT: 242 LBS

## 2025-05-15 DIAGNOSIS — B37.31 VAGINAL CANDIDIASIS: Primary | ICD-10-CM

## 2025-05-15 DIAGNOSIS — R10.2 SUPRAPUBIC PRESSURE: ICD-10-CM

## 2025-05-15 LAB
BILIRUBIN, POC: ABNORMAL
BLOOD URINE, POC: ABNORMAL
CLARITY, POC: CLEAR
COLOR, POC: YELLOW
GLUCOSE URINE, POC: ABNORMAL MG/DL
KETONES, POC: ABNORMAL MG/DL
LEUKOCYTE EST, POC: ABNORMAL
NITRITE, POC: ABNORMAL
PH, POC: 6.5
PROTEIN, POC: ABNORMAL MG/DL
SPECIFIC GRAVITY, POC: 1.01
UROBILINOGEN, POC: ABNORMAL MG/DL

## 2025-05-15 PROCEDURE — 81003 URINALYSIS AUTO W/O SCOPE: CPT | Performed by: NURSE PRACTITIONER

## 2025-05-15 PROCEDURE — 1159F MED LIST DOCD IN RCRD: CPT | Performed by: NURSE PRACTITIONER

## 2025-05-15 PROCEDURE — 1036F TOBACCO NON-USER: CPT | Performed by: NURSE PRACTITIONER

## 2025-05-15 PROCEDURE — 1160F RVW MEDS BY RX/DR IN RCRD: CPT | Performed by: NURSE PRACTITIONER

## 2025-05-15 PROCEDURE — 99213 OFFICE O/P EST LOW 20 MIN: CPT | Performed by: NURSE PRACTITIONER

## 2025-05-15 PROCEDURE — 3078F DIAST BP <80 MM HG: CPT | Performed by: NURSE PRACTITIONER

## 2025-05-15 PROCEDURE — G8399 PT W/DXA RESULTS DOCUMENT: HCPCS | Performed by: NURSE PRACTITIONER

## 2025-05-15 PROCEDURE — G8427 DOCREV CUR MEDS BY ELIG CLIN: HCPCS | Performed by: NURSE PRACTITIONER

## 2025-05-15 PROCEDURE — 3074F SYST BP LT 130 MM HG: CPT | Performed by: NURSE PRACTITIONER

## 2025-05-15 PROCEDURE — G8417 CALC BMI ABV UP PARAM F/U: HCPCS | Performed by: NURSE PRACTITIONER

## 2025-05-15 PROCEDURE — 1123F ACP DISCUSS/DSCN MKR DOCD: CPT | Performed by: NURSE PRACTITIONER

## 2025-05-15 PROCEDURE — 3017F COLORECTAL CA SCREEN DOC REV: CPT | Performed by: NURSE PRACTITIONER

## 2025-05-15 PROCEDURE — 1090F PRES/ABSN URINE INCON ASSESS: CPT | Performed by: NURSE PRACTITIONER

## 2025-05-15 RX ORDER — FLUCONAZOLE 150 MG/1
150 TABLET ORAL
Qty: 2 TABLET | Refills: 1 | Status: SHIPPED | OUTPATIENT
Start: 2025-05-15 | End: 2025-05-27

## 2025-05-15 RX ORDER — METOPROLOL TARTRATE 100 MG/1
100 TABLET ORAL 2 TIMES DAILY
COMMUNITY
Start: 2025-04-04

## 2025-05-15 RX ORDER — CLOBETASOL PROPIONATE 0.5 MG/G
OINTMENT TOPICAL
COMMUNITY
Start: 2025-03-25

## 2025-05-15 RX ORDER — FLUCONAZOLE 150 MG/1
150 TABLET ORAL
Qty: 2 TABLET | Refills: 0 | Status: SHIPPED | OUTPATIENT
Start: 2025-05-15 | End: 2025-05-15 | Stop reason: DRUGHIGH

## 2025-05-15 ASSESSMENT — ENCOUNTER SYMPTOMS
DIARRHEA: 0
WHEEZING: 0
VOMITING: 0
CHEST TIGHTNESS: 0
NAUSEA: 0
SHORTNESS OF BREATH: 0
COUGH: 0
CONSTIPATION: 0
ABDOMINAL PAIN: 1
COLOR CHANGE: 0

## 2025-05-15 NOTE — PROGRESS NOTES
Lisset Bacon (: 1960) is a 65 y.o. female, Established patient, who presents today for:    Chief Complaint   Patient presents with    Vaginal Itching     Using otc meds w/o relief, x 6 days         Subjective     HPI:  History of Present Illness  The patient is a 65-year-old female who presents for evaluation of vaginal itching and a yeast infection.    She has been experiencing symptoms for the past 6 days. She reports pruritus, erythema, and a white, thick discharge but does not endorse any malodorous discharge or urinary symptoms such as dysuria. Lower abdominal pain is present, and suprapubic pressure, but she denies any flank pain. Self-treatment with Monistat and Gyne-Lotrimin has been ineffective. She has previously used Diflucan for similar symptoms.    She is a known diabetic and is currently on Jardiance 25 mg and metformin. Hydration is maintained by consuming 4 to 5 bottles of water daily, and she is making efforts to reduce her intake of diet soda.            Results  Labs   - Urinalysis: Leukocytes are negative. Nitrates are negative. There is no blood. Glucose is 3+, however she is on an SGLT.       Review of Systems   Respiratory:  Negative for cough, chest tightness, shortness of breath and wheezing.    Cardiovascular:  Negative for chest pain, palpitations and leg swelling.   Gastrointestinal:  Positive for abdominal pain (suprapubic pressure). Negative for constipation, diarrhea, nausea and vomiting.   Genitourinary:  Positive for vaginal discharge (thick white). Negative for difficulty urinating, dysuria, flank pain, frequency, genital sores, hematuria, pelvic pain and urgency.   Musculoskeletal:  Negative for arthralgias and myalgias.   Skin:  Negative for color change and rash.   Neurological:  Negative for dizziness, weakness, light-headedness, numbness and headaches.   Hematological:  Negative for adenopathy.        Past Medical History:   Diagnosis Date    Angina at rest

## 2025-05-15 NOTE — TELEPHONE ENCOUNTER
Pharmacy is requesting medication refill. Please approve or deny this request.    Rx requested:  Requested Prescriptions     Pending Prescriptions Disp Refills    fluconazole (DIFLUCAN) 150 MG tablet [Pharmacy Med Name: Fluconazole 150 MG Oral Tablet] 2 tablet 0     Sig: TAKE 1 TABLET BY MOUTH EVERY 72 HOURS FOR 12 DAYS         Last Office Visit:   1/27/2025      Next Visit Date:  Future Appointments   Date Time Provider Department Center   5/15/2025 11:30 AM Tonie Alas APRN - CNP [facility] DEP   8/7/2025 10:15 AM [facility]   9/16/2025 10:30 AM Paul Dotson MD [place] [facility]   1/16/2026  9:30 AM [facility] PACEMAKER IN CLINIC [facility]

## 2025-07-06 ENCOUNTER — HOSPITAL ENCOUNTER (OUTPATIENT)
Dept: CARDIOLOGY | Age: 65
Discharge: HOME | End: 2025-07-06
Payer: MEDICARE

## 2025-07-06 PROCEDURE — 93294 REM INTERROG EVL PM/LDLS PM: CPT | Performed by: INTERNAL MEDICINE

## 2025-07-06 PROCEDURE — 93296 REM INTERROG EVL PM/IDS: CPT

## 2025-07-14 ENCOUNTER — APPOINTMENT (OUTPATIENT)
Dept: CARDIOLOGY | Facility: CLINIC | Age: 65
End: 2025-07-14
Payer: MEDICARE

## 2025-07-21 ENCOUNTER — APPOINTMENT (OUTPATIENT)
Dept: CARDIOLOGY | Facility: CLINIC | Age: 65
End: 2025-07-21
Payer: MEDICARE

## 2025-07-21 VITALS
BODY MASS INDEX: 39.72 KG/M2 | SYSTOLIC BLOOD PRESSURE: 126 MMHG | WEIGHT: 238.4 LBS | HEART RATE: 67 BPM | HEIGHT: 65 IN | DIASTOLIC BLOOD PRESSURE: 80 MMHG

## 2025-07-21 DIAGNOSIS — I48.0 PAROXYSMAL ATRIAL FIBRILLATION (MULTI): ICD-10-CM

## 2025-07-21 DIAGNOSIS — I10 PRIMARY HYPERTENSION: ICD-10-CM

## 2025-07-21 DIAGNOSIS — I48.11 LONGSTANDING PERSISTENT ATRIAL FIBRILLATION (MULTI): ICD-10-CM

## 2025-07-21 DIAGNOSIS — Z95.0 PACEMAKER: ICD-10-CM

## 2025-07-21 DIAGNOSIS — Z79.01 CHRONIC ANTICOAGULATION: ICD-10-CM

## 2025-07-21 DIAGNOSIS — I48.19 PERSISTENT ATRIAL FIBRILLATION (MULTI): ICD-10-CM

## 2025-07-21 DIAGNOSIS — I49.5 SINUS NODE DYSFUNCTION (MULTI): ICD-10-CM

## 2025-07-21 DIAGNOSIS — Z79.899 HIGH RISK MEDICATION USE: Primary | ICD-10-CM

## 2025-07-21 PROCEDURE — 1036F TOBACCO NON-USER: CPT | Performed by: NURSE PRACTITIONER

## 2025-07-21 PROCEDURE — 93000 ELECTROCARDIOGRAM COMPLETE: CPT | Performed by: INTERNAL MEDICINE

## 2025-07-21 PROCEDURE — 1160F RVW MEDS BY RX/DR IN RCRD: CPT | Performed by: NURSE PRACTITIONER

## 2025-07-21 PROCEDURE — 3074F SYST BP LT 130 MM HG: CPT | Performed by: NURSE PRACTITIONER

## 2025-07-21 PROCEDURE — 3079F DIAST BP 80-89 MM HG: CPT | Performed by: NURSE PRACTITIONER

## 2025-07-21 PROCEDURE — 99214 OFFICE O/P EST MOD 30 MIN: CPT | Performed by: NURSE PRACTITIONER

## 2025-07-21 PROCEDURE — 1159F MED LIST DOCD IN RCRD: CPT | Performed by: NURSE PRACTITIONER

## 2025-07-21 PROCEDURE — 3008F BODY MASS INDEX DOCD: CPT | Performed by: NURSE PRACTITIONER

## 2025-07-21 RX ORDER — DOFETILIDE 0.5 MG/1
500 CAPSULE ORAL 2 TIMES DAILY
Qty: 180 CAPSULE | Refills: 3 | Status: SHIPPED | OUTPATIENT
Start: 2025-07-21

## 2025-07-21 RX ORDER — METOPROLOL TARTRATE 100 MG/1
100 TABLET ORAL 2 TIMES DAILY
Qty: 180 TABLET | Refills: 3 | Status: SHIPPED | OUTPATIENT
Start: 2025-07-21

## 2025-07-21 RX ORDER — DULAGLUTIDE 1.5 MG/.5ML
1.5 INJECTION, SOLUTION SUBCUTANEOUS
COMMUNITY
Start: 2025-05-06

## 2025-07-21 RX ORDER — CLOBETASOL PROPIONATE 0.5 MG/G
OINTMENT TOPICAL
COMMUNITY
Start: 2025-03-25

## 2025-07-21 RX ORDER — PREDNISOLONE ACETATE 10 MG/ML
1 SUSPENSION/ DROPS OPHTHALMIC 3 TIMES DAILY
COMMUNITY
Start: 2025-06-17

## 2025-07-21 RX ORDER — OMEPRAZOLE 40 MG/1
40 CAPSULE, DELAYED RELEASE ORAL
COMMUNITY
Start: 2025-06-23

## 2025-07-21 RX ORDER — KETOROLAC TROMETHAMINE 5 MG/ML
1 SOLUTION OPHTHALMIC 3 TIMES DAILY
COMMUNITY
Start: 2025-06-17

## 2025-07-21 NOTE — PROGRESS NOTES
"Chief Complaint:  Chief Complaint   Patient presents with    Follow-up     Patient presents today for 6M follow up.        Suzy Hanson is a 65 y.o. female that presents to the office today with her  for cardiac follow-up. She follows with her  primary cardiologist Dr. Fitch and Dr. Guo.  She was added to my schedule today as a 6-month follow-up for Dr. Guo.  She  has a PMH of sinus node dysfunction, dual-chamber pacemaker, persistent atrial fibrillation, PVI ablation, high risk medication denies, long-term anticoagulation Eliquis, hypertension, sleep apnea.    Overall she states she is doing well.  She denies any chest pain, chest pressure, chest tightness, shortness of breath, dizziness, lightheadedness, dizziness.      Testing Reviewed  EKG obtained in the office today and verified with Dr. Gordon  shows NSR minimal voltage for LVH, non  specific T wave abnormality. HR  65 bpm, QT/Qtc  402/418    She reports recent device interrogation though I cannot find copy of this.    CBC:   Lab Results   Component Value Date    WBC 9.1 10/10/2024    RBC 4.48 10/10/2024    HGB 12.8 10/10/2024    HCT 39.5 10/10/2024     10/10/2024        CMP:    Lab Results   Component Value Date     10/10/2024    K 3.8 10/10/2024     10/10/2024    CO2 28 10/10/2024    BUN 13 10/10/2024    CREATININE 0.75 10/10/2024    GLUCOSE 154 (H) 10/10/2024    CALCIUM 8.6 10/10/2024       Lipid Profile:    No results found for: \"CHLPL\", \"TRIG\", \"HDL\", \"LDLCALC\", \"LDLDIRECT\"    BMP:  Lab Results   Component Value Date     10/10/2024     06/26/2024     03/29/2024    K 3.8 10/10/2024    K 4.0 06/26/2024    K 4.5 03/29/2024     10/10/2024     06/26/2024     03/29/2024    CO2 28 10/10/2024    CO2 28 06/26/2024    CO2 23 03/29/2024    BUN 13 10/10/2024    BUN 11 06/26/2024    BUN 19 03/29/2024    CREATININE 0.75 10/10/2024    CREATININE 0.72 06/26/2024    CREATININE 0.92 03/29/2024 "       CBC:  Lab Results   Component Value Date    WBC 9.1 10/10/2024    WBC 7.2 06/26/2024    WBC 12.3 (H) 03/29/2024    RBC 4.48 10/10/2024    RBC 4.64 06/26/2024    RBC 4.29 03/29/2024    HGB 12.8 10/10/2024    HGB 13.3 06/26/2024    HGB 12.6 03/29/2024    HCT 39.5 10/10/2024    HCT 40.7 06/26/2024    HCT 39.0 03/29/2024    MCV 88 10/10/2024    MCV 88 06/26/2024    MCV 91 03/29/2024    MCH 28.6 10/10/2024    MCH 28.7 06/26/2024    MCH 29.4 03/29/2024    MCHC 32.4 10/10/2024    MCHC 32.7 06/26/2024    MCHC 32.3 03/29/2024    RDW 14.2 10/10/2024    RDW 13.8 06/26/2024    RDW 14.1 03/29/2024     10/10/2024     06/26/2024     03/29/2024       Hepatic Function Panel:    Lab Results   Component Value Date    ALKPHOS 85 10/10/2024    ALT 20 10/10/2024    AST 18 10/10/2024    PROT 6.5 10/10/2024    BILITOT 0.6 10/10/2024       HgBA1c:    Lab Results   Component Value Date    HGBA1C 7.4 (H) 10/31/2024       Magnesium:    Lab Results   Component Value Date    MG 2.56 (H) 03/29/2024       TSH:    Lab Results   Component Value Date    TSH 2.27 02/26/2024       PT/INR:    Lab Results   Component Value Date    PROTIME 15.8 (H) 10/10/2024    INR 1.4 (H) 10/10/2024       Problem List[1]    Social History[2]    Medical History[3]    Current Medications[4]    Amlodipine, Codeine, Losartan, Milk containing products (dairy), and Sulfa (sulfonamide antibiotics)    Family History[5]    Surgical History[6]       Review of systems  Constitutional: No weight loss, fever, chills, weakness or fatigue  HEENT: No visual loss, blurred vision, double vision or yellow sclerae  Skin: No rash or itching  Cardiovascular: No chest pain, pressure or discomfort, No palpitations or edema.  Respiratory: No shortness of breath, cough or sputum  Gastrointestinal: No nausea, vomiting or diarrhea. No bloody or dark tarry stools.  Neurological: No headache, lightheadedness, dizziness, syncope.   Musculoskeletal: No muscle, back pain,  "joint pain or stiffness.  Hematologic: No anemia, bleeding or bruising.    /80 (BP Location: Left arm)   Pulse 67   Ht 1.651 m (5' 5\")   Wt 108 kg (238 lb 6.4 oz)   BMI 39.67 kg/m²     Physical Exam  Constitutional: Well developed, awake/alert x 3, no distress.  Head/Neck: No JVD, No bruits  Respiratory/Thorax: patent airways, CTAB, normal breath sounds with good expansion.  Cardiovascular: Regular rate and rhythm, no murmurs, normal S1 and S2,   Gastrointestinal: Non distended, soft, non-tender, no rebound tenderness or guarding.  Extremities: No cyanosis, edema.   Neurological: Alert and oriented x 3. Moves extremities spontaneous with purpose.  Psychological: Appropriate mood and behavior  Skin: Warm and Dry. No lesions or rashes.     Assessment/Plan  Persistent atrial fibrillation; EKG as noted above  Anticoagulation; denies bleeding.  Reports compliance.  Continue Eliquis  High risk medication;   Dofetilide.  EKG as noted above.  Continue  Dual-chamber pacemaker; reports recent device interrogation.  No device interrogation noted in chart.  I will request that they be placed in the chart.  Hypertension; stable.  Follow in device clinic as scheduled  Follow in the office with Dr. Guo in 6 months with in-clinic device interrogation prior.      Please excuse any errors in grammar or translation related to dictation, voice recognition software was used to prepare this document.           [1]   Patient Active Problem List  Diagnosis    Anxiety    Arteriosclerosis of coronary artery    Heart burn    HTN (hypertension)    Obstructive sleep apnea, adult    Pacemaker    Palpitations    Paroxysmal atrial fibrillation (Multi)    Persistent atrial fibrillation (Multi)    Sinus node dysfunction (Multi)    Chest pressure    Tightness in chest    High risk medication use    Class 3 severe obesity due to excess calories with body mass index (BMI) of 45.0 to 49.9 in adult    Atrial fibrillation (Multi)    Atrial " fibrillation, persistent (Multi)    Never smoked tobacco    BMI 40.0-44.9, adult (Multi)    Medication dose changed    Shortness of breath    Chest discomfort    PAF (paroxysmal atrial fibrillation) (Multi)    Chronic anticoagulation   [2]   Social History  Tobacco Use    Smoking status: Never     Passive exposure: Never    Smokeless tobacco: Never   Vaping Use    Vaping status: Never Used   Substance Use Topics    Alcohol use: Never    Drug use: Never   [3]   Past Medical History:  Diagnosis Date    Acute kidney failure, unspecified     Acute kidney injury    Arrhythmia     Chronic kidney disease     Coronary artery disease     Hyperlipidemia     Hypertension     Other dorsalgia     Interscapular pain    Personal history of other diseases of the circulatory system     History of sinus bradycardia    Personal history of other diseases of urinary system     History of chronic kidney disease    Personal history of other endocrine, nutritional and metabolic disease     History of hypokalemia    Personal history of other specified conditions     History of fatigue    Personal history of other specified conditions     H/O shortness of breath    Sleep apnea    [4]   Current Outpatient Medications:     atorvastatin (Lipitor) 80 mg tablet, Take 1 tablet (80 mg) by mouth once daily at bedtime., Disp: 90 tablet, Rfl: 3    clobetasol (Temovate) 0.05 % ointment, APPLY TO HANDS TWICE DAILY 2 TO 3 WEEKS AS NEEDED FOR FLARES, Disp: , Rfl:     digoxin (Lanoxin) 250 MCG tab;et, Take 1 tablet (250 mcg) by mouth once daily., Disp: 90 tablet, Rfl: 3    doxazosin (Cardura) 2 mg tablet, 0.5 tab(s) orally once a day, Disp: 45 tablet, Rfl: 3    empagliflozin (Jardiance) 25 mg, Take 1 tablet (25 mg) by mouth once daily., Disp: , Rfl:     furosemide (Lasix) 20 mg tablet, Take 1 tablet (20 mg) by mouth once daily., Disp: 90 tablet, Rfl: 3    ketorolac (Acular) 0.5 % ophthalmic solution, Administer 1 drop into the left eye 3 times a day.,  Disp: , Rfl:     loratadine (Claritin) 10 mg tablet, Take 1 tablet (10 mg) by mouth once daily., Disp: , Rfl:     metFORMIN  mg 24 hr tablet, Take 2 tablets (1,000 mg) by mouth once daily., Disp: , Rfl:     omeprazole (PriLOSEC) 40 mg DR capsule, Take 1 capsule (40 mg) by mouth once daily in the morning. Take before meals., Disp: , Rfl:     PARoxetine (Paxil) 20 mg tablet, Take 1 tablet (20 mg) by mouth once daily., Disp: , Rfl:     potassium chloride CR (Klor-Con M20) 20 mEq ER tablet, Take 1 tablet every evening, Disp: 90 tablet, Rfl: 3    prednisoLONE acetate (Pred-Forte) 1 % ophthalmic suspension, Administer 1 drop into the left eye 3 times a day., Disp: , Rfl:     spironolactone (Aldactone) 25 mg tablet, TAKE 2 TABLETS BY MOUTH IN THE  MORNING AND 1 TABLET BY MOUTH IN THE EVENING, Disp: 270 tablet, Rfl: 3    Trulicity 1.5 mg/0.5 mL pen injector injection, Inject 1.5 mg under the skin every 7 days., Disp: , Rfl:     apixaban (Eliquis) 5 mg tablet, Take 1 tablet (5 mg) by mouth 2 times a day., Disp: 180 tablet, Rfl: 3    dofetilide (Tikosyn) 500 mcg capsule, Take 1 capsule (500 mcg) by mouth 2 times a day., Disp: 180 capsule, Rfl: 3    metoprolol tartrate (Lopressor) 100 mg tablet, Take 1 tablet (100 mg) by mouth 2 times a day., Disp: 180 tablet, Rfl: 3    Trulicity 0.75 mg/0.5 mL pen injector, Inject 0.75 mg under the skin 1 (one) time per week. (Patient not taking: Reported on 7/21/2025), Disp: , Rfl:   [5]   Family History  Problem Relation Name Age of Onset    Other (systemic lupus erythematosus) Mother      Heart attack Father      Other (arteriosclerotic cardiovascular disease) Sister      Heart attack Sister      Lung cancer Brother      Other (cardiac disorder) Child     [6]   Past Surgical History:  Procedure Laterality Date    CARDIAC CATHETERIZATION N/A 6/26/2024    Procedure: Left Heart Cath, With LV;  Surgeon: Shree Oliveira MD;  Location: [facility];  Service: Cardiovascular;   Laterality: N/A;  cath/possibel ptca at [facility] with Dr. Oliveira or Beau within next week.Pt to hold Eliquis 48 hours prior and Trulicity 7 days prior.Labs have been done within last 30 days. Holding Metformin am of procedure    CARDIAC ELECTROPHYSIOLOGY PROCEDURE N/A 3/28/2024    Procedure: Ablation A-Fib;  Surgeon: Tutu Guo MD;  Location: [facility];  Service: Electrophysiology;  Laterality: N/A;    CARDIAC ELECTROPHYSIOLOGY PROCEDURE Bilateral 10/11/2024    Procedure: Ablation A-Fib;  Surgeon: Tutu Guo MD;  Location: [facility];  Service: Electrophysiology;  Laterality: Bilateral;  PVI ABLATION TO BE DONE IN [place] IN AUGUST WITH . STEPHEN ORDERED AND TO BE DONE PRIOR. No CTA is needed    OTHER SURGICAL HISTORY  10/27/2021    Cholecystectomy    OTHER SURGICAL HISTORY  10/27/2021    Colonoscopy    OTHER SURGICAL HISTORY  10/27/2021    Pacemaker insertion    OTHER SURGICAL HISTORY  10/27/2021    Hysterectomy    OTHER SURGICAL HISTORY  10/27/2021     section    OTHER SURGICAL HISTORY  2022    Gallbladder surgery

## 2025-07-29 ENCOUNTER — HOSPITAL ENCOUNTER (OUTPATIENT)
Dept: LAB | Age: 65
Discharge: HOME | End: 2025-07-29
Payer: MEDICARE

## 2025-07-29 DIAGNOSIS — Z13.0 SCREENING, ANEMIA, DEFICIENCY, IRON: ICD-10-CM

## 2025-07-29 DIAGNOSIS — E78.5 HYPERLIPIDEMIA ASSOCIATED WITH TYPE 2 DIABETES MELLITUS (HCC): ICD-10-CM

## 2025-07-29 DIAGNOSIS — E11.69 HYPERLIPIDEMIA ASSOCIATED WITH TYPE 2 DIABETES MELLITUS (HCC): ICD-10-CM

## 2025-07-29 DIAGNOSIS — E11.9 TYPE 2 DIABETES MELLITUS WITHOUT COMPLICATION, WITHOUT LONG-TERM CURRENT USE OF INSULIN (HCC): ICD-10-CM

## 2025-07-29 DIAGNOSIS — Z13.29 SCREENING FOR THYROID DISORDER: ICD-10-CM

## 2025-07-29 LAB
ALBUMIN SERPL-MCNC: 4 G/DL (ref 3.5–4.6)
ALP SERPL-CCNC: 97 U/L (ref 40–130)
ALT SERPL-CCNC: 20 U/L (ref 0–33)
ANION GAP SERPL CALCULATED.3IONS-SCNC: 12 MEQ/L (ref 9–15)
AST SERPL-CCNC: 20 U/L (ref 0–35)
BASOPHILS # BLD: 0 K/UL (ref 0–0.2)
BASOPHILS NFR BLD: 0.3 %
BILIRUB SERPL-MCNC: 0.5 MG/DL (ref 0.2–0.7)
BUN SERPL-MCNC: 13 MG/DL (ref 8–23)
CALCIUM SERPL-MCNC: 9.3 MG/DL (ref 8.5–9.9)
CHLORIDE SERPL-SCNC: 103 MEQ/L (ref 95–107)
CHOLEST SERPL-MCNC: 109 MG/DL (ref 0–199)
CO2 SERPL-SCNC: 25 MEQ/L (ref 20–31)
CREAT SERPL-MCNC: 0.87 MG/DL (ref 0.5–0.9)
CREAT UR-MCNC: 88.2 MG/DL
EOSINOPHIL # BLD: 0.2 K/UL (ref 0–0.7)
EOSINOPHIL NFR BLD: 2.3 %
ERYTHROCYTE [DISTWIDTH] IN BLOOD BY AUTOMATED COUNT: 15.3 % (ref 11.5–14.5)
GLOBULIN SER CALC-MCNC: 3.1 G/DL (ref 2.3–3.5)
GLUCOSE FASTING: 134 MG/DL (ref 70–99)
HCT VFR BLD AUTO: 46 % (ref 37–47)
HDLC SERPL-MCNC: 32 MG/DL (ref 40–59)
HGB BLD-MCNC: 14.1 G/DL (ref 12–16)
LDL CHOLESTEROL: 59 MG/DL (ref 0–129)
LYMPHOCYTES # BLD: 1.3 K/UL (ref 1–4.8)
LYMPHOCYTES NFR BLD: 13.9 %
MCH RBC QN AUTO: 27.5 PG (ref 27–31.3)
MCHC RBC AUTO-ENTMCNC: 30.7 % (ref 33–37)
MCV RBC AUTO: 89.8 FL (ref 79.4–94.8)
MICROALBUMIN UR-MCNC: <1.2 MG/DL
MICROALBUMIN/CREAT UR-RTO: NORMAL MG/G (ref 0–30)
MONOCYTES # BLD: 0.7 K/UL (ref 0.2–0.8)
MONOCYTES NFR BLD: 7.9 %
NEUTROPHILS # BLD: 6.8 K/UL (ref 1.4–6.5)
NEUTS SEG NFR BLD: 75.2 %
PLATELET # BLD AUTO: 180 K/UL (ref 130–400)
POTASSIUM SERPL-SCNC: 4.2 MEQ/L (ref 3.4–4.9)
PROT SERPL-MCNC: 7.1 G/DL (ref 6.3–8)
RBC # BLD AUTO: 5.12 M/UL (ref 4.2–5.4)
SODIUM SERPL-SCNC: 140 MEQ/L (ref 135–144)
TRIGLYCERIDE, FASTING: 92 MG/DL (ref 0–150)
TSH SERPL-MCNC: 3.1 UIU/ML (ref 0.44–3.86)
WBC # BLD AUTO: 9 K/UL (ref 4.8–10.8)

## 2025-07-29 PROCEDURE — 82570 ASSAY OF URINE CREATININE: CPT

## 2025-07-29 PROCEDURE — 36415 COLL VENOUS BLD VENIPUNCTURE: CPT

## 2025-07-29 PROCEDURE — 80061 LIPID PANEL: CPT

## 2025-07-29 PROCEDURE — 82043 UR ALBUMIN QUANTITATIVE: CPT

## 2025-07-29 PROCEDURE — 85025 COMPLETE CBC W/AUTO DIFF WBC: CPT

## 2025-07-29 PROCEDURE — 80053 COMPREHEN METABOLIC PANEL: CPT

## 2025-07-29 PROCEDURE — 84443 ASSAY THYROID STIM HORMONE: CPT

## 2025-07-30 DIAGNOSIS — E11.9 TYPE 2 DIABETES MELLITUS WITHOUT COMPLICATION, WITHOUT LONG-TERM CURRENT USE OF INSULIN (HCC): ICD-10-CM

## 2025-07-30 RX ORDER — METFORMIN HYDROCHLORIDE 500 MG/1
1000 TABLET, EXTENDED RELEASE ORAL
Qty: 180 TABLET | Refills: 0 | Status: SHIPPED | OUTPATIENT
Start: 2025-07-30

## 2025-07-30 NOTE — TELEPHONE ENCOUNTER
Comments: Pt requesting refill for metFORMIN (GLUCOPHAGE-XR) 500 MG extended release tablet to be sent to [facility]. States she is running low. Please advise.    Last Office Visit (last PCP visit):   2/7/2025    Next Visit Date:  Future Appointments   Date Time Provider Department Center   8/7/2025 10:15 AM Farhad Reese MD [facility]   9/11/2025 11:15 AM Pual Dotson MD [facility]   1/16/2026  9:30 AM [facility] CLINIC [facility]       **If hasn't been seen in over a year OR hasn't followed up according to last diabetes/ADHD visit, make appointment for patient before sending refill to provider.    Rx requested:  Requested Prescriptions     Pending Prescriptions Disp Refills    metFORMIN (GLUCOPHAGE-XR) 500 MG extended release tablet 180 tablet 1     Sig: Take 2 tablets by mouth daily (with breakfast)

## 2025-08-07 ENCOUNTER — HOSPITAL ENCOUNTER (OUTPATIENT)
Dept: CT IMAGING | Age: 65
Discharge: HOME | End: 2025-08-09
Attending: INTERNAL MEDICINE
Payer: MEDICARE

## 2025-08-07 ENCOUNTER — OFFICE VISIT (OUTPATIENT)
Dept: FAMILY MEDICINE CLINIC | Age: 65
End: 2025-08-07
Payer: MEDICARE

## 2025-08-07 VITALS
DIASTOLIC BLOOD PRESSURE: 82 MMHG | OXYGEN SATURATION: 95 % | WEIGHT: 239 LBS | HEART RATE: 70 BPM | BODY MASS INDEX: 39.77 KG/M2 | SYSTOLIC BLOOD PRESSURE: 128 MMHG | RESPIRATION RATE: 18 BRPM

## 2025-08-07 DIAGNOSIS — I15.2 HYPERTENSION ASSOCIATED WITH DIABETES (HCC): ICD-10-CM

## 2025-08-07 DIAGNOSIS — G47.33 OSA (OBSTRUCTIVE SLEEP APNEA): ICD-10-CM

## 2025-08-07 DIAGNOSIS — Z13.0 SCREENING FOR DEFICIENCY ANEMIA: ICD-10-CM

## 2025-08-07 DIAGNOSIS — Z12.31 ENCOUNTER FOR SCREENING MAMMOGRAM FOR MALIGNANT NEOPLASM OF BREAST: ICD-10-CM

## 2025-08-07 DIAGNOSIS — G45.9 TIA (TRANSIENT ISCHEMIC ATTACK): ICD-10-CM

## 2025-08-07 DIAGNOSIS — E78.5 HYPERLIPIDEMIA ASSOCIATED WITH TYPE 2 DIABETES MELLITUS (HCC): ICD-10-CM

## 2025-08-07 DIAGNOSIS — Z13.29 SCREENING FOR THYROID DISORDER: ICD-10-CM

## 2025-08-07 DIAGNOSIS — R20.0 NUMBNESS OF TONGUE: ICD-10-CM

## 2025-08-07 DIAGNOSIS — E11.59 HYPERTENSION ASSOCIATED WITH DIABETES (HCC): ICD-10-CM

## 2025-08-07 DIAGNOSIS — B37.31 VAGINAL CANDIDIASIS: ICD-10-CM

## 2025-08-07 DIAGNOSIS — G45.9 TIA (TRANSIENT ISCHEMIC ATTACK): Primary | ICD-10-CM

## 2025-08-07 DIAGNOSIS — I48.19 ATRIAL FIBRILLATION, PERSISTENT (HCC): ICD-10-CM

## 2025-08-07 DIAGNOSIS — E11.9 TYPE 2 DIABETES MELLITUS WITHOUT COMPLICATION, WITHOUT LONG-TERM CURRENT USE OF INSULIN (HCC): ICD-10-CM

## 2025-08-07 DIAGNOSIS — E11.69 HYPERLIPIDEMIA ASSOCIATED WITH TYPE 2 DIABETES MELLITUS (HCC): ICD-10-CM

## 2025-08-07 PROBLEM — Z71.89 ACP (ADVANCE CARE PLANNING): Status: RESOLVED | Noted: 2025-02-07 | Resolved: 2025-08-07

## 2025-08-07 LAB — HBA1C MFR BLD: 6.7 %

## 2025-08-07 PROCEDURE — 3017F COLORECTAL CA SCREEN DOC REV: CPT | Performed by: INTERNAL MEDICINE

## 2025-08-07 PROCEDURE — G8427 DOCREV CUR MEDS BY ELIG CLIN: HCPCS | Performed by: INTERNAL MEDICINE

## 2025-08-07 PROCEDURE — 1123F ACP DISCUSS/DSCN MKR DOCD: CPT | Performed by: INTERNAL MEDICINE

## 2025-08-07 PROCEDURE — 1090F PRES/ABSN URINE INCON ASSESS: CPT | Performed by: INTERNAL MEDICINE

## 2025-08-07 PROCEDURE — 83036 HEMOGLOBIN GLYCOSYLATED A1C: CPT | Performed by: INTERNAL MEDICINE

## 2025-08-07 PROCEDURE — 2022F DILAT RTA XM EVC RTNOPTHY: CPT | Performed by: INTERNAL MEDICINE

## 2025-08-07 PROCEDURE — 3044F HG A1C LEVEL LT 7.0%: CPT | Performed by: INTERNAL MEDICINE

## 2025-08-07 PROCEDURE — 1160F RVW MEDS BY RX/DR IN RCRD: CPT | Performed by: INTERNAL MEDICINE

## 2025-08-07 PROCEDURE — 1036F TOBACCO NON-USER: CPT | Performed by: INTERNAL MEDICINE

## 2025-08-07 PROCEDURE — 6360000004 HC RX CONTRAST MEDICATION: Performed by: INTERNAL MEDICINE

## 2025-08-07 PROCEDURE — G8417 CALC BMI ABV UP PARAM F/U: HCPCS | Performed by: INTERNAL MEDICINE

## 2025-08-07 PROCEDURE — 70496 CT ANGIOGRAPHY HEAD: CPT

## 2025-08-07 PROCEDURE — 3074F SYST BP LT 130 MM HG: CPT | Performed by: INTERNAL MEDICINE

## 2025-08-07 PROCEDURE — 3079F DIAST BP 80-89 MM HG: CPT | Performed by: INTERNAL MEDICINE

## 2025-08-07 PROCEDURE — 99214 OFFICE O/P EST MOD 30 MIN: CPT | Performed by: INTERNAL MEDICINE

## 2025-08-07 PROCEDURE — 1159F MED LIST DOCD IN RCRD: CPT | Performed by: INTERNAL MEDICINE

## 2025-08-07 PROCEDURE — G8399 PT W/DXA RESULTS DOCUMENT: HCPCS | Performed by: INTERNAL MEDICINE

## 2025-08-07 RX ORDER — FLUCONAZOLE 150 MG/1
150 TABLET ORAL DAILY
Qty: 3 TABLET | Refills: 0 | Status: SHIPPED | OUTPATIENT
Start: 2025-08-07 | End: 2025-08-10

## 2025-08-07 RX ORDER — IOPAMIDOL 755 MG/ML
75 INJECTION, SOLUTION INTRAVASCULAR
Status: COMPLETED | OUTPATIENT
Start: 2025-08-07 | End: 2025-08-07

## 2025-08-07 RX ADMIN — IOPAMIDOL 75 ML: 755 INJECTION, SOLUTION INTRAVENOUS at 11:50

## 2025-08-07 ASSESSMENT — ENCOUNTER SYMPTOMS
TROUBLE SWALLOWING: 0
VOICE CHANGE: 0
CONSTIPATION: 0
ABDOMINAL PAIN: 0
BACK PAIN: 0
DIARRHEA: 0
EYE PAIN: 0
CHEST TIGHTNESS: 0
NAUSEA: 0
BLOOD IN STOOL: 0
SHORTNESS OF BREATH: 0
COLOR CHANGE: 0

## 2025-08-08 DIAGNOSIS — E11.9 TYPE 2 DIABETES MELLITUS WITHOUT COMPLICATION, WITHOUT LONG-TERM CURRENT USE OF INSULIN (HCC): ICD-10-CM

## 2025-08-11 ENCOUNTER — HOSPITAL ENCOUNTER (OUTPATIENT)
Dept: ULTRASOUND IMAGING | Age: 65
Discharge: HOME | End: 2025-08-13
Attending: INTERNAL MEDICINE
Payer: MEDICARE

## 2025-08-11 DIAGNOSIS — R20.0 NUMBNESS OF TONGUE: ICD-10-CM

## 2025-08-11 DIAGNOSIS — G45.9 TIA (TRANSIENT ISCHEMIC ATTACK): ICD-10-CM

## 2025-08-11 LAB
VAS LEFT BULB EDV: 13.8 CM/S
VAS LEFT BULB PSV: 90 CM/S
VAS LEFT CCA DIST EDV: 13.8 CM/S
VAS LEFT CCA DIST PSV: 91.8 CM/S
VAS LEFT CCA MID EDV: 12 CM/S
VAS LEFT CCA MID PSV: 95.4 CM/S
VAS LEFT CCA PROX EDV: 19.5 CM/S
VAS LEFT CCA PROX PSV: 135.3 CM/S
VAS LEFT ECA EDV: 4.7 CM/S
VAS LEFT ECA PSV: 95.4 CM/S
VAS LEFT ICA DIST EDV: 18.2 CM/S
VAS LEFT ICA DIST PSV: 75.4 CM/S
VAS LEFT ICA MID EDV: 17.1 CM/S
VAS LEFT ICA MID PSV: 74.3 CM/S
VAS LEFT ICA PROX EDV: 17.4 CM/S
VAS LEFT ICA PROX PSV: 88.2 CM/S
VAS LEFT ICA/CCA PSV: 0.9 NO UNITS
VAS LEFT VERTEBRAL EDV: 9.2 CM/S
VAS LEFT VERTEBRAL PSV: 45.5 CM/S
VAS RIGHT BULB EDV: 12.8 CM/S
VAS RIGHT BULB PSV: 79.5 CM/S
VAS RIGHT CCA DIST EDV: 11.8 CM/S
VAS RIGHT CCA DIST PSV: 80.4 CM/S
VAS RIGHT CCA MID EDV: 17.5 CM/S
VAS RIGHT CCA MID PSV: 112.4 CM/S
VAS RIGHT CCA PROX EDV: 11.8 CM/S
VAS RIGHT CCA PROX PSV: 103.4 CM/S
VAS RIGHT ECA EDV: 4.7 CM/S
VAS RIGHT ECA PSV: 112.4 CM/S
VAS RIGHT ICA DIST EDV: 21.5 CM/S
VAS RIGHT ICA DIST PSV: 87.5 CM/S
VAS RIGHT ICA MID EDV: 22.6 CM/S
VAS RIGHT ICA MID PSV: 88.6 CM/S
VAS RIGHT ICA PROX EDV: 21 CM/S
VAS RIGHT ICA PROX PSV: 108.1 CM/S
VAS RIGHT ICA/CCA PSV: 1 NO UNITS
VAS RIGHT VERTEBRAL EDV: 7.5 CM/S
VAS RIGHT VERTEBRAL PSV: 43 CM/S

## 2025-08-11 PROCEDURE — 93880 EXTRACRANIAL BILAT STUDY: CPT

## 2025-08-11 RX ORDER — EMPAGLIFLOZIN 25 MG/1
25 TABLET, FILM COATED ORAL DAILY
Qty: 90 TABLET | Refills: 3 | Status: SHIPPED | OUTPATIENT
Start: 2025-08-11

## 2025-08-25 ENCOUNTER — OFFICE VISIT (OUTPATIENT)
Dept: FAMILY MEDICINE CLINIC | Age: 65
End: 2025-08-25
Payer: MEDICARE

## 2025-08-25 VITALS
OXYGEN SATURATION: 95 % | RESPIRATION RATE: 18 BRPM | DIASTOLIC BLOOD PRESSURE: 70 MMHG | HEART RATE: 85 BPM | SYSTOLIC BLOOD PRESSURE: 130 MMHG

## 2025-08-25 DIAGNOSIS — E11.59 HYPERTENSION ASSOCIATED WITH DIABETES (HCC): ICD-10-CM

## 2025-08-25 DIAGNOSIS — I15.2 HYPERTENSION ASSOCIATED WITH DIABETES (HCC): ICD-10-CM

## 2025-08-25 DIAGNOSIS — E11.9 TYPE 2 DIABETES MELLITUS WITHOUT COMPLICATION, WITHOUT LONG-TERM CURRENT USE OF INSULIN (HCC): ICD-10-CM

## 2025-08-25 DIAGNOSIS — R20.0 NUMBNESS OF TONGUE: Primary | ICD-10-CM

## 2025-08-25 DIAGNOSIS — B37.31 RECURRENT CANDIDIASIS OF VAGINA: ICD-10-CM

## 2025-08-25 DIAGNOSIS — I48.19 ATRIAL FIBRILLATION, PERSISTENT (HCC): ICD-10-CM

## 2025-08-25 PROBLEM — Z12.31 ENCOUNTER FOR SCREENING MAMMOGRAM FOR MALIGNANT NEOPLASM OF BREAST: Status: RESOLVED | Noted: 2025-08-07 | Resolved: 2025-08-25

## 2025-08-25 PROBLEM — Z13.29 SCREENING FOR THYROID DISORDER: Status: RESOLVED | Noted: 2025-08-07 | Resolved: 2025-08-25

## 2025-08-25 PROBLEM — Z13.0 SCREENING FOR DEFICIENCY ANEMIA: Status: RESOLVED | Noted: 2025-08-07 | Resolved: 2025-08-25

## 2025-08-25 PROCEDURE — 1123F ACP DISCUSS/DSCN MKR DOCD: CPT | Performed by: INTERNAL MEDICINE

## 2025-08-25 PROCEDURE — 1090F PRES/ABSN URINE INCON ASSESS: CPT | Performed by: INTERNAL MEDICINE

## 2025-08-25 PROCEDURE — 2022F DILAT RTA XM EVC RTNOPTHY: CPT | Performed by: INTERNAL MEDICINE

## 2025-08-25 PROCEDURE — 3017F COLORECTAL CA SCREEN DOC REV: CPT | Performed by: INTERNAL MEDICINE

## 2025-08-25 PROCEDURE — 3078F DIAST BP <80 MM HG: CPT | Performed by: INTERNAL MEDICINE

## 2025-08-25 PROCEDURE — 1160F RVW MEDS BY RX/DR IN RCRD: CPT | Performed by: INTERNAL MEDICINE

## 2025-08-25 PROCEDURE — G8427 DOCREV CUR MEDS BY ELIG CLIN: HCPCS | Performed by: INTERNAL MEDICINE

## 2025-08-25 PROCEDURE — 3075F SYST BP GE 130 - 139MM HG: CPT | Performed by: INTERNAL MEDICINE

## 2025-08-25 PROCEDURE — G8417 CALC BMI ABV UP PARAM F/U: HCPCS | Performed by: INTERNAL MEDICINE

## 2025-08-25 PROCEDURE — 99214 OFFICE O/P EST MOD 30 MIN: CPT | Performed by: INTERNAL MEDICINE

## 2025-08-25 PROCEDURE — 1036F TOBACCO NON-USER: CPT | Performed by: INTERNAL MEDICINE

## 2025-08-25 PROCEDURE — 3044F HG A1C LEVEL LT 7.0%: CPT | Performed by: INTERNAL MEDICINE

## 2025-08-25 PROCEDURE — 1159F MED LIST DOCD IN RCRD: CPT | Performed by: INTERNAL MEDICINE

## 2025-08-25 PROCEDURE — G8399 PT W/DXA RESULTS DOCUMENT: HCPCS | Performed by: INTERNAL MEDICINE

## 2025-08-25 ASSESSMENT — ENCOUNTER SYMPTOMS
TROUBLE SWALLOWING: 0
BACK PAIN: 0
SHORTNESS OF BREATH: 0
COLOR CHANGE: 0
CONSTIPATION: 0
VOICE CHANGE: 0
CHEST TIGHTNESS: 0
ABDOMINAL PAIN: 0
BLOOD IN STOOL: 0
NAUSEA: 0
DIARRHEA: 0
EYE PAIN: 0

## 2025-08-29 DIAGNOSIS — F41.9 ANXIETY: ICD-10-CM

## 2025-08-29 DIAGNOSIS — K21.9 GASTROESOPHAGEAL REFLUX DISEASE, UNSPECIFIED WHETHER ESOPHAGITIS PRESENT: ICD-10-CM

## 2025-08-29 RX ORDER — OMEPRAZOLE 40 MG/1
40 CAPSULE, DELAYED RELEASE ORAL
Qty: 90 CAPSULE | Refills: 3 | Status: SHIPPED | OUTPATIENT
Start: 2025-08-29

## 2025-08-29 RX ORDER — PAROXETINE 20 MG/1
20 TABLET, FILM COATED ORAL EVERY MORNING
Qty: 90 TABLET | Refills: 3 | Status: SHIPPED | OUTPATIENT
Start: 2025-08-29

## 2025-10-29 ENCOUNTER — APPOINTMENT (OUTPATIENT)
Dept: CARDIOLOGY | Facility: CLINIC | Age: 65
End: 2025-10-29
Payer: MEDICARE

## 2026-01-12 ENCOUNTER — APPOINTMENT (OUTPATIENT)
Dept: CARDIOLOGY | Facility: CLINIC | Age: 66
End: 2026-01-12
Payer: MEDICARE

## (undated) DEVICE — CONMED SCOPE SAVER BITE BLOCK, 20X27 MM: Brand: SCOPE SAVER

## (undated) DEVICE — ENDOSCOPIC TRAY TRNSPRT 20.5X16.5X4.1 IN RECYCL SUGAR PULP

## (undated) DEVICE — CATHETER, VARIABLE, LASSO, 2-6-2-MM, 20 POLE

## (undated) DEVICE — CLOSURE SYSTEM, VASCULAR, MVP 6-12FR, VENOUS

## (undated) DEVICE — GAUZE,SPONGE,2"X2",8PLY,STERILE,LF,2'S: Brand: MEDLINE

## (undated) DEVICE — TROCAR ENDOSCP L100MM DIA12MM BLNT STBL SL DISP ENDOPATH

## (undated) DEVICE — SUTURE VCRL + SZ 0 L27IN ABSRB VLT L26MM UR-6 5/8 CIR VCP603H

## (undated) DEVICE — BINDER ABD M/L H9IN FOR 46-62IN WHT 3 SLD PNL DSGN HOOP AND

## (undated) DEVICE — CATHETER, DIAGNOSTIC, AMPLATZ, 5 FR, AR MOD

## (undated) DEVICE — BAG SPEC LAP H6IN DIA3IN 250ML 10 12MM CANN ATTCH MEM WIRE

## (undated) DEVICE — KIT CHOLGM POLYUR W/ KARLAN BLLN CATH 4FR L60CM 5MM INTRO

## (undated) DEVICE — CATHETER, EPL, 7FR DUO, 2/8 2M 95CM, STEERABLE LGCRL

## (undated) DEVICE — TUBING, MANIFOLD, LOW PRESSURE

## (undated) DEVICE — ELECTRODE PT RET AD L9FT HI MOIST COND ADH HYDRGEL CORDED

## (undated) DEVICE — INTENDED FOR TISSUE SEPARATION, AND OTHER PROCEDURES THAT REQUIRE A SHARP SURGICAL BLADE TO PUNCTURE OR CUT.: Brand: BARD-PARKER ® CARBON RIB-BACK BLADES

## (undated) DEVICE — SUTURE VCRL SZ 3-0 L27IN ABSRB UD L26MM SH 1/2 CIR J416H

## (undated) DEVICE — Device

## (undated) DEVICE — 4-PORT MANIFOLD: Brand: NEPTUNE 2

## (undated) DEVICE — 1842 FOAM BLOCK NEEDLE COUNTER: Brand: DEVON

## (undated) DEVICE — WARMER LAPSCP BST 2 STG STRL DISP HEAT BLU

## (undated) DEVICE — TUBING SET, IRRIGATION, SMARTABLATE

## (undated) DEVICE — CATHETER, ULTRASOUND, SOUNDSTAR, 8F X 90CM

## (undated) DEVICE — SHEATH, GLIDESHEATH, SLENDER, 6FR 10CM

## (undated) DEVICE — TUBING IRRIGATION 140/160/180/190 SER GI ENDOSCP SMARTCAP

## (undated) DEVICE — DRAPE,LAP,CHOLE,W/TROUGHS,STERILE: Brand: MEDLINE

## (undated) DEVICE — SUTURE VCRL + SZ 4-0 L18IN ABSRB UD L19MM PS-2 3/8 CIR PRIM VCP496H

## (undated) DEVICE — FORCEPS BX L240CM JAW DIA2.8MM L CAP W/ NDL MIC MESH TOOTH

## (undated) DEVICE — TUBE SET 96 MM 64 MM H2O PERISTALTIC STD AUX CHANNEL

## (undated) DEVICE — COVER,MAYO STAND,STERILE: Brand: MEDLINE

## (undated) DEVICE — MEDI-VAC NON-CONDUCTIVE SUCTION TUBING: Brand: CARDINAL HEALTH

## (undated) DEVICE — INTRODUCER, SHEATH, FAST-CATH, 8.5FR X 12CM, C-LOCK

## (undated) DEVICE — POSITIONER, RETENTION SYSTEM, PANNUS, 2 PADS 1 STRAP, NS

## (undated) DEVICE — BW-412T DISP COMBO CLEANING BRUSH: Brand: SINGLE USE COMBINATION CLEANING BRUSH

## (undated) DEVICE — X-RAY DETECTABLE SPONGES,16 PLY: Brand: VISTEC

## (undated) DEVICE — DRAPE SURG C-ARM MOBILE XRAY LF

## (undated) DEVICE — NEEDLE, SAFESEPT, BLUNT, 71CM 1 CURVE

## (undated) DEVICE — PENCIL ES L3M BTTN SWCH HOLSTER W/ BLDE ELECTRD EDGE

## (undated) DEVICE — INTRODUCER, TRANSSEPTAL, SWARTZ, 8.5 X 67 CM, W/VALVE, 160CM SS GW 3MM J

## (undated) DEVICE — DISCONTINUED USE 393278 SYRINGE 10 ML HYPO W/O NDL LL TP PLSTC ST

## (undated) DEVICE — CATHETER, OPTITORQUE, 5FR, JACKY, 3.5/ 2H/110CM, CURVED

## (undated) DEVICE — ENDO CARRY-ON PROCEDURE KIT: Brand: ENDO CARRY-ON PROCEDURE KIT

## (undated) DEVICE — NEEDLE HYPO 22GA L1 1/2IN PIVOTING SHLD FOR LUERLOCK SYR

## (undated) DEVICE — APPLIER CLP M L L11.4IN DIA10MM ENDOSCP ROT MULT FOR LIG

## (undated) DEVICE — 2000CC GUARDIAN II: Brand: GUARDIAN

## (undated) DEVICE — GLOVE SURG 5.5 PF POLYISOPRENE WHT STRL SENSICARE MIC

## (undated) DEVICE — GOWN,AURORA,NONREINFORCED,LARGE: Brand: MEDLINE

## (undated) DEVICE — Device: Brand: ENDO SMARTCAP

## (undated) DEVICE — INTRODUCER, SHEATH, FAST-CATH, 8FR X 12CM, C-LOCK

## (undated) DEVICE — BRUSH ENDO CLN L90.5IN SHTH DIA1.7MM BRIST DIA5-7MM 2-6MM

## (undated) DEVICE — RETRACTOR ENDOSCP SHFT L31MM DIA10MM TEAL ATRAUM ARTC 5

## (undated) DEVICE — CATHETER, THERMOCOOL SMART TOUCH, SF, F-J CURVE

## (undated) DEVICE — GUIDEWIRE KIT, SAFESEPT TRANSSEPTAL, .014 X 135CM

## (undated) DEVICE — INSUFFLATION TUBING SET, WITH FILTER: Brand: CONMED

## (undated) DEVICE — SLEEVE CMPR SM STD CALF SCD ANEMB LF

## (undated) DEVICE — TROCAR: Brand: KII FIOS FIRST ENTRY

## (undated) DEVICE — ADAPTER FLSH PMP FLD MGMT GI IRRIG OFP 2 DISPOSABLE

## (undated) DEVICE — TUBING, SUCTION, 1/4" X 10', STRAIGHT: Brand: MEDLINE

## (undated) DEVICE — PACK,SET UP,DRAPE: Brand: MEDLINE

## (undated) DEVICE — TUBE ENDOSCP COLON CHANNEL

## (undated) DEVICE — GLOVE ORANGE PI 8 1/2   MSG9085

## (undated) DEVICE — TROCAR: Brand: KII SHIELDED BLADED ACCESS SYSTEM

## (undated) DEVICE — PATCHES, EXTERNAL REFERENCE, CARTO3

## (undated) DEVICE — TROCAR: Brand: KII® SLEEVE

## (undated) DEVICE — BANDAGE, QUIKCLOT, INTERVENTIONAL HEMO, W/O SLIT

## (undated) DEVICE — TRAY PREP DRY W/ PREM GLV 2 APPL 6 SPNG 2 UNDPD 1 OVERWRAP

## (undated) DEVICE — 3M™ STERI-STRIP™ REINFORCED ADHESIVE SKIN CLOSURES, R1547, 1/2 IN X 4 IN (12 MM X 100 MM), 6 STRIPS/ENVELOPE: Brand: 3M™ STERI-STRIP™

## (undated) DEVICE — DEFOGGER!" ANTI FOG KIT: Brand: DEROYAL

## (undated) DEVICE — CABLE, 34 HYP, 34 LEMO, 10FT, SMART TOUCH

## (undated) DEVICE — SINGLE PORT MANIFOLD: Brand: NEPTUNE 2

## (undated) DEVICE — LABEL MED MINI W/ MARKER

## (undated) DEVICE — BAND, D-STAT RADIAL, TOPICAL HEMOSTAT

## (undated) DEVICE — ENDO CARRY-ON PROCEDURE KIT INCLUDES LUBRICANT, DEFENDO OLYMPUS AIR, WATER, SUCTION, BIOPSY VALVE KIT, ENZYMATIC SPONGE, AND BASIN.: Brand: ENDO CARRY-ON PROCEDURE KIT